# Patient Record
Sex: MALE | Race: WHITE | NOT HISPANIC OR LATINO
[De-identification: names, ages, dates, MRNs, and addresses within clinical notes are randomized per-mention and may not be internally consistent; named-entity substitution may affect disease eponyms.]

---

## 2017-03-02 ENCOUNTER — APPOINTMENT (OUTPATIENT)
Dept: UROLOGY | Facility: CLINIC | Age: 82
End: 2017-03-02

## 2017-03-02 LAB
PSA FREE FLD-MCNC: 20.8 %
PSA FREE SERPL-MCNC: 1.66 NG/ML
PSA SERPL-MCNC: 7.97 NG/ML

## 2017-03-06 ENCOUNTER — APPOINTMENT (OUTPATIENT)
Dept: PULMONOLOGY | Facility: CLINIC | Age: 82
End: 2017-03-06

## 2017-03-06 VITALS
BODY MASS INDEX: 27.3 KG/M2 | HEART RATE: 63 BPM | OXYGEN SATURATION: 94 % | WEIGHT: 206 LBS | SYSTOLIC BLOOD PRESSURE: 122 MMHG | RESPIRATION RATE: 18 BRPM | TEMPERATURE: 97.7 F | DIASTOLIC BLOOD PRESSURE: 75 MMHG | HEIGHT: 73 IN

## 2017-03-06 DIAGNOSIS — M25.519 PAIN IN UNSPECIFIED SHOULDER: ICD-10-CM

## 2017-03-06 DIAGNOSIS — M25.529 PAIN IN UNSPECIFIED ELBOW: ICD-10-CM

## 2017-03-21 ENCOUNTER — APPOINTMENT (OUTPATIENT)
Dept: PULMONOLOGY | Facility: CLINIC | Age: 82
End: 2017-03-21

## 2017-06-05 ENCOUNTER — APPOINTMENT (OUTPATIENT)
Dept: PULMONOLOGY | Facility: CLINIC | Age: 82
End: 2017-06-05

## 2017-06-05 VITALS
HEART RATE: 69 BPM | BODY MASS INDEX: 27.04 KG/M2 | TEMPERATURE: 97.5 F | DIASTOLIC BLOOD PRESSURE: 82 MMHG | HEIGHT: 73 IN | OXYGEN SATURATION: 96 % | SYSTOLIC BLOOD PRESSURE: 144 MMHG | RESPIRATION RATE: 16 BRPM | WEIGHT: 204 LBS

## 2017-06-05 DIAGNOSIS — M43.6 TORTICOLLIS: ICD-10-CM

## 2017-06-05 LAB
BASE EXCESS BLDA CALC-SCNC: -2.7
BLOOD GAS COMMENTS ARTERIAL: NORMAL
GAS PNL BLDA: NORMAL
HCO3 BLDA-SCNC: 21.3
HOROWITZ INDEX BLDA+IHG-RTO: NORMAL
PCO2 BLDA: 35.1
PH BLDA: 7.4
PO2 BLDA: 60
SAO2 % BLDA: NORMAL

## 2017-09-05 ENCOUNTER — APPOINTMENT (OUTPATIENT)
Dept: PULMONOLOGY | Facility: CLINIC | Age: 82
End: 2017-09-05

## 2017-09-07 ENCOUNTER — APPOINTMENT (OUTPATIENT)
Dept: UROLOGY | Facility: CLINIC | Age: 82
End: 2017-09-07
Payer: MEDICARE

## 2017-09-07 LAB
APPEARANCE: CLEAR
BACTERIA: NEGATIVE
BILIRUBIN URINE: NEGATIVE
BLOOD URINE: NEGATIVE
COLOR: ABNORMAL
GLUCOSE QUALITATIVE U: NORMAL MG/DL
HYALINE CASTS: 6 /LPF
KETONES URINE: NEGATIVE
LEUKOCYTE ESTERASE URINE: ABNORMAL
MICROSCOPIC-UA: NORMAL
NITRITE URINE: NEGATIVE
PH URINE: 5.5
PROTEIN URINE: ABNORMAL MG/DL
RED BLOOD CELLS URINE: 6 /HPF
SPECIFIC GRAVITY URINE: 1.02
SQUAMOUS EPITHELIAL CELLS: 0 /HPF
UROBILINOGEN URINE: NORMAL MG/DL
WHITE BLOOD CELLS URINE: 120 /HPF

## 2017-09-07 PROCEDURE — 99214 OFFICE O/P EST MOD 30 MIN: CPT

## 2017-09-08 LAB
CORE LAB FLUID CYTOLOGY: NORMAL
PSA FREE FLD-MCNC: 19.4
PSA FREE SERPL-MCNC: 1.68 NG/ML
PSA SERPL-MCNC: 8.66 NG/ML

## 2017-09-12 ENCOUNTER — APPOINTMENT (OUTPATIENT)
Dept: PULMONOLOGY | Facility: CLINIC | Age: 82
End: 2017-09-12
Payer: MEDICARE

## 2017-09-12 VITALS
TEMPERATURE: 97.6 F | WEIGHT: 204 LBS | BODY MASS INDEX: 27.04 KG/M2 | DIASTOLIC BLOOD PRESSURE: 86 MMHG | HEIGHT: 73 IN | RESPIRATION RATE: 16 BRPM | OXYGEN SATURATION: 96 % | SYSTOLIC BLOOD PRESSURE: 144 MMHG | HEART RATE: 69 BPM

## 2017-09-12 DIAGNOSIS — M17.10 UNILATERAL PRIMARY OSTEOARTHRITIS, UNSPECIFIED KNEE: ICD-10-CM

## 2017-09-12 DIAGNOSIS — M47.9 SPONDYLOSIS, UNSPECIFIED: ICD-10-CM

## 2017-09-12 DIAGNOSIS — R07.81 PLEURODYNIA: ICD-10-CM

## 2017-09-12 LAB
BASE EXCESS BLDA CALC-SCNC: -3.9
BLOOD GAS COMMENTS ARTERIAL: NORMAL
GAS PNL BLDA: NORMAL
HCO3 BLDA-SCNC: 19.7
HOROWITZ INDEX BLDA+IHG-RTO: NORMAL
PCO2 BLDA: 30.2
PH BLDA: 7.43
PO2 BLDA: 74
SAO2 % BLDA: NORMAL

## 2017-09-12 PROCEDURE — 82803 BLOOD GASES ANY COMBINATION: CPT

## 2017-09-12 PROCEDURE — 99214 OFFICE O/P EST MOD 30 MIN: CPT | Mod: 25

## 2017-09-12 PROCEDURE — 36600 WITHDRAWAL OF ARTERIAL BLOOD: CPT

## 2017-12-05 ENCOUNTER — APPOINTMENT (OUTPATIENT)
Dept: PULMONOLOGY | Facility: CLINIC | Age: 82
End: 2017-12-05
Payer: MEDICARE

## 2017-12-05 VITALS
TEMPERATURE: 98.4 F | BODY MASS INDEX: 26.64 KG/M2 | DIASTOLIC BLOOD PRESSURE: 87 MMHG | RESPIRATION RATE: 17 BRPM | HEART RATE: 68 BPM | HEIGHT: 73 IN | WEIGHT: 201 LBS | OXYGEN SATURATION: 92 % | SYSTOLIC BLOOD PRESSURE: 152 MMHG

## 2017-12-05 DIAGNOSIS — M79.605 PAIN IN LEFT LEG: ICD-10-CM

## 2017-12-05 DIAGNOSIS — M54.2 CERVICALGIA: ICD-10-CM

## 2017-12-05 PROCEDURE — 99215 OFFICE O/P EST HI 40 MIN: CPT | Mod: 25

## 2017-12-05 PROCEDURE — 36600 WITHDRAWAL OF ARTERIAL BLOOD: CPT

## 2017-12-05 PROCEDURE — 82803 BLOOD GASES ANY COMBINATION: CPT

## 2017-12-22 ENCOUNTER — APPOINTMENT (OUTPATIENT)
Dept: PULMONOLOGY | Facility: CLINIC | Age: 82
End: 2017-12-22
Payer: MEDICARE

## 2017-12-22 VITALS
OXYGEN SATURATION: 93 % | HEART RATE: 70 BPM | BODY MASS INDEX: 26.77 KG/M2 | HEIGHT: 73 IN | WEIGHT: 202 LBS | TEMPERATURE: 98.3 F | SYSTOLIC BLOOD PRESSURE: 158 MMHG | DIASTOLIC BLOOD PRESSURE: 75 MMHG

## 2017-12-22 DIAGNOSIS — R09.02 HYPOXEMIA: ICD-10-CM

## 2017-12-22 LAB
BASE EXCESS BLDA CALC-SCNC: -2.5
BASE EXCESS BLDA CALC-SCNC: -4.1
BLOOD GAS COMMENTS ARTERIAL: NORMAL
BLOOD GAS COMMENTS ARTERIAL: NORMAL
GAS PNL BLDA: NORMAL
GAS PNL BLDA: NORMAL
HCO3 BLDA-SCNC: 19
HCO3 BLDA-SCNC: NORMAL
HOROWITZ INDEX BLDA+IHG-RTO: NORMAL
HOROWITZ INDEX BLDA+IHG-RTO: NORMAL
PCO2 BLDA: 26.6
PCO2 BLDA: 31.4
PH BLDA: 7.44
PH BLDA: 7.47
PO2 BLDA: 54
PO2 BLDA: 58
SAO2 % BLDA: NORMAL
SAO2 % BLDA: NORMAL

## 2017-12-22 PROCEDURE — 94664 DEMO&/EVAL PT USE INHALER: CPT | Mod: 59

## 2017-12-22 PROCEDURE — 99214 OFFICE O/P EST MOD 30 MIN: CPT | Mod: 25

## 2017-12-22 PROCEDURE — 36600 WITHDRAWAL OF ARTERIAL BLOOD: CPT | Mod: 59

## 2017-12-22 PROCEDURE — 82803 BLOOD GASES ANY COMBINATION: CPT

## 2018-01-03 ENCOUNTER — RX RENEWAL (OUTPATIENT)
Age: 83
End: 2018-01-03

## 2018-03-08 ENCOUNTER — APPOINTMENT (OUTPATIENT)
Dept: UROLOGY | Facility: CLINIC | Age: 83
End: 2018-03-08
Payer: MEDICARE

## 2018-03-08 LAB
APPEARANCE: CLEAR
BACTERIA: NEGATIVE
BILIRUBIN URINE: NEGATIVE
BLOOD URINE: NEGATIVE
COLOR: ABNORMAL
GLUCOSE QUALITATIVE U: NEGATIVE MG/DL
HYALINE CASTS: 1 /LPF
KETONES URINE: NEGATIVE
LEUKOCYTE ESTERASE URINE: ABNORMAL
MICROSCOPIC-UA: NORMAL
NITRITE URINE: NEGATIVE
PH URINE: 5.5
PROTEIN URINE: NEGATIVE MG/DL
PSA FREE FLD-MCNC: 18.3
PSA FREE SERPL-MCNC: 1.14 NG/ML
PSA SERPL-MCNC: 6.23 NG/ML
RED BLOOD CELLS URINE: 2 /HPF
SPECIFIC GRAVITY URINE: 1.01
SQUAMOUS EPITHELIAL CELLS: 0 /HPF
UROBILINOGEN URINE: NEGATIVE MG/DL
WHITE BLOOD CELLS URINE: 143 /HPF

## 2018-03-08 PROCEDURE — 99214 OFFICE O/P EST MOD 30 MIN: CPT

## 2018-03-11 LAB — CORE LAB FLUID CYTOLOGY: NORMAL

## 2018-09-12 ENCOUNTER — APPOINTMENT (OUTPATIENT)
Dept: UROLOGY | Facility: CLINIC | Age: 83
End: 2018-09-12

## 2018-09-17 ENCOUNTER — OUTPATIENT (OUTPATIENT)
Dept: OUTPATIENT SERVICES | Facility: HOSPITAL | Age: 83
LOS: 1 days | End: 2018-09-17
Payer: MEDICARE

## 2018-09-17 VITALS
RESPIRATION RATE: 16 BRPM | WEIGHT: 199.96 LBS | SYSTOLIC BLOOD PRESSURE: 140 MMHG | OXYGEN SATURATION: 95 % | HEIGHT: 73 IN | TEMPERATURE: 99 F | DIASTOLIC BLOOD PRESSURE: 76 MMHG | HEART RATE: 69 BPM

## 2018-09-17 DIAGNOSIS — Z98.890 OTHER SPECIFIED POSTPROCEDURAL STATES: Chronic | ICD-10-CM

## 2018-09-17 DIAGNOSIS — I10 ESSENTIAL (PRIMARY) HYPERTENSION: ICD-10-CM

## 2018-09-17 DIAGNOSIS — I48.1 PERSISTENT ATRIAL FIBRILLATION: ICD-10-CM

## 2018-09-17 DIAGNOSIS — Z29.9 ENCOUNTER FOR PROPHYLACTIC MEASURES, UNSPECIFIED: ICD-10-CM

## 2018-09-17 DIAGNOSIS — Z89.9 ACQUIRED ABSENCE OF LIMB, UNSPECIFIED: Chronic | ICD-10-CM

## 2018-09-17 DIAGNOSIS — G47.34 IDIOPATHIC SLEEP RELATED NONOBSTRUCTIVE ALVEOLAR HYPOVENTILATION: ICD-10-CM

## 2018-09-17 DIAGNOSIS — M16.11 UNILATERAL PRIMARY OSTEOARTHRITIS, RIGHT HIP: ICD-10-CM

## 2018-09-17 DIAGNOSIS — Z95.0 PRESENCE OF CARDIAC PACEMAKER: Chronic | ICD-10-CM

## 2018-09-17 DIAGNOSIS — Z01.818 ENCOUNTER FOR OTHER PREPROCEDURAL EXAMINATION: ICD-10-CM

## 2018-09-17 DIAGNOSIS — J44.9 CHRONIC OBSTRUCTIVE PULMONARY DISEASE, UNSPECIFIED: ICD-10-CM

## 2018-09-17 DIAGNOSIS — Z95.0 PRESENCE OF CARDIAC PACEMAKER: ICD-10-CM

## 2018-09-17 LAB
ANION GAP SERPL CALC-SCNC: 11 MMOL/L — SIGNIFICANT CHANGE UP (ref 5–17)
BLD GP AB SCN SERPL QL: NEGATIVE — SIGNIFICANT CHANGE UP
BUN SERPL-MCNC: 24 MG/DL — HIGH (ref 7–23)
CALCIUM SERPL-MCNC: 9.5 MG/DL — SIGNIFICANT CHANGE UP (ref 8.4–10.5)
CHLORIDE SERPL-SCNC: 105 MMOL/L — SIGNIFICANT CHANGE UP (ref 96–108)
CO2 SERPL-SCNC: 23 MMOL/L — SIGNIFICANT CHANGE UP (ref 22–31)
CREAT SERPL-MCNC: 1.42 MG/DL — HIGH (ref 0.5–1.3)
GLUCOSE SERPL-MCNC: 131 MG/DL — HIGH (ref 70–99)
HBA1C BLD-MCNC: 5.9 % — HIGH (ref 4–5.6)
HCT VFR BLD CALC: 36.8 % — LOW (ref 39–50)
HGB BLD-MCNC: 11.9 G/DL — LOW (ref 13–17)
MCHC RBC-ENTMCNC: 32.1 PG — SIGNIFICANT CHANGE UP (ref 27–34)
MCHC RBC-ENTMCNC: 32.3 GM/DL — SIGNIFICANT CHANGE UP (ref 32–36)
MCV RBC AUTO: 99.2 FL — SIGNIFICANT CHANGE UP (ref 80–100)
MRSA PCR RESULT.: SIGNIFICANT CHANGE UP
PLATELET # BLD AUTO: 150 K/UL — SIGNIFICANT CHANGE UP (ref 150–400)
POTASSIUM SERPL-MCNC: 4.3 MMOL/L — SIGNIFICANT CHANGE UP (ref 3.5–5.3)
POTASSIUM SERPL-SCNC: 4.3 MMOL/L — SIGNIFICANT CHANGE UP (ref 3.5–5.3)
RBC # BLD: 3.71 M/UL — LOW (ref 4.2–5.8)
RBC # FLD: 13.8 % — SIGNIFICANT CHANGE UP (ref 10.3–14.5)
RH IG SCN BLD-IMP: NEGATIVE — SIGNIFICANT CHANGE UP
S AUREUS DNA NOSE QL NAA+PROBE: DETECTED
SODIUM SERPL-SCNC: 139 MMOL/L — SIGNIFICANT CHANGE UP (ref 135–145)
WBC # BLD: 6.72 K/UL — SIGNIFICANT CHANGE UP (ref 3.8–10.5)
WBC # FLD AUTO: 6.72 K/UL — SIGNIFICANT CHANGE UP (ref 3.8–10.5)

## 2018-09-17 PROCEDURE — 83036 HEMOGLOBIN GLYCOSYLATED A1C: CPT

## 2018-09-17 PROCEDURE — G0463: CPT

## 2018-09-17 PROCEDURE — 80048 BASIC METABOLIC PNL TOTAL CA: CPT

## 2018-09-17 PROCEDURE — 87640 STAPH A DNA AMP PROBE: CPT

## 2018-09-17 PROCEDURE — 85027 COMPLETE CBC AUTOMATED: CPT

## 2018-09-17 PROCEDURE — 86901 BLOOD TYPING SEROLOGIC RH(D): CPT

## 2018-09-17 PROCEDURE — 86850 RBC ANTIBODY SCREEN: CPT

## 2018-09-17 PROCEDURE — 87641 MR-STAPH DNA AMP PROBE: CPT

## 2018-09-17 PROCEDURE — 86900 BLOOD TYPING SEROLOGIC ABO: CPT

## 2018-09-17 RX ORDER — TRAMADOL HYDROCHLORIDE 50 MG/1
50 TABLET ORAL ONCE
Qty: 0 | Refills: 0 | Status: DISCONTINUED | OUTPATIENT
Start: 2018-10-01 | End: 2018-10-01

## 2018-09-17 RX ORDER — SODIUM CHLORIDE 9 MG/ML
3 INJECTION INTRAMUSCULAR; INTRAVENOUS; SUBCUTANEOUS EVERY 8 HOURS
Qty: 0 | Refills: 0 | Status: DISCONTINUED | OUTPATIENT
Start: 2018-10-01 | End: 2018-10-03

## 2018-09-17 RX ORDER — LIDOCAINE HCL 20 MG/ML
0.2 VIAL (ML) INJECTION ONCE
Qty: 0 | Refills: 0 | Status: DISCONTINUED | OUTPATIENT
Start: 2018-10-01 | End: 2018-10-03

## 2018-09-17 RX ORDER — CEFAZOLIN SODIUM 1 G
2000 VIAL (EA) INJECTION ONCE
Qty: 0 | Refills: 0 | Status: DISCONTINUED | OUTPATIENT
Start: 2018-10-01 | End: 2018-10-01

## 2018-09-17 NOTE — H&P PST ADULT - PROBLEM SELECTOR PLAN 5
Call cardiology office for last interrogation, if not recent will need to have one done prior to surgery

## 2018-09-17 NOTE — H&P PST ADULT - PMH
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

## 2018-09-17 NOTE — H&P PST ADULT - HISTORY OF PRESENT ILLNESS
Mr. Hutchison is an 83 year old man with PMH 2PPD smoker x50yrs, COPD, Idiopathic Pulmonary Fibrosis, HTN, HLD, Afib on coumadin s/p PPM 2005, BPH and osteoarthritis who has worsening right hip pain now scheduled for right THR.

## 2018-09-17 NOTE — H&P PST ADULT - PROBLEM SELECTOR PLAN 1
Scheduled for right total hip replacement.  MRSA nasal swab pending results  Labs pending.  Preop instructions given including skin prep  Instructed that patient may continue Norco for pain relief, may take DOS with small sips of water  Cardio evaluation done 2 weeks ago, pending results.  Pulm. evaluation scheduled for 9/22/18.

## 2018-09-17 NOTE — H&P PST ADULT - PROBLEM SELECTOR PLAN 6
Instructed to continue inhalers and to use the DOS prior to coming to hospital and to bring Pro-Air with him.  Pulm evaluation pending 9/22/18

## 2018-09-17 NOTE — H&P PST ADULT - ASSESSMENT
CAPRINI SCORE [CLOT]    AGE RELATED RISK FACTORS                                                       MOBILITY RELATED FACTORS  [ ] Age 41-60 years                                            (1 Point)                  [ ] Bed rest                                                        (1 Point)  [ ] Age: 61-74 years                                           (2 Points)                 [ ] Plaster cast                                                   (2 Points)  [x ] Age= 75 years                                              (3 Points)                 [ ] Bed bound for more than 72 hours                 (2 Points)    DISEASE RELATED RISK FACTORS                                               GENDER SPECIFIC FACTORS  [ ] Edema in the lower extremities                       (1 Point)                  [ ] Pregnancy                                                     (1 Point)  [ ] Varicose veins                                               (1 Point)                  [ ] Post-partum < 6 weeks                                   (1 Point)             [x ] BMI > 25 Kg/m2                                            (1 Point)                  [ ] Hormonal therapy  or oral contraception          (1 Point)                 [ ] Sepsis (in the previous month)                        (1 Point)                  [ ] History of pregnancy complications                 (1 point)  [ ] Pneumonia or serious lung disease                                               [ ] Unexplained or recurrent                     (1 Point)           (in the previous month)                               (1 Point)  [ ] Abnormal pulmonary function test                     (1 Point)                 SURGERY RELATED RISK FACTORS  [ ] Acute myocardial infarction                              (1 Point)                 [ ]  Section                                             (1 Point)  [ ] Congestive heart failure (in the previous month)  (1 Point)               [ ] Minor surgery                                                  (1 Point)   [ ] Inflammatory bowel disease                             (1 Point)                 [ ] Arthroscopic surgery                                        (2 Points)  [ ] Central venous access                                      (2 Points)                [ ] General surgery lasting more than 45 minutes   (2 Points)       [ ] Stroke (in the previous month)                          (5 Points)               [ x] Elective arthroplasty                                         (5 Points)                                                                                                                                               HEMATOLOGY RELATED FACTORS                                                 TRAUMA RELATED RISK FACTORS  [ ] Prior episodes of VTE                                     (3 Points)                 [ ] Fracture of the hip, pelvis, or leg                       (5 Points)  [ ] Positive family history for VTE                         (3 Points)                 [ ] Acute spinal cord injury (in the previous month)  (5 Points)  [ ] Prothrombin 02126 A                                     (3 Points)                 [ ] Paralysis  (less than 1 month)                             (5 Points)  [ ] Factor V Leiden                                             (3 Points)                  [ ] Multiple Trauma within 1 month                        (5 Points)  [ ] Lupus anticoagulants                                     (3 Points)                                                           [ ] Anticardiolipin antibodies                               (3 Points)                                                       [ ] High homocysteine in the blood                      (3 Points)                                             [ ] Other congenital or acquired thrombophilia      (3 Points)                                                [ ] Heparin induced thrombocytopenia                  (3 Points)                                          Total Score [     9     ]

## 2018-09-17 NOTE — H&P PST ADULT - PSH
H/O amputation  age 19, R great toe, due to growth "tumor"  H/O arthroscopic knee surgery  Both knees, 2013  H/O cardiac pacemaker  2005  H/O repair of rotator cuff  2012

## 2018-09-17 NOTE — H&P PST ADULT - OTHER CARE PROVIDERS
ambulated out of ed Dr Blancas, cardio at [facility], [phone]

## 2018-09-17 NOTE — H&P PST ADULT - NSANTHOSAYNRD_GEN_A_CORE
No. CISCO screening performed.  STOP BANG Legend: 0-2 = LOW Risk; 3-4 = INTERMEDIATE Risk; 5-8 = HIGH Risk

## 2018-09-18 RX ORDER — MUPIROCIN 20 MG/G
1 OINTMENT TOPICAL
Qty: 22 | Refills: 0 | OUTPATIENT
Start: 2018-09-18 | End: 2018-09-22

## 2018-09-30 ENCOUNTER — TRANSCRIPTION ENCOUNTER (OUTPATIENT)
Age: 83
End: 2018-09-30

## 2018-10-01 ENCOUNTER — RESULT REVIEW (OUTPATIENT)
Age: 83
End: 2018-10-01

## 2018-10-01 ENCOUNTER — INPATIENT (INPATIENT)
Facility: HOSPITAL | Age: 83
LOS: 1 days | Discharge: HOME | DRG: 470 | End: 2018-10-03
Attending: ORTHOPAEDIC SURGERY | Admitting: ORTHOPAEDIC SURGERY
Payer: MEDICARE

## 2018-10-01 VITALS
WEIGHT: 199.96 LBS | OXYGEN SATURATION: 97 % | SYSTOLIC BLOOD PRESSURE: 154 MMHG | TEMPERATURE: 98 F | RESPIRATION RATE: 16 BRPM | DIASTOLIC BLOOD PRESSURE: 86 MMHG | HEART RATE: 70 BPM | HEIGHT: 73 IN

## 2018-10-01 DIAGNOSIS — Z98.890 OTHER SPECIFIED POSTPROCEDURAL STATES: Chronic | ICD-10-CM

## 2018-10-01 DIAGNOSIS — Z89.9 ACQUIRED ABSENCE OF LIMB, UNSPECIFIED: Chronic | ICD-10-CM

## 2018-10-01 DIAGNOSIS — M16.11 UNILATERAL PRIMARY OSTEOARTHRITIS, RIGHT HIP: ICD-10-CM

## 2018-10-01 DIAGNOSIS — Z95.0 PRESENCE OF CARDIAC PACEMAKER: Chronic | ICD-10-CM

## 2018-10-01 LAB
ANION GAP SERPL CALC-SCNC: 8 MMOL/L — SIGNIFICANT CHANGE UP (ref 5–17)
BUN SERPL-MCNC: 20 MG/DL — SIGNIFICANT CHANGE UP (ref 7–23)
CALCIUM SERPL-MCNC: 8.4 MG/DL — SIGNIFICANT CHANGE UP (ref 8.4–10.5)
CHLORIDE SERPL-SCNC: 108 MMOL/L — SIGNIFICANT CHANGE UP (ref 96–108)
CO2 SERPL-SCNC: 26 MMOL/L — SIGNIFICANT CHANGE UP (ref 22–31)
CREAT SERPL-MCNC: 1.06 MG/DL — SIGNIFICANT CHANGE UP (ref 0.5–1.3)
GLUCOSE BLDC GLUCOMTR-MCNC: 107 MG/DL — HIGH (ref 70–99)
GLUCOSE SERPL-MCNC: 119 MG/DL — HIGH (ref 70–99)
HCT VFR BLD CALC: 29.7 % — LOW (ref 39–50)
HGB BLD-MCNC: 9.9 G/DL — LOW (ref 13–17)
INR BLD: 1.22 RATIO — HIGH (ref 0.88–1.16)
MCHC RBC-ENTMCNC: 32.6 PG — SIGNIFICANT CHANGE UP (ref 27–34)
MCHC RBC-ENTMCNC: 33.2 GM/DL — SIGNIFICANT CHANGE UP (ref 32–36)
MCV RBC AUTO: 98.1 FL — SIGNIFICANT CHANGE UP (ref 80–100)
PLATELET # BLD AUTO: 140 K/UL — LOW (ref 150–400)
POTASSIUM SERPL-MCNC: 3.9 MMOL/L — SIGNIFICANT CHANGE UP (ref 3.5–5.3)
POTASSIUM SERPL-SCNC: 3.9 MMOL/L — SIGNIFICANT CHANGE UP (ref 3.5–5.3)
PROTHROM AB SERPL-ACNC: 13.3 SEC — HIGH (ref 9.8–12.7)
RBC # BLD: 3.03 M/UL — LOW (ref 4.2–5.8)
RBC # FLD: 12.4 % — SIGNIFICANT CHANGE UP (ref 10.3–14.5)
RH IG SCN BLD-IMP: NEGATIVE — SIGNIFICANT CHANGE UP
SODIUM SERPL-SCNC: 142 MMOL/L — SIGNIFICANT CHANGE UP (ref 135–145)
WBC # BLD: 6.4 K/UL — SIGNIFICANT CHANGE UP (ref 3.8–10.5)
WBC # FLD AUTO: 6.4 K/UL — SIGNIFICANT CHANGE UP (ref 3.8–10.5)

## 2018-10-01 PROCEDURE — 88311 DECALCIFY TISSUE: CPT | Mod: 26

## 2018-10-01 PROCEDURE — 88305 TISSUE EXAM BY PATHOLOGIST: CPT | Mod: 26

## 2018-10-01 PROCEDURE — 72170 X-RAY EXAM OF PELVIS: CPT | Mod: 26

## 2018-10-01 RX ORDER — SODIUM CHLORIDE 9 MG/ML
1000 INJECTION, SOLUTION INTRAVENOUS
Qty: 0 | Refills: 0 | Status: DISCONTINUED | OUTPATIENT
Start: 2018-10-01 | End: 2018-10-03

## 2018-10-01 RX ORDER — ACETAMINOPHEN 500 MG
1000 TABLET ORAL ONCE
Qty: 0 | Refills: 0 | Status: COMPLETED | OUTPATIENT
Start: 2018-10-01 | End: 2018-10-01

## 2018-10-01 RX ORDER — TRAMADOL HYDROCHLORIDE 50 MG/1
50 TABLET ORAL EVERY 6 HOURS
Qty: 0 | Refills: 0 | Status: DISCONTINUED | OUTPATIENT
Start: 2018-10-01 | End: 2018-10-03

## 2018-10-01 RX ORDER — CELECOXIB 200 MG/1
200 CAPSULE ORAL EVERY 12 HOURS
Qty: 0 | Refills: 0 | Status: DISCONTINUED | OUTPATIENT
Start: 2018-10-03 | End: 2018-10-03

## 2018-10-01 RX ORDER — OXYCODONE HYDROCHLORIDE 5 MG/1
5 TABLET ORAL EVERY 4 HOURS
Qty: 0 | Refills: 0 | Status: DISCONTINUED | OUTPATIENT
Start: 2018-10-01 | End: 2018-10-03

## 2018-10-01 RX ORDER — HYDROMORPHONE HYDROCHLORIDE 2 MG/ML
0.25 INJECTION INTRAMUSCULAR; INTRAVENOUS; SUBCUTANEOUS
Qty: 0 | Refills: 0 | Status: DISCONTINUED | OUTPATIENT
Start: 2018-10-01 | End: 2018-10-01

## 2018-10-01 RX ORDER — TAMSULOSIN HYDROCHLORIDE 0.4 MG/1
0.4 CAPSULE ORAL AT BEDTIME
Qty: 0 | Refills: 0 | Status: DISCONTINUED | OUTPATIENT
Start: 2018-10-01 | End: 2018-10-03

## 2018-10-01 RX ORDER — KETOROLAC TROMETHAMINE 30 MG/ML
15 SYRINGE (ML) INJECTION EVERY 8 HOURS
Qty: 0 | Refills: 0 | Status: DISCONTINUED | OUTPATIENT
Start: 2018-10-01 | End: 2018-10-03

## 2018-10-01 RX ORDER — ACETAMINOPHEN 500 MG
975 TABLET ORAL ONCE
Qty: 0 | Refills: 0 | Status: COMPLETED | OUTPATIENT
Start: 2018-10-01 | End: 2018-10-01

## 2018-10-01 RX ORDER — SODIUM CHLORIDE 9 MG/ML
500 INJECTION INTRAMUSCULAR; INTRAVENOUS; SUBCUTANEOUS ONCE
Qty: 0 | Refills: 0 | Status: COMPLETED | OUTPATIENT
Start: 2018-10-01 | End: 2018-10-01

## 2018-10-01 RX ORDER — SIMVASTATIN 20 MG/1
20 TABLET, FILM COATED ORAL AT BEDTIME
Qty: 0 | Refills: 0 | Status: DISCONTINUED | OUTPATIENT
Start: 2018-10-01 | End: 2018-10-03

## 2018-10-01 RX ORDER — DOCUSATE SODIUM 100 MG
100 CAPSULE ORAL THREE TIMES A DAY
Qty: 0 | Refills: 0 | Status: DISCONTINUED | OUTPATIENT
Start: 2018-10-01 | End: 2018-10-03

## 2018-10-01 RX ORDER — ONDANSETRON 8 MG/1
4 TABLET, FILM COATED ORAL EVERY 6 HOURS
Qty: 0 | Refills: 0 | Status: DISCONTINUED | OUTPATIENT
Start: 2018-10-01 | End: 2018-10-03

## 2018-10-01 RX ORDER — BUDESONIDE AND FORMOTEROL FUMARATE DIHYDRATE 160; 4.5 UG/1; UG/1
2 AEROSOL RESPIRATORY (INHALATION)
Qty: 0 | Refills: 0 | Status: DISCONTINUED | OUTPATIENT
Start: 2018-10-01 | End: 2018-10-03

## 2018-10-01 RX ORDER — GABAPENTIN 400 MG/1
100 CAPSULE ORAL ONCE
Qty: 0 | Refills: 0 | Status: COMPLETED | OUTPATIENT
Start: 2018-10-01 | End: 2018-10-01

## 2018-10-01 RX ORDER — ACETAMINOPHEN 500 MG
975 TABLET ORAL EVERY 8 HOURS
Qty: 0 | Refills: 0 | Status: DISCONTINUED | OUTPATIENT
Start: 2018-10-02 | End: 2018-10-03

## 2018-10-01 RX ORDER — GABAPENTIN 400 MG/1
100 CAPSULE ORAL DAILY
Qty: 0 | Refills: 0 | Status: DISCONTINUED | OUTPATIENT
Start: 2018-10-01 | End: 2018-10-03

## 2018-10-01 RX ORDER — ACETAMINOPHEN 500 MG
1000 TABLET ORAL ONCE
Qty: 0 | Refills: 0 | Status: COMPLETED | OUTPATIENT
Start: 2018-10-02 | End: 2018-10-02

## 2018-10-01 RX ORDER — IPRATROPIUM/ALBUTEROL SULFATE 18-103MCG
3 AEROSOL WITH ADAPTER (GRAM) INHALATION EVERY 6 HOURS
Qty: 0 | Refills: 0 | Status: DISCONTINUED | OUTPATIENT
Start: 2018-10-01 | End: 2018-10-03

## 2018-10-01 RX ORDER — CEFAZOLIN SODIUM 1 G
2000 VIAL (EA) INJECTION EVERY 8 HOURS
Qty: 0 | Refills: 0 | Status: COMPLETED | OUTPATIENT
Start: 2018-10-01 | End: 2018-10-02

## 2018-10-01 RX ORDER — TIOTROPIUM BROMIDE 18 UG/1
1 CAPSULE ORAL; RESPIRATORY (INHALATION) DAILY
Qty: 0 | Refills: 0 | Status: DISCONTINUED | OUTPATIENT
Start: 2018-10-01 | End: 2018-10-03

## 2018-10-01 RX ORDER — WARFARIN SODIUM 2.5 MG/1
5 TABLET ORAL ONCE
Qty: 0 | Refills: 0 | Status: COMPLETED | OUTPATIENT
Start: 2018-10-01 | End: 2018-10-01

## 2018-10-01 RX ORDER — PANTOPRAZOLE SODIUM 20 MG/1
40 TABLET, DELAYED RELEASE ORAL DAILY
Qty: 0 | Refills: 0 | Status: DISCONTINUED | OUTPATIENT
Start: 2018-10-01 | End: 2018-10-03

## 2018-10-01 RX ORDER — OXYCODONE HYDROCHLORIDE 5 MG/1
10 TABLET ORAL EVERY 4 HOURS
Qty: 0 | Refills: 0 | Status: DISCONTINUED | OUTPATIENT
Start: 2018-10-01 | End: 2018-10-03

## 2018-10-01 RX ORDER — ALBUTEROL 90 UG/1
2 AEROSOL, METERED ORAL EVERY 6 HOURS
Qty: 0 | Refills: 0 | Status: DISCONTINUED | OUTPATIENT
Start: 2018-10-01 | End: 2018-10-03

## 2018-10-01 RX ORDER — SODIUM CHLORIDE 9 MG/ML
500 INJECTION INTRAMUSCULAR; INTRAVENOUS; SUBCUTANEOUS ONCE
Qty: 0 | Refills: 0 | Status: COMPLETED | OUTPATIENT
Start: 2018-10-02 | End: 2018-10-02

## 2018-10-01 RX ORDER — INFLUENZA VIRUS VACCINE 15; 15; 15; 15 UG/.5ML; UG/.5ML; UG/.5ML; UG/.5ML
0.5 SUSPENSION INTRAMUSCULAR ONCE
Qty: 0 | Refills: 0 | Status: COMPLETED | OUTPATIENT
Start: 2018-10-01 | End: 2018-10-03

## 2018-10-01 RX ORDER — SENNA PLUS 8.6 MG/1
2 TABLET ORAL AT BEDTIME
Qty: 0 | Refills: 0 | Status: DISCONTINUED | OUTPATIENT
Start: 2018-10-01 | End: 2018-10-03

## 2018-10-01 RX ORDER — AMLODIPINE BESYLATE 2.5 MG/1
5 TABLET ORAL DAILY
Qty: 0 | Refills: 0 | Status: DISCONTINUED | OUTPATIENT
Start: 2018-10-01 | End: 2018-10-03

## 2018-10-01 RX ORDER — LOSARTAN POTASSIUM 100 MG/1
50 TABLET, FILM COATED ORAL DAILY
Qty: 0 | Refills: 0 | Status: DISCONTINUED | OUTPATIENT
Start: 2018-10-01 | End: 2018-10-03

## 2018-10-01 RX ORDER — PANTOPRAZOLE SODIUM 20 MG/1
40 TABLET, DELAYED RELEASE ORAL ONCE
Qty: 0 | Refills: 0 | Status: COMPLETED | OUTPATIENT
Start: 2018-10-01 | End: 2018-10-01

## 2018-10-01 RX ORDER — ONDANSETRON 8 MG/1
4 TABLET, FILM COATED ORAL ONCE
Qty: 0 | Refills: 0 | Status: DISCONTINUED | OUTPATIENT
Start: 2018-10-01 | End: 2018-10-01

## 2018-10-01 RX ADMIN — Medication 1000 MILLIGRAM(S): at 18:55

## 2018-10-01 RX ADMIN — Medication 15 MILLIGRAM(S): at 22:06

## 2018-10-01 RX ADMIN — TAMSULOSIN HYDROCHLORIDE 0.4 MILLIGRAM(S): 0.4 CAPSULE ORAL at 22:07

## 2018-10-01 RX ADMIN — WARFARIN SODIUM 5 MILLIGRAM(S): 2.5 TABLET ORAL at 22:10

## 2018-10-01 RX ADMIN — SODIUM CHLORIDE 3 MILLILITER(S): 9 INJECTION INTRAMUSCULAR; INTRAVENOUS; SUBCUTANEOUS at 22:11

## 2018-10-01 RX ADMIN — TRAMADOL HYDROCHLORIDE 50 MILLIGRAM(S): 50 TABLET ORAL at 16:32

## 2018-10-01 RX ADMIN — HYDROMORPHONE HYDROCHLORIDE 0.25 MILLIGRAM(S): 2 INJECTION INTRAMUSCULAR; INTRAVENOUS; SUBCUTANEOUS at 13:15

## 2018-10-01 RX ADMIN — Medication 100 MILLIGRAM(S): at 19:00

## 2018-10-01 RX ADMIN — Medication 400 MILLIGRAM(S): at 13:21

## 2018-10-01 RX ADMIN — Medication 1000 MILLIGRAM(S): at 13:48

## 2018-10-01 RX ADMIN — Medication 15 MILLIGRAM(S): at 22:36

## 2018-10-01 RX ADMIN — Medication 400 MILLIGRAM(S): at 18:40

## 2018-10-01 RX ADMIN — SIMVASTATIN 20 MILLIGRAM(S): 20 TABLET, FILM COATED ORAL at 22:07

## 2018-10-01 RX ADMIN — SODIUM CHLORIDE 1000 MILLILITER(S): 9 INJECTION INTRAMUSCULAR; INTRAVENOUS; SUBCUTANEOUS at 16:28

## 2018-10-01 RX ADMIN — Medication 15 MILLIGRAM(S): at 13:21

## 2018-10-01 RX ADMIN — HYDROMORPHONE HYDROCHLORIDE 0.25 MILLIGRAM(S): 2 INJECTION INTRAMUSCULAR; INTRAVENOUS; SUBCUTANEOUS at 13:03

## 2018-10-01 RX ADMIN — GABAPENTIN 100 MILLIGRAM(S): 400 CAPSULE ORAL at 06:06

## 2018-10-01 RX ADMIN — TIOTROPIUM BROMIDE 1 CAPSULE(S): 18 CAPSULE ORAL; RESPIRATORY (INHALATION) at 18:50

## 2018-10-01 RX ADMIN — BUDESONIDE AND FORMOTEROL FUMARATE DIHYDRATE 2 PUFF(S): 160; 4.5 AEROSOL RESPIRATORY (INHALATION) at 17:50

## 2018-10-01 RX ADMIN — Medication 3 MILLILITER(S): at 17:50

## 2018-10-01 RX ADMIN — SODIUM CHLORIDE 3 MILLILITER(S): 9 INJECTION INTRAMUSCULAR; INTRAVENOUS; SUBCUTANEOUS at 06:07

## 2018-10-01 RX ADMIN — Medication 15 MILLIGRAM(S): at 13:48

## 2018-10-01 RX ADMIN — SODIUM CHLORIDE 3 MILLILITER(S): 9 INJECTION INTRAMUSCULAR; INTRAVENOUS; SUBCUTANEOUS at 13:12

## 2018-10-01 RX ADMIN — TRAMADOL HYDROCHLORIDE 50 MILLIGRAM(S): 50 TABLET ORAL at 07:21

## 2018-10-01 RX ADMIN — PANTOPRAZOLE SODIUM 40 MILLIGRAM(S): 20 TABLET, DELAYED RELEASE ORAL at 06:06

## 2018-10-01 RX ADMIN — Medication 975 MILLIGRAM(S): at 06:07

## 2018-10-01 RX ADMIN — TRAMADOL HYDROCHLORIDE 50 MILLIGRAM(S): 50 TABLET ORAL at 17:02

## 2018-10-01 RX ADMIN — Medication 975 MILLIGRAM(S): at 06:06

## 2018-10-01 RX ADMIN — Medication 100 MILLIGRAM(S): at 22:07

## 2018-10-01 RX ADMIN — SODIUM CHLORIDE 75 MILLILITER(S): 9 INJECTION, SOLUTION INTRAVENOUS at 13:26

## 2018-10-01 NOTE — PATIENT PROFILE ADULT. - PMH
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

## 2018-10-01 NOTE — PROCEDURE NOTE - ADDITIONAL PROCEDURE DETAILS
call to [facility] for patient preop hip surgery  no need for magnet use as surgery is greater than 15 cm away from device   no need for reintegration  post op   [id number]

## 2018-10-01 NOTE — PHYSICAL THERAPY INITIAL EVALUATION ADULT - PERTINENT HX OF CURRENT PROBLEM, REHAB EVAL
Pt is an 83 year old male w/p R JERARDO on 10/1/18 with PMH 2PPD smoker x50yrs, COPD, Idiopathic Pulmonary Fibrosis, HTN, HLD, Afib on coumadin s/p PPM 2005, BPH

## 2018-10-01 NOTE — BRIEF OPERATIVE NOTE - PROCEDURE
<<-----Click on this checkbox to enter Procedure Total hip arthroplasty  10/01/2018    Active  ARMEN

## 2018-10-01 NOTE — PHYSICAL THERAPY INITIAL EVALUATION ADULT - PLANNED THERAPY INTERVENTIONS, PT EVAL
gait training/GOAL: Pt will perform 2 stairs with or without U HR as needed within 3-4weeks./bed mobility training/transfer training

## 2018-10-01 NOTE — PRE-OP CHECKLIST - MUPIRONCIN END DATE
Gen: No acute distress, non toxic  HEENT: Mucous membranes moist, right eye blind, slightly sunken in. light ecchyoesis/bruising above right eye on forehead.   CV: RRR, nl s1/s2.  Resp: CTAB, normal rate and effort  GI: Abdomen soft, NT, ND. No rebound, no guarding  : No CVAT  Neuro: A&O x 1.5, moving all 4 extremities  MSK: No spine or joint tenderness to palpation  Skin: No rashes. intact and perfused. 01-Oct-2018

## 2018-10-01 NOTE — PHYSICAL THERAPY INITIAL EVALUATION ADULT - ADDITIONAL COMMENTS
As per pt, pt lives alone in a private house w/ 2 steps to enter no handrail. PTA pt was independent w/ all ADLs and required a cane for ambulation.

## 2018-10-01 NOTE — CHART NOTE - NSCHARTNOTEFT_GEN_A_CORE
Resting without complaints.   Seen in PACU   No Chest Pain, SOB, N/V.    T(C): 36.6 (10-01-18 @ 14:00), Max: 36.7 (10-01-18 @ 06:11)  HR: 69 (10-01-18 @ 14:00) (69 - 70)  BP: 116/65 (10-01-18 @ 13:45) (95/56 - 154/86)  RR: 17 (10-01-18 @ 14:00) (16 - 18)  SpO2: 98% (10-01-18 @ 14:00) (94% - 100%)      Exam:  Alert and [place], No Acute Distress  Card: +S1/S2, RRR  Pulm: CTAB  Abdomen soft / benign  [place]  [n ]   EXT   RLE       Dressing (s) C/D  [x ]           Calves soft       (+) DF  PF;  EHL / FHL 5/5        No Sensory Deficits noted        2+ pulses    Xray:----                          9.9<L>  6.4   )-----------( 140<L>    ( 01 Oct 2018 13:22 )             29.7<L>     10-01    142  |  108  |  20  ----------------------------<  119<H>  3.9   |  26  |  1.06        A/P: S/p R Total hip arthroplasty    -PT/OT-WBAT-posterior precautions  -Chk AM Labs  -DVT PPx: coumadin  -Pain Control PO/IV Pain Rx  -Continue Current Tx  -Dispo planning: anticipate home      ***See Above  [facility]  Orthopedics  B: [phone]  S: [phone] Resting without complaints.   Seen in PACU   No Chest Pain, SOB, N/V.    T(C): 36.6 (10-01-18 @ 14:00), Max: 36.7 (10-01-18 @ 06:11)  HR: 69 (10-01-18 @ 14:00) (69 - 70)  BP: 116/65 (10-01-18 @ 13:45) (95/56 - 154/86)  RR: 17 (10-01-18 @ 14:00) (16 - 18)  SpO2: 98% (10-01-18 @ 14:00) (94% - 100%)      Exam:  Alert and [place], No Acute Distress  Card: +S1/S2, RRR  Pulm: CTAB  Abdomen soft / benign  [place]  [n ]   EXT   RLE       Dressing (s) C/D  [x ]           Calves soft       (+) DF  PF;  EHL / FHL 5/5        No Sensory Deficits noted        2+ pulses    Xray:---- prosthesis in good alignment                          9.9<L>  6.4   )-----------( 140<L>    ( 01 Oct 2018 13:22 )             29.7<L>     10-01    142  |  108  |  20  ----------------------------<  119<H>  3.9   |  26  |  1.06        A/P: S/p R Total hip arthroplasty    -PT/OT-WBAT-posterior precautions  -Chk AM Labs  -DVT PPx: coumadin  -Pain Control PO/IV Pain Rx  -Continue Current Tx  -Dispo planning: anticipate home      ***See Above  [facility]  Orthopedics  B: [phone]  S: [phone]

## 2018-10-02 ENCOUNTER — TRANSCRIPTION ENCOUNTER (OUTPATIENT)
Age: 83
End: 2018-10-02

## 2018-10-02 LAB
ANION GAP SERPL CALC-SCNC: 11 MMOL/L — SIGNIFICANT CHANGE UP (ref 5–17)
BUN SERPL-MCNC: 19 MG/DL — SIGNIFICANT CHANGE UP (ref 7–23)
CALCIUM SERPL-MCNC: 8.5 MG/DL — SIGNIFICANT CHANGE UP (ref 8.4–10.5)
CHLORIDE SERPL-SCNC: 107 MMOL/L — SIGNIFICANT CHANGE UP (ref 96–108)
CO2 SERPL-SCNC: 24 MMOL/L — SIGNIFICANT CHANGE UP (ref 22–31)
CREAT SERPL-MCNC: 1.07 MG/DL — SIGNIFICANT CHANGE UP (ref 0.5–1.3)
GLUCOSE SERPL-MCNC: 125 MG/DL — HIGH (ref 70–99)
HCT VFR BLD CALC: 28.5 % — LOW (ref 39–50)
HGB BLD-MCNC: 9.4 G/DL — LOW (ref 13–17)
INR BLD: 1.24 RATIO — HIGH (ref 0.88–1.16)
MCHC RBC-ENTMCNC: 32.3 PG — SIGNIFICANT CHANGE UP (ref 27–34)
MCHC RBC-ENTMCNC: 33 GM/DL — SIGNIFICANT CHANGE UP (ref 32–36)
MCV RBC AUTO: 97.9 FL — SIGNIFICANT CHANGE UP (ref 80–100)
PLATELET # BLD AUTO: 123 K/UL — LOW (ref 150–400)
POTASSIUM SERPL-MCNC: 4.6 MMOL/L — SIGNIFICANT CHANGE UP (ref 3.5–5.3)
POTASSIUM SERPL-SCNC: 4.6 MMOL/L — SIGNIFICANT CHANGE UP (ref 3.5–5.3)
PROTHROM AB SERPL-ACNC: 14.1 SEC — HIGH (ref 10–13.1)
RBC # BLD: 2.91 M/UL — LOW (ref 4.2–5.8)
RBC # FLD: 13.7 % — SIGNIFICANT CHANGE UP (ref 10.3–14.5)
SODIUM SERPL-SCNC: 142 MMOL/L — SIGNIFICANT CHANGE UP (ref 135–145)
WBC # BLD: 6.08 K/UL — SIGNIFICANT CHANGE UP (ref 3.8–10.5)
WBC # FLD AUTO: 6.08 K/UL — SIGNIFICANT CHANGE UP (ref 3.8–10.5)

## 2018-10-02 RX ORDER — LOSARTAN POTASSIUM 100 MG/1
2 TABLET, FILM COATED ORAL
Refills: 0 | DISCHARGE
Start: 2018-10-02

## 2018-10-02 RX ORDER — SIMVASTATIN 20 MG/1
1 TABLET, FILM COATED ORAL
Qty: 0 | Refills: 0 | COMMUNITY

## 2018-10-02 RX ORDER — OXYCODONE HYDROCHLORIDE 5 MG/1
1 TABLET ORAL
Qty: 0 | Refills: 0 | COMMUNITY
Start: 2018-10-02

## 2018-10-02 RX ORDER — SIMVASTATIN 20 MG/1
1 TABLET, FILM COATED ORAL
Qty: 0 | Refills: 0 | DISCHARGE
Start: 2018-10-02

## 2018-10-02 RX ORDER — PANTOPRAZOLE SODIUM 20 MG/1
1 TABLET, DELAYED RELEASE ORAL
Qty: 0 | Refills: 0 | COMMUNITY
Start: 2018-10-02

## 2018-10-02 RX ORDER — ACETAMINOPHEN 500 MG
3 TABLET ORAL
Qty: 0 | Refills: 0 | DISCHARGE
Start: 2018-10-02

## 2018-10-02 RX ORDER — GABAPENTIN 400 MG/1
1 CAPSULE ORAL
Qty: 0 | Refills: 0 | DISCHARGE
Start: 2018-10-02

## 2018-10-02 RX ORDER — DOCUSATE SODIUM 100 MG
1 CAPSULE ORAL
Qty: 0 | Refills: 0 | DISCHARGE
Start: 2018-10-02

## 2018-10-02 RX ORDER — WARFARIN SODIUM 2.5 MG/1
3 TABLET ORAL ONCE
Qty: 0 | Refills: 0 | Status: COMPLETED | OUTPATIENT
Start: 2018-10-02 | End: 2018-10-02

## 2018-10-02 RX ORDER — TRAMADOL HYDROCHLORIDE 50 MG/1
1 TABLET ORAL
Qty: 0 | Refills: 0 | COMMUNITY
Start: 2018-10-02

## 2018-10-02 RX ADMIN — Medication 15 MILLIGRAM(S): at 22:35

## 2018-10-02 RX ADMIN — Medication 15 MILLIGRAM(S): at 05:55

## 2018-10-02 RX ADMIN — TAMSULOSIN HYDROCHLORIDE 0.4 MILLIGRAM(S): 0.4 CAPSULE ORAL at 22:35

## 2018-10-02 RX ADMIN — PANTOPRAZOLE SODIUM 40 MILLIGRAM(S): 20 TABLET, DELAYED RELEASE ORAL at 11:19

## 2018-10-02 RX ADMIN — Medication 3 MILLILITER(S): at 00:11

## 2018-10-02 RX ADMIN — Medication 15 MILLIGRAM(S): at 13:08

## 2018-10-02 RX ADMIN — OXYCODONE HYDROCHLORIDE 10 MILLIGRAM(S): 5 TABLET ORAL at 11:51

## 2018-10-02 RX ADMIN — SODIUM CHLORIDE 3 MILLILITER(S): 9 INJECTION INTRAMUSCULAR; INTRAVENOUS; SUBCUTANEOUS at 22:37

## 2018-10-02 RX ADMIN — Medication 100 MILLIGRAM(S): at 03:26

## 2018-10-02 RX ADMIN — OXYCODONE HYDROCHLORIDE 10 MILLIGRAM(S): 5 TABLET ORAL at 23:06

## 2018-10-02 RX ADMIN — Medication 15 MILLIGRAM(S): at 23:05

## 2018-10-02 RX ADMIN — OXYCODONE HYDROCHLORIDE 10 MILLIGRAM(S): 5 TABLET ORAL at 22:36

## 2018-10-02 RX ADMIN — WARFARIN SODIUM 3 MILLIGRAM(S): 2.5 TABLET ORAL at 22:35

## 2018-10-02 RX ADMIN — GABAPENTIN 100 MILLIGRAM(S): 400 CAPSULE ORAL at 11:19

## 2018-10-02 RX ADMIN — Medication 100 MILLIGRAM(S): at 13:08

## 2018-10-02 RX ADMIN — TIOTROPIUM BROMIDE 1 CAPSULE(S): 18 CAPSULE ORAL; RESPIRATORY (INHALATION) at 13:09

## 2018-10-02 RX ADMIN — BUDESONIDE AND FORMOTEROL FUMARATE DIHYDRATE 2 PUFF(S): 160; 4.5 AEROSOL RESPIRATORY (INHALATION) at 05:38

## 2018-10-02 RX ADMIN — Medication 975 MILLIGRAM(S): at 11:19

## 2018-10-02 RX ADMIN — Medication 3 MILLILITER(S): at 05:38

## 2018-10-02 RX ADMIN — SODIUM CHLORIDE 1000 MILLILITER(S): 9 INJECTION INTRAMUSCULAR; INTRAVENOUS; SUBCUTANEOUS at 07:42

## 2018-10-02 RX ADMIN — Medication 975 MILLIGRAM(S): at 17:14

## 2018-10-02 RX ADMIN — Medication 100 MILLIGRAM(S): at 05:39

## 2018-10-02 RX ADMIN — Medication 100 MILLIGRAM(S): at 22:35

## 2018-10-02 RX ADMIN — LOSARTAN POTASSIUM 50 MILLIGRAM(S): 100 TABLET, FILM COATED ORAL at 05:39

## 2018-10-02 RX ADMIN — SODIUM CHLORIDE 3 MILLILITER(S): 9 INJECTION INTRAMUSCULAR; INTRAVENOUS; SUBCUTANEOUS at 05:45

## 2018-10-02 RX ADMIN — AMLODIPINE BESYLATE 5 MILLIGRAM(S): 2.5 TABLET ORAL at 05:39

## 2018-10-02 RX ADMIN — OXYCODONE HYDROCHLORIDE 10 MILLIGRAM(S): 5 TABLET ORAL at 00:12

## 2018-10-02 RX ADMIN — SODIUM CHLORIDE 3 MILLILITER(S): 9 INJECTION INTRAMUSCULAR; INTRAVENOUS; SUBCUTANEOUS at 13:03

## 2018-10-02 RX ADMIN — Medication 400 MILLIGRAM(S): at 03:00

## 2018-10-02 RX ADMIN — OXYCODONE HYDROCHLORIDE 10 MILLIGRAM(S): 5 TABLET ORAL at 07:35

## 2018-10-02 RX ADMIN — Medication 3 MILLILITER(S): at 13:09

## 2018-10-02 RX ADMIN — OXYCODONE HYDROCHLORIDE 10 MILLIGRAM(S): 5 TABLET ORAL at 00:42

## 2018-10-02 RX ADMIN — OXYCODONE HYDROCHLORIDE 10 MILLIGRAM(S): 5 TABLET ORAL at 15:52

## 2018-10-02 RX ADMIN — SODIUM CHLORIDE 75 MILLILITER(S): 9 INJECTION, SOLUTION INTRAVENOUS at 03:00

## 2018-10-02 RX ADMIN — SIMVASTATIN 20 MILLIGRAM(S): 20 TABLET, FILM COATED ORAL at 22:35

## 2018-10-02 RX ADMIN — Medication 3 MILLILITER(S): at 17:14

## 2018-10-02 RX ADMIN — Medication 1000 MILLIGRAM(S): at 03:15

## 2018-10-02 RX ADMIN — Medication 15 MILLIGRAM(S): at 05:39

## 2018-10-02 NOTE — DISCHARGE NOTE ADULT - HOME CARE AGENCY
[facility] [phone] FOR VN AND PT. VISITING NURSE WILL CALL TO SCHEDULE VISIT DAY AFTER DISCHARGE.

## 2018-10-02 NOTE — DISCHARGE NOTE ADULT - PLAN OF CARE
Pain relief, improvement with activities of daily living Please call Dr. Dave' s office within next few days to schedule a follow up appointment about 14 days after surgery. Dressing will be changed during office visit. Out of bed, ambulate, weight bearing as tolerated- Physical therapy to assist with exercise and help increase endurance

## 2018-10-02 NOTE — OCCUPATIONAL THERAPY INITIAL EVALUATION ADULT - ANTICIPATED DISCHARGE DISPOSITION, OT EVAL
home w/ OT/for ADLs and safety assessment in home environment. Assist with ADLs as needed from family

## 2018-10-02 NOTE — OCCUPATIONAL THERAPY INITIAL EVALUATION ADULT - PERTINENT HX OF CURRENT PROBLEM, REHAB EVAL
Mr. Hutchison is an 83 year old man with PMH 2PPD smoker x50yrs, COPD, Idiopathic Pulmonary Fibrosis, HTN, HLD, Afib on coumadin s/p PPM 2005, BPH and osteoarthritis who has worsening right hip pain now scheduled for right THR. See below

## 2018-10-02 NOTE — DISCHARGE NOTE ADULT - MEDICATION SUMMARY - MEDICATIONS TO TAKE
I will START or STAY ON the medications listed below when I get home from the hospital:    acetaminophen 325 mg oral tablet  -- 3 tab(s) by mouth every 8 hours, As Needed  -- Indication: For Pain    Naprosyn 500 mg oral tablet  -- 1 tab(s) by mouth 2 times a day  -- Indication: For Antiinflammatory    oxyCODONE 5 mg oral tablet  -- 1 or 2 tab(s) by mouth every 4 hours, As needed, for Pain MDD:8  -- Indication: For Pain    traMADol 50 mg oral tablet  -- 1 tab(s) by mouth every 6 hours, As needed, Mild Pain (1 - 3) MDD:4  -- Indication: For Pain    losartan 50 mg oral tablet  -- 1 tab(s) by mouth once a day  -- Indication: For Antihypertensive agent    Flomax 0.4 mg oral capsule  -- 1 cap(s) by mouth once a day  -- Indication: For BPH    Coumadin 5 mg oral tablet  -- 1 tab(s) by mouth once a day, follow up with Dr. Blancas for dosing and lab cks.  -- Indication: For Anticoagulation    gabapentin 100 mg oral capsule  -- 1 cap(s) by mouth once a day  -- Indication: For Anticonvulsant    simvastatin 20 mg oral tablet  -- 1 tab(s) by mouth once a day (at bedtime)  -- Indication: For Antihyperlipidemia    Advair  mcg-21 mcg/inh inhalation aerosol  -- 2 puff(s) inhaled 2 times a day  -- Indication: For Bronchodilator    Incruse Ellipta 62.5 mcg/inh inhalation powder  -- Indication: For Bronchodilator    albuterol 90 mcg/inh inhalation aerosol  -- 2 puff(s) inhaled 4 times a day, As Needed  -- Indication: For Bronchodilator    Norvasc 5 mg oral tablet  -- 1 tab(s) by mouth once a day  -- Indication: For Antihypertensive agent    docusate sodium 100 mg oral capsule  -- 1 cap(s) by mouth 3 times a day  -- Indication: For Stool softener    pantoprazole 40 mg oral delayed release tablet  -- 1 tab(s) by mouth once a day  -- Indication: For Antidyspepsia agent

## 2018-10-02 NOTE — OCCUPATIONAL THERAPY INITIAL EVALUATION ADULT - LEVEL OF INDEPENDENCE: STAND/SIT, REHAB EVAL
Adequate NUTRIENT INTAKE -      Nutrient/Hydration intake appropriate for improving, restoring or maintaining nutritional needs Progressing     Breast feeding baby will demonstrate adequate intake Progressing        DISCHARGE PLANNING     Discharge to home or other facility with appropriate resources Progressing        INFECTION -      No evidence of infection Progressing        Knowledge Deficit     Patient/family/caregiver demonstrates understanding of disease process, treatment plan, medications, and discharge instructions Progressing     Infant caregiver verbalizes understanding of benefits of skin-to-skin with healthy  Progressing     Infant caregiver verbalizes understanding of benefits and management of breastfeeding their healthy  [de-identified]     Infant caregiver verbalizes understanding of benefits to rooming-in with their healthy  [de-identified]     Infant caregiver verbalizes understanding of support and resources for follow up after discharge Progressing        NORMAL      Experiences normal transition Progressing     Total weight loss less than 10% of birth weight Progressing        PAIN -      Displays adequate comfort level or baseline comfort level Progressing        SAFETY -      Patient will remain free from falls Progressing        THERMOREGULATION - /PEDIATRICS     Maintains normal body temperature Progressing independent

## 2018-10-02 NOTE — PROGRESS NOTE ADULT - SUBJECTIVE AND OBJECTIVE BOX
Pt S/E at bedside, no acute events overnight, pain controlled  Abduction pillow in place    Vital Signs Last 24 Hrs  T(C): 36.6 (02 Oct 2018 00:29), Max: 36.7 (01 Oct 2018 20:59)  T(F): 97.8 (02 Oct 2018 00:29), Max: 98.1 (01 Oct 2018 20:59)  HR: 70 (02 Oct 2018 05:30) (69 - 71)  BP: 114/71 (02 Oct 2018 05:30) (95/56 - 132/71)  BP(mean): 95 (01 Oct 2018 15:00) (69 - 95)  RR: 18 (02 Oct 2018 00:29) (15 - 18)  SpO2: 100% (02 Oct 2018 00:29) (94% - 100%)    AVSS  Gen: NAD, AAOx3    RLE:  Dressing clean dry intact  +EHL/FHL/TA/GS  SILT L3-S1  +DP/PT Pulses  Compartments soft  No calf TTP B/L

## 2018-10-02 NOTE — PROGRESS NOTE ADULT - ATTENDING COMMENTS
Patient seen. Agree with above.  NV intact,.  Dressing clean and dry.  Stable.  PT.  DVT prophylaxis.  OOB, D/C planning.

## 2018-10-02 NOTE — DISCHARGE NOTE ADULT - PATIENT PORTAL LINK FT
You can access the [facility] Portal, offered by [facility], by registering with the following website: [email or web address]

## 2018-10-02 NOTE — OCCUPATIONAL THERAPY INITIAL EVALUATION ADULT - LIVES WITH, PROFILE
alone/Pt lives alone in private home with 1 step to enter, flight to bedroom, tub in bathroom with grab bars. Pt I in ADLs and ambulation prior to admission

## 2018-10-02 NOTE — DISCHARGE NOTE ADULT - HOSPITAL COURSE
History of Present Illness		  Mr. Hutchison is an 83 year old man with PMH 2PPD smoker x50yrs, COPD, Idiopathic Pulmonary Fibrosis, HTN, HLD, Afib on coumadin s/p PPM 2005, BPH and osteoarthritis who has worsening right hip pain now scheduled for right THR.    Allergies/Medications:   Allergies:        Allergies:  	No Known Allergies:     Home Medications:   * Patient Currently Takes Medications as of 17-Sep-2018 10:33 documented in Structured Notes  · 	Jantoven 5 mg oral tablet: Last Dose Taken:  , 1 tab(s) orally once a day  · 	losartan 50 mg oral tablet: Last Dose Taken:  , 1 tab(s) orally once a day  · 	simvastatin 20 mg oral tablet: Last Dose Taken:  , 1 tab(s) orally once a day (at bedtime)  · 	Flomax 0.4 mg oral capsule: Last Dose Taken:  , 1 cap(s) orally once a day  · 	Norvasc 5 mg oral tablet: Last Dose Taken:  , 1 tab(s) orally once a day  · 	Norco 10 mg-325 mg oral tablet: Last Dose Taken:  , 1 tab(s) orally every 6 hours, As Needed  · 	Advair  mcg-21 mcg/inh inhalation aerosol: Last Dose Taken:  , 2 puff(s) inhaled 2 times a day  · 	Incruse Ellipta 62.5 mcg/inh inhalation powder: Last Dose Taken:    · 	albuterol 90 mcg/inh inhalation aerosol: Last Dose Taken:  , 2 puff(s) inhaled 4 times a day, As Needed    PMH/PSH/FH/SH:    Past Medical History:  Benign prostatic hyperplasia with nocturia    Chronic obstructive pulmonary disease, unspecified COPD type    Dyspnea on exertion    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     Past Surgical History:  H/O amputation  age 19, R great toe, due to growth "tumor"  H/O arthroscopic knee surgery  Both knees, 2013  H/O cardiac pacemaker  2005  H/O repair of rotator cuff  2012.  10/1/18- Patient presents to the hospital for elective procedure, underwent a right total hip replacement- tolerated procedure without complications.  Patient was evaluated by Physical and Occupational therapy whom recommended home for discharge.  Patient stable for discharge when cleared by PT.

## 2018-10-02 NOTE — DISCHARGE NOTE ADULT - CARE PLAN
Principal Discharge DX:	Unilateral primary osteoarthritis, right hip  Goal:	Pain relief, improvement with activities of daily living  Assessment and plan of treatment:	Please call Dr. Dave' s office within next few days to schedule a follow up appointment about 14 days after surgery. Dressing will be changed during office visit. Out of bed, ambulate, weight bearing as tolerated- Physical therapy to assist with exercise and help increase endurance

## 2018-10-02 NOTE — DISCHARGE NOTE ADULT - CARE PROVIDER_API CALL
Dioni Dave), Orthopaedic Surgery  [address] [place] [address]  Phone: [phone]  Fax: [phone]

## 2018-10-03 VITALS — OXYGEN SATURATION: 95 % | HEART RATE: 70 BPM | SYSTOLIC BLOOD PRESSURE: 129 MMHG | DIASTOLIC BLOOD PRESSURE: 71 MMHG

## 2018-10-03 LAB
APTT BLD: 29.7 SEC — SIGNIFICANT CHANGE UP (ref 27.5–37.4)
INR BLD: 1.43 RATIO — HIGH (ref 0.88–1.16)
PROTHROM AB SERPL-ACNC: 16.3 SEC — HIGH (ref 10–13.1)

## 2018-10-03 PROCEDURE — C1713: CPT

## 2018-10-03 PROCEDURE — 97161 PT EVAL LOW COMPLEX 20 MIN: CPT

## 2018-10-03 PROCEDURE — 85610 PROTHROMBIN TIME: CPT

## 2018-10-03 PROCEDURE — 72170 X-RAY EXAM OF PELVIS: CPT

## 2018-10-03 PROCEDURE — 97535 SELF CARE MNGMENT TRAINING: CPT

## 2018-10-03 PROCEDURE — 86901 BLOOD TYPING SEROLOGIC RH(D): CPT

## 2018-10-03 PROCEDURE — 88311 DECALCIFY TISSUE: CPT

## 2018-10-03 PROCEDURE — 82962 GLUCOSE BLOOD TEST: CPT

## 2018-10-03 PROCEDURE — 97530 THERAPEUTIC ACTIVITIES: CPT

## 2018-10-03 PROCEDURE — 97116 GAIT TRAINING THERAPY: CPT

## 2018-10-03 PROCEDURE — 80048 BASIC METABOLIC PNL TOTAL CA: CPT

## 2018-10-03 PROCEDURE — 97165 OT EVAL LOW COMPLEX 30 MIN: CPT

## 2018-10-03 PROCEDURE — 85730 THROMBOPLASTIN TIME PARTIAL: CPT

## 2018-10-03 PROCEDURE — 94640 AIRWAY INHALATION TREATMENT: CPT

## 2018-10-03 PROCEDURE — 85027 COMPLETE CBC AUTOMATED: CPT

## 2018-10-03 PROCEDURE — 86900 BLOOD TYPING SEROLOGIC ABO: CPT

## 2018-10-03 PROCEDURE — C1776: CPT

## 2018-10-03 PROCEDURE — 88305 TISSUE EXAM BY PATHOLOGIST: CPT

## 2018-10-03 PROCEDURE — 90686 IIV4 VACC NO PRSV 0.5 ML IM: CPT

## 2018-10-03 RX ORDER — SODIUM CHLORIDE 9 MG/ML
500 INJECTION INTRAMUSCULAR; INTRAVENOUS; SUBCUTANEOUS ONCE
Qty: 0 | Refills: 0 | Status: COMPLETED | OUTPATIENT
Start: 2018-10-03 | End: 2018-10-03

## 2018-10-03 RX ORDER — OXYCODONE HYDROCHLORIDE 5 MG/1
1 TABLET ORAL
Qty: 50 | Refills: 0
Start: 2018-10-03

## 2018-10-03 RX ORDER — GABAPENTIN 400 MG/1
1 CAPSULE ORAL
Qty: 14 | Refills: 0
Start: 2018-10-03 | End: 2018-10-16

## 2018-10-03 RX ORDER — TRAMADOL HYDROCHLORIDE 50 MG/1
1 TABLET ORAL
Qty: 28 | Refills: 0
Start: 2018-10-03 | End: 2018-10-09

## 2018-10-03 RX ORDER — PANTOPRAZOLE SODIUM 20 MG/1
1 TABLET, DELAYED RELEASE ORAL
Qty: 14 | Refills: 0
Start: 2018-10-03 | End: 2018-10-16

## 2018-10-03 RX ADMIN — OXYCODONE HYDROCHLORIDE 10 MILLIGRAM(S): 5 TABLET ORAL at 06:32

## 2018-10-03 RX ADMIN — Medication 975 MILLIGRAM(S): at 02:14

## 2018-10-03 RX ADMIN — Medication 975 MILLIGRAM(S): at 10:32

## 2018-10-03 RX ADMIN — INFLUENZA VIRUS VACCINE 0.5 MILLILITER(S): 15; 15; 15; 15 SUSPENSION INTRAMUSCULAR at 10:13

## 2018-10-03 RX ADMIN — Medication 975 MILLIGRAM(S): at 11:48

## 2018-10-03 RX ADMIN — SODIUM CHLORIDE 3 MILLILITER(S): 9 INJECTION INTRAMUSCULAR; INTRAVENOUS; SUBCUTANEOUS at 06:05

## 2018-10-03 RX ADMIN — Medication 975 MILLIGRAM(S): at 02:45

## 2018-10-03 RX ADMIN — LOSARTAN POTASSIUM 50 MILLIGRAM(S): 100 TABLET, FILM COATED ORAL at 05:49

## 2018-10-03 RX ADMIN — Medication 100 MILLIGRAM(S): at 05:49

## 2018-10-03 RX ADMIN — Medication 15 MILLIGRAM(S): at 06:05

## 2018-10-03 RX ADMIN — OXYCODONE HYDROCHLORIDE 10 MILLIGRAM(S): 5 TABLET ORAL at 07:00

## 2018-10-03 RX ADMIN — BUDESONIDE AND FORMOTEROL FUMARATE DIHYDRATE 2 PUFF(S): 160; 4.5 AEROSOL RESPIRATORY (INHALATION) at 05:50

## 2018-10-03 RX ADMIN — AMLODIPINE BESYLATE 5 MILLIGRAM(S): 2.5 TABLET ORAL at 05:49

## 2018-10-03 RX ADMIN — Medication 15 MILLIGRAM(S): at 05:49

## 2018-10-03 RX ADMIN — Medication 3 MILLILITER(S): at 05:49

## 2018-10-03 RX ADMIN — SODIUM CHLORIDE 1000 MILLILITER(S): 9 INJECTION INTRAMUSCULAR; INTRAVENOUS; SUBCUTANEOUS at 01:39

## 2018-10-03 NOTE — PROGRESS NOTE ADULT - SUBJECTIVE AND OBJECTIVE BOX
ORTHO PROGRESS NOTE     Pt seen and examined at bedside, denies SOB, CP, Dizziness. N/V/D /HA.  No significant overnight events. Pain well controlled.    Vital Signs Last 24 Hrs  T(C): 37 (03 Oct 2018 05:12), Max: 37.2 (02 Oct 2018 16:26)  T(F): 98.6 (03 Oct 2018 05:12), Max: 98.9 (02 Oct 2018 16:26)  HR: 70 (03 Oct 2018 05:12) (70 - 73)  BP: 117/69 (03 Oct 2018 05:12) (96/60 - 131/72)  BP(mean): --  RR: 18 (03 Oct 2018 05:12) (18 - 18)  SpO2: 95% (03 Oct 2018 05:12) (93% - 100%)    Gen: No apparent distress, alert  LE:  Dressing C/D/I          +DF/PF. moving toes          SILT, wwp at foot          compartments soft    Labs:  CBC Full  -  ( 02 Oct 2018 07:55 )  WBC Count : 6.08 K/uL  Hemoglobin : 9.4 g/dL  Hematocrit : 28.5 %  Platelet Count - Automated : 123 K/uL  Mean Cell Volume : 97.9 fl  Mean Cell Hemoglobin : 32.3 pg  Mean Cell Hemoglobin Concentration : 33.0 gm/dL  Auto Neutrophil # : x  Auto Lymphocyte # : x  Auto Monocyte # : x  Auto Eosinophil # : x  Auto Basophil # : x  Auto Neutrophil % : x  Auto Lymphocyte % : x  Auto Monocyte % : x  Auto Eosinophil % : x  Auto Basophil % : x      10-02    142  |  107  |  19  ----------------------------<  125<H>  4.6   |  24  |  1.07    Ca    8.5      02 Oct 2018 07:05        A/P  Pt is a 83yyo Male s/p total hip Arthroplasty, POD #2    - Pain control/ Analgesia  - DVT ppx  -PT/OT - wbat/oob    -WBAT  -home today

## 2018-12-05 PROBLEM — F10.10 ALCOHOL ABUSE, UNCOMPLICATED: Chronic | Status: ACTIVE | Noted: 2018-09-17

## 2018-12-05 PROBLEM — Z95.0 PRESENCE OF CARDIAC PACEMAKER: Chronic | Status: ACTIVE | Noted: 2018-09-17

## 2018-12-05 PROBLEM — K63.5 POLYP OF COLON: Chronic | Status: ACTIVE | Noted: 2018-09-17

## 2018-12-05 PROBLEM — J44.9 CHRONIC OBSTRUCTIVE PULMONARY DISEASE, UNSPECIFIED: Chronic | Status: ACTIVE | Noted: 2018-09-17

## 2018-12-05 PROBLEM — R06.09 OTHER FORMS OF DYSPNEA: Chronic | Status: ACTIVE | Noted: 2018-09-17

## 2018-12-05 PROBLEM — J84.112 IDIOPATHIC PULMONARY FIBROSIS: Chronic | Status: ACTIVE | Noted: 2018-09-17

## 2018-12-05 PROBLEM — M15.9 POLYOSTEOARTHRITIS, UNSPECIFIED: Chronic | Status: ACTIVE | Noted: 2018-09-17

## 2018-12-05 PROBLEM — I48.1 PERSISTENT ATRIAL FIBRILLATION: Chronic | Status: ACTIVE | Noted: 2018-09-17

## 2018-12-05 PROBLEM — N40.1 BENIGN PROSTATIC HYPERPLASIA WITH LOWER URINARY TRACT SYMPTOMS: Chronic | Status: ACTIVE | Noted: 2018-09-17

## 2018-12-05 PROBLEM — G47.34 IDIOPATHIC SLEEP RELATED NONOBSTRUCTIVE ALVEOLAR HYPOVENTILATION: Chronic | Status: ACTIVE | Noted: 2018-09-17

## 2018-12-05 PROBLEM — Z87.891 PERSONAL HISTORY OF NICOTINE DEPENDENCE: Chronic | Status: ACTIVE | Noted: 2018-09-17

## 2019-04-18 ENCOUNTER — APPOINTMENT (OUTPATIENT)
Dept: UROLOGY | Facility: CLINIC | Age: 84
End: 2019-04-18
Payer: MEDICARE

## 2019-04-18 DIAGNOSIS — Z00.00 ENCOUNTER FOR GENERAL ADULT MEDICAL EXAMINATION W/OUT ABNORMAL FINDINGS: ICD-10-CM

## 2019-04-18 PROCEDURE — 99214 OFFICE O/P EST MOD 30 MIN: CPT

## 2019-04-18 NOTE — PHYSICAL EXAM
[General Appearance - Well Nourished] : well nourished [General Appearance - Well Developed] : well developed [Normal Appearance] : normal appearance [General Appearance - In No Acute Distress] : no acute distress [Well Groomed] : well groomed [Abdomen Soft] : soft [Costovertebral Angle Tenderness] : no ~M costovertebral angle tenderness [Abdomen Tenderness] : non-tender [Urethral Meatus] : meatus normal [Urinary Bladder Findings] : the bladder was normal on palpation [Scrotum] : the scrotum was normal [Testes Mass (___cm)] : there were no testicular masses [No Prostate Nodules] : no prostate nodules [Edema] : no peripheral edema [] : no respiratory distress [Respiration, Rhythm And Depth] : normal respiratory rhythm and effort [Exaggerated Use Of Accessory Muscles For Inspiration] : no accessory muscle use [Affect] : the affect was normal [Oriented To Time, Place, And Person] : oriented to person, place, and time [Mood] : the mood was normal [Not Anxious] : not anxious [Normal Station and Gait] : the gait and station were normal for the patient's age [No Focal Deficits] : no focal deficits [No Palpable Adenopathy] : no palpable adenopathy

## 2019-04-19 LAB
APPEARANCE: ABNORMAL
BACTERIA: NEGATIVE
BILIRUBIN URINE: NEGATIVE
BLOOD URINE: ABNORMAL
COLOR: YELLOW
GLUCOSE QUALITATIVE U: NEGATIVE
HYALINE CASTS: 0 /LPF
KETONES URINE: NEGATIVE
LEUKOCYTE ESTERASE URINE: ABNORMAL
MICROSCOPIC-UA: NORMAL
NITRITE URINE: NEGATIVE
PH URINE: 6
PROTEIN URINE: NORMAL
PSA FREE FLD-MCNC: 19 %
PSA FREE SERPL-MCNC: 1.63 NG/ML
PSA SERPL-MCNC: 8.48 NG/ML
RED BLOOD CELLS URINE: 7 /HPF
SPECIFIC GRAVITY URINE: 1.02
SQUAMOUS EPITHELIAL CELLS: 1 /HPF
UROBILINOGEN URINE: NORMAL
WHITE BLOOD CELLS URINE: 251 /HPF

## 2019-12-04 ENCOUNTER — INPATIENT (INPATIENT)
Facility: HOSPITAL | Age: 84
LOS: 2 days | Discharge: HOME | End: 2019-12-07
Attending: SURGERY | Admitting: SURGERY
Payer: MEDICARE

## 2019-12-04 VITALS
HEART RATE: 70 BPM | DIASTOLIC BLOOD PRESSURE: 88 MMHG | RESPIRATION RATE: 18 BRPM | OXYGEN SATURATION: 95 % | SYSTOLIC BLOOD PRESSURE: 150 MMHG | TEMPERATURE: 98 F

## 2019-12-04 DIAGNOSIS — Z89.9 ACQUIRED ABSENCE OF LIMB, UNSPECIFIED: Chronic | ICD-10-CM

## 2019-12-04 DIAGNOSIS — Z98.890 OTHER SPECIFIED POSTPROCEDURAL STATES: Chronic | ICD-10-CM

## 2019-12-04 DIAGNOSIS — Z95.0 PRESENCE OF CARDIAC PACEMAKER: Chronic | ICD-10-CM

## 2019-12-04 LAB
ALBUMIN SERPL ELPH-MCNC: 4 G/DL — SIGNIFICANT CHANGE UP (ref 3.3–5)
ALP SERPL-CCNC: 52 U/L — SIGNIFICANT CHANGE UP (ref 40–120)
ALT FLD-CCNC: 30 U/L — SIGNIFICANT CHANGE UP (ref 4–41)
ANION GAP SERPL CALC-SCNC: 17 MMO/L — HIGH (ref 7–14)
APTT BLD: 51.8 SEC — HIGH (ref 27.5–36.3)
AST SERPL-CCNC: 19 U/L — SIGNIFICANT CHANGE UP (ref 4–40)
BILIRUB SERPL-MCNC: 0.7 MG/DL — SIGNIFICANT CHANGE UP (ref 0.2–1.2)
BUN SERPL-MCNC: 38 MG/DL — HIGH (ref 7–23)
CALCIUM SERPL-MCNC: 9.3 MG/DL — SIGNIFICANT CHANGE UP (ref 8.4–10.5)
CHLORIDE SERPL-SCNC: 104 MMOL/L — SIGNIFICANT CHANGE UP (ref 98–107)
CO2 SERPL-SCNC: 20 MMOL/L — LOW (ref 22–31)
CREAT SERPL-MCNC: 1.31 MG/DL — HIGH (ref 0.5–1.3)
GLUCOSE SERPL-MCNC: 144 MG/DL — HIGH (ref 70–99)
HCT VFR BLD CALC: 43.4 % — SIGNIFICANT CHANGE UP (ref 39–50)
HGB BLD-MCNC: 14.2 G/DL — SIGNIFICANT CHANGE UP (ref 13–17)
INR BLD: 8.1 — CRITICAL HIGH (ref 0.88–1.17)
MCHC RBC-ENTMCNC: 32.7 % — SIGNIFICANT CHANGE UP (ref 32–36)
MCHC RBC-ENTMCNC: 33.5 PG — SIGNIFICANT CHANGE UP (ref 27–34)
MCV RBC AUTO: 102.4 FL — HIGH (ref 80–100)
NRBC # FLD: 0 K/UL — SIGNIFICANT CHANGE UP (ref 0–0)
PLATELET # BLD AUTO: 169 K/UL — SIGNIFICANT CHANGE UP (ref 150–400)
PMV BLD: 10.1 FL — SIGNIFICANT CHANGE UP (ref 7–13)
POTASSIUM SERPL-MCNC: 4.3 MMOL/L — SIGNIFICANT CHANGE UP (ref 3.5–5.3)
POTASSIUM SERPL-SCNC: 4.3 MMOL/L — SIGNIFICANT CHANGE UP (ref 3.5–5.3)
PROT SERPL-MCNC: 6.9 G/DL — SIGNIFICANT CHANGE UP (ref 6–8.3)
PROTHROM AB SERPL-ACNC: 98.5 SEC — HIGH (ref 9.8–13.1)
RBC # BLD: 4.24 M/UL — SIGNIFICANT CHANGE UP (ref 4.2–5.8)
RBC # FLD: 14.2 % — SIGNIFICANT CHANGE UP (ref 10.3–14.5)
SODIUM SERPL-SCNC: 141 MMOL/L — SIGNIFICANT CHANGE UP (ref 135–145)
WBC # BLD: 18.26 K/UL — HIGH (ref 3.8–10.5)
WBC # FLD AUTO: 18.26 K/UL — HIGH (ref 3.8–10.5)

## 2019-12-04 RX ORDER — PHYTONADIONE (VIT K1) 5 MG
5 TABLET ORAL ONCE
Refills: 0 | Status: COMPLETED | OUTPATIENT
Start: 2019-12-04 | End: 2019-12-04

## 2019-12-04 RX ORDER — ALBUTEROL 90 UG/1
1 AEROSOL, METERED ORAL ONCE
Refills: 0 | Status: COMPLETED | OUTPATIENT
Start: 2019-12-04 | End: 2019-12-04

## 2019-12-04 RX ADMIN — ALBUTEROL 1 PUFF(S): 90 AEROSOL, METERED ORAL at 23:50

## 2019-12-04 NOTE — ED PROVIDER NOTE - OBJECTIVE STATEMENT
83yo man with hx afib (on coumadin), HLD, HTN, COPD presenting with rectal bleeding x1 day. Hx hemorrhoids, bright red blood, no dark blood, no BM today. Last normal BM yesterday. No diarrhea/constipation. No CP/SOB. No dizziness/lightheadedness. No N/V. Follows with GI, Dr. Glover, for annual colonoscopy. Last Feb 2019.

## 2019-12-04 NOTE — ED PROVIDER NOTE - ATTENDING CONTRIBUTION TO CARE
agree with resident note    "85yo man with hx afib (on coumadin), HLD, HTN, COPD presenting with rectal bleeding x1 day. Hx hemorrhoids, bright red blood, no dark blood, no BM today. Last normal BM yesterday. No diarrhea/constipation. No CP/SOB. No dizziness/lightheadedness. No N/V. Follows with GI, Dr. Glover, for annual colonoscopy. Last Feb 2019." Noted 4-5 BMs (only 1 here) with blood today.  States clots that have now resolved.  Denies weakness, chest pain, shortness of breath    PE: well appearing; VSS: CTAB/L; s1 s2 no m/r/g abd soft/NT/ND ext: no edema Rectal: performed by resident; external hemorrhoids not bleeding; BRBPR    Imp: INR supratherapeutic; will give PO vitamin K; pt bleeding has stopped/drastically declined; will hold AC; no abd pain to warrant CTA; has been scoped by Dr. Glover so will have surgery come see; trend H/H; at this point FFP to be held until surgery sees;  admit

## 2019-12-04 NOTE — ED ADULT NURSE NOTE - PMH
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

## 2019-12-04 NOTE — ED ADULT NURSE NOTE - NSIMPLEMENTINTERV_GEN_ALL_ED
Implemented All Fall Risk Interventions:  [place] to call system. Call bell, personal items and telephone within reach. Instruct patient to call for assistance. Room bathroom lighting operational. Non-slip footwear when patient is off stretcher. Physically safe environment: no spills, clutter or unnecessary equipment. Stretcher in lowest position, wheels locked, appropriate side rails in place. Provide visual cue, wrist band, yellow gown, etc. Monitor gait and stability. Monitor for mental status changes and reorient to person, place, and time. Review medications for side effects contributing to fall risk. Reinforce activity limits and safety measures with patient and family.

## 2019-12-04 NOTE — ED ADULT TRIAGE NOTE - RESPIRATORY RATE (BREATHS/MIN)
Here for a wellness check works for [employer] non-smoker modest drinker non-smoker. He has hypertension and takes an hour before that purpose. Positive family history of lung cancer in father who was a smoker. He exercises infrequently.   When he does its with
18

## 2019-12-04 NOTE — ED PROVIDER NOTE - PROGRESS NOTE DETAILS
INR 8.10, 5mg Vit K.   Pt clinically stable, no signs of acute abdomen, VS stable. Will repeat CBC in 4 hours. Consult surgery as pt follows with Dr. Glover for colonoscopy.   d/w Dr. Gill Surgery at bedside. No acute intervention. Pt clinically stable. Given pt age and hx, will admit for inpatient GI workup. d/w Dr. Wu.

## 2019-12-04 NOTE — ED ADULT TRIAGE NOTE - CHIEF COMPLAINT QUOTE
Pt. c/o rectal bleeding that began around 10 am, with "a few clots." Endorses use of blood thinners. Also c/o "burning" sensation on rectum. Denies dizziness, weakness. PMH: pacemaker, a fib, COPD

## 2019-12-04 NOTE — ED PROVIDER NOTE - PMH
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

## 2019-12-04 NOTE — ED ADULT NURSE NOTE - OBJECTIVE STATEMENT
Pt is a 84 year old male reporting to the ED for rectal bleed. Pt reports pmh of afib on coumadin, membroids and polyps in colon. Pt reports yesterday bright red blood in BM. PT reports rectal bleeding and burning since yesterday. Pt is AOX4. Pt denies chest pain or SOB. PT respirations even an unlabored. Pt appears to be comfortable, in NAD. Pt denies fever, chills, n/v/d. Pt denies abdominal pain, dysuria, hematuria. 18 g iv placed in right wrist, labs drawn, pending review, will continue to monitor.

## 2019-12-04 NOTE — ED PROVIDER NOTE - CLINICAL SUMMARY MEDICAL DECISION MAKING FREE TEXT BOX
[facility] PGY1: 85yo man with hx afib (on coumadin), HLD, HTN, COPD presenting with rectal bleeding x1 day. Hx hemorrhoids, bright red blood. VS stable. H/h stable. INR 8.10, given Vit K 5mg to reverse. Rpt labs pending. Surgery consulted, no need for acute intervention. Pt for further workup inpt with GI. Accepted by hospitalist for admission. d/w Dr. Wu.

## 2019-12-05 DIAGNOSIS — Z02.9 ENCOUNTER FOR ADMINISTRATIVE EXAMINATIONS, UNSPECIFIED: ICD-10-CM

## 2019-12-05 DIAGNOSIS — I48.19 OTHER PERSISTENT ATRIAL FIBRILLATION: ICD-10-CM

## 2019-12-05 DIAGNOSIS — K92.2 GASTROINTESTINAL HEMORRHAGE, UNSPECIFIED: ICD-10-CM

## 2019-12-05 DIAGNOSIS — E78.5 HYPERLIPIDEMIA, UNSPECIFIED: ICD-10-CM

## 2019-12-05 DIAGNOSIS — J44.9 CHRONIC OBSTRUCTIVE PULMONARY DISEASE, UNSPECIFIED: ICD-10-CM

## 2019-12-05 DIAGNOSIS — D75.89 OTHER SPECIFIED DISEASES OF BLOOD AND BLOOD-FORMING ORGANS: ICD-10-CM

## 2019-12-05 DIAGNOSIS — Z29.9 ENCOUNTER FOR PROPHYLACTIC MEASURES, UNSPECIFIED: ICD-10-CM

## 2019-12-05 DIAGNOSIS — I10 ESSENTIAL (PRIMARY) HYPERTENSION: ICD-10-CM

## 2019-12-05 DIAGNOSIS — N40.1 BENIGN PROSTATIC HYPERPLASIA WITH LOWER URINARY TRACT SYMPTOMS: ICD-10-CM

## 2019-12-05 LAB
ALBUMIN SERPL ELPH-MCNC: 3.6 G/DL — SIGNIFICANT CHANGE UP (ref 3.3–5)
ALP SERPL-CCNC: 44 U/L — SIGNIFICANT CHANGE UP (ref 40–120)
ALT FLD-CCNC: 23 U/L — SIGNIFICANT CHANGE UP (ref 4–41)
ANION GAP SERPL CALC-SCNC: 15 MMO/L — HIGH (ref 7–14)
APTT BLD: 25.3 SEC — LOW (ref 27.5–36.3)
APTT BLD: 47.4 SEC — HIGH (ref 27.5–36.3)
APTT BLD: 49.2 SEC — HIGH (ref 27.5–36.3)
AST SERPL-CCNC: 15 U/L — SIGNIFICANT CHANGE UP (ref 4–40)
BASE EXCESS BLDV CALC-SCNC: 1.7 MMOL/L — SIGNIFICANT CHANGE UP
BILIRUB SERPL-MCNC: 1 MG/DL — SIGNIFICANT CHANGE UP (ref 0.2–1.2)
BLD GP AB SCN SERPL QL: NEGATIVE — SIGNIFICANT CHANGE UP
BLD GP AB SCN SERPL QL: NEGATIVE — SIGNIFICANT CHANGE UP
BLOOD GAS VENOUS - CREATININE: 1.14 MG/DL — SIGNIFICANT CHANGE UP (ref 0.5–1.3)
BUN SERPL-MCNC: 37 MG/DL — HIGH (ref 7–23)
CALCIUM SERPL-MCNC: 8.8 MG/DL — SIGNIFICANT CHANGE UP (ref 8.4–10.5)
CHLORIDE BLDV-SCNC: 108 MMOL/L — SIGNIFICANT CHANGE UP (ref 96–108)
CHLORIDE SERPL-SCNC: 106 MMOL/L — SIGNIFICANT CHANGE UP (ref 98–107)
CO2 SERPL-SCNC: 22 MMOL/L — SIGNIFICANT CHANGE UP (ref 22–31)
CREAT SERPL-MCNC: 1.17 MG/DL — SIGNIFICANT CHANGE UP (ref 0.5–1.3)
FOLATE SERPL-MCNC: > 20 NG/ML — HIGH (ref 4.7–20)
GAS PNL BLDV: SIGNIFICANT CHANGE UP MMOL/L (ref 136–146)
GLUCOSE BLDV-MCNC: 136 MG/DL — HIGH (ref 70–99)
GLUCOSE SERPL-MCNC: 148 MG/DL — HIGH (ref 70–99)
HCO3 BLDV-SCNC: 25 MMOL/L — SIGNIFICANT CHANGE UP (ref 20–27)
HCT VFR BLD CALC: 33.6 % — LOW (ref 39–50)
HCT VFR BLD CALC: 33.6 % — LOW (ref 39–50)
HCT VFR BLD CALC: 37.3 % — LOW (ref 39–50)
HCT VFR BLD CALC: 39 % — SIGNIFICANT CHANGE UP (ref 39–50)
HCT VFR BLDV CALC: 37.2 % — LOW (ref 39–51)
HGB BLD-MCNC: 10.9 G/DL — LOW (ref 13–17)
HGB BLD-MCNC: 10.9 G/DL — LOW (ref 13–17)
HGB BLD-MCNC: 12.5 G/DL — LOW (ref 13–17)
HGB BLD-MCNC: 13.1 G/DL — SIGNIFICANT CHANGE UP (ref 13–17)
HGB BLDV-MCNC: 12.1 G/DL — LOW (ref 13–17)
INR BLD: 1.4 — HIGH (ref 0.88–1.17)
INR BLD: 6.59 — CRITICAL HIGH (ref 0.88–1.17)
INR BLD: 7.83 — CRITICAL HIGH (ref 0.88–1.17)
LACTATE BLDV-MCNC: 2.5 MMOL/L — HIGH (ref 0.5–2)
MAGNESIUM SERPL-MCNC: 2.1 MG/DL — SIGNIFICANT CHANGE UP (ref 1.6–2.6)
MCHC RBC-ENTMCNC: 32.4 % — SIGNIFICANT CHANGE UP (ref 32–36)
MCHC RBC-ENTMCNC: 32.4 % — SIGNIFICANT CHANGE UP (ref 32–36)
MCHC RBC-ENTMCNC: 32.7 PG — SIGNIFICANT CHANGE UP (ref 27–34)
MCHC RBC-ENTMCNC: 33.2 PG — SIGNIFICANT CHANGE UP (ref 27–34)
MCHC RBC-ENTMCNC: 33.5 % — SIGNIFICANT CHANGE UP (ref 32–36)
MCHC RBC-ENTMCNC: 33.6 % — SIGNIFICANT CHANGE UP (ref 32–36)
MCHC RBC-ENTMCNC: 33.9 PG — SIGNIFICANT CHANGE UP (ref 27–34)
MCHC RBC-ENTMCNC: 34.3 PG — HIGH (ref 27–34)
MCV RBC AUTO: 100.9 FL — HIGH (ref 80–100)
MCV RBC AUTO: 101 FL — HIGH (ref 80–100)
MCV RBC AUTO: 102.4 FL — HIGH (ref 80–100)
MCV RBC AUTO: 102.5 FL — HIGH (ref 80–100)
NRBC # FLD: 0 K/UL — SIGNIFICANT CHANGE UP (ref 0–0)
PCO2 BLDV: 43 MMHG — SIGNIFICANT CHANGE UP (ref 41–51)
PH BLDV: 7.4 PH — SIGNIFICANT CHANGE UP (ref 7.32–7.43)
PHOSPHATE SERPL-MCNC: 3 MG/DL — SIGNIFICANT CHANGE UP (ref 2.5–4.5)
PLATELET # BLD AUTO: 113 K/UL — LOW (ref 150–400)
PLATELET # BLD AUTO: 120 K/UL — LOW (ref 150–400)
PLATELET # BLD AUTO: 133 K/UL — LOW (ref 150–400)
PLATELET # BLD AUTO: 145 K/UL — LOW (ref 150–400)
PMV BLD: 10.2 FL — SIGNIFICANT CHANGE UP (ref 7–13)
PMV BLD: 10.2 FL — SIGNIFICANT CHANGE UP (ref 7–13)
PMV BLD: 10.3 FL — SIGNIFICANT CHANGE UP (ref 7–13)
PMV BLD: 9.9 FL — SIGNIFICANT CHANGE UP (ref 7–13)
PO2 BLDV: 24 MMHG — LOW (ref 35–40)
POTASSIUM BLDV-SCNC: SIGNIFICANT CHANGE UP MMOL/L (ref 3.4–4.5)
POTASSIUM SERPL-MCNC: 4.8 MMOL/L — SIGNIFICANT CHANGE UP (ref 3.5–5.3)
POTASSIUM SERPL-SCNC: 4.8 MMOL/L — SIGNIFICANT CHANGE UP (ref 3.5–5.3)
PROT SERPL-MCNC: 5.8 G/DL — LOW (ref 6–8.3)
PROTHROM AB SERPL-ACNC: 16.1 SEC — HIGH (ref 9.8–13.1)
PROTHROM AB SERPL-ACNC: 77.6 SEC — HIGH (ref 9.8–13.1)
PROTHROM AB SERPL-ACNC: 92.5 SEC — HIGH (ref 9.8–13.1)
RBC # BLD: 3.28 M/UL — LOW (ref 4.2–5.8)
RBC # BLD: 3.33 M/UL — LOW (ref 4.2–5.8)
RBC # BLD: 3.64 M/UL — LOW (ref 4.2–5.8)
RBC # BLD: 3.86 M/UL — LOW (ref 4.2–5.8)
RBC # FLD: 13.9 % — SIGNIFICANT CHANGE UP (ref 10.3–14.5)
RBC # FLD: 14 % — SIGNIFICANT CHANGE UP (ref 10.3–14.5)
RBC # FLD: 14.1 % — SIGNIFICANT CHANGE UP (ref 10.3–14.5)
RBC # FLD: 14.1 % — SIGNIFICANT CHANGE UP (ref 10.3–14.5)
RH IG SCN BLD-IMP: NEGATIVE — SIGNIFICANT CHANGE UP
RH IG SCN BLD-IMP: NEGATIVE — SIGNIFICANT CHANGE UP
SAO2 % BLDV: 37.1 % — LOW (ref 60–85)
SODIUM SERPL-SCNC: 143 MMOL/L — SIGNIFICANT CHANGE UP (ref 135–145)
VIT B12 SERPL-MCNC: 919 PG/ML — HIGH (ref 200–900)
WBC # BLD: 10.11 K/UL — SIGNIFICANT CHANGE UP (ref 3.8–10.5)
WBC # BLD: 11.78 K/UL — HIGH (ref 3.8–10.5)
WBC # BLD: 12.67 K/UL — HIGH (ref 3.8–10.5)
WBC # BLD: 13.52 K/UL — HIGH (ref 3.8–10.5)
WBC # FLD AUTO: 10.11 K/UL — SIGNIFICANT CHANGE UP (ref 3.8–10.5)
WBC # FLD AUTO: 11.78 K/UL — HIGH (ref 3.8–10.5)
WBC # FLD AUTO: 12.67 K/UL — HIGH (ref 3.8–10.5)
WBC # FLD AUTO: 13.52 K/UL — HIGH (ref 3.8–10.5)

## 2019-12-05 PROCEDURE — 99223 1ST HOSP IP/OBS HIGH 75: CPT | Mod: GC

## 2019-12-05 PROCEDURE — 99222 1ST HOSP IP/OBS MODERATE 55: CPT

## 2019-12-05 PROCEDURE — 78278 ACUTE GI BLOOD LOSS IMAGING: CPT | Mod: 26

## 2019-12-05 RX ORDER — SODIUM CHLORIDE 9 MG/ML
1000 INJECTION, SOLUTION INTRAVENOUS
Refills: 0 | Status: DISCONTINUED | OUTPATIENT
Start: 2019-12-05 | End: 2019-12-06

## 2019-12-05 RX ORDER — PANTOPRAZOLE SODIUM 20 MG/1
80 TABLET, DELAYED RELEASE ORAL ONCE
Refills: 0 | Status: COMPLETED | OUTPATIENT
Start: 2019-12-05 | End: 2019-12-05

## 2019-12-05 RX ORDER — ALBUTEROL 90 UG/1
2.5 AEROSOL, METERED ORAL EVERY 6 HOURS
Refills: 0 | Status: DISCONTINUED | OUTPATIENT
Start: 2019-12-05 | End: 2019-12-07

## 2019-12-05 RX ORDER — BUDESONIDE AND FORMOTEROL FUMARATE DIHYDRATE 160; 4.5 UG/1; UG/1
2 AEROSOL RESPIRATORY (INHALATION)
Refills: 0 | Status: DISCONTINUED | OUTPATIENT
Start: 2019-12-05 | End: 2019-12-07

## 2019-12-05 RX ORDER — PHYTONADIONE (VIT K1) 5 MG
10 TABLET ORAL ONCE
Refills: 0 | Status: COMPLETED | OUTPATIENT
Start: 2019-12-05 | End: 2019-12-05

## 2019-12-05 RX ORDER — PANTOPRAZOLE SODIUM 20 MG/1
8 TABLET, DELAYED RELEASE ORAL
Qty: 80 | Refills: 0 | Status: DISCONTINUED | OUTPATIENT
Start: 2019-12-05 | End: 2019-12-06

## 2019-12-05 RX ORDER — TIOTROPIUM BROMIDE 18 UG/1
1 CAPSULE ORAL; RESPIRATORY (INHALATION) DAILY
Refills: 0 | Status: DISCONTINUED | OUTPATIENT
Start: 2019-12-05 | End: 2019-12-07

## 2019-12-05 RX ORDER — SODIUM CHLORIDE 9 MG/ML
1000 INJECTION, SOLUTION INTRAVENOUS
Refills: 0 | Status: DISCONTINUED | OUTPATIENT
Start: 2019-12-05 | End: 2019-12-05

## 2019-12-05 RX ADMIN — SODIUM CHLORIDE 75 MILLILITER(S): 9 INJECTION, SOLUTION INTRAVENOUS at 12:03

## 2019-12-05 RX ADMIN — SODIUM CHLORIDE 75 MILLILITER(S): 9 INJECTION, SOLUTION INTRAVENOUS at 07:20

## 2019-12-05 RX ADMIN — Medication 5 MILLIGRAM(S): at 00:07

## 2019-12-05 RX ADMIN — Medication 102 MILLIGRAM(S): at 06:21

## 2019-12-05 RX ADMIN — PANTOPRAZOLE SODIUM 80 MILLIGRAM(S): 20 TABLET, DELAYED RELEASE ORAL at 06:55

## 2019-12-05 RX ADMIN — PANTOPRAZOLE SODIUM 10 MG/HR: 20 TABLET, DELAYED RELEASE ORAL at 07:20

## 2019-12-05 RX ADMIN — BUDESONIDE AND FORMOTEROL FUMARATE DIHYDRATE 2 PUFF(S): 160; 4.5 AEROSOL RESPIRATORY (INHALATION) at 12:01

## 2019-12-05 RX ADMIN — TIOTROPIUM BROMIDE 1 CAPSULE(S): 18 CAPSULE ORAL; RESPIRATORY (INHALATION) at 12:02

## 2019-12-05 NOTE — ED ADULT NURSE REASSESSMENT NOTE - NS ED NURSE REASSESS COMMENT FT1
Pt is aOX4. Pt reports 1 episode of bright red blood in stool. Pt denies cp or SOB. Pt denies dizziness, fatigue. Report given to ESSU1 RN.

## 2019-12-05 NOTE — H&P ADULT - PROBLEM SELECTOR PLAN 3
- Continue home inhaler equivalents and nasal cannula 3 L overnight. - MCV = 102.4  - f/u Vitamin B12, folate levels  - if B12 borderline/unequivocal (since low-normal level could still have deficiency), would get methylmalonic acid level

## 2019-12-05 NOTE — H&P ADULT - NSHPLABSRESULTS_GEN_ALL_CORE
13.1   13.52 )-----------( 133      ( 05 Dec 2019 02:30 )             39.0       12-04    141  |  104  |  38<H>  ----------------------------<  144<H>  4.3   |  20<L>  |  1.31<H>    Ca    9.3      04 Dec 2019 22:40    TPro  6.9  /  Alb  4.0  /  TBili  0.7  /  DBili  x   /  AST  19  /  ALT  30  /  AlkPhos  52  12-04    PT/INR - ( 05 Dec 2019 02:30 )   PT: 92.5 SEC;   INR: 7.82     PTT - ( 05 Dec 2019 02:30 )  PTT:49.2 SEC

## 2019-12-05 NOTE — H&P ADULT - PROBLEM SELECTOR PLAN 4
- Hold home antihypertensives in the setting of acute GI bleed.  - give Iv medications if hypertension is an issue. - Hold home antihypertensives in the setting of acute GI bleed.  - give IV medications if hypertension is an issue. - Continue home inhaler equivalents and nasal cannula 3 L overnight.

## 2019-12-05 NOTE — H&P ADULT - PROBLEM SELECTOR PLAN 8
Transitions of Care Status:  1.  Name of PCP:  2.  PCP Contacted on Admission: [ ] Y    [ x] N    3.  PCP contacted at Discharge: [ ] Y    [ ] N    [ ] N/A  4.  Post-Discharge Appointment Date and Location:  5.  Summary of Handoff given to PCP: - on AC - coumadin (for A-fib) and w/ supratherapeutic INR presently

## 2019-12-05 NOTE — H&P ADULT - PROBLEM SELECTOR PLAN 2
- patient takes coumadin for AC and metoprolol for rate control. Will hold both in the setting of acute GI bleed. - [id number] Score = 3  - patient takes coumadin for AC and metoprolol for rate control. Will hold both in the setting of acute GI bleed.

## 2019-12-05 NOTE — H&P ADULT - PROBLEM SELECTOR PLAN 9
Transitions of Care Status:  1.  Name of PCP:  2.  PCP Contacted on Admission: [ ] Y    [ x] N    3.  PCP contacted at Discharge: [ ] Y    [ ] N    [ ] N/A  4.  Post-Discharge Appointment Date and Location:  5.  Summary of Handoff given to PCP:

## 2019-12-05 NOTE — H&P ADULT - PROBLEM SELECTOR PLAN 7
- effectively anticoagulated - on AC - coumadin (for A-fib) and w/ supratherapeutic INR presently - No issues presently  - Continue Flomax once GI status stabilizes

## 2019-12-05 NOTE — H&P ADULT - NSHPPHYSICALEXAM_GEN_ALL_CORE
PHYSICAL EXAM:  T(C): 36.6 (12-05-19 @ 05:45), Max: 36.6 (12-05-19 @ 01:09)  HR: 70 (12-05-19 @ 05:45) (70 - 79)  BP: 135/80 (12-05-19 @ 05:45) (135/80 - 176/97)  RR: 17 (12-05-19 @ 05:45) (16 - 19)  SpO2: 98% (12-05-19 @ 05:45) (95% - 98%)  GENERAL: NAD, well-developed  HEAD:  Atraumatic, Normocephalic  EYES: EOMI, PERRLA, conjunctiva and sclera clear  NECK: Supple, No JVD  CHEST/LUNG: Clear to auscultation bilaterally; No wheeze  HEART: Regular rate and rhythm; No murmurs, rubs, or gallops  ABDOMEN: Soft, Nontender, Nondistended; Bowel sounds present  EXTREMITIES: b/l LE trace edema 2+ Peripheral Pulses, No clubbing, cyanosis  PSYCH: AAOx3  NEUROLOGY: non-focal  SKIN: No rashes or lesions PHYSICAL EXAM:  T(C): 36.6 (12-05-19 @ 05:45), Max: 36.6 (12-05-19 @ 01:09)  HR: 70 (12-05-19 @ 05:45) (70 - 79)  BP: 135/80 (12-05-19 @ 05:45) (135/80 - 176/97)  RR: 17 (12-05-19 @ 05:45) (16 - 19)  SpO2: 98% (12-05-19 @ 05:45) (95% - 98%)  GENERAL: NAD, well-developed  HEAD:  Atraumatic, Normocephalic  EYES: EOMI, PERRLA, conjunctiva and sclera clear  NECK: Supple, No JVD  CHEST/LUNG: Clear to auscultation bilaterally; No wheeze  HEART: Regular rate and rhythm; No murmurs, rubs, or gallops  ABDOMEN: Soft, Nontender, Nondistended; Bowel sounds present,  RECTAL: blood surrounding the anus, some skin tags and small extrenal hemorrhoids, no palpable internal hemorrhoids.   EXTREMITIES: b/l LE trace edema 2+ Peripheral Pulses, No clubbing, cyanosis  PSYCH: AAOx3  NEUROLOGY: non-focal  SKIN: No rashes or lesions PHYSICAL EXAM:  T(C): 36.6 (12-05-19 @ 05:45), Max: 36.6 (12-05-19 @ 01:09)  HR: 70 (12-05-19 @ 05:45) (70 - 79)  BP: 135/80 (12-05-19 @ 05:45) (135/80 - 176/97)  RR: 17 (12-05-19 @ 05:45) (16 - 19)  SpO2: 98% (12-05-19 @ 05:45) (95% - 98%)  GENERAL: NAD, well-developed  HEAD:  Atraumatic, Normocephalic  EYES: EOMI, PERRLA, conjunctiva and sclera clear  NECK: Supple, No JVD  CHEST/LUNG: Clear to auscultation bilaterally; No wheeze  HEART: Regular rate and rhythm; No murmurs, rubs, or gallops  ABDOMEN: Soft, Nontender, Nondistended; Bowel sounds present,  RECTAL: blood surrounding the anus, some skin tags and small external hemorrhoids, no palpable internal hemorrhoids.   EXTREMITIES: b/l LE trace edema 2+ Peripheral Pulses, No clubbing, cyanosis  PSYCH: AAOx3  NEUROLOGY: non-focal  SKIN: No rashes or lesions PHYSICAL EXAM:  T(C): 36.6 (19 @ 05:45), Max: 36.6 (19 @ 01:09)  HR: 70 (19 @ 05:45) (70 - 79)  BP: 135/80 (19 @ 05:45) (135/80 - 176/97)  RR: 17 (19 @ 05:45) (16 - 19)  SpO2: 98% (19 @ 05:45) (95% - 98%)  GENERAL: NAD, well-developed, appears worried  HEAD:  Atraumatic, Normocephalic  EYES: EOMI, PERRL, conjunctiva and sclera clear  NECK: Supple, No JVD  CHEST/LUNG: Clear to auscultation bilaterally; No wheeze  HEART: Regular rate and rhythm; No murmurs, rubs, or gallops  ABDOMEN: Soft, Nontender, Nondistended; Bowel sounds present,  RECTAL: blood surrounding the anus, some skin tags and small external hemorrhoids, no palpable internal hemorrhoids.   EXTREMITIES: b/l LE trace edema 2+ Peripheral Pulses, No clubbing, cyanosis  PSYCH: AAOx3; anxious about being admitted/current illness and also recollection that his wife  here ~ 6 years ago on Thanksgiving Day  NEUROLOGY: non-focal  SKIN: No rashes or lesions

## 2019-12-05 NOTE — CONSULT NOTE ADULT - ASSESSMENT
84 year old male with coumadin toxicity and hematochezia .  SEEN IN ED WITH FAMILY.       Rectal bleeding   Seem to be improving with correction of INR   I had an extensive discussion with family about colonoscopy We decided to hold off for now as I explained that he recently had a colonoscopy and likely this bleeding is all form his coumadin toxcity.  Bleeding scan ordered by primary team   Ok for clears after exam   CBC daily   keep hemoglobin > 8     Coumadin toxicity   Repeat INR in am   Hold dosing for now   Vit K given already   FFP is hanging       I was physically present for the key portions of the evaluation and management (E/M) service provided.  The patient was personally seen and examined at bedside.    Thank you for your consultation and allowing  me to participate in the care of your patients. If you have further questions please contact me at [phone].     Damian Desouza M.D.       _________________________________________________________________________________________________  [facility]  [address] [place] [address]  Office: [phone]    Bubba Zelaya PA-C  ___________________________________________________________________________________________________

## 2019-12-05 NOTE — H&P ADULT - NSICDXPASTSURGICALHX_GEN_ALL_CORE_FT
PAST SURGICAL HISTORY:  H/O amputation age 19, R great toe, due to growth "tumor"    H/O arthroscopic knee surgery Both knees, 2013    H/O cardiac pacemaker 2005    H/O repair of rotator cuff 2012

## 2019-12-05 NOTE — PATIENT PROFILE ADULT - FUNCTIONAL SCREEN CURRENT LEVEL: SWALLOWING (IF SCORE 2 OR MORE FOR ANY ITEM, CONSULT REHAB SERVICES), MLM)
Arthritis: Care Instructions  Your Care Instructions  Arthritis, also called osteoarthritis, is a breakdown of the cartilage that cushions your joints. When the cartilage wears down, your bones rub against each other. This causes pain and stiffness. Many people have some arthritis as they age. Arthritis most often affects the joints of the spine, hands, hips, knees, or feet. You can take simple measures to protect your joints, ease your pain, and help you stay active. Follow-up care is a key part of your treatment and safety. Be sure to make and go to all appointments, and call your doctor if you are having problems. It's also a good idea to know your test results and keep a list of the medicines you take. How can you care for yourself at home? · Stay at a healthy weight. Being overweight puts extra strain on your joints. · Talk to your doctor or physical therapist about exercises that will help ease joint pain. ? Stretch. You may enjoy gentle forms of yoga to help keep your joints and muscles flexible. ? Walk instead of jog. Other types of exercise that are less stressful on the joints include riding a bicycle, swimming, yadi chi, or water exercise. ? Lift weights. Strong muscles help reduce stress on your joints. Stronger thigh muscles, for example, take some of the stress off of the knees and hips. Learn the right way to lift weights so you do not make joint pain worse. · Take your medicines exactly as prescribed. Call your doctor if you think you are having a problem with your medicine. · Take pain medicines exactly as directed. ? If the doctor gave you a prescription medicine for pain, take it as prescribed. ? If you are not taking a prescription pain medicine, ask your doctor if you can take an over-the-counter medicine. · Use a cane, crutch, walker, or another device if you need help to get around. These can help rest your joints.  You also can use other things to make life easier, such as a higher toilet seat and padded handles on kitchen utensils. · Do not sit in low chairs, which can make it hard to get up. · Put heat or cold on your sore joints as needed. Use whichever helps you most. You also can take turns with hot and cold packs. ? Apply heat 2 or 3 times a day for 20 to 30 minutesusing a heating pad, hot shower, or hot packto relieve pain and stiffness. ? Put ice or a cold pack on your sore joint for 10 to 20 minutes at a time. Put a thin cloth between the ice and your skin. When should you call for help? Call your doctor now or seek immediate medical care if:    · You have sudden swelling, warmth, or pain in any joint.     · You have joint pain and a fever or rash.     · You have such bad pain that you cannot use a joint.    Watch closely for changes in your health, and be sure to contact your doctor if:    · You have mild joint symptoms that continue even with more than 6 weeks of care at home.     · You have stomach pain or other problems with your medicine. Where can you learn more? Go to http://mikael-oskar.info/. Enter Q742 in the search box to learn more about \"Arthritis: Care Instructions. \"  Current as of: June 11, 2018  Content Version: 11.8  © 0193-6414 [employer]. Care instructions adapted under license by [employer] (which disclaims liability or warranty for this information). If you have questions about a medical condition or this instruction, always ask your healthcare professional. [place] [address] any warranty or liability for your use of this information. 0 = swallows foods/liquids without difficulty

## 2019-12-05 NOTE — CONSULT NOTE ADULT - SUBJECTIVE AND OBJECTIVE BOX
Patient is a 84y old  Male who presents with a chief complaint of Lower GI bleed in the setting of supratherapeutic INR (05 Dec 2019 05:59)      The patient is an 84 year old man with afib on coumadin, HTN, HLD, COPD nasal cannula at night, s/p PPM (2005), BPH, s/p right hip replacement, presents with multiple bloody bowel movements. On the day of presentation the patient reports having about 6 bowel movements initially he had solid stool surrounded by blood filling the bowl and eventually became just blood with clots mixed in. The persistence of the bleeding brought him to the hospital. He has had occasional hemorrhoidal bleeding but it has never been this bad. The patient usually follows with his cardiologist Dr. Alfonso Blancas for coumadin management but he has not been for a while. He does not know his exact INR but he has not have to change his dose in a long time. He has not recently changed his diet. His HHA cooks for him      84 year old male with family at bedside He has a significant history of A-fib on coumadin, HLD, COPD, presents to our ED wit multiple episodes of bright red blood per rectum.  He states on my interview that the epsides have improved and his last BM had only spots of blood. His last colonoscopy was this past February.       REVIEW OF SYSTEMS  Constitutional:   No fever, no fatigue, no pallor, no night sweats, no weight loss.  HEENT:   No eye pain, no vision changes, no icterus, no mouth ulcers.  Respiratory:   No shortness of breath, no cough, no respiratory distress.   Cardiovascular:   No chest pain, no palpitations.   Gastrointestinal: No abdominal pain, no nausea, no vomiting , no diarrhea no constipation, + hematochezia, no melena.  Skin:   No rashes, no jaundice, no eczema.   Musculoskeletal:   No joint pain, no swelling, no myalgia.   Neurologic:   No headache, no seizure, no weakness.   Genitourinary:   No dysuria, no decreased urine output.  Psychiatric:  No depression, no anxiety,   Endocrine:   No thyroid disease, no diabetes.  Heme/Lymphatic:   No anemia, no blood transfusions, no lymph node enlargement, no bleeding, no bruising.  ___________________________________________________________________________________________  Allergies    No Known Allergies    Intolerances      MEDICATIONS  (STANDING):  budesonide 160 MICROgram(s)/formoterol 4.5 MICROgram(s) Inhaler 2 Puff(s) Inhalation two times a day  lactated ringers. 1000 milliLiter(s) (75 mL/Hr) IV Continuous <Continuous>  pantoprazole Infusion 8 mG/Hr (10 mL/Hr) IV Continuous <Continuous>  tiotropium 18 MICROgram(s) Capsule 1 Capsule(s) Inhalation daily    MEDICATIONS  (PRN):  ALBUTerol    0.083% 2.5 milliGRAM(s) Nebulizer every 6 hours PRN Shortness of Breath and/or Wheezing      PAST MEDICAL & SURGICAL HISTORY:  Essential hypertension  Oxygen desaturation during sleep: Use oxygen at night, nasal cannula  Dyspnea on exertion  Benign prostatic hyperplasia with nocturia  Polyp of colon, unspecified part of colon, unspecified type  Idiopathic pulmonary fibrosis  Pacemaker: [employer] Model [id number]  Serial [id number]  Persistent atrial fibrillation  ETOH abuse: in recovery, 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  Chronic obstructive pulmonary disease, unspecified COPD type  Former smoker, stopped smoking many years ago: 2PPD X 50yrs  Osteoarthritis of multiple joints, unspecified osteoarthritis type  H/O repair of rotator cuff: 2012  H/O cardiac pacemaker: 2005  H/O arthroscopic knee surgery: Both knees, 2013  H/O amputation: age 19, R great toe, due to growth &quot;tumor&quot;    FAMILY HISTORY:  FH: ovarian cancer: father  FH: colon cancer: mother    Social History: No history of : Tobacco use, IVDA, EToH  ______________________________________________________________________________________    PHYSICAL EXAM    Daily Height in cm: 185.42 (05 Dec 2019 10:41)    Daily   BMI: 27.2 (12-05 @ 10:41)  Change in Weight:  Vital Signs Last 24 Hrs  T(C): 36.5 (05 Dec 2019 13:25), Max: 37.1 (05 Dec 2019 10:41)  T(F): 97.7 (05 Dec 2019 13:25), Max: 98.7 (05 Dec 2019 10:41)  HR: 69 (05 Dec 2019 13:25) (69 - 79)  BP: 146/84 (05 Dec 2019 13:25) (135/80 - 176/97)  BP(mean): --  RR: 16 (05 Dec 2019 13:25) (16 - 19)  SpO2: 99% (05 Dec 2019 13:25) (95% - 99%)    General:  Well developed, well nourished, alert and active, no pallor, NAD.  HEENT:    Normal appearance of conjunctiva, ears, nose, lips, oropharynx, and oral mucosa, anicteric.  Neck:  No masses, no asymmetry.  Lymph Nodes:  No lymphadenopathy.   Cardiovascular:  RRR normal S1/S2, no murmur.  Respiratory:  CTA B/L, normal respiratory effort.   Abdominal:   soft, no masses or tenderness, normoactive BS, NT/ND, no HSM.  Extremities:   No clubbing or cyanosis, normal capillary refill, no edema.   Skin:   No rash, jaundice, lesions, eczema.   Musculoskeletal:  No joint swelling, erythema or tenderness.   Neuro: No focal deficits.   Other:   _______________________________________________________________________________________________  Lab Results:                          12.5   12.67 )-----------( 145      ( 05 Dec 2019 06:46 )             37.3     12-05    143  |  106  |  37<H>  ----------------------------<  148<H>  4.8   |  22  |  1.17    Ca    8.8      05 Dec 2019 06:46  Phos  3.0     12-05  Mg     2.1     12-05    TPro  5.8<L>  /  Alb  3.6  /  TBili  1.0  /  DBili  x   /  AST  15  /  ALT  23  /  AlkPhos  44  12-05    LIVER FUNCTIONS - ( 05 Dec 2019 06:46 )  Alb: 3.6 g/dL / Pro: 5.8 g/dL / ALK PHOS: 44 u/L / ALT: 23 u/L / AST: 15 u/L / GGT: x           PT/INR - ( 05 Dec 2019 06:46 )   PT: 77.6 SEC;   INR: 6.59          PTT - ( 05 Dec 2019 06:46 )  PTT:47.4 SEC        Stool Results:          RADIOLOGY RESULTS:    SURGICAL PATHOLOGY:

## 2019-12-05 NOTE — H&P ADULT - ATTENDING COMMENTS
84 year old male, with past history significant for Colonic polyps, Hemorrhoids, Idiopathic pulmonary fibrosis, A-fib, OA, Smoking, A-fib, alcohol abuse (in remission) and BPH, presented w/ report of continuous rectal bleeding of BRB for the past one day.  Appears very worried when seen at bedside.  Reports continued dripping of blood during defecation; no prior episodes.  Was not straining.  Follows up w/ colorectal surgeon for yearly colonoscopy (due to polyps); last procedure in 02/2018.  INR elevated to 8.10 --> 7.82.  BUN/Cr elevated to 38/1.31 (previously 19/1.07 in 10/2018).  S/P Phytonadione 5 mg in the ED.  For repeat CBC stat.  Would start Protonix drip due to suspicion for UGIB; has mild tenderness to moderate palpation at the mid abdomen during writer's examination.  May need Kcentra.  GI to be notified now.  Surgical consult (Dr Torsten Glover) as well.  Vital signs within acceptable ranges (no tachycardia also).  Antihypertensive meds on hold.  NPO and IVF hydration while NPO.    Other management as prescribed.

## 2019-12-05 NOTE — H&P ADULT - ASSESSMENT
The patient is an 84 year old man with afib on coumadin, HTN, HLD, COPD nasal cannula at night, s/p PPM (2005), BPH, s/p right hip replacement, presents with multiple bloody bowel movements admitted for brisk GI bleed possibly brisk upper GI bleed in the setting of elevated BUN vs. lower GI.

## 2019-12-05 NOTE — H&P ADULT - PROBLEM SELECTOR PLAN 1
- patient presenting with multiple episodes of BRBPR in the setting of supratherapeutic INR.   - Patient received 5 mg of PO vitamin k in the Ed.   - additional 10 mg vit k IV ordered.   - will recheck coags and CBC stat and assess need for k-centra  - cbc and coags q4h.   - contact GI regarding endoscopy and colonoscopy.   - contacted Dr. Glover for colonoscopy  - Dr. Galloway contacted as GI doctor for endoscopy  -

## 2019-12-05 NOTE — H&P ADULT - HISTORY OF PRESENT ILLNESS
The patient is an 84 year old man with afib on coumadin, HTN, HLD, COPD, s/p PPM (2005), BPH, s/p right hip replacement, presents with multiple bloody bowel movements. On the day of presentation the patient reports having about 6 bowel movements initially he had solid stool surrounded by blood filling the bowl and eventually became just blood with clots mixed in. The persistence of the bleeding brought him to the hospital. He has had occasional hemorrhoidal bleeding but it has never been this bad. The patient usually follows with his cardiologist Dr. Alfonso Blancas for coumadin management but he has not been for a while. He does not know his exact INR but he has not have to change his dose in a long time. He has not recently changed his diet. His HHA cooks for him.     The patient had his last colonoscopy with Dr. Glover was 10 months ago.     In the ED the patient has been hemodynamically stable and has had several BRBPR. He was given 5 mg of PO vitamin K in the ED. The patient is an 84 year old man with afib on coumadin, HTN, HLD, COPD nasal cannula at night, s/p PPM (2005), BPH, s/p right hip replacement, presents with multiple bloody bowel movements. On the day of presentation the patient reports having about 6 bowel movements initially he had solid stool surrounded by blood filling the bowl and eventually became just blood with clots mixed in. The persistence of the bleeding brought him to the hospital. He has had occasional hemorrhoidal bleeding but it has never been this bad. The patient usually follows with his cardiologist Dr. Alfonso Blancas for coumadin management but he has not been for a while. He does not know his exact INR but he has not have to change his dose in a long time. He has not recently changed his diet. His HHA cooks for him.     The patient had his last colonoscopy with Dr. Glover was 10 months ago.     In the ED the patient has been hemodynamically stable and has had several BRBPR. He was given 5 mg of PO vitamin K in the ED.

## 2019-12-05 NOTE — H&P ADULT - NSHPSOCIALHISTORY_GEN_ALL_CORE
The patient is a retired  and . He has a 70 pack year smoking history, quit 35 years ago. He drinks socially and does not use recreational drugs. The patient is a retired  and .   He has a 70 pack year smoking history, quit 35 years ago.   He drinks socially and   Does not use recreational drugs.

## 2019-12-05 NOTE — PATIENT PROFILE ADULT - LIVES WITH
alone/Pt. states that he has an aide that comes to his house and helps him 5 hours a day x 5 days a week

## 2019-12-05 NOTE — H&P ADULT - NSICDXPASTMEDICALHX_GEN_ALL_CORE_FT
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    Former smoker, stopped smoking many years ago 2PPD X 50yrs    Idiopathic pulmonary fibrosis     Osteoarthritis of multiple joints, unspecified osteoarthritis type     Oxygen desaturation during sleep Use oxygen at night, nasal cannula    Pacemaker [employer] Model [id number]  Serial [id number]    Persistent atrial fibrillation     Polyp of colon, unspecified part of colon, unspecified type PAST MEDICAL HISTORY:  Benign prostatic hyperplasia with nocturia     Chronic obstructive pulmonary disease, unspecified COPD type     Dyspnea on exertion     Essential hypertension     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

## 2019-12-05 NOTE — CONSULT NOTE ADULT - ASSESSMENT
84M Hx BPH, COPD, Pulm fibrosis, PPM, A Fib on coumadin who presents with a GI bleed.    - Correct INR  - Hold anticoagulation  - Consult GI for colonoscopy (vs endoscopy)  - Should the patient become unstable, consider IR consultation for angioembolization  - Trend H/H and transfuse prn  - To be discussed with bleed    A Team  [id number] 84M Hx BPH, COPD, Pulm fibrosis, PPM, A Fib on coumadin who presents with a GI bleed.    - Correct INR  - Hold anticoagulation  - Consult GI for colonoscopy (vs endoscopy)  - Should the patient become unstable, consider IR consultation for angioembolization  - Trend H/H and transfuse prn  - To be discussed with attending    A Team  [id number]

## 2019-12-05 NOTE — H&P ADULT - NSHPREVIEWOFSYSTEMS_GEN_ALL_CORE
REVIEW OF SYSTEMS:    CONSTITUTIONAL: No weakness, fevers or chills  EYES/ENT: No visual changes;  No vertigo or throat pain   NECK: No pain or stiffness  RESPIRATORY: No cough, wheezing, hemoptysis; No shortness of breath  CARDIOVASCULAR: No chest pain or palpitations  GASTROINTESTINAL: as in HPI  GENITOURINARY: No dysuria, frequency or hematuria  NEUROLOGICAL: No numbness or weakness  EXTREMITIES: right hip pain associated with hip replacement  SKIN: No itching, rashes

## 2019-12-05 NOTE — H&P ADULT - PROBLEM SELECTOR PLAN 5
- hold simvastatin until GI status stabilized. - Hold home antihypertensives in the setting of acute GI bleed.  - give IV medications if hypertension is an issue.

## 2019-12-05 NOTE — CONSULT NOTE ADULT - SUBJECTIVE AND OBJECTIVE BOX
Surgery Consult  Consulting surgical team: A Team  Consulting attending: Dr. Glover    HPI: Patient is a 84M Hx BPH, COPD, Pulm fibrosis, PPM, A Fib on coumain who presents with a GI bleed. He had a normal breakfast yesterday and then went to the bathroom around 10 am. At that time, he had a large amount of blood per rectum. It was a mix of dark and bright blood. He had 6 more episodes like this and came to the ED. He denies dizziness and CP but endorses a chronic history of SOB due to his COPD. He denies n/v/fevers.     His last colonoscopy was 10 months ago with Dr. Glover and is due for one in another couple months due to findings of polyps.       PAST MEDICAL HISTORY:  Oxygen desaturation during sleep  Dyspnea on exertion  Benign prostatic hyperplasia with nocturia  Polyp of colon, unspecified part of colon, unspecified type  Idiopathic pulmonary fibrosis  Pacemaker  Persistent atrial fibrillation  ETOH abuse  Chronic obstructive pulmonary disease, unspecified COPD type  Former smoker, stopped smoking many years ago  Osteoarthritis of multiple joints, unspecified osteoarthritis type      PAST SURGICAL HISTORY:  H/O repair of rotator cuff  H/O cardiac pacemaker  H/O arthroscopic knee surgery  H/O amputation      MEDICATIONS:      ALLERGIES:  No Known Allergies      VITALS & I/Os:  Vital Signs Last 24 Hrs  T(C): 36.6 (05 Dec 2019 01:09), Max: 36.6 (05 Dec 2019 01:09)  T(F): 97.9 (05 Dec 2019 01:09), Max: 97.9 (05 Dec 2019 01:09)  HR: 71 (05 Dec 2019 03:56) (70 - 79)  BP: 137/87 (05 Dec 2019 03:56) (137/87 - 176/97)  BP(mean): --  RR: 18 (05 Dec 2019 03:56) (16 - 19)  SpO2: 95% (05 Dec 2019 03:56) (95% - 97%)    I&O's Summary      PHYSICAL EXAM:  General: No acute distress  Respiratory: Nonlabored  Cardiovascular: RRR  Abdominal: Soft, nondistended, nontender. No rebound or guarding.  Extremities: Warm    LABS:                        13.1   13.52 )-----------( 133      ( 05 Dec 2019 02:30 )             39.0     12-04    141  |  104  |  38<H>  ----------------------------<  144<H>  4.3   |  20<L>  |  1.31<H>    Ca    9.3      04 Dec 2019 22:40    TPro  6.9  /  Alb  4.0  /  TBili  0.7  /  DBili  x   /  AST  19  /  ALT  30  /  AlkPhos  52  12-04    Lactate:    PT/INR - ( 05 Dec 2019 02:30 )   PT: 92.5 SEC;   INR: 7.82          PTT - ( 05 Dec 2019 02:30 )  PTT:49.2 SEC              IMAGING:

## 2019-12-06 LAB
24R-OH-CALCIDIOL SERPL-MCNC: 11.3 NG/ML — LOW (ref 30–80)
ANION GAP SERPL CALC-SCNC: 12 MMO/L — SIGNIFICANT CHANGE UP (ref 7–14)
APTT BLD: 25.4 SEC — LOW (ref 27.5–36.3)
BUN SERPL-MCNC: 22 MG/DL — SIGNIFICANT CHANGE UP (ref 7–23)
CALCIUM SERPL-MCNC: 8.5 MG/DL — SIGNIFICANT CHANGE UP (ref 8.4–10.5)
CHLORIDE SERPL-SCNC: 106 MMOL/L — SIGNIFICANT CHANGE UP (ref 98–107)
CO2 SERPL-SCNC: 23 MMOL/L — SIGNIFICANT CHANGE UP (ref 22–31)
CREAT SERPL-MCNC: 1.24 MG/DL — SIGNIFICANT CHANGE UP (ref 0.5–1.3)
GLUCOSE SERPL-MCNC: 83 MG/DL — SIGNIFICANT CHANGE UP (ref 70–99)
HCT VFR BLD CALC: 33.5 % — LOW (ref 39–50)
HGB BLD-MCNC: 10.5 G/DL — LOW (ref 13–17)
INR BLD: 1.18 — HIGH (ref 0.88–1.17)
MAGNESIUM SERPL-MCNC: 2 MG/DL — SIGNIFICANT CHANGE UP (ref 1.6–2.6)
MCHC RBC-ENTMCNC: 31.3 % — LOW (ref 32–36)
MCHC RBC-ENTMCNC: 32.1 PG — SIGNIFICANT CHANGE UP (ref 27–34)
MCV RBC AUTO: 102.4 FL — HIGH (ref 80–100)
NRBC # FLD: 0 K/UL — SIGNIFICANT CHANGE UP (ref 0–0)
PHOSPHATE SERPL-MCNC: 2.6 MG/DL — SIGNIFICANT CHANGE UP (ref 2.5–4.5)
PLATELET # BLD AUTO: 108 K/UL — LOW (ref 150–400)
PMV BLD: 10.3 FL — SIGNIFICANT CHANGE UP (ref 7–13)
POTASSIUM SERPL-MCNC: 3.8 MMOL/L — SIGNIFICANT CHANGE UP (ref 3.5–5.3)
POTASSIUM SERPL-SCNC: 3.8 MMOL/L — SIGNIFICANT CHANGE UP (ref 3.5–5.3)
PROTHROM AB SERPL-ACNC: 13.5 SEC — HIGH (ref 9.8–13.1)
RBC # BLD: 3.27 M/UL — LOW (ref 4.2–5.8)
RBC # FLD: 14.1 % — SIGNIFICANT CHANGE UP (ref 10.3–14.5)
SODIUM SERPL-SCNC: 141 MMOL/L — SIGNIFICANT CHANGE UP (ref 135–145)
WBC # BLD: 8.9 K/UL — SIGNIFICANT CHANGE UP (ref 3.8–10.5)
WBC # FLD AUTO: 8.9 K/UL — SIGNIFICANT CHANGE UP (ref 3.8–10.5)

## 2019-12-06 RX ORDER — TAMSULOSIN HYDROCHLORIDE 0.4 MG/1
0.4 CAPSULE ORAL AT BEDTIME
Refills: 0 | Status: DISCONTINUED | OUTPATIENT
Start: 2019-12-06 | End: 2019-12-07

## 2019-12-06 RX ORDER — AMLODIPINE BESYLATE 2.5 MG/1
5 TABLET ORAL DAILY
Refills: 0 | Status: DISCONTINUED | OUTPATIENT
Start: 2019-12-06 | End: 2019-12-07

## 2019-12-06 RX ORDER — SODIUM,POTASSIUM PHOSPHATES 278-250MG
1 POWDER IN PACKET (EA) ORAL ONCE
Refills: 0 | Status: COMPLETED | OUTPATIENT
Start: 2019-12-06 | End: 2019-12-06

## 2019-12-06 RX ORDER — SIMVASTATIN 20 MG/1
20 TABLET, FILM COATED ORAL AT BEDTIME
Refills: 0 | Status: DISCONTINUED | OUTPATIENT
Start: 2019-12-06 | End: 2019-12-07

## 2019-12-06 RX ORDER — PANTOPRAZOLE SODIUM 20 MG/1
40 TABLET, DELAYED RELEASE ORAL
Refills: 0 | Status: DISCONTINUED | OUTPATIENT
Start: 2019-12-06 | End: 2019-12-07

## 2019-12-06 RX ORDER — LOSARTAN POTASSIUM 100 MG/1
50 TABLET, FILM COATED ORAL DAILY
Refills: 0 | Status: DISCONTINUED | OUTPATIENT
Start: 2019-12-06 | End: 2019-12-07

## 2019-12-06 RX ORDER — GABAPENTIN 400 MG/1
100 CAPSULE ORAL DAILY
Refills: 0 | Status: DISCONTINUED | OUTPATIENT
Start: 2019-12-06 | End: 2019-12-07

## 2019-12-06 RX ORDER — SODIUM CHLORIDE 9 MG/ML
3 INJECTION INTRAMUSCULAR; INTRAVENOUS; SUBCUTANEOUS EVERY 8 HOURS
Refills: 0 | Status: DISCONTINUED | OUTPATIENT
Start: 2019-12-06 | End: 2019-12-07

## 2019-12-06 RX ADMIN — SODIUM CHLORIDE 3 MILLILITER(S): 9 INJECTION INTRAMUSCULAR; INTRAVENOUS; SUBCUTANEOUS at 14:23

## 2019-12-06 RX ADMIN — AMLODIPINE BESYLATE 5 MILLIGRAM(S): 2.5 TABLET ORAL at 07:06

## 2019-12-06 RX ADMIN — GABAPENTIN 100 MILLIGRAM(S): 400 CAPSULE ORAL at 14:24

## 2019-12-06 RX ADMIN — SODIUM CHLORIDE 3 MILLILITER(S): 9 INJECTION INTRAMUSCULAR; INTRAVENOUS; SUBCUTANEOUS at 22:41

## 2019-12-06 RX ADMIN — PANTOPRAZOLE SODIUM 40 MILLIGRAM(S): 20 TABLET, DELAYED RELEASE ORAL at 07:06

## 2019-12-06 RX ADMIN — Medication 1 TABLET(S): at 09:34

## 2019-12-06 RX ADMIN — LOSARTAN POTASSIUM 50 MILLIGRAM(S): 100 TABLET, FILM COATED ORAL at 07:06

## 2019-12-06 RX ADMIN — BUDESONIDE AND FORMOTEROL FUMARATE DIHYDRATE 2 PUFF(S): 160; 4.5 AEROSOL RESPIRATORY (INHALATION) at 09:36

## 2019-12-06 RX ADMIN — TIOTROPIUM BROMIDE 1 CAPSULE(S): 18 CAPSULE ORAL; RESPIRATORY (INHALATION) at 09:34

## 2019-12-06 RX ADMIN — TAMSULOSIN HYDROCHLORIDE 0.4 MILLIGRAM(S): 0.4 CAPSULE ORAL at 22:22

## 2019-12-06 RX ADMIN — SIMVASTATIN 20 MILLIGRAM(S): 20 TABLET, FILM COATED ORAL at 22:22

## 2019-12-06 RX ADMIN — BUDESONIDE AND FORMOTEROL FUMARATE DIHYDRATE 2 PUFF(S): 160; 4.5 AEROSOL RESPIRATORY (INHALATION) at 22:21

## 2019-12-06 NOTE — PROGRESS NOTE ADULT - ASSESSMENT
84M Hx BPH, COPD, Pulm fibrosis, PPM, A Fib on coumadin who presents with a GI bleed.    - F/u INR  - Appreciate GI recs  - Home meds restarted  - Tagged RBC scan yesterday negative for acute bleeding  - Hold anticoagulation  - CBC stable  - Diet: CLD    A Team  [id number]

## 2019-12-06 NOTE — PROGRESS NOTE ADULT - SUBJECTIVE AND OBJECTIVE BOX
SUBJECTIVE: Patient seen and examined this morning. No acute events overnight. Pain well controlled. Tolerating diet. Denies N/V/D/F/C. CBC stable overnight.    MEDICATIONS  (STANDING):  amLODIPine   Tablet 5 milliGRAM(s) Oral daily  budesonide 160 MICROgram(s)/formoterol 4.5 MICROgram(s) Inhaler 2 Puff(s) Inhalation two times a day  gabapentin 100 milliGRAM(s) Oral daily  lactated ringers. 1000 milliLiter(s) (50 mL/Hr) IV Continuous <Continuous>  losartan 50 milliGRAM(s) Oral daily  pantoprazole    Tablet 40 milliGRAM(s) Oral before breakfast  simvastatin 20 milliGRAM(s) Oral at bedtime  tamsulosin 0.4 milliGRAM(s) Oral at bedtime  tiotropium 18 MICROgram(s) Capsule 1 Capsule(s) Inhalation daily    MEDICATIONS  (PRN):  ALBUTerol    0.083% 2.5 milliGRAM(s) Nebulizer every 6 hours PRN Shortness of Breath and/or Wheezing      OBJECTIVE:    Vital Signs Last 24 Hrs  T(C): 36.7 (06 Dec 2019 05:53), Max: 37.1 (05 Dec 2019 10:41)  T(F): 98 (06 Dec 2019 05:53), Max: 98.7 (05 Dec 2019 10:41)  HR: 70 (06 Dec 2019 05:53) (68 - 74)  BP: 120/82 (06 Dec 2019 05:53) (120/75 - 150/78)  BP(mean): --  RR: 18 (06 Dec 2019 05:53) (16 - 19)  SpO2: 98% (06 Dec 2019 05:53) (95% - 99%)    General Appearance: Uncomfortable due to nausea  Neck: Supple  Chest: non-labored breathing, no respiratory distress  CV: Pulse regular presently  Abdomen: Soft, non-tender, non-distended  Extremities: warm and well perfused    I&O's Summary    05 Dec 2019 07:01  -  06 Dec 2019 07:00  --------------------------------------------------------  IN: 990 mL / OUT: 1250 mL / NET: -260 mL      I&O's Detail    05 Dec 2019 07:01  -  06 Dec 2019 07:00  --------------------------------------------------------  IN:    lactated ringers.: 900 mL    pantoprazole Infusion: 90 mL  Total IN: 990 mL    OUT:    Voided: 1250 mL  Total OUT: 1250 mL    Total NET: -260 mL            LABS:                        10.9   10.11 )-----------( 113      ( 05 Dec 2019 22:45 )             33.6     12-05    143  |  106  |  37<H>  ----------------------------<  148<H>  4.8   |  22  |  1.17    Ca    8.8      05 Dec 2019 06:46  Phos  3.0     12-05  Mg     2.1     12-05    TPro  5.8<L>  /  Alb  3.6  /  TBili  1.0  /  DBili  x   /  AST  15  /  ALT  23  /  AlkPhos  44  12-05    PT/INR - ( 05 Dec 2019 17:04 )   PT: 16.1 SEC;   INR: 1.40          PTT - ( 05 Dec 2019 17:04 )  PTT:25.3 SEC

## 2019-12-06 NOTE — CONSULT NOTE ADULT - SUBJECTIVE AND OBJECTIVE BOX
CHIEF COMPLAINT:Patient is a 84y old  Male who presents with a chief complaint of Lower GI bleed in the setting of supratherapeutic INR (06 Dec 2019 09:15)      HISTORY OF PRESENT ILLNESS:    84 male with history as below , copd, chronic afib on coumadin s/p PPM   admitted with GI BLEED   feels well  No chest pain, dyspnea, palpitation, or dizziness.     PAST MEDICAL & SURGICAL HISTORY:  Essential hypertension  Oxygen desaturation during sleep: Use oxygen at night, nasal cannula  Dyspnea on exertion  Benign prostatic hyperplasia with nocturia  Polyp of colon, unspecified part of colon, unspecified type  Idiopathic pulmonary fibrosis  Pacemaker: [employer] Model [id number]  Serial [id number]  Persistent atrial fibrillation  ETOH abuse: in recovery, 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  Chronic obstructive pulmonary disease, unspecified COPD type  Former smoker, stopped smoking many years ago: 2PPD X 50yrs  Osteoarthritis of multiple joints, unspecified osteoarthritis type  H/O repair of rotator cuff: 2012  H/O cardiac pacemaker: 2005  H/O arthroscopic knee surgery: Both knees, 2013  H/O amputation: age 19, R great toe, due to growth &quot;tumor&quot;          MEDICATIONS:  amLODIPine   Tablet 5 milliGRAM(s) Oral daily  losartan 50 milliGRAM(s) Oral daily  tamsulosin 0.4 milliGRAM(s) Oral at bedtime      ALBUTerol    0.083% 2.5 milliGRAM(s) Nebulizer every 6 hours PRN  budesonide 160 MICROgram(s)/formoterol 4.5 MICROgram(s) Inhaler 2 Puff(s) Inhalation two times a day  tiotropium 18 MICROgram(s) Capsule 1 Capsule(s) Inhalation daily    gabapentin 100 milliGRAM(s) Oral daily    pantoprazole    Tablet 40 milliGRAM(s) Oral before breakfast    simvastatin 20 milliGRAM(s) Oral at bedtime    sodium chloride 0.9% lock flush 3 milliLiter(s) IV Push every 8 hours      FAMILY HISTORY:  FH: ovarian cancer: father  FH: colon cancer: mother      Non-contributory    SOCIAL HISTORY:    No tobacco, drugs or etoh    Allergies    No Known Allergies    Intolerances    	    REVIEW OF SYSTEMS:  as above  The rest of the 14 points ROS reviewed and except above they are unremarkable.        PHYSICAL EXAM:  T(C): 36.8 (12-06-19 @ 14:01), Max: 37 (12-06-19 @ 09:39)  HR: 72 (12-06-19 @ 14:01) (68 - 74)  BP: 125/78 (12-06-19 @ 14:01) (117/85 - 148/83)  RR: 18 (12-06-19 @ 14:01) (16 - 19)  SpO2: 98% (12-06-19 @ 14:01) (95% - 98%)  Wt(kg): --  I&O's Summary    05 Dec 2019 07:01  -  06 Dec 2019 07:00  --------------------------------------------------------  IN: 990 mL / OUT: 1450 mL / NET: -460 mL    06 Dec 2019 07:01  -  06 Dec 2019 15:05  --------------------------------------------------------  IN: 472 mL / OUT: 200 mL / NET: 272 mL        JVP: Normal  Neck: supple  Lung: clear   CV: S1 S2 , Murmur:  Abd: soft  Ext: No edema  neuro: Awake / alert  Psych: flat affect  Skin: normal     LABS/DATA:    TELEMETRY: 	    ECG:  	   	  CARDIAC MARKERS:      < from: 12 Lead ECG (12.04.19 @ 21:11) >  Diagnosis Line Ventricular-paced rhythm  Abnormal ECG    < end of copied text >                                  10.5   8.90  )-----------( 108      ( 06 Dec 2019 06:15 )             33.5     12-06    141  |  106  |  22  ----------------------------<  83  3.8   |  23  |  1.24    Ca    8.5      06 Dec 2019 06:15  Phos  2.6     12-06  Mg     2.0     12-06    TPro  5.8<L>  /  Alb  3.6  /  TBili  1.0  /  DBili  x   /  AST  15  /  ALT  23  /  AlkPhos  44  12-05    proBNP:   Lipid Profile:   HgA1c:   TSH:

## 2019-12-06 NOTE — CONSULT NOTE ADULT - ATTENDING COMMENTS
Advanced care planning was discussed with patient and family.  Risks, benefits and alternatives of the cardiac treatments and medical therapy including procedures were discussed in detail and all questions were answered. Importance of compliance with medical therapy and lifestyle modification to improve cardiovascular health were addressed. Appropriate forms and patient educational materials were reviewed. 30 minutes face to face spent.
Mr. Hutchison presented to [facility] with complaints of blood per rectum that started yesterday. He had a bloody BM in the ED. He denies any nausea, vomiting, fever, chills, abdominal pain, chest pain, shortness of breath, or dizziness.     He was found to have a supratherapeutic INR  He has received Vitamin K 5mg, and 10mg  He is ordered for 2 units of FFP    On exam: he is awake, alert and oriented  He is receiving supplemental O2 via NC (history of COPD)  There is no scleral icterus  His abdomen is soft, not tender and not distended    Labs reviewed    84 year old man with GI bleed, and supratherapeutic INR  1. 2 units of FFP  2. Follow up INR  3. Bleeding scan pending  4. Appreciate GI consultation  5. Trend H/H

## 2019-12-06 NOTE — PROGRESS NOTE ADULT - ASSESSMENT
84M with h/o COPD, pulmonary fibrosis, AFib on Coumadin presents with LGIB 2/2 supratherapeutic INR. No further bloody BM, no active bleeding on NM bleeding scan, H/H stable    - ADAT  - f/u INR in AM  - monitor H/H  - resume AC once appropriate  - discharge once stable on AC 84M with h/o COPD, pulmonary fibrosis, AFib on Coumadin presents with LGIB 2/2 supratherapeutic INR. No further bloody BM, no active bleeding on NM bleeding scan, H/H stable, INR improved    Pt seen and examined with Dr. Briones  - Reg diet  - monitor H/H  - resume AC once appropriate  - speak with outpt Cardiologist Dr. Isiah Blancas  - dispreymundo once stable on AC

## 2019-12-06 NOTE — CONSULT NOTE ADULT - REASON FOR ADMISSION
Lower GI bleed in the setting of supratherapeutic INR
Lower GI bleed in the setting of supratherapeutic INR

## 2019-12-06 NOTE — CONSULT NOTE ADULT - ASSESSMENT
persistent afib  paced   HR stable  a/c is recommended once deemed safe from GI / surgery standpoint  as per current acc/aha guidelines , noac is preferred over warfarin   recommend eliquis 5 bid once he is able to go on a/c  echo is recommended to rule out mitral stenosis     HTN  stable   cont current meds    GIB  Monitor hemoglobin, transfuse as needed.

## 2019-12-06 NOTE — PROGRESS NOTE ADULT - SUBJECTIVE AND OBJECTIVE BOX
INTERVAL HPI/OVERNIGHT EVENTS:    Pt seen and examined. No further rectal bleeding  no bm overnight  pt denies abdominal pain, n/v     MEDICATIONS  (STANDING):  amLODIPine   Tablet 5 milliGRAM(s) Oral daily  budesonide 160 MICROgram(s)/formoterol 4.5 MICROgram(s) Inhaler 2 Puff(s) Inhalation two times a day  gabapentin 100 milliGRAM(s) Oral daily  lactated ringers. 1000 milliLiter(s) (50 mL/Hr) IV Continuous <Continuous>  losartan 50 milliGRAM(s) Oral daily  pantoprazole    Tablet 40 milliGRAM(s) Oral before breakfast  potassium acid phosphate/sodium acid phosphate tablet (K-PHOS No. 2) 1 Tablet(s) Oral once  simvastatin 20 milliGRAM(s) Oral at bedtime  tamsulosin 0.4 milliGRAM(s) Oral at bedtime  tiotropium 18 MICROgram(s) Capsule 1 Capsule(s) Inhalation daily    MEDICATIONS  (PRN):  ALBUTerol    0.083% 2.5 milliGRAM(s) Nebulizer every 6 hours PRN Shortness of Breath and/or Wheezing      Allergies    No Known Allergies    Intolerances        Review of Systems:    General:  No wt loss, fevers, chills, night sweats,fatigue,   Eyes:  Good vision, no reported pain  ENT:  No sore throat, pain, runny nose, dysphagia  CV:  No pain, palpitations, hypo/hypertension  Resp:  No dyspnea, cough, tachypnea, wheezing  GI:  No pain, No nausea, No vomiting, No diarrhea, No constipation, No weight loss, No fever, No pruritis, No rectal bleeding, No melena, No dysphagia  :  No pain, bleeding, incontinence, nocturia  Muscle:  No pain, weakness  Neuro:  No weakness, tingling, memory problems  Psych:  No fatigue, insomnia, mood problems, depression  Endocrine:  No polyuria, polydypsia, cold/heat intolerance  Heme:  No petechiae, ecchymosis, easy bruisability  Skin:  No rash, tattoos, scars, edema      Vital Signs Last 24 Hrs  T(C): 36.6 (06 Dec 2019 07:07), Max: 37.1 (05 Dec 2019 10:41)  T(F): 97.9 (06 Dec 2019 07:07), Max: 98.7 (05 Dec 2019 10:41)  HR: 72 (06 Dec 2019 07:07) (68 - 74)  BP: 117/85 (06 Dec 2019 07:07) (117/85 - 150/78)  BP(mean): --  RR: 17 (06 Dec 2019 07:07) (16 - 19)  SpO2: 98% (06 Dec 2019 07:07) (95% - 99%)    PHYSICAL EXAM:    Constitutional: NAD, well-developed  HEENT: EOMI, throat clear  Neck: No LAD, supple  Respiratory: CTA and P  Cardiovascular: S1 and S2, RRR, no M  Gastrointestinal: BS+, soft, NT/ND, neg HSM,  Extremities: No peripheral edema, neg clubing, cyanosis  Vascular: 2+ peripheral pulses  Neurological: A/O x 3, no focal deficits  Psychiatric: Normal mood, normal affect  Skin: No rashes      LABS:                        10.5   8.90  )-----------( 108      ( 06 Dec 2019 06:15 )             33.5     12-06    141  |  106  |  22  ----------------------------<  83  3.8   |  23  |  1.24    Ca    8.5      06 Dec 2019 06:15  Phos  2.6     12-06  Mg     2.0     12-06    TPro  5.8<L>  /  Alb  3.6  /  TBili  1.0  /  DBili  x   /  AST  15  /  ALT  23  /  AlkPhos  44  12-05    PT/INR - ( 06 Dec 2019 06:15 )   PT: 13.5 SEC;   INR: 1.18          PTT - ( 06 Dec 2019 06:15 )  PTT:25.4 SEC      RADIOLOGY & ADDITIONAL TESTS:

## 2019-12-06 NOTE — PROGRESS NOTE ADULT - ASSESSMENT
84 year old male with coumadin toxicity and hematochezia       Rectal bleeding   improving with correction of INR   Dr. Ibrahim had an extensive discussion with family about colonoscopy. They decided as it was explained that he recently had a colonoscopy and likely this bleeding is all from his coumadin toxicity.  Bleeding scan reviewed, negative  diet to be advanced as tolerated   CBC daily   keep hemoglobin > 8     Coumadin toxicity   Repeat INR in am   Hold dosing for now   s/p Vit K  s/p FFP     [facility]  [address] [place] [address]  Office: [phone]    Bubba Zelaya PA-C

## 2019-12-07 ENCOUNTER — TRANSCRIPTION ENCOUNTER (OUTPATIENT)
Age: 84
End: 2019-12-07

## 2019-12-07 VITALS
RESPIRATION RATE: 17 BRPM | OXYGEN SATURATION: 95 % | HEART RATE: 79 BPM | DIASTOLIC BLOOD PRESSURE: 54 MMHG | TEMPERATURE: 97 F | SYSTOLIC BLOOD PRESSURE: 124 MMHG

## 2019-12-07 LAB
ANION GAP SERPL CALC-SCNC: 12 MMO/L — SIGNIFICANT CHANGE UP (ref 7–14)
APTT BLD: 24.4 SEC — LOW (ref 27.5–36.3)
BUN SERPL-MCNC: 20 MG/DL — SIGNIFICANT CHANGE UP (ref 7–23)
CALCIUM SERPL-MCNC: 8.2 MG/DL — LOW (ref 8.4–10.5)
CHLORIDE SERPL-SCNC: 104 MMOL/L — SIGNIFICANT CHANGE UP (ref 98–107)
CO2 SERPL-SCNC: 24 MMOL/L — SIGNIFICANT CHANGE UP (ref 22–31)
CREAT SERPL-MCNC: 1.26 MG/DL — SIGNIFICANT CHANGE UP (ref 0.5–1.3)
GLUCOSE SERPL-MCNC: 110 MG/DL — HIGH (ref 70–99)
HCT VFR BLD CALC: 31.7 % — LOW (ref 39–50)
HCT VFR BLD CALC: 35.4 % — LOW (ref 39–50)
HGB BLD-MCNC: 10.4 G/DL — LOW (ref 13–17)
HGB BLD-MCNC: 11.4 G/DL — LOW (ref 13–17)
INR BLD: 1.21 — HIGH (ref 0.88–1.17)
MAGNESIUM SERPL-MCNC: 2.1 MG/DL — SIGNIFICANT CHANGE UP (ref 1.6–2.6)
MCHC RBC-ENTMCNC: 32.2 % — SIGNIFICANT CHANGE UP (ref 32–36)
MCHC RBC-ENTMCNC: 32.8 % — SIGNIFICANT CHANGE UP (ref 32–36)
MCHC RBC-ENTMCNC: 33.1 PG — SIGNIFICANT CHANGE UP (ref 27–34)
MCHC RBC-ENTMCNC: 33.4 PG — SIGNIFICANT CHANGE UP (ref 27–34)
MCV RBC AUTO: 101.9 FL — HIGH (ref 80–100)
MCV RBC AUTO: 102.9 FL — HIGH (ref 80–100)
NRBC # FLD: 0 K/UL — SIGNIFICANT CHANGE UP (ref 0–0)
NRBC # FLD: 0 K/UL — SIGNIFICANT CHANGE UP (ref 0–0)
PHOSPHATE SERPL-MCNC: 2.9 MG/DL — SIGNIFICANT CHANGE UP (ref 2.5–4.5)
PLATELET # BLD AUTO: 102 K/UL — LOW (ref 150–400)
PLATELET # BLD AUTO: 142 K/UL — LOW (ref 150–400)
PMV BLD: 10.1 FL — SIGNIFICANT CHANGE UP (ref 7–13)
PMV BLD: 10.1 FL — SIGNIFICANT CHANGE UP (ref 7–13)
POTASSIUM SERPL-MCNC: 3.7 MMOL/L — SIGNIFICANT CHANGE UP (ref 3.5–5.3)
POTASSIUM SERPL-SCNC: 3.7 MMOL/L — SIGNIFICANT CHANGE UP (ref 3.5–5.3)
PROTHROM AB SERPL-ACNC: 13.5 SEC — HIGH (ref 9.8–13.1)
RBC # BLD: 3.11 M/UL — LOW (ref 4.2–5.8)
RBC # BLD: 3.44 M/UL — LOW (ref 4.2–5.8)
RBC # FLD: 13.7 % — SIGNIFICANT CHANGE UP (ref 10.3–14.5)
RBC # FLD: 13.8 % — SIGNIFICANT CHANGE UP (ref 10.3–14.5)
SODIUM SERPL-SCNC: 140 MMOL/L — SIGNIFICANT CHANGE UP (ref 135–145)
WBC # BLD: 10.79 K/UL — HIGH (ref 3.8–10.5)
WBC # BLD: 7.27 K/UL — SIGNIFICANT CHANGE UP (ref 3.8–10.5)
WBC # FLD AUTO: 10.79 K/UL — HIGH (ref 3.8–10.5)
WBC # FLD AUTO: 7.27 K/UL — SIGNIFICANT CHANGE UP (ref 3.8–10.5)

## 2019-12-07 RX ORDER — APIXABAN 2.5 MG/1
5 TABLET, FILM COATED ORAL
Refills: 0 | Status: DISCONTINUED | OUTPATIENT
Start: 2019-12-07 | End: 2019-12-07

## 2019-12-07 RX ORDER — APIXABAN 2.5 MG/1
1 TABLET, FILM COATED ORAL
Qty: 60 | Refills: 0
Start: 2019-12-07 | End: 2020-01-05

## 2019-12-07 RX ORDER — POTASSIUM CHLORIDE 20 MEQ
40 PACKET (EA) ORAL ONCE
Refills: 0 | Status: COMPLETED | OUTPATIENT
Start: 2019-12-07 | End: 2019-12-07

## 2019-12-07 RX ADMIN — GABAPENTIN 100 MILLIGRAM(S): 400 CAPSULE ORAL at 11:03

## 2019-12-07 RX ADMIN — SODIUM CHLORIDE 3 MILLILITER(S): 9 INJECTION INTRAMUSCULAR; INTRAVENOUS; SUBCUTANEOUS at 06:56

## 2019-12-07 RX ADMIN — SODIUM CHLORIDE 3 MILLILITER(S): 9 INJECTION INTRAMUSCULAR; INTRAVENOUS; SUBCUTANEOUS at 13:42

## 2019-12-07 RX ADMIN — AMLODIPINE BESYLATE 5 MILLIGRAM(S): 2.5 TABLET ORAL at 06:58

## 2019-12-07 RX ADMIN — LOSARTAN POTASSIUM 50 MILLIGRAM(S): 100 TABLET, FILM COATED ORAL at 06:58

## 2019-12-07 RX ADMIN — Medication 40 MILLIEQUIVALENT(S): at 13:42

## 2019-12-07 RX ADMIN — PANTOPRAZOLE SODIUM 40 MILLIGRAM(S): 20 TABLET, DELAYED RELEASE ORAL at 06:58

## 2019-12-07 RX ADMIN — BUDESONIDE AND FORMOTEROL FUMARATE DIHYDRATE 2 PUFF(S): 160; 4.5 AEROSOL RESPIRATORY (INHALATION) at 11:04

## 2019-12-07 NOTE — PROGRESS NOTE ADULT - REASON FOR ADMISSION
Lower GI bleed in the setting of supratherapeutic INR

## 2019-12-07 NOTE — DISCHARGE NOTE NURSING/CASE MANAGEMENT/SOCIAL WORK - NSDCPNINST_GEN_ALL_CORE
Report of any redness, drainage, swelling, fever or pain, chills not relieved by pain medication. Report increased abdominal distension, nausea, vomiting, diarrhea to the provider. Report blood in stools.

## 2019-12-07 NOTE — PROGRESS NOTE ADULT - SUBJECTIVE AND OBJECTIVE BOX
Subjective: Patient seen and examined. No new events except as noted.     SUBJECTIVE/ROS:  feels ok       MEDICATIONS:  MEDICATIONS  (STANDING):  amLODIPine   Tablet 5 milliGRAM(s) Oral daily  budesonide 160 MICROgram(s)/formoterol 4.5 MICROgram(s) Inhaler 2 Puff(s) Inhalation two times a day  gabapentin 100 milliGRAM(s) Oral daily  losartan 50 milliGRAM(s) Oral daily  pantoprazole    Tablet 40 milliGRAM(s) Oral before breakfast  simvastatin 20 milliGRAM(s) Oral at bedtime  sodium chloride 0.9% lock flush 3 milliLiter(s) IV Push every 8 hours  tamsulosin 0.4 milliGRAM(s) Oral at bedtime  tiotropium 18 MICROgram(s) Capsule 1 Capsule(s) Inhalation daily      PHYSICAL EXAM:  T(C): 36.7 (12-07-19 @ 07:00), Max: 37.1 (12-06-19 @ 18:10)  HR: 69 (12-07-19 @ 02:35) (67 - 72)  BP: 118/65 (12-07-19 @ 07:00) (103/60 - 138/77)  RR: 16 (12-07-19 @ 07:00) (16 - 18)  SpO2: 96% (12-07-19 @ 07:00) (95% - 98%)  Wt(kg): --  I&O's Summary    06 Dec 2019 07:01  -  07 Dec 2019 07:00  --------------------------------------------------------  IN: 792 mL / OUT: 200 mL / NET: 592 mL            JVP: Normal  Neck: supple  Lung: clear   CV: S1 S2 , Murmur:  Abd: soft  Ext: No edema  neuro: Awake / alert  Psych: flat affect  Skin: normal``    LABS/DATA:    CARDIAC MARKERS:                                10.5   8.90  )-----------( 108      ( 06 Dec 2019 06:15 )             33.5     12-06    141  |  106  |  22  ----------------------------<  83  3.8   |  23  |  1.24    Ca    8.5      06 Dec 2019 06:15  Phos  2.6     12-06  Mg     2.0     12-06      proBNP:   Lipid Profile:   HgA1c:   TSH:     TELE:  EKG:

## 2019-12-07 NOTE — PROGRESS NOTE ADULT - SUBJECTIVE AND OBJECTIVE BOX
INTERVAL HPI/OVERNIGHT EVENTS:    no further bleeding    MEDICATIONS  (STANDING):  amLODIPine   Tablet 5 milliGRAM(s) Oral daily  apixaban 5 milliGRAM(s) Oral two times a day  budesonide 160 MICROgram(s)/formoterol 4.5 MICROgram(s) Inhaler 2 Puff(s) Inhalation two times a day  gabapentin 100 milliGRAM(s) Oral daily  losartan 50 milliGRAM(s) Oral daily  pantoprazole    Tablet 40 milliGRAM(s) Oral before breakfast  simvastatin 20 milliGRAM(s) Oral at bedtime  sodium chloride 0.9% lock flush 3 milliLiter(s) IV Push every 8 hours  tamsulosin 0.4 milliGRAM(s) Oral at bedtime  tiotropium 18 MICROgram(s) Capsule 1 Capsule(s) Inhalation daily    MEDICATIONS  (PRN):  ALBUTerol    0.083% 2.5 milliGRAM(s) Nebulizer every 6 hours PRN Shortness of Breath and/or Wheezing      Allergies    No Known Allergies    Intolerances        Review of Systems:    General:  No wt loss, fevers, chills, night sweats,fatigue,   Eyes:  Good vision, no reported pain  ENT:  No sore throat, pain, runny nose, dysphagia  CV:  No pain, palpitatioins, hypo/hypertension  Resp:  No dyspnea, cough, tachypnea, wheezing  GI:  No pain, No nausea, No vomiting, No diarrhea, No constipatiion, No weight loss, No fever, No pruritis, No rectal bleeding, No tarry stools, No dysphagia,  :  No pain, bleeding, incontinence, nocturia  Muscle:  No pain, weakness  Neuro:  No weakness, tingling, memory problems  Psych:  No fatigue, insomnia, mood problems, depression  Endocrine:  No polyuria, polydypsia, cold/heat intolerance  Heme:  No petechiae, ecchymosis, easy bruisability  Skin:  No rash, tattoos, scars, edema      Vital Signs Last 24 Hrs  T(C): 36.3 (07 Dec 2019 14:05), Max: 37.1 (06 Dec 2019 18:10)  T(F): 97.3 (07 Dec 2019 14:05), Max: 98.7 (06 Dec 2019 18:10)  HR: 79 (07 Dec 2019 14:05) (67 - 79)  BP: 124/54 (07 Dec 2019 14:05) (103/60 - 124/54)  BP(mean): --  RR: 17 (07 Dec 2019 14:05) (16 - 18)  SpO2: 95% (07 Dec 2019 14:05) (95% - 97%)    PHYSICAL EXAM:    Constitutional: NAD, well-developed  HEENT: EOMI, throat clear  Neck: No LAD, supple  Respiratory: CTA and P  Cardiovascular: S1 and S2, RRR, no M  Gastrointestinal: BS+, soft, NT/ND, neg HSM,  Extremities: No peripheral edema, neg clubing, cyanosis  Vascular: 2+ peripheral pulses  Neurological: A/O x 3, no focal deficits  Psychiatric: Normal mood, normal affect  Skin: No rashes      LABS:                        10.4   7.27  )-----------( 102      ( 07 Dec 2019 06:45 )             31.7     12-07    140  |  104  |  20  ----------------------------<  110<H>  3.7   |  24  |  1.26    Ca    8.2<L>      07 Dec 2019 06:45  Phos  2.9     12-07  Mg     2.1     12-07      PT/INR - ( 07 Dec 2019 06:45 )   PT: 13.5 SEC;   INR: 1.21          PTT - ( 07 Dec 2019 06:45 )  PTT:24.4 SEC      RADIOLOGY & ADDITIONAL TESTS:

## 2019-12-07 NOTE — PROGRESS NOTE ADULT - ASSESSMENT
84 year old male with coumadin toxicity and hematochezia       Rectal bleeding   improving with correction of INR   Dr. Ibrahim had an extensive discussion with family about colonoscopy. They decided as it was explained that he recently had a colonoscopy and likely this bleeding is all from his coumadin toxicity.  Bleeding scan reviewed, negative  diet to be advanced as tolerated   CBC daily   keep hemoglobin > 8     Coumadin toxicity   Repeat INR in am   Hold dosing for now   s/p Vit K  s/p FFP     [facility]  [address] [place] [address]  Office: [phone]    Bubba Zelaya PA-C

## 2019-12-07 NOTE — PROGRESS NOTE ADULT - ASSESSMENT
83y/o admitted with LGIB due to supra-therapeutic INR, currently stable.    - start Eliquis as per cardiol  - repeat HH later today, if stable with d/c home to f/u with cardiol as outpt.

## 2019-12-07 NOTE — PROGRESS NOTE ADULT - ASSESSMENT
persistent afib  paced   HR stable  a/c is recommended once deemed safe from GI / surgery standpoint  as per current acc/aha guidelines , noac is preferred over warfarin   recommend eliquis 5 bid once he is able to go on a/c  echo is recommended to rule out mitral stenosis , it can be done as outpt     HTN  stable   cont current meds    GIB  Monitor hemoglobin, transfuse as needed.

## 2019-12-07 NOTE — DISCHARGE NOTE NURSING/CASE MANAGEMENT/SOCIAL WORK - PATIENT PORTAL LINK FT
You can access the FollowMyHealth Patient Portal offered by [facility] by registering at the following website: [email or web address]. By joining [facility]’s FollowMyHealth portal, you will also be able to view your health information using other applications (apps) compatible with our system.

## 2019-12-07 NOTE — DISCHARGE NOTE PROVIDER - CARE PROVIDER_API CALL
Torsten Glover)  ColonRectal Surgery; Surgery  [address]  [place] [address]  Phone: [phone]  Fax: [phone]  Follow Up Time:     Andrew Huber)  Cardiovascular Disease; Internal Medicine  [address], [address]  [place] [address]  Phone: [phone]  Fax: [phone]  Follow Up Time:

## 2019-12-07 NOTE — DISCHARGE NOTE PROVIDER - NSDCCPCAREPLAN_GEN_ALL_CORE_FT
PRINCIPAL DISCHARGE DIAGNOSIS  Diagnosis: Lower GI bleeding  Assessment and Plan of Treatment: Patient managed conservatively. GI bleed resolved spontaneously.

## 2019-12-07 NOTE — PROGRESS NOTE ADULT - SUBJECTIVE AND OBJECTIVE BOX
INTERVAL HPI/OVERNIGHT EVENTS: pt feels well. No further rectal bleeding    MEDICATIONS  (STANDING):  amLODIPine   Tablet 5 milliGRAM(s) Oral daily  apixaban 5 milliGRAM(s) Oral two times a day  budesonide 160 MICROgram(s)/formoterol 4.5 MICROgram(s) Inhaler 2 Puff(s) Inhalation two times a day  gabapentin 100 milliGRAM(s) Oral daily  losartan 50 milliGRAM(s) Oral daily  pantoprazole    Tablet 40 milliGRAM(s) Oral before breakfast  potassium chloride    Tablet ER 40 milliEquivalent(s) Oral once  simvastatin 20 milliGRAM(s) Oral at bedtime  sodium chloride 0.9% lock flush 3 milliLiter(s) IV Push every 8 hours  tamsulosin 0.4 milliGRAM(s) Oral at bedtime  tiotropium 18 MICROgram(s) Capsule 1 Capsule(s) Inhalation daily    MEDICATIONS  (PRN):  ALBUTerol    0.083% 2.5 milliGRAM(s) Nebulizer every 6 hours PRN Shortness of Breath and/or Wheezing      Vital Signs Last 24 Hrs  T(C): 36.6 (07 Dec 2019 09:29), Max: 37.1 (06 Dec 2019 18:10)  T(F): 97.9 (07 Dec 2019 09:29), Max: 98.7 (06 Dec 2019 18:10)  HR: 77 (07 Dec 2019 09:29) (67 - 77)  BP: 103/63 (07 Dec 2019 09:29) (103/60 - 125/78)  BP(mean): --  RR: 18 (07 Dec 2019 09:29) (16 - 18)  SpO2: 95% (07 Dec 2019 09:29) (95% - 98%)  I&O's Detail    06 Dec 2019 07:01  -  07 Dec 2019 07:00  --------------------------------------------------------  IN:    Oral Fluid: 792 mL  Total IN: 792 mL    OUT:    Voided: 200 mL  Total OUT: 200 mL    Total NET: 592 mL      07 Dec 2019 07:01  -  07 Dec 2019 11:17  --------------------------------------------------------  IN:    Oral Fluid: 150 mL  Total IN: 150 mL    OUT:  Total OUT: 0 mL    Total NET: 150 mL      Abdominal: Soft, NT, ND +BS, Extremities: No edema, + peripheral pulses        LABS:                        10.4   7.27  )-----------( 102      ( 07 Dec 2019 06:45 )             31.7     12-07    140  |  104  |  20  ----------------------------<  110<H>  3.7   |  24  |  1.26    Ca    8.2<L>      07 Dec 2019 06:45  Phos  2.9     12-07  Mg     2.1     12-07      PT/INR - ( 07 Dec 2019 06:45 )   PT: 13.5 SEC;   INR: 1.21          PTT - ( 07 Dec 2019 06:45 )  PTT:24.4 SEC      RADIOLOGY & ADDITIONAL STUDIES:

## 2019-12-07 NOTE — DISCHARGE NOTE NURSING/CASE MANAGEMENT/SOCIAL WORK - NSDCPEELIQUIS_GEN_ALL_CORE
Apixaban/Eliquis - Dietary Advice/Apixaban/Eliquis - Compliance/Apixaban/Eliquis - Potential for adverse drug reactions and interactions/Apixaban/Eliquis - Follow up monitoring

## 2019-12-07 NOTE — PROGRESS NOTE ADULT - ATTENDING COMMENTS
Advanced care planning was discussed with patient and family.  Risks, benefits and alternatives of the cardiac treatments and medical therapy including procedures were discussed in detail and all questions were answered. Importance of compliance with medical therapy and lifestyle modification to improve cardiovascular health were addressed. Appropriate forms and patient educational materials were reviewed. 30 minutes face to face spent.
Pt seen/examined, agree with above.  - d/c planning pending cardiol recom.

## 2019-12-07 NOTE — DISCHARGE NOTE PROVIDER - HOSPITAL COURSE
84 year old man with afib on coumadin, HTN, HLD, COPD nasal cannula at night, s/p PPM (2005), BPH, s/p right hip replacement, who presented with multiple bloody bowel movements. On the day of presentation the patient reported having about 6 bowel movements initially he had solid stool surrounded by blood filling the bowl and eventually became just blood with clots mixed in. Admission laboratory results revealed supratherapeutic INR level. Coumadin was held and GI bleeding resolved shortly after. Cardiology was consulted who recommended Eliquis 5 twice a day and outpatient echocardiogram.         At the time of discharge, the patient was hemodynamically stable, tolerating PO diet, voiding urine, passing stool, ambulating and was comfortable with adequate pain control. The patient was instructed to follow up with his cardiologist after discharge. The patient/family felt comfortable with discharge. The patient was discharged to home/rehab. The patient had no other issues.

## 2019-12-07 NOTE — DISCHARGE NOTE PROVIDER - NSDCFUADDINST_GEN_ALL_CORE_FT
1. Please take Eliquis medication 2 times a day.  2. As per Cardiologists recommendations, please have an echocardiogram done and follow with Dr. Huber in his offices with the results.    WOUND CARE: Keep incision clean and dry.  Cover with dry, sterile dressing.  BATHING: Please do not submerge wound underwater. You may shower and/or sponge bathe.  ACTIVITY: No heavy lifting or straining. Otherwise, you may return to your usual level of physical activity. If you are taking narcotic pain medication (such as Percocet), do NOT drive a car, operate machinery or make important decisions.  DIET: ___  NOTIFY YOUR SURGEON IF: You have any bleeding that does not stop, any pus draining from your wound, any fever (over 100.4 F) or chills, persistent nausea/vomiting, persistent diarrhea, or if your pain is not controlled on your discharge pain medications.  FOLLOW-UP:  1. Follow-up with  within 1-2 weeks of discharge.  Please call office for appointment.  2. Please follow up with your primary care physician in one week regarding your hospitalization. 1. Please take Eliquis medication 2 times a day.  2. As per Cardiologists recommendations, please have an echocardiogram done and follow with Dr. Huber in his offices with the results.    NOTIFY YOUR SURGEON IF: You have any bleeding that does not stop, any fever (over 100.4 F) or chills, persistent diarrhea.    FOLLOW-UP:  1. Follow-up with you cardiologist within 1-2 weeks of discharge.  Please call office for appointment.  2. Please follow up with your primary care physician in one week regarding your hospitalization. 1. Please take Eliquis medication 2 times a day.  2. As per Cardiologists recommendations, please have an echocardiogram done and follow with Dr. Huber in his offices with the results. You may also follow with your cardiologist Dr. Alfonso Blancas.    NOTIFY YOUR SURGEON IF: You have any bleeding that does not stop, any fever (over 100.4 F) or chills, persistent diarrhea.    FOLLOW-UP:  1. Follow-up with you cardiologist within 1-2 weeks of discharge.  Please call office for appointment.  2. Please follow up with your primary care physician in one week regarding your hospitalization.

## 2020-04-22 ENCOUNTER — APPOINTMENT (OUTPATIENT)
Dept: UROLOGY | Facility: CLINIC | Age: 85
End: 2020-04-22

## 2020-06-19 ENCOUNTER — RX RENEWAL (OUTPATIENT)
Age: 85
End: 2020-06-19

## 2021-04-04 NOTE — DISCHARGE NOTE ADULT - NS AS ACTIVITY OBS
COVID pneumonia SOB  Afib Patient may shower, limit direct water to dressing, if wet pat dry/Do not make important decisions/No Heavy lifting/straining/Walking-Indoors allowed/Walking-Outdoors allowed/Stairs allowed/Do not drive or operate machinery

## 2021-12-10 NOTE — DISCHARGE NOTE PROVIDER - REASON FOR ADMISSION
Lower GI bleed in the setting of supratherapeutic INR DISPLAY PLAN FREE TEXT DISPLAY PLAN FREE TEXT DISPLAY PLAN FREE TEXT DISPLAY PLAN FREE TEXT DISPLAY PLAN FREE TEXT DISPLAY PLAN FREE TEXT DISPLAY PLAN FREE TEXT DISPLAY PLAN FREE TEXT

## 2022-02-09 PROBLEM — I10 ESSENTIAL (PRIMARY) HYPERTENSION: Chronic | Status: ACTIVE | Noted: 2019-12-05

## 2022-02-17 ENCOUNTER — APPOINTMENT (OUTPATIENT)
Dept: UROLOGY | Facility: CLINIC | Age: 87
End: 2022-02-17
Payer: MEDICARE

## 2022-02-17 DIAGNOSIS — R97.20 ELEVATED PROSTATE, SPECIFIC ANTIGEN [PSA]: ICD-10-CM

## 2022-02-17 PROCEDURE — 99214 OFFICE O/P EST MOD 30 MIN: CPT

## 2022-02-18 LAB
ANION GAP SERPL CALC-SCNC: 15 MMOL/L
APPEARANCE: ABNORMAL
BACTERIA: ABNORMAL
BILIRUBIN URINE: NEGATIVE
BLOOD URINE: ABNORMAL
BUN SERPL-MCNC: 22 MG/DL
CALCIUM SERPL-MCNC: 9.3 MG/DL
CHLORIDE SERPL-SCNC: 102 MMOL/L
CO2 SERPL-SCNC: 24 MMOL/L
COLOR: YELLOW
CREAT SERPL-MCNC: 1.1 MG/DL
GLUCOSE QUALITATIVE U: NEGATIVE
GLUCOSE SERPL-MCNC: 90 MG/DL
HYALINE CASTS: 1 /LPF
KETONES URINE: NEGATIVE
LEUKOCYTE ESTERASE URINE: ABNORMAL
MICROSCOPIC-UA: NORMAL
NITRITE URINE: POSITIVE
PH URINE: 6.5
POTASSIUM SERPL-SCNC: 4.1 MMOL/L
PROTEIN URINE: ABNORMAL
PSA FREE FLD-MCNC: 19 %
PSA FREE SERPL-MCNC: 1.61 NG/ML
PSA SERPL-MCNC: 8.36 NG/ML
RED BLOOD CELLS URINE: 1 /HPF
SODIUM SERPL-SCNC: 141 MMOL/L
SPECIFIC GRAVITY URINE: 1.02
SQUAMOUS EPITHELIAL CELLS: 0 /HPF
UROBILINOGEN URINE: NORMAL
WHITE BLOOD CELLS URINE: >720 /HPF

## 2022-08-02 ENCOUNTER — RX RENEWAL (OUTPATIENT)
Age: 87
End: 2022-08-02

## 2022-08-02 RX ORDER — TAMSULOSIN HYDROCHLORIDE 0.4 MG/1
0.4 CAPSULE ORAL
Qty: 60 | Refills: 0 | Status: ACTIVE | COMMUNITY
Start: 2020-07-15 | End: 1900-01-01

## 2022-08-23 ENCOUNTER — APPOINTMENT (OUTPATIENT)
Dept: ORTHOPEDIC SURGERY | Facility: CLINIC | Age: 87
End: 2022-08-23

## 2023-01-17 ENCOUNTER — NON-APPOINTMENT (OUTPATIENT)
Age: 88
End: 2023-01-17

## 2023-01-17 ENCOUNTER — APPOINTMENT (OUTPATIENT)
Dept: GERIATRICS | Facility: CLINIC | Age: 88
End: 2023-01-17
Payer: MEDICARE

## 2023-01-17 VITALS
DIASTOLIC BLOOD PRESSURE: 66 MMHG | TEMPERATURE: 97.7 F | RESPIRATION RATE: 17 BRPM | HEIGHT: 72 IN | OXYGEN SATURATION: 95 % | BODY MASS INDEX: 26.84 KG/M2 | WEIGHT: 198.13 LBS | SYSTOLIC BLOOD PRESSURE: 144 MMHG | HEART RATE: 66 BPM

## 2023-01-17 DIAGNOSIS — G62.9 POLYNEUROPATHY, UNSPECIFIED: ICD-10-CM

## 2023-01-17 DIAGNOSIS — H91.90 UNSPECIFIED HEARING LOSS, UNSPECIFIED EAR: ICD-10-CM

## 2023-01-17 PROCEDURE — 99204 OFFICE O/P NEW MOD 45 MIN: CPT | Mod: GC

## 2023-01-17 RX ORDER — TAMSULOSIN HYDROCHLORIDE 0.4 MG/1
0.4 CAPSULE ORAL
Qty: 60 | Refills: 0 | Status: DISCONTINUED | COMMUNITY
Start: 2019-04-18 | End: 2023-01-17

## 2023-01-17 RX ORDER — RIVAROXABAN 20 MG/1
20 TABLET, FILM COATED ORAL DAILY
Refills: 0 | Status: ACTIVE | COMMUNITY
Start: 2023-01-17

## 2023-01-17 RX ORDER — HYDROCODONE BITARTRATE AND ACETAMINOPHEN 7.5; 325 MG/1; MG/1
7.5-325 TABLET ORAL 3 TIMES DAILY
Qty: 100 | Refills: 0 | Status: DISCONTINUED | COMMUNITY
Start: 2017-12-05 | End: 2023-01-17

## 2023-01-17 RX ORDER — METHYLPREDNISOLONE 4 MG/1
4 TABLET ORAL DAILY
Refills: 0 | Status: ACTIVE | COMMUNITY
Start: 2023-01-17

## 2023-01-17 RX ORDER — LOSARTAN POTASSIUM 100 MG/1
100 TABLET, FILM COATED ORAL DAILY
Refills: 0 | Status: ACTIVE | COMMUNITY
Start: 2023-01-17

## 2023-01-17 RX ORDER — AMLODIPINE BESYLATE 5 MG/1
5 TABLET ORAL
Qty: 90 | Refills: 1 | Status: ACTIVE | COMMUNITY
Start: 2023-01-17

## 2023-01-17 RX ORDER — DEXAMETHASONE 2 MG/1
2 TABLET ORAL
Qty: 60 | Refills: 2 | Status: DISCONTINUED | COMMUNITY
Start: 2017-12-05 | End: 2023-01-17

## 2023-01-17 RX ORDER — SIMVASTATIN 20 MG/1
20 TABLET, FILM COATED ORAL DAILY
Refills: 0 | Status: ACTIVE | COMMUNITY
Start: 2023-01-17

## 2023-01-17 RX ORDER — HYDROCODONE BITARTRATE AND ACETAMINOPHEN 7.5; 3 MG/1; MG/1
7.5-3 TABLET ORAL
Qty: 120 | Refills: 0 | Status: DISCONTINUED | COMMUNITY
Start: 2017-03-06 | End: 2023-01-17

## 2023-01-17 RX ORDER — ACLIDINIUM BROMIDE 400 UG/1
400 POWDER, METERED RESPIRATORY (INHALATION)
Qty: 60 | Refills: 3 | Status: DISCONTINUED | COMMUNITY
Start: 2017-12-22 | End: 2023-01-17

## 2023-01-17 RX ORDER — GABAPENTIN 100 MG/1
100 CAPSULE ORAL 3 TIMES DAILY
Refills: 0 | Status: ACTIVE | COMMUNITY
Start: 2023-01-17

## 2023-01-17 RX ORDER — METOPROLOL SUCCINATE 50 MG/1
50 TABLET, EXTENDED RELEASE ORAL
Refills: 0 | Status: ACTIVE | COMMUNITY
Start: 2023-01-17

## 2023-01-17 RX ORDER — UMECLIDINIUM 62.5 UG/1
62.5 AEROSOL, POWDER ORAL DAILY
Qty: 1 | Refills: 3 | Status: DISCONTINUED | COMMUNITY
Start: 2018-01-03 | End: 2023-01-17

## 2023-01-17 NOTE — REASON FOR VISIT
[Initial Evaluation] : an initial evaluation [Family Member] : family member [FreeTextEntry1] : Establish care; Fall [FreeTextEntry2] : Daughter- Inez

## 2023-01-17 NOTE — HISTORY OF PRESENT ILLNESS
[Patient is independent with] : bathing [Independent] : managing finances [] : Assistance needed with traveling/transport [Full assistance needed] : Assistance needed managing medications [Cane] : cane [0] : 2) Feeling down, depressed, or hopeless: Not at all (0) [PHQ-2 Negative - No further assessment needed] : PHQ-2 Negative - No further assessment needed [With Patient/Caregiver] : , with patient/caregiver [Reviewed no changes] : Reviewed, no changes [Designated Healthcare Proxy] : Designated healthcare proxy [Relationship: ___] : Relationship: [unfilled] [I will adhere to the patient's wishes.] : I will adhere to the patient's wishes. 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 He is accompanied by his daughter, Inez, and son, Bartolome.\par \par Uro- Steckel\par Pulm- Milvia\par Cardio- Isiah Handler\par GI - Hernan Glover\par \par Patient reports that he had a fall last night. He was walking outside without his cane, lost balance and landed on his right knee in the grass. Denies LOC, headstrike. Since the fall, he has been able to ambulate and bear weight. Denies headache, dizziness, numbness, tingling, chest pain, dyspnea, nausea, vomit, constipation, diarrhea, hematochezia, hematuria. \par \par Last hospitalized at [facility] in 2019 for GI bleeding deemed secondary to Coumadin toxicity - transitioned to Eliquis. Currently on Xarelto due to cost of medication. \par \par Chronic Venous Insufficiency- He does not use compression stockings consistently. Following with vascular surgery. \par \par HTN- Currently on amlodipine, Losartan, Toprol XL. \par \par HLD- Currently on Simvastatin. No prior lipid panel in our records.\par \par BPH- Follows with urology. Currently on Flomax.\par \par COPD/IPF- Patient reports that he uses nocturnal oxygen (3.5L at baseline). He reports being on chronic steroids (methylprednisolone) by his pulmonologist. He uses Advair/Spiriva for maintenance. He uses albuterol PRN. \par \par s/p PPM (replaced 2-3 months ago)\par [employer] MRI\par [id number]\par [id number]\par \par SHx: Former smoker (quit 35 years ago). 2-3 beers/week. No illicit drug use. . He has 3 children (2 sons, 1 daughter). Retired [employer]. He lives alone, hired [employer] 6 hrs/day 6 days/week.\par \par [facility] - Ritika Toro ([phone]) [BoneDensityDate] : 2022 [ColonoscopyDate] : 2022 [Mammogramdate] : 2016 [Grab Bars] : no grab bars [Shower Chair] : no shower chair [Driving Concerns] : not driving or driving without noted concerns [FreeTextEntry3] : Ambulates with cane  [FreeTextEntry8] : Wears diapers [de-identified] : No longer drives [AdvancecareDate] : 01/23 [FreeTextEntry4] : Advised to bring paperwork at next visit

## 2023-01-17 NOTE — ASSESSMENT
[FreeTextEntry1] : XRs/Blood work ordered today.\par Referral provided to orthopedics and audiology\par \par RTO in 1 month for follow-up- advised to bring medication bottles for medication reconciliation. Consider cognitive assessment.

## 2023-01-17 NOTE — PHYSICAL EXAM
[Alert] : alert [No Acute Distress] : in no acute distress [Sclera] : the sclera and conjunctiva were normal [EOMI] : extraocular movements were intact [Normal Oral Mucosa] : normal oral mucosa [Normal Outer Ear/Nose] : the ears and nose were normal in appearance [Both Tympanic Membranes Were Examined] : both tympanic membranes were normal [Normal Lips/Gums] : the lips and gums were normal [Oropharynx] : the oropharynx was normal [Supple] : the neck was supple [No Respiratory Distress] : no respiratory distress [No Acc Muscle Use] : no accessory muscle use [Respiration, Rhythm And Depth] : normal respiratory rhythm and effort [Auscultation Breath Sounds / Voice Sounds] : lungs were clear to auscultation bilaterally [Normal S1, S2] : normal S1 and S2 [Heart Rate And Rhythm] : heart rate was normal and rhythm regular [Normal Appearance] : normal in appearance [Bowel Sounds] : normal bowel sounds [Abdomen Tenderness] : non-tender [Abdomen Soft] : soft [Cervical Lymph Nodes Enlarged Posterior Bilaterally] : posterior cervical [Supraclavicular Lymph Nodes Enlarged Bilaterally] : supraclavicular [Cervical Lymph Nodes Enlarged Anterior Bilaterally] : anterior cervical, supraclavicular [No Spinal Tenderness] : no spinal tenderness [Normal Gait] : normal gait [Involuntary Movements] : no involuntary movements were seen [Motor Tone] : muscle strength and tone were normal [No Focal Deficits] : no focal deficits [Oriented To Time, Place, And Person] : oriented to person, place, and time [Normal Affect] : the affect was normal [Normal Insight/Judgment] : insight and judgment were intact [Normal Mood] : the mood was normal [de-identified] : Bilateral lower extremity edema +1-2. Normal capillary refill [de-identified] : Normal varus/valgus test on right knee [de-identified] : Skin tags and seborrheic keratosis on back. Chronic hyperpigmentation changes in BL LE

## 2023-01-17 NOTE — END OF VISIT
[] : Fellow [Time Spent: ___ minutes] : I have spent [unfilled] minutes of time on the encounter. [FreeTextEntry3] : 87 year old man w/ PMH as above presents for initial visit.\par \par Patient has numerous specialists ("more than 10 doctors") but has not had consistent primary care. We do not have recent outside records for review but asked family to obtain. Brought med list, but unable to verify complete accuracy. Is accompanied by son and daughter for collateral hx.\par \par Majority of visit today consumed by patient's mechanical fall last night. He did not lose consciousness, but was by himself. Has bruising on anterior R knee (had prior surgery in that location). He has no evidence of large effusion/hemarthrosis. No change in RLE mobility and has been weight bearing since the fall. There was no reported head or chest trauma. We have ordered R hip/pelvis and R knee/tibia/fibula xrays. His PPM is reportedly not MRI compatible.\par \par Also completed labs today. 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 He has very mild leukocytosis w/ neutrophil predominance. There was no clinical evidence of infection on hx and exam. He has been using methylprednisolone as per his pulmonologist and this could be secondary to steroid use. Will need to compare to prior labs and repeat in 1-2 months (assuming no clinical changes).\par \par Patient confirmed his daughter Inez is his HCP. Asked for copies. \par \par Also provided ortho and audiology referrals today.\par \par Short term f/u in 1 month w/ Dr. Gomez. he will need cognitive assessment at some point in the near future as well.

## 2023-01-17 NOTE — REVIEW OF SYSTEMS
[Loss Of Hearing] : hearing loss [Lower Ext Edema] : lower extremity edema [Joint Swelling] : joint swelling [Limb Swelling] : limb swelling [Negative] : Endocrine [Limb Pain] : no limb pain

## 2023-01-18 ENCOUNTER — APPOINTMENT (OUTPATIENT)
Dept: RADIOLOGY | Facility: IMAGING CENTER | Age: 88
End: 2023-01-18
Payer: MEDICARE

## 2023-01-18 ENCOUNTER — OUTPATIENT (OUTPATIENT)
Dept: OUTPATIENT SERVICES | Facility: HOSPITAL | Age: 88
LOS: 1 days | End: 2023-01-18
Payer: MEDICARE

## 2023-01-18 DIAGNOSIS — Z00.8 ENCOUNTER FOR OTHER GENERAL EXAMINATION: ICD-10-CM

## 2023-01-18 DIAGNOSIS — Z95.0 PRESENCE OF CARDIAC PACEMAKER: Chronic | ICD-10-CM

## 2023-01-18 DIAGNOSIS — Z98.890 OTHER SPECIFIED POSTPROCEDURAL STATES: Chronic | ICD-10-CM

## 2023-01-18 DIAGNOSIS — Z89.9 ACQUIRED ABSENCE OF LIMB, UNSPECIFIED: Chronic | ICD-10-CM

## 2023-01-18 LAB
ALBUMIN SERPL ELPH-MCNC: 4.2 G/DL
ALP BLD-CCNC: 52 U/L
ALT SERPL-CCNC: 20 U/L
ANION GAP SERPL CALC-SCNC: 18 MMOL/L
AST SERPL-CCNC: 19 U/L
BASOPHILS # BLD AUTO: 0.02 K/UL
BASOPHILS NFR BLD AUTO: 0.2 %
BILIRUB SERPL-MCNC: 1 MG/DL
BUN SERPL-MCNC: 31 MG/DL
CALCIUM SERPL-MCNC: 9.4 MG/DL
CHLORIDE SERPL-SCNC: 107 MMOL/L
CHOLEST SERPL-MCNC: 144 MG/DL
CO2 SERPL-SCNC: 20 MMOL/L
CREAT SERPL-MCNC: 1.41 MG/DL
EGFR: 49 ML/MIN/1.73M2
EOSINOPHIL # BLD AUTO: 0.01 K/UL
EOSINOPHIL NFR BLD AUTO: 0.1 %
GLUCOSE SERPL-MCNC: 102 MG/DL
HCT VFR BLD CALC: 38 %
HDLC SERPL-MCNC: 71 MG/DL
HGB BLD-MCNC: 12.6 G/DL
IMM GRANULOCYTES NFR BLD AUTO: 0.9 %
LDLC SERPL CALC-MCNC: 51 MG/DL
LYMPHOCYTES # BLD AUTO: 0.87 K/UL
LYMPHOCYTES NFR BLD AUTO: 7.5 %
MAN DIFF?: NORMAL
MCHC RBC-ENTMCNC: 33.2 GM/DL
MCHC RBC-ENTMCNC: 34 PG
MCV RBC AUTO: 102.4 FL
MONOCYTES # BLD AUTO: 0.86 K/UL
MONOCYTES NFR BLD AUTO: 7.4 %
NEUTROPHILS # BLD AUTO: 9.71 K/UL
NEUTROPHILS NFR BLD AUTO: 83.9 %
NONHDLC SERPL-MCNC: 73 MG/DL
PLATELET # BLD AUTO: 179 K/UL
POTASSIUM SERPL-SCNC: 4.2 MMOL/L
PROT SERPL-MCNC: 5.8 G/DL
RBC # BLD: 3.71 M/UL
RBC # FLD: 13.3 %
SODIUM SERPL-SCNC: 145 MMOL/L
TRIGL SERPL-MCNC: 109 MG/DL
TSH SERPL-ACNC: 1.18 UIU/ML
WBC # FLD AUTO: 11.57 K/UL

## 2023-01-18 PROCEDURE — 73564 X-RAY EXAM KNEE 4 OR MORE: CPT | Mod: 26,RT

## 2023-01-18 PROCEDURE — 73564 X-RAY EXAM KNEE 4 OR MORE: CPT

## 2023-01-19 ENCOUNTER — NON-APPOINTMENT (OUTPATIENT)
Age: 88
End: 2023-01-19

## 2023-01-30 ENCOUNTER — APPOINTMENT (OUTPATIENT)
Dept: ORTHOPEDIC SURGERY | Facility: CLINIC | Age: 88
End: 2023-01-30
Payer: MEDICARE

## 2023-01-30 VITALS — OXYGEN SATURATION: 92 % | HEART RATE: 70 BPM | SYSTOLIC BLOOD PRESSURE: 151 MMHG | DIASTOLIC BLOOD PRESSURE: 50 MMHG

## 2023-01-30 DIAGNOSIS — T84.84XA PAIN DUE TO INTERNAL ORTHOPEDIC PROSTHETIC DEVICES, IMPLANTS AND GRAFTS, INITIAL ENCOUNTER: ICD-10-CM

## 2023-01-30 DIAGNOSIS — M79.676 PAIN IN UNSPECIFIED TOE(S): ICD-10-CM
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
## 2023-01-30 DIAGNOSIS — Z96.649 PAIN DUE TO INTERNAL ORTHOPEDIC PROSTHETIC DEVICES, IMPLANTS AND GRAFTS, INITIAL ENCOUNTER: ICD-10-CM

## 2023-01-30 PROCEDURE — 99203 OFFICE O/P NEW LOW 30 MIN: CPT

## 2023-01-30 PROCEDURE — 73564 X-RAY EXAM KNEE 4 OR MORE: CPT | Mod: RT

## 2023-01-30 PROCEDURE — 73552 X-RAY EXAM OF FEMUR 2/>: CPT | Mod: RT

## 2023-01-30 PROCEDURE — 73502 X-RAY EXAM HIP UNI 2-3 VIEWS: CPT | Mod: RT
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
## 2023-02-04 NOTE — REVIEW OF SYSTEMS
[Joint Pain] : joint pain [Negative] : Endocrine [Joint Stiffness] : no joint stiffness [Joint Swelling] : no joint swelling [FreeTextEntry5] : History of A-Fib, on PPM (Xarelto) [FreeTextEntry6] : History of COPD [FreeTextEntry8] : History of BPH [de-identified] : History of tingling in the foot

## 2023-02-04 NOTE — HISTORY OF PRESENT ILLNESS
[de-identified] : Mr. Hutchison is a 87-year-old male presented to the office, with his daughter, for evaluation of his right leg following a fall.  Patient experienced a fall on 1/16/2023.  He was in his yard and tripped in the grass and states that his leg went sideways.  Patient fell onto his right knee, but did not hit his head.  He states that he is not having significant knee pain at this time and that it only "hurts a little".  He does report some right hip/thigh pain.  Pain starts over the right lateral hip/thigh and radiates down the lateral thigh.  He states that this pain has been ongoing since his right JERARDO.  Patient underwent right JERARDO in 2018 by Dr. Dave.  Patient also has a history of lumbar back pain has been treated with epidural.  He reports some tingling in his toes.  Patient has been able to ambulate with a cane.  No fevers or chills.  Patient also reports a history of chronic toe pain and decreased toe range of motion.

## 2023-02-04 NOTE — PHYSICAL EXAM
[de-identified] : Constitutional: 87 year old male, alert and oriented, cooperative, in no acute distress.\par \par HEENT \par NC/AT.  Appearance: symmetric\par \par Neck/Back\par Straight without deformity or instability.  Good ROM.\par \par Chest/Respiratory \par Respiratory effort: no intercostal retractions or use of accessory muscles. Nonlabored Breathing\par \par Skin \par On inspection, warm and dry without rashes or lesions.\par \par Mental Status: \par Judgment, insight: intact\par Orientation: oriented to time, place, and person\par \par Neurological:\par Sensory and Motor are grossly intact throughout\par \par Right Knee\par \par Inspection:\par     Skin intact, no rashes or lesions\par     No Effusion\par     Non-tender to palpation over tibial tubercle, patella, medial and lateral joint line, and pes insertion.\par \par Range of Motion:\par 	Extension - 0 degrees\par 	Flexion - 120 degrees\par 	Extensor lag: None\par \par Stability:\par      Demonstrates no Varus or Valgus instability\par      Negative Anterior or Posterior drawer.\par      Negative Lachman's\par      Negative McMurrays\par \par Patella: stable, tracks well. \par \par Neurologic Exam\par     Motor intact including 5/5 Extensor Hallucis Longus, 5/5 Flexor Hallucis Longus, 5/5 Tibialis Anterior and 5/5 Gastrocnemius\par     Sensation Intact to Light Touch including Saphenous, Sural, Superficial Peroneal, Deep Peroneal, Tibial nerve distributions\par \par Vascular Exam\par     Foot is warm and well perfused with 2+ Dorsalis Pedis Pulse \par \par No pain with range of motion of the bilateral hips or left knee. No lumbar paraspinal muscle tenderness. No tenderness over the bilateral upper extremities. No pain with range of motion of bilateral upper extremities.\par \par Right Hip Exam:\par \par Tenderness: \par 	Sacroiliac: Negative \par 	Greater trochanter: Positive \par                 BELEN Test: Negative\par                 FADIR Test: Negative\par \par Range of Motion:\par                 Extension - 0 \par 	Flexion - 100 \par 	IR - 20 \par 	ER - 30 \par 	Abd - 45 \par 	Add - 30 \par \par Spine Exam:\par No midline Tenderness to Palpation over the Cervical, Thoracic, Lumbar or Sacral spine\par No paraspinal muscle Tenderness to Palpation\par \par Motor: \par                L2               L3               L4               L5               S1\par R            5/5              5/5              5/5               5/5              5/5\par L            5/5              5/5              5/5               5/5              5/5\par \par Sensory:\par \par                L2          L3          L4           L5          S1        (0=absent, 1=impaired, 2=normal, NT=not testable)\par R              2            2             2            2            2\par L              2            2             2            2            2 \par  [de-identified] : XRay:  XRays of the Pelvis (1 View), Right Hip (2 Views) and Right Femur (2 Views) taken in the office today and discussed with the patient. XRays demonstrate no obvious fracture or dislocation. 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 (my personal interpretation). \par \par XRay: XRays of the Right Knee (4 Views) taken in the office today and reviewed with the patient. XRays demonstrate tricompartmental joint space narrowing, consistent with mild osteoarthritis, KL ndGndrndanddndend:nd nd2nd. There are no obvious fractures or dislocations. (my personal interpretation) \par \par

## 2023-02-04 NOTE — DISCUSSION/SUMMARY
[de-identified] : Mr. Hutchison is a 87-year-old male who presented to the office, with his daughter, for evaluation of his right leg pain.  Patient had a fall on 1/16/2023, falling in his yard.  Patient has been able to ambulate and states that his knee pain has improved.  He does report some chronic hip pain over the right lateral thigh.  X-rays showed no obvious fractures or dislocations.  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  Examination showed the patient was neurologically intact, with good hip and knee range of motion.  There was some tenderness over the right greater trochanter.  Discussed with patient the examination and imaging findings.  Discussed with the patient that there were no obvious fractures or dislocations seen on x-ray.  Discussed that patient continues to have right hip pain.  Patient would like further evaluation of his chronic right hip pain.  Patient is unable to undergo MRI due to history of a pacemaker.  A CT scan of the right lower extremity was ordered for further evaluation of his right total hip arthroplasty and to rule out any fractures.  Patient would also like further evaluation of his chronic toe pain.  Patient was given referral to Dr. Martinez.  Patient will follow-up in 2 to 3 weeks for reevaluation and management, pending results of CT scan.  Patient understanding and in agreement with the plan.  All questions answered.\par \par Plan:\par - CT Scan Right Lower Extremity\par - Follow up with Dr. Martinez for evaluation of toe pain\par -Follow-up in 2 to 3 weeks for reevaluation and management, pending results of the CT scan

## 2023-02-09 ENCOUNTER — OUTPATIENT (OUTPATIENT)
Dept: OUTPATIENT SERVICES | Facility: HOSPITAL | Age: 88
LOS: 1 days | End: 2023-02-09
Payer: MEDICARE

## 2023-02-09 ENCOUNTER — RESULT REVIEW (OUTPATIENT)
Age: 88
End: 2023-02-09

## 2023-02-09 ENCOUNTER — APPOINTMENT (OUTPATIENT)
Dept: CT IMAGING | Facility: IMAGING CENTER | Age: 88
End: 2023-02-09
Payer: MEDICARE

## 2023-02-09 DIAGNOSIS — T84.84XA PAIN DUE TO INTERNAL ORTHOPEDIC PROSTHETIC DEVICES, IMPLANTS AND GRAFTS, INITIAL ENCOUNTER: ICD-10-CM

## 2023-02-09 DIAGNOSIS — Z95.0 PRESENCE OF CARDIAC PACEMAKER: Chronic | ICD-10-CM

## 2023-02-09 DIAGNOSIS — Z89.9 ACQUIRED ABSENCE OF LIMB, UNSPECIFIED: Chronic | ICD-10-CM

## 2023-02-09 DIAGNOSIS — Z98.890 OTHER SPECIFIED POSTPROCEDURAL STATES: Chronic | ICD-10-CM
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
## 2023-02-09 PROCEDURE — 73700 CT LOWER EXTREMITY W/O DYE: CPT

## 2023-02-09 PROCEDURE — 76377 3D RENDER W/INTRP POSTPROCES: CPT | Mod: 26

## 2023-02-09 PROCEDURE — 73700 CT LOWER EXTREMITY W/O DYE: CPT | Mod: 26,RT,MH

## 2023-02-09 PROCEDURE — 76377 3D RENDER W/INTRP POSTPROCES: CPT

## 2023-02-14 ENCOUNTER — APPOINTMENT (OUTPATIENT)
Dept: GERIATRICS | Facility: CLINIC | Age: 88
End: 2023-02-14
Payer: MEDICARE

## 2023-02-14 ENCOUNTER — NON-APPOINTMENT (OUTPATIENT)
Age: 88
End: 2023-02-14

## 2023-02-14 VITALS
TEMPERATURE: 97.3 F | DIASTOLIC BLOOD PRESSURE: 81 MMHG | HEIGHT: 72 IN | WEIGHT: 201.5 LBS | SYSTOLIC BLOOD PRESSURE: 153 MMHG | OXYGEN SATURATION: 95 % | RESPIRATION RATE: 18 BRPM | BODY MASS INDEX: 27.29 KG/M2 | HEART RATE: 70 BPM
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
## 2023-02-14 DIAGNOSIS — E78.5 HYPERLIPIDEMIA, UNSPECIFIED: ICD-10-CM

## 2023-02-14 DIAGNOSIS — J84.112 IDIOPATHIC PULMONARY FIBROSIS: ICD-10-CM

## 2023-02-14 DIAGNOSIS — N17.9 ACUTE KIDNEY FAILURE, UNSPECIFIED: ICD-10-CM

## 2023-02-14 PROCEDURE — 99214 OFFICE O/P EST MOD 30 MIN: CPT | Mod: GC

## 2023-02-14 NOTE — REASON FOR VISIT
[Family Member] : family member [Follow-Up] : a follow-up visit [FreeTextEntry1] : Fall [FreeTextEntry2] : Rubin- Bartolome; DENZEL- Ana

## 2023-02-14 NOTE — REVIEW OF SYSTEMS
[Lower Ext Edema] : lower extremity edema [Limb Swelling] : limb swelling [SOB on Exertion] : shortness of breath during exertion [Negative] : ENT [Joint Swelling] : no joint swelling [Limb Pain] : no limb pain

## 2023-02-14 NOTE — PHYSICAL EXAM
[Alert] : alert [Sclera] : the sclera and conjunctiva were normal [EOMI] : extraocular movements were intact [Normal Oral Mucosa] : normal oral mucosa [Normal Outer Ear/Nose] : the ears and nose were normal in appearance [Both Tympanic Membranes Were Examined] : both tympanic membranes were normal [Normal Lips/Gums] : the lips and gums were normal [Oropharynx] : the oropharynx was normal [Supple] : the neck was supple [No Respiratory Distress] : no respiratory distress [No Acc Muscle Use] : no accessory muscle use [Respiration, Rhythm And Depth] : normal respiratory rhythm and effort [Auscultation Breath Sounds / Voice Sounds] : lungs were clear to auscultation bilaterally [Normal S1, S2] : normal S1 and S2 [Heart Rate And Rhythm] : heart rate was normal and rhythm regular [Normal Appearance] : normal in appearance [Bowel Sounds] : normal bowel sounds [Abdomen Tenderness] : non-tender [Abdomen Soft] : soft [Cervical Lymph Nodes Enlarged Posterior Bilaterally] : posterior cervical [Supraclavicular Lymph Nodes Enlarged Bilaterally] : supraclavicular [Cervical Lymph Nodes Enlarged Anterior Bilaterally] : anterior cervical, supraclavicular [No Spinal Tenderness] : no spinal tenderness [Normal Gait] : normal gait [Involuntary Movements] : no involuntary movements were seen [Motor Tone] : muscle strength and tone were normal [No Focal Deficits] : no focal deficits [Oriented To Time, Place, And Person] : oriented to person, place, and time [Normal Affect] : the affect was normal [Normal Insight/Judgment] : insight and judgment were intact [Normal Mood] : the mood was normal [de-identified] : Bilateral lower extremity edema +1-2. Normal capillary refill [de-identified] : Skin tags and seborrheic keratosis on back. Chronic hyperpigmentation changes in BL LE

## 2023-02-14 NOTE — HISTORY OF PRESENT ILLNESS
[Any fall with injury in past year] : Patient reported fall with injury in the past year [Patient is independent with] : bathing [Independent] : managing finances [] : Assistance needed with traveling/transport [Full assistance needed] : Assistance needed managing medications [Cane] : cane [0] : 2) Feeling down, depressed, or hopeless: Not at all (0) [PHQ-2 Negative - No further assessment needed] : PHQ-2 Negative - No further assessment needed [With Patient/Caregiver] : , with patient/caregiver [Reviewed no changes] : Reviewed, no changes [Designated Healthcare Proxy] : Designated healthcare proxy [Name: ___] : Health Care Proxy's Name: [unfilled]  [Relationship: ___] : Relationship: [unfilled] [I will adhere to the patient's wishes.] : I will adhere to the patient's wishes. 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 He is accompanied by his son, Bartolome, and HHAna VANN.\par \par Uro- Steckel\par Pulm- Milvia\par Cardio- Isiah Handler\par GI - Hernan Glover\par \par Patient had a fall 01/16/2023. He was walking outside without his cane, lost balance and landed on his right knee in the grass. XR of the right knee showed no acute fracture. He was evaluated by orthopedics. CT of RLE showed no periprosthetic fracture or osteolysis. \par \par Since then, no new falls. No ED/urgent care visits/hospitalizations. \par \par Chronic Venous Insufficiency- He was seen by vascular surgery a week ago - patient reports that echocardiogram and ultrasound of lower extremities were obtained with no acute findings. He does not use compression stockings consistently. \par \par Atrial Fibrillation- Hospitalized at [facility] in 2019 for GI bleeding deemed secondary to Coumadin toxicity - transitioned to Eliquis. Currently on Xarelto due to cost of medication. \par \par HTN- Currently on amlodipine, Losartan, Toprol XL. \par \par HLD- Currently on Simvastatin. Lipid Panel (01/2023): Cholesterol 144, HDL 71, , LDL 51. \par \par BPH- Follows with urology. Currently on Flomax.\par \par COPD/IPF- Patient reports that he uses nocturnal oxygen (3.5L at baseline). He reports being on chronic steroids (methylprednisolone) as needed by his pulmonologist. He uses Advair/Spiriva for maintenance. He uses albuterol PRN. \par \par s/p PPM (replaced 2-3 months ago)\par Assurity MRI\par [id number]\par [id number]\par \par Planning for defibrillator placement this Friday. \par SHx: Former smoker (quit 35 years ago). 2-3 beers/week. No illicit drug use. . He has 3 children (2 sons, 1 daughter). Retired [employer]. He lives alone, hired [employer] 6 hrs/day 6 days/week.\par \par Immunizations:\par Flu- UTD\par Covid- UTD\par Pneumococcal- Patient reports that he has had pneumococcal vaccination but does not recall date \par Shingles: Due \par \par [facility]- HCP - Ritika Toro ([phone]) [BoneDensityDate] : 2022 [ColonoscopyDate] : 2022 [Mammogramdate] : 2016 [Grab Bars] : no grab bars [Shower Chair] : no shower chair [Driving Concerns] : not driving or driving without noted concerns [FreeTextEntry3] : Ambulates with cane  [FreeTextEntry8] : Wears diapers [de-identified] : No longer drives [AdvancecareDate] : 01/23 [FreeTextEntry4] : Advised to bring paperwork at next visit

## 2023-02-14 NOTE — ASSESSMENT
[FreeTextEntry1] : Reviewed medication bottles in office today - medication list reconciled.\par \par Blood work ordered today - will discuss with daughter, Inez, at patient's request \par Confirm vaccination history with daughter \par Patient will consider physical therapy and let us know if he would like referral. \par Referral provided to audiology at prior visit\par \par Advised to bring paperwork for HCP.\par Consider cognitive assessment in future visits\par \par RTO in 2-3 month for follow-up, sooner if needed\par

## 2023-02-15 DIAGNOSIS — Z23 ENCOUNTER FOR IMMUNIZATION: ICD-10-CM

## 2023-02-15 LAB
ANION GAP SERPL CALC-SCNC: 12 MMOL/L
BASOPHILS # BLD AUTO: 0.03 K/UL
BASOPHILS NFR BLD AUTO: 0.3 %
BUN SERPL-MCNC: 13 MG/DL
CALCIUM SERPL-MCNC: 9.4 MG/DL
CHLORIDE SERPL-SCNC: 106 MMOL/L
CO2 SERPL-SCNC: 24 MMOL/L
CREAT SERPL-MCNC: 1.07 MG/DL
EGFR: 68 ML/MIN/1.73M2
EOSINOPHIL # BLD AUTO: 0.06 K/UL
EOSINOPHIL NFR BLD AUTO: 0.7 %
GLUCOSE SERPL-MCNC: 95 MG/DL
HCT VFR BLD CALC: 37.4 %
HGB BLD-MCNC: 12.1 G/DL
IMM GRANULOCYTES NFR BLD AUTO: 0.3 %
LYMPHOCYTES # BLD AUTO: 0.82 K/UL
LYMPHOCYTES NFR BLD AUTO: 8.9 %
MAN DIFF?: NORMAL
MCHC RBC-ENTMCNC: 32.4 GM/DL
MCHC RBC-ENTMCNC: 34.7 PG
MCV RBC AUTO: 107.2 FL
MONOCYTES # BLD AUTO: 0.78 K/UL
MONOCYTES NFR BLD AUTO: 8.5 %
NEUTROPHILS # BLD AUTO: 7.48 K/UL
NEUTROPHILS NFR BLD AUTO: 81.3 %
PLATELET # BLD AUTO: 164 K/UL
POTASSIUM SERPL-SCNC: 4.7 MMOL/L
RBC # BLD: 3.49 M/UL
RBC # FLD: 14.2 %
SODIUM SERPL-SCNC: 141 MMOL/L
WBC # FLD AUTO: 9.2 K/UL

## 2023-02-22 ENCOUNTER — APPOINTMENT (OUTPATIENT)
Dept: UROLOGY | Facility: CLINIC | Age: 88
End: 2023-02-22
Payer: MEDICARE

## 2023-02-22 PROCEDURE — 88112 CYTOPATH CELL ENHANCE TECH: CPT | Mod: 26

## 2023-02-22 PROCEDURE — 99214 OFFICE O/P EST MOD 30 MIN: CPT

## 2023-02-23 LAB
PSA FREE FLD-MCNC: 16 %
PSA FREE SERPL-MCNC: 2.26 NG/ML
PSA SERPL-MCNC: 14.2 NG/ML
URINE CYTOLOGY: NORMAL

## 2023-02-24 LAB
APPEARANCE: CLEAR
BACTERIA: ABNORMAL
BILIRUBIN URINE: NEGATIVE
BLOOD URINE: ABNORMAL
COLOR: ABNORMAL
GLUCOSE QUALITATIVE U: NEGATIVE
HYALINE CASTS: 0 /LPF
KETONES URINE: NEGATIVE
LEUKOCYTE ESTERASE URINE: ABNORMAL
MICROSCOPIC-UA: NORMAL
NITRITE URINE: NEGATIVE
PH URINE: 6
PROTEIN URINE: ABNORMAL
RED BLOOD CELLS URINE: 10 /HPF
SPECIFIC GRAVITY URINE: 1.02
SQUAMOUS EPITHELIAL CELLS: 0 /HPF
URINE COMMENTS: NORMAL
UROBILINOGEN URINE: NORMAL
WHITE BLOOD CELLS URINE: 425 /HPF

## 2023-03-28 DIAGNOSIS — R26.81 UNSTEADINESS ON FEET: ICD-10-CM

## 2023-05-17 ENCOUNTER — APPOINTMENT (OUTPATIENT)
Dept: GERIATRICS | Facility: CLINIC | Age: 88
End: 2023-05-17
Payer: MEDICARE

## 2023-05-17 VITALS
WEIGHT: 196 LBS | OXYGEN SATURATION: 89 % | TEMPERATURE: 98.4 F | BODY MASS INDEX: 26.58 KG/M2 | RESPIRATION RATE: 20 BRPM | HEART RATE: 6 BPM | SYSTOLIC BLOOD PRESSURE: 153 MMHG | DIASTOLIC BLOOD PRESSURE: 65 MMHG

## 2023-05-17 VITALS — SYSTOLIC BLOOD PRESSURE: 138 MMHG | DIASTOLIC BLOOD PRESSURE: 78 MMHG

## 2023-05-17 DIAGNOSIS — E55.9 VITAMIN D DEFICIENCY, UNSPECIFIED: ICD-10-CM

## 2023-05-17 DIAGNOSIS — Z71.89 OTHER SPECIFIED COUNSELING: ICD-10-CM

## 2023-05-17 DIAGNOSIS — I87.2 VENOUS INSUFFICIENCY (CHRONIC) (PERIPHERAL): ICD-10-CM

## 2023-05-17 PROCEDURE — 90677 PCV20 VACCINE IM: CPT

## 2023-05-17 PROCEDURE — G0009: CPT

## 2023-05-17 PROCEDURE — 99497 ADVNCD CARE PLAN 30 MIN: CPT

## 2023-05-17 PROCEDURE — 99215 OFFICE O/P EST HI 40 MIN: CPT | Mod: 25

## 2023-05-17 NOTE — HISTORY OF PRESENT ILLNESS
[Any fall with injury in past year] : Patient reported fall with injury in the past year [Patient is independent with] : bathing [Independent] : managing finances [] : Assistance needed with traveling/transport [Full assistance needed] : Assistance needed managing medications [Cane] : cane [0] : 2) Feeling down, depressed, or hopeless: Not at all (0) [PHQ-2 Negative - No further assessment needed] : PHQ-2 Negative - No further assessment needed [With Patient/Caregiver] : , with patient/caregiver [Reviewed no changes] : Reviewed, no changes [Designated Healthcare Proxy] : Designated healthcare proxy [Name: ___] : Health Care Proxy's Name: [unfilled]  [Relationship: ___] : Relationship: [unfilled] [I will adhere to the patient's wishes.] : I will adhere to the patient's wishes. 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 He is accompanied by his daughter, Inez, and HHA, Ana.\par \par Uro- Elian\par Pulm- Milvia\par Cardio- Isiah Handler\par GI - Hernan Glover\par Ortho- Dr. Conner\par \par Patient states that he is feeling okay today. He is now on continuous supplemental oxygen. Typically using 3L via NC. Denies SOB or TOLENTINO when using O2. Patient has not had any falls, urgent care visits or hospitalizations since last visit. \par \par SInce last visit, patient had CT hip with showed no periprosthetic fractures or osteolysis. No need for sx. Doing PT. \par \par Was also seen by urology, Dr. Plummer with PSA of 14.2. Advised to follow up in September for repeat PSA.\par ____________________________________________________________________________________________________________\par \par Chronic Venous Insufficiency- He was seen by vascular surgery in February - patient reports that echocardiogram and ultrasound of lower extremities were obtained with no acute findings. He does not use compression stockings consistently. \par \par Atrial Fibrillation- Hospitalized at [facility] in 2019 for GI bleeding deemed secondary to Coumadin toxicity - transitioned to Eliquis. Currently on Xarelto due to cost of medication. \par \par HTN- Currently on amlodipine, Losartan, Toprol XL. \par \par HLD- Currently on Simvastatin. Lipid Panel (01/2023): Cholesterol 144, HDL 71, , LDL 51. \par \par BPH- Follows with urology. Currently on Flomax.\par \par COPD/IPF- Patient reports that he uses nocturnal oxygen (3.5L at baseline). He reports being on chronic steroids (methylprednisolone) as needed by his pulmonologist. He uses Advair/Spiriva for maintenance. He uses albuterol PRN. \par \par s/p PPM (replaced 2-3 months ago)\par Assurity MRI\par [id number]\par [id number]\par \par Planning for defibrillator placement this Friday. \par SHx: Former smoker (quit 35 years ago). 2-3 beers/week. No illicit drug use. . He has 3 children (2 sons, 1 daughter). Retired [employer]. He lives alone, hired [employer] 6 hrs/day 6 days/week.\par \par Immunizations:\par Flu- UTD\par Covid- UTD\par Pneumococcal- unclear, thinks he may have has one pna vaccine- does not know when\par Shingles: Due \par \par [facility]- [facility] - Inez Toro ([phone]) [BoneDensityDate] : 2022 [ColonoscopyDate] : 2022 [Mammogramdate] : 2016 [Grab Bars] : no grab bars [Shower Chair] : no shower chair [Driving Concerns] : not driving or driving without noted concerns [FreeTextEntry3] : Ambulates with cane  [FreeTextEntry8] : Wears diapers [de-identified] : No longer drives [[id number]] : 0 [AdvancecareDate] : 05/23 [FreeTextEntry4] : did not bring HCP form to visit; will fax to office\selvin TORO completed with patient today: DNR/DNI/trial of feeding tube/trial of IVF/limited medical interventions/send to the hospital

## 2023-05-17 NOTE — REVIEW OF SYSTEMS
[Lower Ext Edema] : lower extremity edema [Limb Swelling] : limb swelling [Feeling Poorly] : not feeling poorly [Feeling Tired] : not feeling tired [Discharge From Eyes] : no purulent discharge from the eyes [Dry Eyes] : no dryness of the eyes [Nasal Discharge] : no nasal discharge [Sore Throat] : no sore throat [Shortness Of Breath] : no shortness of breath [Wheezing] : no wheezing [Cough] : no cough [SOB on Exertion] : no shortness of breath during exertion [Abdominal Pain] : no abdominal pain [Vomiting] : no vomiting [Constipation] : no constipation [Diarrhea] : no diarrhea [Dysuria] : no dysuria [Joint Swelling] : no joint swelling [Limb Pain] : no limb pain [Skin Wound] : no skin wound [Dizziness] : no dizziness [Anxiety] : no anxiety [Depression] : no depression [Muscle Weakness] : no muscle weakness

## 2023-05-17 NOTE — ASSESSMENT
[FreeTextEntry1] : Prevnar 20 today\par Advised Shingles vaccine\par \par Blood work ordered \par \par Follow up in 3 months

## 2023-05-17 NOTE — PHYSICAL EXAM
[Alert] : alert [Sclera] : the sclera and conjunctiva were normal [EOMI] : extraocular movements were intact [Normal Oral Mucosa] : normal oral mucosa [Normal Outer Ear/Nose] : the ears and nose were normal in appearance [Both Tympanic Membranes Were Examined] : both tympanic membranes were normal [Normal Lips/Gums] : the lips and gums were normal [Oropharynx] : the oropharynx was normal [Supple] : the neck was supple [No Respiratory Distress] : no respiratory distress [No Acc Muscle Use] : no accessory muscle use [Respiration, Rhythm And Depth] : normal respiratory rhythm and effort [Auscultation Breath Sounds / Voice Sounds] : lungs were clear to auscultation bilaterally [Normal S1, S2] : normal S1 and S2 [Heart Rate And Rhythm] : heart rate was normal and rhythm regular [Normal Appearance] : normal in appearance [Bowel Sounds] : normal bowel sounds [Abdomen Tenderness] : non-tender [Abdomen Soft] : soft [Cervical Lymph Nodes Enlarged Posterior Bilaterally] : posterior cervical [Supraclavicular Lymph Nodes Enlarged Bilaterally] : supraclavicular [Cervical Lymph Nodes Enlarged Anterior Bilaterally] : anterior cervical, supraclavicular [No Spinal Tenderness] : no spinal tenderness [Normal Gait] : normal gait [Involuntary Movements] : no involuntary movements were seen [Motor Tone] : muscle strength and tone were normal [No Focal Deficits] : no focal deficits [Oriented To Time, Place, And Person] : oriented to person, place, and time [Normal Affect] : the affect was normal [Normal Insight/Judgment] : insight and judgment were intact [Normal Mood] : the mood was normal [Pedal Pulses Normal] : the pedal pulses are present [de-identified] : Bilateral lower extremity edema +1 [de-identified] : Skin tags and seborrheic keratosis on back. Chronic hyperpigmentation changes in BL LE

## 2023-05-17 NOTE — REASON FOR VISIT
[Follow-Up] : a follow-up visit [Family Member] : family member [FreeTextEntry1] : Fall [FreeTextEntry2] : Rubin- Bartolome; DENZEL- Ana

## 2023-05-18 ENCOUNTER — LABORATORY RESULT (OUTPATIENT)
Age: 88
End: 2023-05-18

## 2023-05-19 RX ORDER — MULTIVIT-MIN/FOLIC/VIT K/LYCOP 400-300MCG
50 MCG TABLET ORAL
Qty: 30 | Refills: 3 | Status: ACTIVE | COMMUNITY
Start: 2023-05-19 | End: 1900-01-01

## 2023-05-19 RX ORDER — CYANOCOBALAMIN (VITAMIN B-12) 1000 MCG
1000 TABLET ORAL
Qty: 90 | Refills: 0 | Status: ACTIVE | COMMUNITY
Start: 2023-05-19 | End: 1900-01-01

## 2023-06-22 NOTE — ED PROVIDER NOTE - CONSTITUTIONAL NEGATIVE STATEMENT, MLM
Patient presents with:  Urgent Care  Pharyngitis: Sore throat since this morning. Feeling tired.       (R07.0) Throat pain  (primary encounter diagnosis)  Comment:   Plan: Streptococcus A Rapid Screen w/Reflex to PCR -         Clinic Collect, Group A Streptococcus PCR         Throat Swab          Tylenol or ibuprofen as needed for pain.     May try benadryl at night as needed (12.5mg) as needed for possible post nasal drip    Culture will be back tomorrow.        If not improving or if condition worsens, follow up with your Primary Care Provider            SUBJECTIVE:   Ellie Zhu is a 9 year old female who presents today with sore throat since this morning, also feeling fatigued.  Denies any runny nose congestion cough nausea vomiting or diarrhea.  She was able to eat both breakfast and lunch today without issue.  She has not had any Tylenol or ibuprofen, and denies any fevers.  She is here today with her mom.      No past medical history on file.      Current Outpatient Medications   Medication Sig Dispense Refill     Multiple Vitamins-Iron (DAILY-SHEN/IRON/BETA-CAROTENE) TABS TAKE 1 TABLET BY MOUTH DAILY. (Patient not taking: Reported on 10/19/2020) 30 tablet 7     Social History     Tobacco Use     Smoking status: Never Smoker     Smokeless tobacco: Never Used   Substance Use Topics     Alcohol use: Not on file     Family History   Problem Relation Age of Onset     Diabetes Mother      Diabetes Father          ROS:    10 point ROS of systems including Constitutional, Eyes, Respiratory, Cardiovascular, Gastroenterology, Genitourinary, Integumentary, Muscularskeletal, Psychiatric ,neurological were all negative except for pertinent positives noted in my HPI       OBJECTIVE:  Pulse 97   Temp 98.1  F (36.7  C) (Temporal)   Resp 20   Wt 24.9 kg (55 lb)   SpO2 98%   Physical Exam:  GENERAL APPEARANCE: healthy, alert and no distress  EYES: EOMI,  PERRL, conjunctiva clear  HENT: ear canals and TM's normal.   Nose and mouth without ulcers, erythema or lesions  HENT: Mildly erythematous posterior pharynx.  NECK: supple, nontender, no lymphadenopathy  RESP: lungs clear to auscultation - no rales, rhonchi or wheezes  CV: regular rates and rhythm, normal S1 S2, no murmur noted  ABDOMEN:  soft, nontender, no HSM or masses and bowel sounds normal  SKIN: no suspicious lesions or rashes    Results for orders placed or performed in visit on 06/22/23   Streptococcus A Rapid Screen w/Reflex to PCR - Clinic Collect     Status: Normal    Specimen: Throat; Swab   Result Value Ref Range    Group A Strep antigen Negative Negative     While   no fever and no chills.

## 2023-07-27 NOTE — DISCHARGE NOTE ADULT - NSFTFATTENDCERTRD_GEN_ALL_CORE
My signature below certifies that the above stated patient is homebound and upon completion of the Face-To-Face encounter, has the need for intermittent skilled nursing, physical therapy and/or speech or occupational therapy services in their home for their current diagnosis as outlined in their initial plan of care. These services will continue to be monitored by myself or another physician.
None

## 2023-08-10 ENCOUNTER — APPOINTMENT (OUTPATIENT)
Dept: DERMATOLOGY | Facility: CLINIC | Age: 88
End: 2023-08-10
Payer: MEDICARE

## 2023-08-10 PROCEDURE — 99213 OFFICE O/P EST LOW 20 MIN: CPT | Mod: 25

## 2023-08-10 PROCEDURE — 11103 TANGNTL BX SKIN EA SEP/ADDL: CPT

## 2023-08-10 PROCEDURE — 11102 TANGNTL BX SKIN SINGLE LES: CPT

## 2023-08-15 NOTE — ASSESSMENT
[FreeTextEntry1] : 1. Neoplasm of uncertain behavior of skin - L nare BCC vs other  - Recommend tangential shave biopsy for definitive diagnosis.  - After informed written consent was obtained, using Hibiclens for cleansing and 1% Lidocaine with epinephrine for anesthetic, with sterile technique a shave biopsy was used to obtain a biopsy specimen of the lesion. Hemostasis was obtained with aluminum chloride. Vaseline was applied, and wound care instructions provided. The specimen was labeled and sent to pathology for evaluation. The procedure was well tolerated without complication.  - The patient will be contacted with biopsy results  2. Neoplasm of uncertain behavior of skin - R flank, irritated neurofibroma vs FEP vs IDN vs other  - Performed tangential shave biopsy for definitive diagnosis.  - After informed written consent was obtained, using Hibiclens for cleansing and 1% Lidocaine with epinephrine for anesthetic, with sterile technique a shave biopsy was used to obtain a biopsy specimen of the lesion. Hemostasis was obtained with aluminum chloride. Vaseline was applied, and wound care instructions provided. The specimen was labeled and sent to pathology for evaluation. The procedure was well tolerated without complication.  - The patient will be contacted with biopsy results  3. cyst of R infraorbital cheek  - Discussed benign clinical features but that a biopsy/excision can be performed to confirm the diagnosis - Reviewed risks (as well as mitigation strategies for adverse drug events as applicable), benefits, and alternatives of therapy - Referred to Dr. Zaria Eagle for excision

## 2023-08-15 NOTE — PHYSICAL EXAM
[Alert] : alert [Oriented x 3] : ~L oriented x 3 [Well Nourished] : well nourished [Conjunctiva Non-injected] : conjunctiva non-injected [No Visual Lymphadenopathy] : no visual  lymphadenopathy [No Clubbing] : no clubbing [No Edema] : no edema [No Bromhidrosis] : no bromhidrosis [No Chromhidrosis] : no chromhidrosis [FreeTextEntry3] : pearly papule with telangiectasia of the L nares   nodule on the R infraorbital cheek   R flank with pedunculated skin colored papule

## 2023-08-15 NOTE — HISTORY OF PRESENT ILLNESS
[FreeTextEntry1] : np - bumps [de-identified] : 87 yo M no hx of skin cancer presenting for:   1. new bump on the L nares x months - getting bigger  2. painful bump R flank x years - requests removal 3. bump of the R infraorbital cheek x years - bothersome because rubs against oxygen tubing

## 2023-08-23 ENCOUNTER — NON-APPOINTMENT (OUTPATIENT)
Age: 88
End: 2023-08-23

## 2023-08-25 LAB — DERMATOLOGY BIOPSY: NORMAL

## 2023-09-07 ENCOUNTER — APPOINTMENT (OUTPATIENT)
Dept: UROLOGY | Facility: CLINIC | Age: 88
End: 2023-09-07
Payer: MEDICARE

## 2023-09-07 DIAGNOSIS — R35.0 FREQUENCY OF MICTURITION: ICD-10-CM

## 2023-09-07 DIAGNOSIS — N40.1 BENIGN PROSTATIC HYPERPLASIA WITH LOWER URINARY TRACT SYMPMS: ICD-10-CM

## 2023-09-07 DIAGNOSIS — R97.20 ELEVATED PROSTATE, SPECIFIC ANTIGEN [PSA]: ICD-10-CM

## 2023-09-07 DIAGNOSIS — N13.8 BENIGN PROSTATIC HYPERPLASIA WITH LOWER URINARY TRACT SYMPMS: ICD-10-CM

## 2023-09-07 PROCEDURE — 99214 OFFICE O/P EST MOD 30 MIN: CPT

## 2023-09-08 LAB
APPEARANCE: CLEAR
BACTERIA: ABNORMAL /HPF
BILIRUBIN URINE: NEGATIVE
BLOOD URINE: ABNORMAL
CAST: 1 /LPF
COLOR: NORMAL
EPITHELIAL CELLS: 1 /HPF
GLUCOSE QUALITATIVE U: NEGATIVE MG/DL
KETONES URINE: NEGATIVE MG/DL
LEUKOCYTE ESTERASE URINE: ABNORMAL
MICROSCOPIC-UA: NORMAL
NITRITE URINE: NEGATIVE
PH URINE: 6
PROTEIN URINE: 30 MG/DL
PSA FREE FLD-MCNC: 17 %
PSA FREE SERPL-MCNC: 2.17 NG/ML
PSA SERPL-MCNC: 12.6 NG/ML
RED BLOOD CELLS URINE: 4 /HPF
SPECIFIC GRAVITY URINE: 1.02
UROBILINOGEN URINE: 1 MG/DL
WHITE BLOOD CELLS URINE: 232 /HPF

## 2023-09-11 LAB — BACTERIA UR CULT: ABNORMAL

## 2023-09-11 RX ORDER — NITROFURANTOIN (MONOHYDRATE/MACROCRYSTALS) 25; 75 MG/1; MG/1
100 CAPSULE ORAL TWICE DAILY
Qty: 14 | Refills: 0 | Status: ACTIVE | COMMUNITY
Start: 2023-09-11 | End: 1900-01-01

## 2023-09-13 ENCOUNTER — APPOINTMENT (OUTPATIENT)
Dept: GERIATRICS | Facility: CLINIC | Age: 88
End: 2023-09-13
Payer: MEDICARE

## 2023-09-13 VITALS
BODY MASS INDEX: 25.77 KG/M2 | SYSTOLIC BLOOD PRESSURE: 137 MMHG | RESPIRATION RATE: 20 BRPM | WEIGHT: 190 LBS | TEMPERATURE: 98.5 F | OXYGEN SATURATION: 94 % | HEART RATE: 69 BPM | DIASTOLIC BLOOD PRESSURE: 75 MMHG

## 2023-09-13 PROCEDURE — 99215 OFFICE O/P EST HI 40 MIN: CPT

## 2023-09-27 ENCOUNTER — APPOINTMENT (OUTPATIENT)
Dept: DERMATOLOGY | Facility: CLINIC | Age: 88
End: 2023-09-27
Payer: MEDICARE

## 2023-09-27 PROCEDURE — 99214 OFFICE O/P EST MOD 30 MIN: CPT

## 2023-10-24 ENCOUNTER — NON-APPOINTMENT (OUTPATIENT)
Age: 88
End: 2023-10-24

## 2023-10-24 ENCOUNTER — APPOINTMENT (OUTPATIENT)
Dept: DERMATOLOGY | Facility: CLINIC | Age: 88
End: 2023-10-24
Payer: MEDICARE

## 2023-10-24 DIAGNOSIS — C44.311 BASAL CELL CARCINOMA OF SKIN OF NOSE: ICD-10-CM

## 2023-10-24 PROCEDURE — 17311 MOHS 1 STAGE H/N/HF/G: CPT

## 2023-11-30 ENCOUNTER — APPOINTMENT (OUTPATIENT)
Dept: ORTHOPEDIC SURGERY | Facility: CLINIC | Age: 88
End: 2023-11-30
Payer: MEDICARE

## 2023-11-30 DIAGNOSIS — M16.12 UNILATERAL PRIMARY OSTEOARTHRITIS, LEFT HIP: ICD-10-CM

## 2023-11-30 PROCEDURE — 72100 X-RAY EXAM L-S SPINE 2/3 VWS: CPT

## 2023-11-30 PROCEDURE — 99213 OFFICE O/P EST LOW 20 MIN: CPT

## 2023-11-30 PROCEDURE — 73502 X-RAY EXAM HIP UNI 2-3 VIEWS: CPT

## 2023-12-02 PROBLEM — M16.12 PRIMARY OSTEOARTHRITIS OF LEFT HIP: Status: ACTIVE | Noted: 2023-12-02

## 2024-01-01 ENCOUNTER — TRANSCRIPTION ENCOUNTER (OUTPATIENT)
Age: 89
End: 2024-01-01

## 2024-01-01 ENCOUNTER — INPATIENT (INPATIENT)
Facility: HOSPITAL | Age: 89
LOS: 8 days | Discharge: HOSPICE | DRG: 293 | End: 2024-12-31
Attending: HOSPITALIST | Admitting: STUDENT IN AN ORGANIZED HEALTH CARE EDUCATION/TRAINING PROGRAM
Payer: MEDICARE

## 2024-01-01 VITALS
RESPIRATION RATE: 18 BRPM | HEART RATE: 69 BPM | OXYGEN SATURATION: 93 % | TEMPERATURE: 99 F | DIASTOLIC BLOOD PRESSURE: 63 MMHG | SYSTOLIC BLOOD PRESSURE: 104 MMHG

## 2024-01-01 VITALS
RESPIRATION RATE: 20 BRPM | WEIGHT: 199.96 LBS | DIASTOLIC BLOOD PRESSURE: 73 MMHG | SYSTOLIC BLOOD PRESSURE: 131 MMHG | HEART RATE: 80 BPM | TEMPERATURE: 98 F | OXYGEN SATURATION: 99 % | HEIGHT: 72 IN

## 2024-01-01 DIAGNOSIS — F43.20 ADJUSTMENT DISORDER, UNSPECIFIED: ICD-10-CM

## 2024-01-01 DIAGNOSIS — I48.20 CHRONIC ATRIAL FIBRILLATION, UNSPECIFIED: ICD-10-CM

## 2024-01-01 DIAGNOSIS — Z98.890 OTHER SPECIFIED POSTPROCEDURAL STATES: Chronic | ICD-10-CM

## 2024-01-01 DIAGNOSIS — Z51.5 ENCOUNTER FOR PALLIATIVE CARE: ICD-10-CM

## 2024-01-01 DIAGNOSIS — R78.81 BACTEREMIA: ICD-10-CM

## 2024-01-01 DIAGNOSIS — R06.00 DYSPNEA, UNSPECIFIED: ICD-10-CM

## 2024-01-01 DIAGNOSIS — R19.7 DIARRHEA, UNSPECIFIED: ICD-10-CM

## 2024-01-01 DIAGNOSIS — J96.01 ACUTE RESPIRATORY FAILURE WITH HYPOXIA: ICD-10-CM

## 2024-01-01 DIAGNOSIS — I10 ESSENTIAL (PRIMARY) HYPERTENSION: ICD-10-CM

## 2024-01-01 DIAGNOSIS — J44.9 CHRONIC OBSTRUCTIVE PULMONARY DISEASE, UNSPECIFIED: ICD-10-CM

## 2024-01-01 DIAGNOSIS — Z71.89 OTHER SPECIFIED COUNSELING: ICD-10-CM

## 2024-01-01 DIAGNOSIS — Z95.0 PRESENCE OF CARDIAC PACEMAKER: Chronic | ICD-10-CM

## 2024-01-01 DIAGNOSIS — J44.1 CHRONIC OBSTRUCTIVE PULMONARY DISEASE WITH (ACUTE) EXACERBATION: ICD-10-CM

## 2024-01-01 DIAGNOSIS — E78.5 HYPERLIPIDEMIA, UNSPECIFIED: ICD-10-CM

## 2024-01-01 DIAGNOSIS — Z89.9 ACQUIRED ABSENCE OF LIMB, UNSPECIFIED: Chronic | ICD-10-CM

## 2024-01-01 DIAGNOSIS — M25.561 PAIN IN RIGHT KNEE: ICD-10-CM

## 2024-01-01 DIAGNOSIS — M10.9 GOUT, UNSPECIFIED: ICD-10-CM

## 2024-01-01 DIAGNOSIS — Z29.9 ENCOUNTER FOR PROPHYLACTIC MEASURES, UNSPECIFIED: ICD-10-CM

## 2024-01-01 DIAGNOSIS — I50.9 HEART FAILURE, UNSPECIFIED: ICD-10-CM

## 2024-01-01 DIAGNOSIS — I50.20 UNSPECIFIED SYSTOLIC (CONGESTIVE) HEART FAILURE: ICD-10-CM

## 2024-01-01 LAB
ADD ON TEST-SPECIMEN IN LAB: SIGNIFICANT CHANGE UP
ALBUMIN SERPL ELPH-MCNC: 4 G/DL — SIGNIFICANT CHANGE UP (ref 3.3–5)
ALP SERPL-CCNC: 64 U/L — SIGNIFICANT CHANGE UP (ref 40–120)
ALT FLD-CCNC: 13 U/L — SIGNIFICANT CHANGE UP (ref 10–45)
ANION GAP SERPL CALC-SCNC: 12 MMOL/L — SIGNIFICANT CHANGE UP (ref 5–17)
ANION GAP SERPL CALC-SCNC: 13 MMOL/L — SIGNIFICANT CHANGE UP (ref 5–17)
ANION GAP SERPL CALC-SCNC: 13 MMOL/L — SIGNIFICANT CHANGE UP (ref 5–17)
ANION GAP SERPL CALC-SCNC: 14 MMOL/L — SIGNIFICANT CHANGE UP (ref 5–17)
ANION GAP SERPL CALC-SCNC: 15 MMOL/L — SIGNIFICANT CHANGE UP (ref 5–17)
ANION GAP SERPL CALC-SCNC: 15 MMOL/L — SIGNIFICANT CHANGE UP (ref 5–17)
ANION GAP SERPL CALC-SCNC: 18 MMOL/L — HIGH (ref 5–17)
ANION GAP SERPL CALC-SCNC: 18 MMOL/L — HIGH (ref 5–17)
ANION GAP SERPL CALC-SCNC: 9 MMOL/L — SIGNIFICANT CHANGE UP (ref 5–17)
ANISOCYTOSIS BLD QL: SLIGHT — SIGNIFICANT CHANGE UP
AST SERPL-CCNC: 16 U/L — SIGNIFICANT CHANGE UP (ref 10–40)
BASOPHILS # BLD AUTO: 0 K/UL — SIGNIFICANT CHANGE UP (ref 0–0.2)
BASOPHILS # BLD AUTO: 0 K/UL — SIGNIFICANT CHANGE UP (ref 0–0.2)
BASOPHILS # BLD AUTO: 0.01 K/UL — SIGNIFICANT CHANGE UP (ref 0–0.2)
BASOPHILS # BLD AUTO: 0.02 K/UL — SIGNIFICANT CHANGE UP (ref 0–0.2)
BASOPHILS NFR BLD AUTO: 0 % — SIGNIFICANT CHANGE UP (ref 0–2)
BASOPHILS NFR BLD AUTO: 0 % — SIGNIFICANT CHANGE UP (ref 0–2)
BASOPHILS NFR BLD AUTO: 0.1 % — SIGNIFICANT CHANGE UP (ref 0–2)
BASOPHILS NFR BLD AUTO: 0.6 % — SIGNIFICANT CHANGE UP (ref 0–2)
BASOPHILS NFR BLD AUTO: 0.7 % — SIGNIFICANT CHANGE UP (ref 0–2)
BASOPHILS NFR BLD AUTO: 0.9 % — SIGNIFICANT CHANGE UP (ref 0–2)
BILIRUB SERPL-MCNC: 0.6 MG/DL — SIGNIFICANT CHANGE UP (ref 0.2–1.2)
BUN SERPL-MCNC: 17 MG/DL — SIGNIFICANT CHANGE UP (ref 7–23)
BUN SERPL-MCNC: 20 MG/DL — SIGNIFICANT CHANGE UP (ref 7–23)
BUN SERPL-MCNC: 21 MG/DL — SIGNIFICANT CHANGE UP (ref 7–23)
BUN SERPL-MCNC: 22 MG/DL — SIGNIFICANT CHANGE UP (ref 7–23)
BUN SERPL-MCNC: 24 MG/DL — HIGH (ref 7–23)
BUN SERPL-MCNC: 24 MG/DL — HIGH (ref 7–23)
BUN SERPL-MCNC: 27 MG/DL — HIGH (ref 7–23)
BUN SERPL-MCNC: 29 MG/DL — HIGH (ref 7–23)
BUN SERPL-MCNC: 32 MG/DL — HIGH (ref 7–23)
BURR CELLS BLD QL SMEAR: PRESENT — SIGNIFICANT CHANGE UP
CALCIUM SERPL-MCNC: 8.2 MG/DL — LOW (ref 8.4–10.5)
CALCIUM SERPL-MCNC: 8.2 MG/DL — LOW (ref 8.4–10.5)
CALCIUM SERPL-MCNC: 8.3 MG/DL — LOW (ref 8.4–10.5)
CALCIUM SERPL-MCNC: 8.4 MG/DL — SIGNIFICANT CHANGE UP (ref 8.4–10.5)
CALCIUM SERPL-MCNC: 9 MG/DL — SIGNIFICANT CHANGE UP (ref 8.4–10.5)
CALCIUM SERPL-MCNC: 9.3 MG/DL — SIGNIFICANT CHANGE UP (ref 8.4–10.5)
CALCIUM SERPL-MCNC: 9.6 MG/DL — SIGNIFICANT CHANGE UP (ref 8.4–10.5)
CHLORIDE SERPL-SCNC: 100 MMOL/L — SIGNIFICANT CHANGE UP (ref 96–108)
CHLORIDE SERPL-SCNC: 103 MMOL/L — SIGNIFICANT CHANGE UP (ref 96–108)
CHLORIDE SERPL-SCNC: 107 MMOL/L — SIGNIFICANT CHANGE UP (ref 96–108)
CHLORIDE SERPL-SCNC: 96 MMOL/L — SIGNIFICANT CHANGE UP (ref 96–108)
CHLORIDE SERPL-SCNC: 98 MMOL/L — SIGNIFICANT CHANGE UP (ref 96–108)
CHLORIDE SERPL-SCNC: 98 MMOL/L — SIGNIFICANT CHANGE UP (ref 96–108)
CHLORIDE SERPL-SCNC: 99 MMOL/L — SIGNIFICANT CHANGE UP (ref 96–108)
CK MB CFR SERPL CALC: 3.1 NG/ML — SIGNIFICANT CHANGE UP (ref 0–6.7)
CK SERPL-CCNC: 64 U/L — SIGNIFICANT CHANGE UP (ref 30–200)
CO2 SERPL-SCNC: 21 MMOL/L — LOW (ref 22–31)
CO2 SERPL-SCNC: 21 MMOL/L — LOW (ref 22–31)
CO2 SERPL-SCNC: 24 MMOL/L — SIGNIFICANT CHANGE UP (ref 22–31)
CO2 SERPL-SCNC: 25 MMOL/L — SIGNIFICANT CHANGE UP (ref 22–31)
CO2 SERPL-SCNC: 26 MMOL/L — SIGNIFICANT CHANGE UP (ref 22–31)
CO2 SERPL-SCNC: 26 MMOL/L — SIGNIFICANT CHANGE UP (ref 22–31)
CO2 SERPL-SCNC: 28 MMOL/L — SIGNIFICANT CHANGE UP (ref 22–31)
CO2 SERPL-SCNC: 28 MMOL/L — SIGNIFICANT CHANGE UP (ref 22–31)
CO2 SERPL-SCNC: 29 MMOL/L — SIGNIFICANT CHANGE UP (ref 22–31)
CREAT SERPL-MCNC: 0.86 MG/DL — SIGNIFICANT CHANGE UP (ref 0.5–1.3)
CREAT SERPL-MCNC: 0.91 MG/DL — SIGNIFICANT CHANGE UP (ref 0.5–1.3)
CREAT SERPL-MCNC: 0.92 MG/DL — SIGNIFICANT CHANGE UP (ref 0.5–1.3)
CREAT SERPL-MCNC: 0.93 MG/DL — SIGNIFICANT CHANGE UP (ref 0.5–1.3)
CREAT SERPL-MCNC: 0.94 MG/DL — SIGNIFICANT CHANGE UP (ref 0.5–1.3)
CREAT SERPL-MCNC: 0.99 MG/DL — SIGNIFICANT CHANGE UP (ref 0.5–1.3)
CREAT SERPL-MCNC: 1.06 MG/DL — SIGNIFICANT CHANGE UP (ref 0.5–1.3)
CREAT SERPL-MCNC: 1.09 MG/DL — SIGNIFICANT CHANGE UP (ref 0.5–1.3)
CREAT SERPL-MCNC: 1.13 MG/DL — SIGNIFICANT CHANGE UP (ref 0.5–1.3)
CULTURE RESULTS: SIGNIFICANT CHANGE UP
CULTURE RESULTS: SIGNIFICANT CHANGE UP
DACRYOCYTES BLD QL SMEAR: SLIGHT — SIGNIFICANT CHANGE UP
EGFR: 63 ML/MIN/1.73M2 — SIGNIFICANT CHANGE UP
EGFR: 65 ML/MIN/1.73M2 — SIGNIFICANT CHANGE UP
EGFR: 68 ML/MIN/1.73M2 — SIGNIFICANT CHANGE UP
EGFR: 73 ML/MIN/1.73M2 — SIGNIFICANT CHANGE UP
EGFR: 78 ML/MIN/1.73M2 — SIGNIFICANT CHANGE UP
EGFR: 79 ML/MIN/1.73M2 — SIGNIFICANT CHANGE UP
EGFR: 80 ML/MIN/1.73M2 — SIGNIFICANT CHANGE UP
EGFR: 81 ML/MIN/1.73M2 — SIGNIFICANT CHANGE UP
EGFR: 83 ML/MIN/1.73M2 — SIGNIFICANT CHANGE UP
EOSINOPHIL # BLD AUTO: 0 K/UL — SIGNIFICANT CHANGE UP (ref 0–0.5)
EOSINOPHIL NFR BLD AUTO: 0 % — SIGNIFICANT CHANGE UP (ref 0–6)
FLUAV AG NPH QL: SIGNIFICANT CHANGE UP
FLUBV AG NPH QL: SIGNIFICANT CHANGE UP
FOLATE SERPL-MCNC: 7.8 NG/ML — SIGNIFICANT CHANGE UP
GAS PNL BLDA: SIGNIFICANT CHANGE UP
GAS PNL BLDV: SIGNIFICANT CHANGE UP
GAS PNL BLDV: SIGNIFICANT CHANGE UP
GIANT PLATELETS BLD QL SMEAR: PRESENT — SIGNIFICANT CHANGE UP
GLUCOSE SERPL-MCNC: 101 MG/DL — HIGH (ref 70–99)
GLUCOSE SERPL-MCNC: 102 MG/DL — HIGH (ref 70–99)
GLUCOSE SERPL-MCNC: 103 MG/DL — HIGH (ref 70–99)
GLUCOSE SERPL-MCNC: 105 MG/DL — HIGH (ref 70–99)
GLUCOSE SERPL-MCNC: 168 MG/DL — HIGH (ref 70–99)
GLUCOSE SERPL-MCNC: 168 MG/DL — HIGH (ref 70–99)
GLUCOSE SERPL-MCNC: 67 MG/DL — LOW (ref 70–99)
GLUCOSE SERPL-MCNC: 87 MG/DL — SIGNIFICANT CHANGE UP (ref 70–99)
GLUCOSE SERPL-MCNC: 89 MG/DL — SIGNIFICANT CHANGE UP (ref 70–99)
HCT VFR BLD CALC: 31.9 % — LOW (ref 39–50)
HCT VFR BLD CALC: 32.4 % — LOW (ref 39–50)
HCT VFR BLD CALC: 32.6 % — LOW (ref 39–50)
HCT VFR BLD CALC: 33.2 % — LOW (ref 39–50)
HCT VFR BLD CALC: 34 % — LOW (ref 39–50)
HCT VFR BLD CALC: 34.4 % — LOW (ref 39–50)
HCT VFR BLD CALC: 35.5 % — LOW (ref 39–50)
HCT VFR BLD CALC: 35.8 % — LOW (ref 39–50)
HCT VFR BLD CALC: 36.4 % — LOW (ref 39–50)
HGB BLD-MCNC: 10.1 G/DL — LOW (ref 13–17)
HGB BLD-MCNC: 10.1 G/DL — LOW (ref 13–17)
HGB BLD-MCNC: 10.5 G/DL — LOW (ref 13–17)
HGB BLD-MCNC: 10.6 G/DL — LOW (ref 13–17)
HGB BLD-MCNC: 10.7 G/DL — LOW (ref 13–17)
HGB BLD-MCNC: 10.9 G/DL — LOW (ref 13–17)
HGB BLD-MCNC: 11.1 G/DL — LOW (ref 13–17)
HGB BLD-MCNC: 11.2 G/DL — LOW (ref 13–17)
HGB BLD-MCNC: 11.5 G/DL — LOW (ref 13–17)
IMM GRANULOCYTES NFR BLD AUTO: 0.3 % — SIGNIFICANT CHANGE UP (ref 0–0.9)
IMM GRANULOCYTES NFR BLD AUTO: 0.5 % — SIGNIFICANT CHANGE UP (ref 0–0.9)
IMM GRANULOCYTES NFR BLD AUTO: 0.5 % — SIGNIFICANT CHANGE UP (ref 0–0.9)
IMM GRANULOCYTES NFR BLD AUTO: 0.6 % — SIGNIFICANT CHANGE UP (ref 0–0.9)
LG PLATELETS BLD QL AUTO: SLIGHT — SIGNIFICANT CHANGE UP
LYMPHOCYTES # BLD AUTO: 0.3 K/UL — LOW (ref 1–3.3)
LYMPHOCYTES # BLD AUTO: 0.37 K/UL — LOW (ref 1–3.3)
LYMPHOCYTES # BLD AUTO: 0.42 K/UL — LOW (ref 1–3.3)
LYMPHOCYTES # BLD AUTO: 0.43 K/UL — LOW (ref 1–3.3)
LYMPHOCYTES # BLD AUTO: 0.48 K/UL — LOW (ref 1–3.3)
LYMPHOCYTES # BLD AUTO: 0.48 K/UL — LOW (ref 1–3.3)
LYMPHOCYTES # BLD AUTO: 13.2 % — SIGNIFICANT CHANGE UP (ref 13–44)
LYMPHOCYTES # BLD AUTO: 16 % — SIGNIFICANT CHANGE UP (ref 13–44)
LYMPHOCYTES # BLD AUTO: 16.7 % — SIGNIFICANT CHANGE UP (ref 13–44)
LYMPHOCYTES # BLD AUTO: 23.5 % — SIGNIFICANT CHANGE UP (ref 13–44)
LYMPHOCYTES # BLD AUTO: 5.7 % — LOW (ref 13–44)
LYMPHOCYTES # BLD AUTO: 9.6 % — LOW (ref 13–44)
MACROCYTES BLD QL: SLIGHT — SIGNIFICANT CHANGE UP
MAGNESIUM SERPL-MCNC: 1.8 MG/DL — SIGNIFICANT CHANGE UP (ref 1.6–2.6)
MAGNESIUM SERPL-MCNC: 1.8 MG/DL — SIGNIFICANT CHANGE UP (ref 1.6–2.6)
MAGNESIUM SERPL-MCNC: 2 MG/DL — SIGNIFICANT CHANGE UP (ref 1.6–2.6)
MAGNESIUM SERPL-MCNC: 2.1 MG/DL — SIGNIFICANT CHANGE UP (ref 1.6–2.6)
MANUAL SMEAR VERIFICATION: SIGNIFICANT CHANGE UP
MCHC RBC-ENTMCNC: 31.2 G/DL — LOW (ref 32–36)
MCHC RBC-ENTMCNC: 31.3 G/DL — LOW (ref 32–36)
MCHC RBC-ENTMCNC: 31.3 G/DL — LOW (ref 32–36)
MCHC RBC-ENTMCNC: 31.5 G/DL — LOW (ref 32–36)
MCHC RBC-ENTMCNC: 31.6 G/DL — LOW (ref 32–36)
MCHC RBC-ENTMCNC: 31.7 G/DL — LOW (ref 32–36)
MCHC RBC-ENTMCNC: 31.7 G/DL — LOW (ref 32–36)
MCHC RBC-ENTMCNC: 31.9 G/DL — LOW (ref 32–36)
MCHC RBC-ENTMCNC: 32.2 G/DL — SIGNIFICANT CHANGE UP (ref 32–36)
MCHC RBC-ENTMCNC: 32.5 PG — SIGNIFICANT CHANGE UP (ref 27–34)
MCHC RBC-ENTMCNC: 32.6 PG — SIGNIFICANT CHANGE UP (ref 27–34)
MCHC RBC-ENTMCNC: 32.7 PG — SIGNIFICANT CHANGE UP (ref 27–34)
MCHC RBC-ENTMCNC: 32.8 PG — SIGNIFICANT CHANGE UP (ref 27–34)
MCHC RBC-ENTMCNC: 32.9 PG — SIGNIFICANT CHANGE UP (ref 27–34)
MCHC RBC-ENTMCNC: 32.9 PG — SIGNIFICANT CHANGE UP (ref 27–34)
MCHC RBC-ENTMCNC: 33.5 PG — SIGNIFICANT CHANGE UP (ref 27–34)
MCV RBC AUTO: 101.2 FL — HIGH (ref 80–100)
MCV RBC AUTO: 103.1 FL — HIGH (ref 80–100)
MCV RBC AUTO: 103.2 FL — HIGH (ref 80–100)
MCV RBC AUTO: 103.6 FL — HIGH (ref 80–100)
MCV RBC AUTO: 104 FL — HIGH (ref 80–100)
MCV RBC AUTO: 104 FL — HIGH (ref 80–100)
MCV RBC AUTO: 104.2 FL — HIGH (ref 80–100)
MCV RBC AUTO: 104.7 FL — HIGH (ref 80–100)
MCV RBC AUTO: 107.2 FL — HIGH (ref 80–100)
METAMYELOCYTES # FLD: 0.9 % — HIGH (ref 0–0)
MONOCYTES # BLD AUTO: 0.18 K/UL — SIGNIFICANT CHANGE UP (ref 0–0.9)
MONOCYTES # BLD AUTO: 0.27 K/UL — SIGNIFICANT CHANGE UP (ref 0–0.9)
MONOCYTES # BLD AUTO: 0.35 K/UL — SIGNIFICANT CHANGE UP (ref 0–0.9)
MONOCYTES # BLD AUTO: 0.39 K/UL — SIGNIFICANT CHANGE UP (ref 0–0.9)
MONOCYTES # BLD AUTO: 0.43 K/UL — SIGNIFICANT CHANGE UP (ref 0–0.9)
MONOCYTES # BLD AUTO: 0.79 K/UL — SIGNIFICANT CHANGE UP (ref 0–0.9)
MONOCYTES NFR BLD AUTO: 10.4 % — SIGNIFICANT CHANGE UP (ref 2–14)
MONOCYTES NFR BLD AUTO: 13 % — SIGNIFICANT CHANGE UP (ref 2–14)
MONOCYTES NFR BLD AUTO: 13.2 % — SIGNIFICANT CHANGE UP (ref 2–14)
MONOCYTES NFR BLD AUTO: 15.8 % — HIGH (ref 2–14)
MONOCYTES NFR BLD AUTO: 8.7 % — SIGNIFICANT CHANGE UP (ref 2–14)
MONOCYTES NFR BLD AUTO: 9.4 % — SIGNIFICANT CHANGE UP (ref 2–14)
NEUTROPHILS # BLD AUTO: 1.15 K/UL — LOW (ref 1.8–7.4)
NEUTROPHILS # BLD AUTO: 1.46 K/UL — LOW (ref 1.8–7.4)
NEUTROPHILS # BLD AUTO: 2.1 K/UL — SIGNIFICANT CHANGE UP (ref 1.8–7.4)
NEUTROPHILS # BLD AUTO: 2.36 K/UL — SIGNIFICANT CHANGE UP (ref 1.8–7.4)
NEUTROPHILS # BLD AUTO: 2.53 K/UL — SIGNIFICANT CHANGE UP (ref 1.8–7.4)
NEUTROPHILS # BLD AUTO: 7.09 K/UL — SIGNIFICANT CHANGE UP (ref 1.8–7.4)
NEUTROPHILS NFR BLD AUTO: 65.2 % — SIGNIFICANT CHANGE UP (ref 43–77)
NEUTROPHILS NFR BLD AUTO: 66.1 % — SIGNIFICANT CHANGE UP (ref 43–77)
NEUTROPHILS NFR BLD AUTO: 70 % — SIGNIFICANT CHANGE UP (ref 43–77)
NEUTROPHILS NFR BLD AUTO: 72.4 % — SIGNIFICANT CHANGE UP (ref 43–77)
NEUTROPHILS NFR BLD AUTO: 79.1 % — HIGH (ref 43–77)
NEUTROPHILS NFR BLD AUTO: 84.3 % — HIGH (ref 43–77)
NEUTS BAND # BLD: 2.6 % — SIGNIFICANT CHANGE UP (ref 0–8)
NRBC # BLD: 0 /100 WBCS — SIGNIFICANT CHANGE UP (ref 0–0)
NT-PROBNP SERPL-SCNC: 7261 PG/ML — HIGH (ref 0–300)
OVALOCYTES BLD QL SMEAR: SLIGHT — SIGNIFICANT CHANGE UP
PHOSPHATE SERPL-MCNC: 2 MG/DL — LOW (ref 2.5–4.5)
PHOSPHATE SERPL-MCNC: 2.1 MG/DL — LOW (ref 2.5–4.5)
PHOSPHATE SERPL-MCNC: 2.2 MG/DL — LOW (ref 2.5–4.5)
PHOSPHATE SERPL-MCNC: 2.2 MG/DL — LOW (ref 2.5–4.5)
PHOSPHATE SERPL-MCNC: 2.4 MG/DL — LOW (ref 2.5–4.5)
PHOSPHATE SERPL-MCNC: 2.4 MG/DL — LOW (ref 2.5–4.5)
PHOSPHATE SERPL-MCNC: 2.5 MG/DL — SIGNIFICANT CHANGE UP (ref 2.5–4.5)
PHOSPHATE SERPL-MCNC: 2.5 MG/DL — SIGNIFICANT CHANGE UP (ref 2.5–4.5)
PLAT MORPH BLD: ABNORMAL
PLAT MORPH BLD: ABNORMAL
PLAT MORPH BLD: NORMAL — SIGNIFICANT CHANGE UP
PLATELET # BLD AUTO: 128 K/UL — LOW (ref 150–400)
PLATELET # BLD AUTO: 128 K/UL — LOW (ref 150–400)
PLATELET # BLD AUTO: 148 K/UL — LOW (ref 150–400)
PLATELET # BLD AUTO: 149 K/UL — LOW (ref 150–400)
PLATELET # BLD AUTO: 157 K/UL — SIGNIFICANT CHANGE UP (ref 150–400)
PLATELET # BLD AUTO: 167 K/UL — SIGNIFICANT CHANGE UP (ref 150–400)
PLATELET # BLD AUTO: 174 K/UL — SIGNIFICANT CHANGE UP (ref 150–400)
PLATELET # BLD AUTO: 175 K/UL — SIGNIFICANT CHANGE UP (ref 150–400)
PLATELET # BLD AUTO: 200 K/UL — SIGNIFICANT CHANGE UP (ref 150–400)
PLATELET CLUMP BLD QL SMEAR: ABNORMAL
POIKILOCYTOSIS BLD QL AUTO: SLIGHT — SIGNIFICANT CHANGE UP
POLYCHROMASIA BLD QL SMEAR: SLIGHT — SIGNIFICANT CHANGE UP
POTASSIUM SERPL-MCNC: 3.4 MMOL/L — LOW (ref 3.5–5.3)
POTASSIUM SERPL-MCNC: 3.4 MMOL/L — LOW (ref 3.5–5.3)
POTASSIUM SERPL-MCNC: 3.5 MMOL/L — SIGNIFICANT CHANGE UP (ref 3.5–5.3)
POTASSIUM SERPL-MCNC: 3.6 MMOL/L — SIGNIFICANT CHANGE UP (ref 3.5–5.3)
POTASSIUM SERPL-MCNC: 3.7 MMOL/L — SIGNIFICANT CHANGE UP (ref 3.5–5.3)
POTASSIUM SERPL-MCNC: 3.9 MMOL/L — SIGNIFICANT CHANGE UP (ref 3.5–5.3)
POTASSIUM SERPL-SCNC: 3.4 MMOL/L — LOW (ref 3.5–5.3)
POTASSIUM SERPL-SCNC: 3.4 MMOL/L — LOW (ref 3.5–5.3)
POTASSIUM SERPL-SCNC: 3.5 MMOL/L — SIGNIFICANT CHANGE UP (ref 3.5–5.3)
POTASSIUM SERPL-SCNC: 3.6 MMOL/L — SIGNIFICANT CHANGE UP (ref 3.5–5.3)
POTASSIUM SERPL-SCNC: 3.7 MMOL/L — SIGNIFICANT CHANGE UP (ref 3.5–5.3)
POTASSIUM SERPL-SCNC: 3.9 MMOL/L — SIGNIFICANT CHANGE UP (ref 3.5–5.3)
PROCALCITONIN SERPL-MCNC: 0.23 NG/ML — HIGH (ref 0.02–0.1)
PROT SERPL-MCNC: 6.5 G/DL — SIGNIFICANT CHANGE UP (ref 6–8.3)
RBC # BLD: 3.09 M/UL — LOW (ref 4.2–5.8)
RBC # BLD: 3.11 M/UL — LOW (ref 4.2–5.8)
RBC # BLD: 3.22 M/UL — LOW (ref 4.2–5.8)
RBC # BLD: 3.22 M/UL — LOW (ref 4.2–5.8)
RBC # BLD: 3.27 M/UL — LOW (ref 4.2–5.8)
RBC # BLD: 3.32 M/UL — LOW (ref 4.2–5.8)
RBC # BLD: 3.34 M/UL — LOW (ref 4.2–5.8)
RBC # BLD: 3.39 M/UL — LOW (ref 4.2–5.8)
RBC # BLD: 3.5 M/UL — LOW (ref 4.2–5.8)
RBC # FLD: 13.3 % — SIGNIFICANT CHANGE UP (ref 10.3–14.5)
RBC # FLD: 13.3 % — SIGNIFICANT CHANGE UP (ref 10.3–14.5)
RBC # FLD: 13.4 % — SIGNIFICANT CHANGE UP (ref 10.3–14.5)
RBC # FLD: 13.5 % — SIGNIFICANT CHANGE UP (ref 10.3–14.5)
RBC # FLD: 14 % — SIGNIFICANT CHANGE UP (ref 10.3–14.5)
RBC # FLD: 14.1 % — SIGNIFICANT CHANGE UP (ref 10.3–14.5)
RBC # FLD: 14.2 % — SIGNIFICANT CHANGE UP (ref 10.3–14.5)
RBC # FLD: 14.3 % — SIGNIFICANT CHANGE UP (ref 10.3–14.5)
RBC # FLD: 14.3 % — SIGNIFICANT CHANGE UP (ref 10.3–14.5)
RBC BLD AUTO: SIGNIFICANT CHANGE UP
RSV RNA NPH QL NAA+NON-PROBE: SIGNIFICANT CHANGE UP
SARS-COV-2 RNA SPEC QL NAA+PROBE: SIGNIFICANT CHANGE UP
SODIUM SERPL-SCNC: 133 MMOL/L — LOW (ref 135–145)
SODIUM SERPL-SCNC: 137 MMOL/L — SIGNIFICANT CHANGE UP (ref 135–145)
SODIUM SERPL-SCNC: 138 MMOL/L — SIGNIFICANT CHANGE UP (ref 135–145)
SODIUM SERPL-SCNC: 139 MMOL/L — SIGNIFICANT CHANGE UP (ref 135–145)
SODIUM SERPL-SCNC: 140 MMOL/L — SIGNIFICANT CHANGE UP (ref 135–145)
SODIUM SERPL-SCNC: 145 MMOL/L — SIGNIFICANT CHANGE UP (ref 135–145)
SODIUM SERPL-SCNC: 146 MMOL/L — HIGH (ref 135–145)
SPECIMEN SOURCE: SIGNIFICANT CHANGE UP
SPECIMEN SOURCE: SIGNIFICANT CHANGE UP
TROPONIN T, HIGH SENSITIVITY RESULT: 57 NG/L — HIGH (ref 0–51)
TROPONIN T, HIGH SENSITIVITY RESULT: 69 NG/L — HIGH (ref 0–51)
TROPONIN T, HIGH SENSITIVITY RESULT: 70 NG/L — HIGH (ref 0–51)
URATE SERPL-MCNC: 9 MG/DL — HIGH (ref 3.4–8.8)
VIT B12 SERPL-MCNC: 1098 PG/ML — SIGNIFICANT CHANGE UP (ref 232–1245)
WBC # BLD: 1.77 K/UL — LOW (ref 3.8–10.5)
WBC # BLD: 2.21 K/UL — LOW (ref 3.8–10.5)
WBC # BLD: 3 K/UL — LOW (ref 3.8–10.5)
WBC # BLD: 3.1 K/UL — LOW (ref 3.8–10.5)
WBC # BLD: 3.26 K/UL — LOW (ref 3.8–10.5)
WBC # BLD: 4.07 K/UL — SIGNIFICANT CHANGE UP (ref 3.8–10.5)
WBC # BLD: 6.33 K/UL — SIGNIFICANT CHANGE UP (ref 3.8–10.5)
WBC # BLD: 7.76 K/UL — SIGNIFICANT CHANGE UP (ref 3.8–10.5)
WBC # BLD: 8.41 K/UL — SIGNIFICANT CHANGE UP (ref 3.8–10.5)
WBC # FLD AUTO: 1.77 K/UL — LOW (ref 3.8–10.5)
WBC # FLD AUTO: 2.21 K/UL — LOW (ref 3.8–10.5)
WBC # FLD AUTO: 3 K/UL — LOW (ref 3.8–10.5)
WBC # FLD AUTO: 3.1 K/UL — LOW (ref 3.8–10.5)
WBC # FLD AUTO: 3.26 K/UL — LOW (ref 3.8–10.5)
WBC # FLD AUTO: 4.07 K/UL — SIGNIFICANT CHANGE UP (ref 3.8–10.5)
WBC # FLD AUTO: 6.33 K/UL — SIGNIFICANT CHANGE UP (ref 3.8–10.5)
WBC # FLD AUTO: 7.76 K/UL — SIGNIFICANT CHANGE UP (ref 3.8–10.5)
WBC # FLD AUTO: 8.41 K/UL — SIGNIFICANT CHANGE UP (ref 3.8–10.5)

## 2024-01-01 PROCEDURE — 96374 THER/PROPH/DIAG INJ IV PUSH: CPT

## 2024-01-01 PROCEDURE — 83880 ASSAY OF NATRIURETIC PEPTIDE: CPT

## 2024-01-01 PROCEDURE — 84100 ASSAY OF PHOSPHORUS: CPT

## 2024-01-01 PROCEDURE — 84145 PROCALCITONIN (PCT): CPT

## 2024-01-01 PROCEDURE — 99285 EMERGENCY DEPT VISIT HI MDM: CPT | Mod: 25

## 2024-01-01 PROCEDURE — 99498 ADVNCD CARE PLAN ADDL 30 MIN: CPT

## 2024-01-01 PROCEDURE — 85025 COMPLETE CBC W/AUTO DIFF WBC: CPT

## 2024-01-01 PROCEDURE — 99233 SBSQ HOSP IP/OBS HIGH 50: CPT

## 2024-01-01 PROCEDURE — 99239 HOSP IP/OBS DSCHRG MGMT >30: CPT

## 2024-01-01 PROCEDURE — 83605 ASSAY OF LACTIC ACID: CPT

## 2024-01-01 PROCEDURE — 99497 ADVNCD CARE PLAN 30 MIN: CPT

## 2024-01-01 PROCEDURE — 82550 ASSAY OF CK (CPK): CPT

## 2024-01-01 PROCEDURE — 36600 WITHDRAWAL OF ARTERIAL BLOOD: CPT

## 2024-01-01 PROCEDURE — 99232 SBSQ HOSP IP/OBS MODERATE 35: CPT | Mod: GC

## 2024-01-01 PROCEDURE — 84484 ASSAY OF TROPONIN QUANT: CPT

## 2024-01-01 PROCEDURE — 93308 TTE F-UP OR LMTD: CPT

## 2024-01-01 PROCEDURE — 93308 TTE F-UP OR LMTD: CPT | Mod: 26

## 2024-01-01 PROCEDURE — 99232 SBSQ HOSP IP/OBS MODERATE 35: CPT

## 2024-01-01 PROCEDURE — 71045 X-RAY EXAM CHEST 1 VIEW: CPT | Mod: 26

## 2024-01-01 PROCEDURE — 94660 CPAP INITIATION&MGMT: CPT

## 2024-01-01 PROCEDURE — 97161 PT EVAL LOW COMPLEX 20 MIN: CPT

## 2024-01-01 PROCEDURE — 99497 ADVNCD CARE PLAN 30 MIN: CPT | Mod: GC

## 2024-01-01 PROCEDURE — 0241U: CPT

## 2024-01-01 PROCEDURE — 94640 AIRWAY INHALATION TREATMENT: CPT

## 2024-01-01 PROCEDURE — 85018 HEMOGLOBIN: CPT

## 2024-01-01 PROCEDURE — 84550 ASSAY OF BLOOD/URIC ACID: CPT

## 2024-01-01 PROCEDURE — 82947 ASSAY GLUCOSE BLOOD QUANT: CPT

## 2024-01-01 PROCEDURE — 82435 ASSAY OF BLOOD CHLORIDE: CPT

## 2024-01-01 PROCEDURE — 99223 1ST HOSP IP/OBS HIGH 75: CPT | Mod: GC

## 2024-01-01 PROCEDURE — 96375 TX/PRO/DX INJ NEW DRUG ADDON: CPT

## 2024-01-01 PROCEDURE — 82607 VITAMIN B-12: CPT

## 2024-01-01 PROCEDURE — 85027 COMPLETE CBC AUTOMATED: CPT

## 2024-01-01 PROCEDURE — 36415 COLL VENOUS BLD VENIPUNCTURE: CPT

## 2024-01-01 PROCEDURE — 84295 ASSAY OF SERUM SODIUM: CPT

## 2024-01-01 PROCEDURE — 87040 BLOOD CULTURE FOR BACTERIA: CPT

## 2024-01-01 PROCEDURE — 99291 CRITICAL CARE FIRST HOUR: CPT | Mod: GC

## 2024-01-01 PROCEDURE — 99233 SBSQ HOSP IP/OBS HIGH 50: CPT | Mod: GC

## 2024-01-01 PROCEDURE — 84132 ASSAY OF SERUM POTASSIUM: CPT

## 2024-01-01 PROCEDURE — 71045 X-RAY EXAM CHEST 1 VIEW: CPT

## 2024-01-01 PROCEDURE — 80053 COMPREHEN METABOLIC PANEL: CPT

## 2024-01-01 PROCEDURE — 82553 CREATINE MB FRACTION: CPT

## 2024-01-01 PROCEDURE — 82330 ASSAY OF CALCIUM: CPT

## 2024-01-01 PROCEDURE — 82746 ASSAY OF FOLIC ACID SERUM: CPT

## 2024-01-01 PROCEDURE — 87637 SARSCOV2&INF A&B&RSV AMP PRB: CPT

## 2024-01-01 PROCEDURE — 83735 ASSAY OF MAGNESIUM: CPT

## 2024-01-01 PROCEDURE — 85014 HEMATOCRIT: CPT

## 2024-01-01 PROCEDURE — 82803 BLOOD GASES ANY COMBINATION: CPT

## 2024-01-01 PROCEDURE — 80048 BASIC METABOLIC PNL TOTAL CA: CPT

## 2024-01-01 RX ORDER — ACETAMINOPHEN 80 MG/.8ML
1000 SOLUTION/ DROPS ORAL ONCE
Refills: 0 | Status: COMPLETED | OUTPATIENT
Start: 2024-01-01 | End: 2024-01-01

## 2024-01-01 RX ORDER — AMPICILLIN TRIHYDRATE 125 MG/5ML
12 SUSPENSION, RECONSTITUTED, ORAL (ML) ORAL ONCE
Refills: 0 | Status: DISCONTINUED | OUTPATIENT
Start: 2024-01-01 | End: 2024-01-01

## 2024-01-01 RX ORDER — METOPROLOL TARTRATE 50 MG
50 TABLET ORAL DAILY
Refills: 0 | Status: DISCONTINUED | OUTPATIENT
Start: 2024-01-01 | End: 2024-01-01

## 2024-01-01 RX ORDER — RIVAROXABAN 2.5 MG/1
20 TABLET, FILM COATED ORAL
Refills: 0 | Status: DISCONTINUED | OUTPATIENT
Start: 2024-01-01 | End: 2024-01-01

## 2024-01-01 RX ORDER — FLUTICASONE PROPIONATE AND SALMETEROL 50; 500 UG/1; UG/1
1 POWDER ORAL; RESPIRATORY (INHALATION)
Refills: 0 | Status: DISCONTINUED | OUTPATIENT
Start: 2024-01-01 | End: 2024-01-01

## 2024-01-01 RX ORDER — SOD PHOS DI, MONO/K PHOS MONO 250 MG
2 TABLET ORAL ONCE
Refills: 0 | Status: COMPLETED | OUTPATIENT
Start: 2024-01-01 | End: 2024-01-01

## 2024-01-01 RX ORDER — SODIUM PHOSPHATE, MONOBASIC, MONOHYDRATE AND SODIUM PHOSPHATE, DIBASIC ANHYDROUS 142; 276 MG/ML; MG/ML
15 INJECTION, SOLUTION INTRAVENOUS ONCE
Refills: 0 | Status: COMPLETED | OUTPATIENT
Start: 2024-01-01 | End: 2024-01-01

## 2024-01-01 RX ORDER — LOSARTAN POTASSIUM 100 MG/1
100 TABLET, FILM COATED ORAL DAILY
Refills: 0 | Status: DISCONTINUED | OUTPATIENT
Start: 2024-01-01 | End: 2024-01-01

## 2024-01-01 RX ORDER — FLUTICASONE FUROATE, UMECLIDINIUM BROMIDE AND VILANTEROL TRIFENATATE 100; 62.5; 25 UG/1; UG/1; UG/1
1 POWDER RESPIRATORY (INHALATION)
Refills: 0 | DISCHARGE

## 2024-01-01 RX ORDER — MORPHINE SULFATE 15 MG
2 TABLET, EXTENDED RELEASE ORAL
Qty: 0 | Refills: 0 | DISCHARGE
Start: 2024-01-01

## 2024-01-01 RX ORDER — KETOROLAC TROMETHAMINE 30 MG/ML
15 INJECTION INTRAMUSCULAR; INTRAVENOUS ONCE
Refills: 0 | Status: DISCONTINUED | OUTPATIENT
Start: 2024-01-01 | End: 2024-01-01

## 2024-01-01 RX ORDER — TIOTROPIUM BROMIDE MONOHYDRATE 18 UG/1
2 CAPSULE ORAL; RESPIRATORY (INHALATION) DAILY
Refills: 0 | Status: DISCONTINUED | OUTPATIENT
Start: 2024-01-01 | End: 2024-01-01

## 2024-01-01 RX ORDER — POTASSIUM PHOSPHATE, MONOBASIC AND POTASSIUM PHOSPHATE, DIBASIC 224; 236 MG/ML; MG/ML
30 INJECTION, SOLUTION INTRAVENOUS ONCE
Refills: 0 | Status: COMPLETED | OUTPATIENT
Start: 2024-01-01 | End: 2024-01-01

## 2024-01-01 RX ORDER — MORPHINE SULFATE 15 MG
2 TABLET, EXTENDED RELEASE ORAL
Refills: 0 | Status: DISCONTINUED | OUTPATIENT
Start: 2024-01-01 | End: 2024-01-01

## 2024-01-01 RX ORDER — LIDOCAINE 50 MG/G
1 OINTMENT TOPICAL ONCE
Refills: 0 | Status: COMPLETED | OUTPATIENT
Start: 2024-01-01 | End: 2024-01-01

## 2024-01-01 RX ORDER — SENNOSIDES 8.6 MG/1
2 TABLET, FILM COATED ORAL AT BEDTIME
Refills: 0 | Status: DISCONTINUED | OUTPATIENT
Start: 2024-01-01 | End: 2024-01-01

## 2024-01-01 RX ORDER — COLCHICINE 0.6 MG/1
1.2 CAPSULE ORAL ONCE
Refills: 0 | Status: COMPLETED | OUTPATIENT
Start: 2024-01-01 | End: 2024-01-01

## 2024-01-01 RX ORDER — CHLORHEXIDINE GLUCONATE 1.2 MG/ML
1 RINSE ORAL
Refills: 0 | Status: DISCONTINUED | OUTPATIENT
Start: 2024-01-01 | End: 2024-01-01

## 2024-01-01 RX ORDER — FUROSEMIDE 20 MG
40 TABLET ORAL ONCE
Refills: 0 | Status: COMPLETED | OUTPATIENT
Start: 2024-01-01 | End: 2024-01-01

## 2024-01-01 RX ORDER — AMPICILLIN TRIHYDRATE 125 MG/5ML
2 SUSPENSION, RECONSTITUTED, ORAL (ML) ORAL EVERY 4 HOURS
Refills: 0 | Status: DISCONTINUED | OUTPATIENT
Start: 2024-01-01 | End: 2024-01-01

## 2024-01-01 RX ORDER — IPRATROPIUM BROMIDE AND ALBUTEROL SULFATE .5; 2.5 MG/3ML; MG/3ML
3 SOLUTION RESPIRATORY (INHALATION) EVERY 6 HOURS
Refills: 0 | Status: DISCONTINUED | OUTPATIENT
Start: 2024-01-01 | End: 2024-01-01

## 2024-01-01 RX ORDER — FUROSEMIDE 20 MG
40 TABLET ORAL DAILY
Refills: 0 | Status: DISCONTINUED | OUTPATIENT
Start: 2024-01-01 | End: 2024-01-01

## 2024-01-01 RX ORDER — TAMSULOSIN HYDROCHLORIDE 0.4 MG/1
0.8 CAPSULE ORAL AT BEDTIME
Refills: 0 | Status: DISCONTINUED | OUTPATIENT
Start: 2024-01-01 | End: 2024-01-01

## 2024-01-01 RX ORDER — COLCHICINE 0.6 MG/1
0.6 CAPSULE ORAL DAILY
Refills: 0 | Status: DISCONTINUED | OUTPATIENT
Start: 2024-01-01 | End: 2024-01-01

## 2024-01-01 RX ORDER — POTASSIUM CHLORIDE 600 MG/1
40 TABLET, FILM COATED, EXTENDED RELEASE ORAL EVERY 4 HOURS
Refills: 0 | Status: COMPLETED | OUTPATIENT
Start: 2024-01-01 | End: 2024-01-01

## 2024-01-01 RX ORDER — PIPERACILLIN AND TAZOBACTAM 3; .375 G/15ML; G/15ML
3.38 INJECTION, POWDER, LYOPHILIZED, FOR SOLUTION INTRAVENOUS ONCE
Refills: 0 | Status: COMPLETED | OUTPATIENT
Start: 2024-01-01 | End: 2024-01-01

## 2024-01-01 RX ORDER — IPRATROPIUM BROMIDE AND ALBUTEROL SULFATE .5; 2.5 MG/3ML; MG/3ML
3 SOLUTION RESPIRATORY (INHALATION)
Qty: 0 | Refills: 0 | DISCHARGE
Start: 2024-01-01

## 2024-01-01 RX ORDER — SODIUM PHOSPHATE, MONOBASIC, MONOHYDRATE AND SODIUM PHOSPHATE, DIBASIC ANHYDROUS 142; 276 MG/ML; MG/ML
30 INJECTION, SOLUTION INTRAVENOUS ONCE
Refills: 0 | Status: COMPLETED | OUTPATIENT
Start: 2024-01-01 | End: 2024-01-01

## 2024-01-01 RX ORDER — ACETAMINOPHEN 80 MG/.8ML
650 SOLUTION/ DROPS ORAL ONCE
Refills: 0 | Status: COMPLETED | OUTPATIENT
Start: 2024-01-01 | End: 2024-01-01

## 2024-01-01 RX ORDER — MAGNESIUM SULFATE 500 MG/ML
2 INJECTION, SOLUTION INTRAMUSCULAR; INTRAVENOUS ONCE
Refills: 0 | Status: COMPLETED | OUTPATIENT
Start: 2024-01-01 | End: 2024-01-01

## 2024-01-01 RX ORDER — FLUTICASONE PROPIONATE AND SALMETEROL 50; 500 UG/1; UG/1
1 POWDER ORAL; RESPIRATORY (INHALATION)
Qty: 0 | Refills: 0 | DISCHARGE
Start: 2024-01-01

## 2024-01-01 RX ORDER — CEFTRIAXONE SODIUM 1 G/1
2000 INJECTION, POWDER, FOR SOLUTION INTRAMUSCULAR; INTRAVENOUS EVERY 12 HOURS
Refills: 0 | Status: DISCONTINUED | OUTPATIENT
Start: 2024-01-01 | End: 2024-01-01

## 2024-01-01 RX ORDER — SOD PHOS DI, MONO/K PHOS MONO 250 MG
1 TABLET ORAL ONCE
Refills: 0 | Status: COMPLETED | OUTPATIENT
Start: 2024-01-01 | End: 2024-01-01

## 2024-01-01 RX ORDER — METOPROLOL TARTRATE 50 MG
1 TABLET ORAL
Qty: 0 | Refills: 0 | DISCHARGE
Start: 2024-01-01

## 2024-01-01 RX ORDER — METOPROLOL TARTRATE 50 MG
25 TABLET ORAL AT BEDTIME
Refills: 0 | Status: DISCONTINUED | OUTPATIENT
Start: 2024-01-01 | End: 2024-01-01

## 2024-01-01 RX ORDER — TIOTROPIUM BROMIDE MONOHYDRATE 18 UG/1
2 CAPSULE ORAL; RESPIRATORY (INHALATION)
Qty: 0 | Refills: 0 | DISCHARGE
Start: 2024-01-01

## 2024-01-01 RX ORDER — FERROUS SULFATE 325(65) MG
325 TABLET ORAL DAILY
Refills: 0 | Status: DISCONTINUED | OUTPATIENT
Start: 2024-01-01 | End: 2024-01-01

## 2024-01-01 RX ORDER — IPRATROPIUM BROMIDE AND ALBUTEROL SULFATE .5; 2.5 MG/3ML; MG/3ML
3 SOLUTION RESPIRATORY (INHALATION) ONCE
Refills: 0 | Status: COMPLETED | OUTPATIENT
Start: 2024-01-01 | End: 2024-01-01

## 2024-01-01 RX ADMIN — RIVAROXABAN 20 MILLIGRAM(S): 2.5 TABLET, FILM COATED ORAL at 17:37

## 2024-01-01 RX ADMIN — CHLORHEXIDINE GLUCONATE 1 APPLICATION(S): 1.2 RINSE ORAL at 06:16

## 2024-01-01 RX ADMIN — IPRATROPIUM BROMIDE AND ALBUTEROL SULFATE 3 MILLILITER(S): .5; 2.5 SOLUTION RESPIRATORY (INHALATION) at 00:03

## 2024-01-01 RX ADMIN — CEFTRIAXONE SODIUM 100 MILLIGRAM(S): 1 INJECTION, POWDER, FOR SOLUTION INTRAMUSCULAR; INTRAVENOUS at 05:10

## 2024-01-01 RX ADMIN — FLUTICASONE PROPIONATE AND SALMETEROL 1 DOSE(S): 50; 500 POWDER ORAL; RESPIRATORY (INHALATION) at 17:04

## 2024-01-01 RX ADMIN — IPRATROPIUM BROMIDE AND ALBUTEROL SULFATE 3 MILLILITER(S): .5; 2.5 SOLUTION RESPIRATORY (INHALATION) at 06:16

## 2024-01-01 RX ADMIN — Medication 40 MILLIGRAM(S): at 05:01

## 2024-01-01 RX ADMIN — Medication 200 GRAM(S): at 08:03

## 2024-01-01 RX ADMIN — CHLORHEXIDINE GLUCONATE 1 APPLICATION(S): 1.2 RINSE ORAL at 07:32

## 2024-01-01 RX ADMIN — IPRATROPIUM BROMIDE AND ALBUTEROL SULFATE 3 MILLILITER(S): .5; 2.5 SOLUTION RESPIRATORY (INHALATION) at 17:03

## 2024-01-01 RX ADMIN — Medication 25 MILLIGRAM(S): at 21:49

## 2024-01-01 RX ADMIN — IPRATROPIUM BROMIDE AND ALBUTEROL SULFATE 3 MILLILITER(S): .5; 2.5 SOLUTION RESPIRATORY (INHALATION) at 06:30

## 2024-01-01 RX ADMIN — SODIUM PHOSPHATE, MONOBASIC, MONOHYDRATE AND SODIUM PHOSPHATE, DIBASIC ANHYDROUS 63.75 MILLIMOLE(S): 142; 276 INJECTION, SOLUTION INTRAVENOUS at 08:34

## 2024-01-01 RX ADMIN — IPRATROPIUM BROMIDE AND ALBUTEROL SULFATE 3 MILLILITER(S): .5; 2.5 SOLUTION RESPIRATORY (INHALATION) at 11:54

## 2024-01-01 RX ADMIN — Medication 5 MILLIGRAM(S): at 05:01

## 2024-01-01 RX ADMIN — CHLORHEXIDINE GLUCONATE 1 APPLICATION(S): 1.2 RINSE ORAL at 07:29

## 2024-01-01 RX ADMIN — IPRATROPIUM BROMIDE AND ALBUTEROL SULFATE 3 MILLILITER(S): .5; 2.5 SOLUTION RESPIRATORY (INHALATION) at 10:55

## 2024-01-01 RX ADMIN — Medication 40 MILLIGRAM(S): at 05:05

## 2024-01-01 RX ADMIN — CEFTRIAXONE SODIUM 100 MILLIGRAM(S): 1 INJECTION, POWDER, FOR SOLUTION INTRAMUSCULAR; INTRAVENOUS at 06:28

## 2024-01-01 RX ADMIN — Medication 25 MILLIGRAM(S): at 21:12

## 2024-01-01 RX ADMIN — FLUTICASONE PROPIONATE AND SALMETEROL 1 DOSE(S): 50; 500 POWDER ORAL; RESPIRATORY (INHALATION) at 05:11

## 2024-01-01 RX ADMIN — IPRATROPIUM BROMIDE AND ALBUTEROL SULFATE 3 MILLILITER(S): .5; 2.5 SOLUTION RESPIRATORY (INHALATION) at 05:01

## 2024-01-01 RX ADMIN — CEFTRIAXONE SODIUM 100 MILLIGRAM(S): 1 INJECTION, POWDER, FOR SOLUTION INTRAMUSCULAR; INTRAVENOUS at 17:54

## 2024-01-01 RX ADMIN — COLCHICINE 0.6 MILLIGRAM(S): 0.6 CAPSULE ORAL at 11:50

## 2024-01-01 RX ADMIN — IPRATROPIUM BROMIDE AND ALBUTEROL SULFATE 3 MILLILITER(S): .5; 2.5 SOLUTION RESPIRATORY (INHALATION) at 23:01

## 2024-01-01 RX ADMIN — TIOTROPIUM BROMIDE MONOHYDRATE 2 PUFF(S): 18 CAPSULE ORAL; RESPIRATORY (INHALATION) at 12:05

## 2024-01-01 RX ADMIN — Medication 50 MILLIGRAM(S): at 05:01

## 2024-01-01 RX ADMIN — IPRATROPIUM BROMIDE AND ALBUTEROL SULFATE 3 MILLILITER(S): .5; 2.5 SOLUTION RESPIRATORY (INHALATION) at 05:04

## 2024-01-01 RX ADMIN — TAMSULOSIN HYDROCHLORIDE 0.8 MILLIGRAM(S): 0.4 CAPSULE ORAL at 21:09

## 2024-01-01 RX ADMIN — TAMSULOSIN HYDROCHLORIDE 0.8 MILLIGRAM(S): 0.4 CAPSULE ORAL at 21:34

## 2024-01-01 RX ADMIN — TAMSULOSIN HYDROCHLORIDE 0.8 MILLIGRAM(S): 0.4 CAPSULE ORAL at 21:04

## 2024-01-01 RX ADMIN — Medication 25 MILLIGRAM(S): at 22:29

## 2024-01-01 RX ADMIN — Medication 2 MILLIGRAM(S): at 06:21

## 2024-01-01 RX ADMIN — RIVAROXABAN 20 MILLIGRAM(S): 2.5 TABLET, FILM COATED ORAL at 17:46

## 2024-01-01 RX ADMIN — RIVAROXABAN 20 MILLIGRAM(S): 2.5 TABLET, FILM COATED ORAL at 17:03

## 2024-01-01 RX ADMIN — Medication 2 MILLIGRAM(S): at 14:06

## 2024-01-01 RX ADMIN — Medication 25 MILLIGRAM(S): at 21:09

## 2024-01-01 RX ADMIN — FLUTICASONE PROPIONATE AND SALMETEROL 1 DOSE(S): 50; 500 POWDER ORAL; RESPIRATORY (INHALATION) at 18:03

## 2024-01-01 RX ADMIN — TAMSULOSIN HYDROCHLORIDE 0.8 MILLIGRAM(S): 0.4 CAPSULE ORAL at 22:29

## 2024-01-01 RX ADMIN — Medication 40 MILLIGRAM(S): at 06:29

## 2024-01-01 RX ADMIN — Medication 200 GRAM(S): at 17:45

## 2024-01-01 RX ADMIN — Medication 1 PACKET(S): at 07:59

## 2024-01-01 RX ADMIN — Medication 2 MILLIGRAM(S): at 05:26

## 2024-01-01 RX ADMIN — Medication 40 MILLIGRAM(S): at 21:16

## 2024-01-01 RX ADMIN — FLUTICASONE PROPIONATE AND SALMETEROL 1 DOSE(S): 50; 500 POWDER ORAL; RESPIRATORY (INHALATION) at 06:29

## 2024-01-01 RX ADMIN — FLUTICASONE PROPIONATE AND SALMETEROL 1 DOSE(S): 50; 500 POWDER ORAL; RESPIRATORY (INHALATION) at 05:01

## 2024-01-01 RX ADMIN — Medication 5 MILLIGRAM(S): at 06:16

## 2024-01-01 RX ADMIN — KETOROLAC TROMETHAMINE 15 MILLIGRAM(S): 30 INJECTION INTRAMUSCULAR; INTRAVENOUS at 17:35

## 2024-01-01 RX ADMIN — CHLORHEXIDINE GLUCONATE 1 APPLICATION(S): 1.2 RINSE ORAL at 07:57

## 2024-01-01 RX ADMIN — Medication 200 GRAM(S): at 00:56

## 2024-01-01 RX ADMIN — Medication 50 MILLIGRAM(S): at 05:05

## 2024-01-01 RX ADMIN — Medication 200 GRAM(S): at 12:15

## 2024-01-01 RX ADMIN — Medication 5 MILLIGRAM(S): at 05:05

## 2024-01-01 RX ADMIN — FLUTICASONE PROPIONATE AND SALMETEROL 1 DOSE(S): 50; 500 POWDER ORAL; RESPIRATORY (INHALATION) at 17:33

## 2024-01-01 RX ADMIN — CHLORHEXIDINE GLUCONATE 1 APPLICATION(S): 1.2 RINSE ORAL at 06:28

## 2024-01-01 RX ADMIN — Medication 2 MILLIGRAM(S): at 22:56

## 2024-01-01 RX ADMIN — Medication 50 MILLIGRAM(S): at 06:28

## 2024-01-01 RX ADMIN — Medication 25 MILLIGRAM(S): at 21:03

## 2024-01-01 RX ADMIN — TIOTROPIUM BROMIDE MONOHYDRATE 2 PUFF(S): 18 CAPSULE ORAL; RESPIRATORY (INHALATION) at 11:12

## 2024-01-01 RX ADMIN — Medication 2 MILLIGRAM(S): at 05:00

## 2024-01-01 RX ADMIN — COLCHICINE 0.6 MILLIGRAM(S): 0.6 CAPSULE ORAL at 17:03

## 2024-01-01 RX ADMIN — IPRATROPIUM BROMIDE AND ALBUTEROL SULFATE 3 MILLILITER(S): .5; 2.5 SOLUTION RESPIRATORY (INHALATION) at 18:16

## 2024-01-01 RX ADMIN — IPRATROPIUM BROMIDE AND ALBUTEROL SULFATE 3 MILLILITER(S): .5; 2.5 SOLUTION RESPIRATORY (INHALATION) at 06:35

## 2024-01-01 RX ADMIN — IPRATROPIUM BROMIDE AND ALBUTEROL SULFATE 3 MILLILITER(S): .5; 2.5 SOLUTION RESPIRATORY (INHALATION) at 00:38

## 2024-01-01 RX ADMIN — IPRATROPIUM BROMIDE AND ALBUTEROL SULFATE 3 MILLILITER(S): .5; 2.5 SOLUTION RESPIRATORY (INHALATION) at 17:33

## 2024-01-01 RX ADMIN — KETOROLAC TROMETHAMINE 15 MILLIGRAM(S): 30 INJECTION INTRAMUSCULAR; INTRAVENOUS at 00:14

## 2024-01-01 RX ADMIN — MAGNESIUM SULFATE 25 GRAM(S): 500 INJECTION, SOLUTION INTRAMUSCULAR; INTRAVENOUS at 11:17

## 2024-01-01 RX ADMIN — KETOROLAC TROMETHAMINE 15 MILLIGRAM(S): 30 INJECTION INTRAMUSCULAR; INTRAVENOUS at 17:46

## 2024-01-01 RX ADMIN — CEFTRIAXONE SODIUM 100 MILLIGRAM(S): 1 INJECTION, POWDER, FOR SOLUTION INTRAMUSCULAR; INTRAVENOUS at 16:37

## 2024-01-01 RX ADMIN — CEFTRIAXONE SODIUM 100 MILLIGRAM(S): 1 INJECTION, POWDER, FOR SOLUTION INTRAMUSCULAR; INTRAVENOUS at 05:01

## 2024-01-01 RX ADMIN — Medication 2 MILLIGRAM(S): at 13:36

## 2024-01-01 RX ADMIN — FLUTICASONE PROPIONATE AND SALMETEROL 1 DOSE(S): 50; 500 POWDER ORAL; RESPIRATORY (INHALATION) at 05:05

## 2024-01-01 RX ADMIN — Medication 325 MILLIGRAM(S): at 11:17

## 2024-01-01 RX ADMIN — Medication 200 GRAM(S): at 05:00

## 2024-01-01 RX ADMIN — IPRATROPIUM BROMIDE AND ALBUTEROL SULFATE 3 MILLILITER(S): .5; 2.5 SOLUTION RESPIRATORY (INHALATION) at 11:33

## 2024-01-01 RX ADMIN — CEFTRIAXONE SODIUM 100 MILLIGRAM(S): 1 INJECTION, POWDER, FOR SOLUTION INTRAMUSCULAR; INTRAVENOUS at 18:18

## 2024-01-01 RX ADMIN — Medication 2 MILLIGRAM(S): at 18:11

## 2024-01-01 RX ADMIN — TAMSULOSIN HYDROCHLORIDE 0.8 MILLIGRAM(S): 0.4 CAPSULE ORAL at 21:13

## 2024-01-01 RX ADMIN — Medication 2 MILLIGRAM(S): at 14:33

## 2024-01-01 RX ADMIN — CEFTRIAXONE SODIUM 100 MILLIGRAM(S): 1 INJECTION, POWDER, FOR SOLUTION INTRAMUSCULAR; INTRAVENOUS at 16:54

## 2024-01-01 RX ADMIN — IPRATROPIUM BROMIDE AND ALBUTEROL SULFATE 3 MILLILITER(S): .5; 2.5 SOLUTION RESPIRATORY (INHALATION) at 00:26

## 2024-01-01 RX ADMIN — TIOTROPIUM BROMIDE MONOHYDRATE 2 PUFF(S): 18 CAPSULE ORAL; RESPIRATORY (INHALATION) at 11:22

## 2024-01-01 RX ADMIN — POTASSIUM PHOSPHATE, MONOBASIC AND POTASSIUM PHOSPHATE, DIBASIC 83.33 MILLIMOLE(S): 224; 236 INJECTION, SOLUTION INTRAVENOUS at 09:59

## 2024-01-01 RX ADMIN — ACETAMINOPHEN 650 MILLIGRAM(S): 80 SOLUTION/ DROPS ORAL at 12:34

## 2024-01-01 RX ADMIN — Medication 325 MILLIGRAM(S): at 11:29

## 2024-01-01 RX ADMIN — FLUTICASONE PROPIONATE AND SALMETEROL 1 DOSE(S): 50; 500 POWDER ORAL; RESPIRATORY (INHALATION) at 17:47

## 2024-01-01 RX ADMIN — TIOTROPIUM BROMIDE MONOHYDRATE 2 PUFF(S): 18 CAPSULE ORAL; RESPIRATORY (INHALATION) at 11:34

## 2024-01-01 RX ADMIN — IPRATROPIUM BROMIDE AND ALBUTEROL SULFATE 3 MILLILITER(S): .5; 2.5 SOLUTION RESPIRATORY (INHALATION) at 11:12

## 2024-01-01 RX ADMIN — TIOTROPIUM BROMIDE MONOHYDRATE 2 PUFF(S): 18 CAPSULE ORAL; RESPIRATORY (INHALATION) at 11:50

## 2024-01-01 RX ADMIN — TAMSULOSIN HYDROCHLORIDE 0.8 MILLIGRAM(S): 0.4 CAPSULE ORAL at 21:49

## 2024-01-01 RX ADMIN — FLUTICASONE PROPIONATE AND SALMETEROL 1 DOSE(S): 50; 500 POWDER ORAL; RESPIRATORY (INHALATION) at 17:53

## 2024-01-01 RX ADMIN — IPRATROPIUM BROMIDE AND ALBUTEROL SULFATE 3 MILLILITER(S): .5; 2.5 SOLUTION RESPIRATORY (INHALATION) at 16:46

## 2024-01-01 RX ADMIN — Medication 2 MILLIGRAM(S): at 16:14

## 2024-01-01 RX ADMIN — Medication 2 MILLIGRAM(S): at 21:09

## 2024-01-01 RX ADMIN — Medication 40 MILLIGRAM(S): at 05:11

## 2024-01-01 RX ADMIN — Medication 1 PACKET(S): at 08:20

## 2024-01-01 RX ADMIN — Medication 325 MILLIGRAM(S): at 11:53

## 2024-01-01 RX ADMIN — IPRATROPIUM BROMIDE AND ALBUTEROL SULFATE 3 MILLILITER(S): .5; 2.5 SOLUTION RESPIRATORY (INHALATION) at 05:11

## 2024-01-01 RX ADMIN — IPRATROPIUM BROMIDE AND ALBUTEROL SULFATE 3 MILLILITER(S): .5; 2.5 SOLUTION RESPIRATORY (INHALATION) at 05:02

## 2024-01-01 RX ADMIN — CEFTRIAXONE SODIUM 100 MILLIGRAM(S): 1 INJECTION, POWDER, FOR SOLUTION INTRAMUSCULAR; INTRAVENOUS at 05:04

## 2024-01-01 RX ADMIN — Medication 50 MILLIGRAM(S): at 06:16

## 2024-01-01 RX ADMIN — COLCHICINE 1.2 MILLIGRAM(S): 0.6 CAPSULE ORAL at 11:28

## 2024-01-01 RX ADMIN — CEFTRIAXONE SODIUM 100 MILLIGRAM(S): 1 INJECTION, POWDER, FOR SOLUTION INTRAMUSCULAR; INTRAVENOUS at 17:37

## 2024-01-01 RX ADMIN — SODIUM PHOSPHATE, MONOBASIC, MONOHYDRATE AND SODIUM PHOSPHATE, DIBASIC ANHYDROUS 85 MILLIMOLE(S): 142; 276 INJECTION, SOLUTION INTRAVENOUS at 09:29

## 2024-01-01 RX ADMIN — TAMSULOSIN HYDROCHLORIDE 0.8 MILLIGRAM(S): 0.4 CAPSULE ORAL at 21:21

## 2024-01-01 RX ADMIN — ACETAMINOPHEN 400 MILLIGRAM(S): 80 SOLUTION/ DROPS ORAL at 12:40

## 2024-01-01 RX ADMIN — FLUTICASONE PROPIONATE AND SALMETEROL 1 DOSE(S): 50; 500 POWDER ORAL; RESPIRATORY (INHALATION) at 06:34

## 2024-01-01 RX ADMIN — Medication 2 MILLIGRAM(S): at 22:10

## 2024-01-01 RX ADMIN — CEFTRIAXONE SODIUM 100 MILLIGRAM(S): 1 INJECTION, POWDER, FOR SOLUTION INTRAMUSCULAR; INTRAVENOUS at 17:45

## 2024-01-01 RX ADMIN — CEFTRIAXONE SODIUM 100 MILLIGRAM(S): 1 INJECTION, POWDER, FOR SOLUTION INTRAMUSCULAR; INTRAVENOUS at 06:34

## 2024-01-01 RX ADMIN — Medication 2 MILLIGRAM(S): at 13:37

## 2024-01-01 RX ADMIN — Medication 325 MILLIGRAM(S): at 11:16

## 2024-01-01 RX ADMIN — PIPERACILLIN AND TAZOBACTAM 200 GRAM(S): 3; .375 INJECTION, POWDER, LYOPHILIZED, FOR SOLUTION INTRAVENOUS at 17:43

## 2024-01-01 RX ADMIN — Medication 25 MILLIGRAM(S): at 21:21

## 2024-01-01 RX ADMIN — RIVAROXABAN 20 MILLIGRAM(S): 2.5 TABLET, FILM COATED ORAL at 17:54

## 2024-01-01 RX ADMIN — IPRATROPIUM BROMIDE AND ALBUTEROL SULFATE 3 MILLILITER(S): .5; 2.5 SOLUTION RESPIRATORY (INHALATION) at 11:29

## 2024-01-01 RX ADMIN — Medication 50 MILLIGRAM(S): at 05:11

## 2024-01-01 RX ADMIN — LIDOCAINE 1 APPLICATION(S): 50 OINTMENT TOPICAL at 17:52

## 2024-01-01 RX ADMIN — ACETAMINOPHEN 1000 MILLIGRAM(S): 80 SOLUTION/ DROPS ORAL at 11:55

## 2024-01-01 RX ADMIN — Medication 325 MILLIGRAM(S): at 11:34

## 2024-01-01 RX ADMIN — CHLORHEXIDINE GLUCONATE 1 APPLICATION(S): 1.2 RINSE ORAL at 05:04

## 2024-01-01 RX ADMIN — IPRATROPIUM BROMIDE AND ALBUTEROL SULFATE 3 MILLILITER(S): .5; 2.5 SOLUTION RESPIRATORY (INHALATION) at 00:57

## 2024-01-01 RX ADMIN — Medication 2 MILLIGRAM(S): at 22:50

## 2024-01-01 RX ADMIN — Medication 2 MILLIGRAM(S): at 19:22

## 2024-01-01 RX ADMIN — IPRATROPIUM BROMIDE AND ALBUTEROL SULFATE 3 MILLILITER(S): .5; 2.5 SOLUTION RESPIRATORY (INHALATION) at 05:00

## 2024-01-01 RX ADMIN — Medication 40 MILLIGRAM(S): at 05:02

## 2024-01-01 RX ADMIN — IPRATROPIUM BROMIDE AND ALBUTEROL SULFATE 3 MILLILITER(S): .5; 2.5 SOLUTION RESPIRATORY (INHALATION) at 17:46

## 2024-01-01 RX ADMIN — IPRATROPIUM BROMIDE AND ALBUTEROL SULFATE 3 MILLILITER(S): .5; 2.5 SOLUTION RESPIRATORY (INHALATION) at 11:16

## 2024-01-01 RX ADMIN — Medication 2 MILLIGRAM(S): at 12:20

## 2024-01-01 RX ADMIN — IPRATROPIUM BROMIDE AND ALBUTEROL SULFATE 3 MILLILITER(S): .5; 2.5 SOLUTION RESPIRATORY (INHALATION) at 00:22

## 2024-01-01 RX ADMIN — Medication 40 MILLIGRAM(S): at 05:00

## 2024-01-01 RX ADMIN — RIVAROXABAN 20 MILLIGRAM(S): 2.5 TABLET, FILM COATED ORAL at 18:01

## 2024-01-01 RX ADMIN — Medication 200 GRAM(S): at 20:53

## 2024-01-01 RX ADMIN — ACETAMINOPHEN 400 MILLIGRAM(S): 80 SOLUTION/ DROPS ORAL at 11:28

## 2024-01-01 RX ADMIN — Medication 5 MILLIGRAM(S): at 05:10

## 2024-01-01 RX ADMIN — IPRATROPIUM BROMIDE AND ALBUTEROL SULFATE 3 MILLILITER(S): .5; 2.5 SOLUTION RESPIRATORY (INHALATION) at 14:45

## 2024-01-01 RX ADMIN — IPRATROPIUM BROMIDE AND ALBUTEROL SULFATE 3 MILLILITER(S): .5; 2.5 SOLUTION RESPIRATORY (INHALATION) at 11:50

## 2024-01-01 RX ADMIN — IPRATROPIUM BROMIDE AND ALBUTEROL SULFATE 3 MILLILITER(S): .5; 2.5 SOLUTION RESPIRATORY (INHALATION) at 05:05

## 2024-01-01 RX ADMIN — Medication 2 MILLIGRAM(S): at 04:26

## 2024-01-01 RX ADMIN — CEFTRIAXONE SODIUM 100 MILLIGRAM(S): 1 INJECTION, POWDER, FOR SOLUTION INTRAMUSCULAR; INTRAVENOUS at 06:16

## 2024-01-01 RX ADMIN — CEFTRIAXONE SODIUM 100 MILLIGRAM(S): 1 INJECTION, POWDER, FOR SOLUTION INTRAMUSCULAR; INTRAVENOUS at 17:03

## 2024-01-01 RX ADMIN — Medication 325 MILLIGRAM(S): at 10:55

## 2024-01-01 RX ADMIN — KETOROLAC TROMETHAMINE 15 MILLIGRAM(S): 30 INJECTION INTRAMUSCULAR; INTRAVENOUS at 01:14

## 2024-01-01 RX ADMIN — Medication 25 MILLIGRAM(S): at 21:34

## 2024-01-01 RX ADMIN — TIOTROPIUM BROMIDE MONOHYDRATE 2 PUFF(S): 18 CAPSULE ORAL; RESPIRATORY (INHALATION) at 11:29

## 2024-01-01 RX ADMIN — IPRATROPIUM BROMIDE AND ALBUTEROL SULFATE 3 MILLILITER(S): .5; 2.5 SOLUTION RESPIRATORY (INHALATION) at 17:54

## 2024-01-01 RX ADMIN — Medication 2 PACKET(S): at 11:17

## 2024-01-01 RX ADMIN — TIOTROPIUM BROMIDE MONOHYDRATE 2 PUFF(S): 18 CAPSULE ORAL; RESPIRATORY (INHALATION) at 10:55

## 2024-01-01 RX ADMIN — Medication 5 MILLIGRAM(S): at 06:28

## 2024-01-01 RX ADMIN — Medication 2 MILLIGRAM(S): at 13:55

## 2024-01-01 RX ADMIN — IPRATROPIUM BROMIDE AND ALBUTEROL SULFATE 3 MILLILITER(S): .5; 2.5 SOLUTION RESPIRATORY (INHALATION) at 18:01

## 2024-01-01 RX ADMIN — FLUTICASONE PROPIONATE AND SALMETEROL 1 DOSE(S): 50; 500 POWDER ORAL; RESPIRATORY (INHALATION) at 06:16

## 2024-01-01 RX ADMIN — COLCHICINE 0.6 MILLIGRAM(S): 0.6 CAPSULE ORAL at 11:35

## 2024-01-01 RX ADMIN — ACETAMINOPHEN 650 MILLIGRAM(S): 80 SOLUTION/ DROPS ORAL at 12:06

## 2024-01-01 RX ADMIN — RIVAROXABAN 20 MILLIGRAM(S): 2.5 TABLET, FILM COATED ORAL at 18:17

## 2024-01-01 RX ADMIN — Medication 40 MILLIGRAM(S): at 06:16

## 2024-01-01 RX ADMIN — Medication 325 MILLIGRAM(S): at 11:50

## 2024-01-01 RX ADMIN — Medication 2 MILLIGRAM(S): at 21:49

## 2024-01-01 RX ADMIN — FLUTICASONE PROPIONATE AND SALMETEROL 1 DOSE(S): 50; 500 POWDER ORAL; RESPIRATORY (INHALATION) at 05:02

## 2024-01-01 RX ADMIN — RIVAROXABAN 20 MILLIGRAM(S): 2.5 TABLET, FILM COATED ORAL at 16:47

## 2024-01-01 RX ADMIN — Medication 40 MILLIGRAM(S): at 17:02

## 2024-01-01 RX ADMIN — IPRATROPIUM BROMIDE AND ALBUTEROL SULFATE 3 MILLILITER(S): .5; 2.5 SOLUTION RESPIRATORY (INHALATION) at 11:15

## 2024-01-01 RX ADMIN — IPRATROPIUM BROMIDE AND ALBUTEROL SULFATE 3 MILLILITER(S): .5; 2.5 SOLUTION RESPIRATORY (INHALATION) at 23:35

## 2024-01-01 RX ADMIN — LIDOCAINE 1 APPLICATION(S): 50 OINTMENT TOPICAL at 00:14

## 2024-01-01 RX ADMIN — Medication 2 MILLIGRAM(S): at 14:03

## 2024-01-01 RX ADMIN — FLUTICASONE PROPIONATE AND SALMETEROL 1 DOSE(S): 50; 500 POWDER ORAL; RESPIRATORY (INHALATION) at 18:16

## 2024-01-01 RX ADMIN — Medication 2 MILLIGRAM(S): at 13:30

## 2024-01-01 RX ADMIN — TIOTROPIUM BROMIDE MONOHYDRATE 2 PUFF(S): 18 CAPSULE ORAL; RESPIRATORY (INHALATION) at 11:16

## 2024-01-01 RX ADMIN — POTASSIUM CHLORIDE 40 MILLIEQUIVALENT(S): 600 TABLET, FILM COATED, EXTENDED RELEASE ORAL at 13:26

## 2024-01-01 RX ADMIN — CEFTRIAXONE SODIUM 100 MILLIGRAM(S): 1 INJECTION, POWDER, FOR SOLUTION INTRAMUSCULAR; INTRAVENOUS at 05:06

## 2024-01-01 RX ADMIN — CHLORHEXIDINE GLUCONATE 1 APPLICATION(S): 1.2 RINSE ORAL at 07:55

## 2024-01-01 RX ADMIN — Medication 325 MILLIGRAM(S): at 11:13

## 2024-01-01 RX ADMIN — FLUTICASONE PROPIONATE AND SALMETEROL 1 DOSE(S): 50; 500 POWDER ORAL; RESPIRATORY (INHALATION) at 16:50

## 2024-01-01 RX ADMIN — IPRATROPIUM BROMIDE AND ALBUTEROL SULFATE 3 MILLILITER(S): .5; 2.5 SOLUTION RESPIRATORY (INHALATION) at 00:15

## 2024-01-01 RX ADMIN — POTASSIUM CHLORIDE 40 MILLIEQUIVALENT(S): 600 TABLET, FILM COATED, EXTENDED RELEASE ORAL at 11:17

## 2024-03-07 ENCOUNTER — APPOINTMENT (OUTPATIENT)
Dept: GERIATRICS | Facility: CLINIC | Age: 89
End: 2024-03-07
Payer: MEDICARE

## 2024-03-07 VITALS
OXYGEN SATURATION: 97 % | RESPIRATION RATE: 16 BRPM | HEART RATE: 70 BPM | SYSTOLIC BLOOD PRESSURE: 127 MMHG | DIASTOLIC BLOOD PRESSURE: 73 MMHG | TEMPERATURE: 98.2 F | BODY MASS INDEX: 26.04 KG/M2 | WEIGHT: 192 LBS

## 2024-03-07 DIAGNOSIS — D64.9 ANEMIA, UNSPECIFIED: ICD-10-CM

## 2024-03-07 DIAGNOSIS — R97.20 ELEVATED PROSTATE, SPECIFIC ANTIGEN [PSA]: ICD-10-CM

## 2024-03-07 DIAGNOSIS — C44.311 BASAL CELL CARCINOMA OF SKIN OF NOSE: ICD-10-CM

## 2024-03-07 DIAGNOSIS — I48.91 UNSPECIFIED ATRIAL FIBRILLATION: ICD-10-CM

## 2024-03-07 DIAGNOSIS — J44.9 CHRONIC OBSTRUCTIVE PULMONARY DISEASE, UNSPECIFIED: ICD-10-CM

## 2024-03-07 DIAGNOSIS — I10 ESSENTIAL (PRIMARY) HYPERTENSION: ICD-10-CM

## 2024-03-07 PROCEDURE — G2211 COMPLEX E/M VISIT ADD ON: CPT

## 2024-03-07 PROCEDURE — 99215 OFFICE O/P EST HI 40 MIN: CPT

## 2024-03-07 RX ORDER — TIOTROPIUM BROMIDE INHALATION SPRAY 3.12 UG/1
2.5 SPRAY, METERED RESPIRATORY (INHALATION) DAILY
Refills: 0 | Status: DISCONTINUED | COMMUNITY
Start: 2023-01-17 | End: 2024-03-07

## 2024-03-07 RX ORDER — FLUTICASONE FUROATE, UMECLIDINIUM BROMIDE AND VILANTEROL TRIFENATATE 200; 62.5; 25 UG/1; UG/1; UG/1
200-62.5-25 POWDER RESPIRATORY (INHALATION)
Refills: 0 | Status: ACTIVE | COMMUNITY
Start: 2024-03-07

## 2024-03-07 NOTE — REASON FOR VISIT
[Family Member] : family member [Follow-Up] : a follow-up visit [FreeTextEntry3] : lauren and DENZEL

## 2024-03-07 NOTE — REVIEW OF SYSTEMS
[As Noted in HPI] : as noted in HPI [Feeling Tired] : not feeling tired [Feeling Poorly] : not feeling poorly [Dry Eyes] : no dryness of the eyes [Discharge From Eyes] : no purulent discharge from the eyes [Nasal Discharge] : no nasal discharge [Sore Throat] : no sore throat [Chest Pain] : no chest pain [Shortness Of Breath] : no shortness of breath [Palpitations] : no palpitations [Cough] : no cough [Wheezing] : no wheezing [SOB on Exertion] : no shortness of breath during exertion [Abdominal Pain] : no abdominal pain [Vomiting] : no vomiting [Constipation] : no constipation [Diarrhea] : no diarrhea [Skin Wound] : no skin wound [Dysuria] : no dysuria [Anxiety] : no anxiety [Dizziness] : no dizziness [Depression] : no depression [Easy Bruising] : no tendency for easy bruising

## 2024-03-07 NOTE — PHYSICAL EXAM
[Alert] : alert [Sclera] : the sclera and conjunctiva were normal [Normal Oral Mucosa] : normal oral mucosa [EOMI] : extraocular movements were intact [Normal Outer Ear/Nose] : the ears and nose were normal in appearance [Both Tympanic Membranes Were Examined] : both tympanic membranes were normal [Supple] : the neck was supple [No Respiratory Distress] : no respiratory distress [Respiration, Rhythm And Depth] : normal respiratory rhythm and effort [No Acc Muscle Use] : no accessory muscle use [Auscultation Breath Sounds / Voice Sounds] : lungs were clear to auscultation bilaterally [Normal S1, S2] : normal S1 and S2 [Heart Rate And Rhythm] : heart rate was normal and rhythm regular [Pedal Pulses Normal] : the pedal pulses are present [Abdomen Tenderness] : non-tender [Normal Appearance] : normal in appearance [Bowel Sounds] : normal bowel sounds [Abdomen Soft] : soft [Cervical Lymph Nodes Enlarged Posterior Bilaterally] : posterior cervical [Cervical Lymph Nodes Enlarged Anterior Bilaterally] : anterior cervical, supraclavicular [No Spinal Tenderness] : no spinal tenderness [Supraclavicular Lymph Nodes Enlarged Bilaterally] : supraclavicular [Involuntary Movements] : no involuntary movements were seen [Normal Gait] : normal gait [No Focal Deficits] : no focal deficits [Motor Tone] : muscle strength and tone were normal [Normal Affect] : the affect was normal [Oriented To Time, Place, And Person] : oriented to person, place, and time [Normal Mood] : the mood was normal [Normal Insight/Judgment] : insight and judgment were intact [de-identified] : Bilateral lower extremity edema +1 [de-identified] : Skin tags and seborrheic keratosis on back. Chronic hyperpigmentation changes in BL LE

## 2024-03-07 NOTE — HISTORY OF PRESENT ILLNESS
[Any fall with injury in past year] : Patient reported fall with injury in the past year [Patient is independent with] : bathing [Independent] : managing finances [Full assistance needed] : Assistance needed managing medications [] : Assistance needed doing laundry [Cane] : cane [PHQ-2 Negative - No further assessment needed] : PHQ-2 Negative - No further assessment needed [0] : 2) Feeling down, depressed, or hopeless: Not at all (0) [With Patient/Caregiver] : , with patient/caregiver [Designated Healthcare Proxy] : Designated healthcare proxy [Reviewed no changes] : Reviewed, no changes [Name: ___] : Health Care Proxy's Name: [unfilled]  [Relationship: ___] : Relationship: [unfilled] [DNR] : DNR [DNI] : DNI [I will adhere to the patient's wishes.] : I will adhere to the patient's wishes. 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 He is accompanied by his son and HHA, Ana.  Seth- [facility]- Milvia Cardio- Isiah Handler JACEK - Hernan Mccarty- Dr. Conner  Patient states that he is feeling okay today. He is on continuous supplemental oxygen. Typically using 3L via NC. Denies SOB or TOLENTINO when using O2. Patient has not had any falls, urgent care visits or hospitalizations since last visit.   States that he has generalized arthritic pain. Takes tylenol 1000mg qhs with some relief. Saw ortho (Dr. Conner) on 11/30/23 who recommended conservative measures for left hip arthritis.  Since last visit, saw derm for biopsy of lesion on his nose/ trunk. 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 Patient has Moh's procedure with derm on 10/24/2023. Has healed well.  Follows with urology, Dr. Plummer (last seen 9/2023) with PSA of 12.6 from 14.2.   Patient saw pulm about 2 weeks ago who switched him to Trelegy which he has been tolerating well.   Also saw ENT for removal of impacted cerumen.  ____________________________________________________________________________________________________________  Chronic Venous Insufficiency- He was seen by vascular surgery in February - patient reports that echocardiogram and ultrasound of lower extremities were obtained with no acute findings. He does not use compression stockings consistently.   Atrial Fibrillation- Hospitalized at [facility] in 2019 for GI bleeding deemed secondary to Coumadin toxicity - transitioned to Eliquis. Currently on Xarelto due to cost of medication.   HTN- Currently on amlodipine, Losartan, Toprol XL.   HLD- Currently on Simvastatin. Lipid Panel (01/2023): Cholesterol 144, HDL 71, , LDL 51.   BPH- Follows with urology. Currently on Flomax.  COPD/IPF- Patient reports that he uses continuous O2 (3L). He reports being on chronic steroids (methylprednisolone) as needed by his pulmonologist. On Trelegy. He uses albuterol PRN.   s/p PPM (replaced 2-3 months ago) [employer] MRI [id number]  SHx: Former smoker (quit 35 years ago). 2-3 beers/week. No illicit drug use. . He has 3 children (2 sons, 1 daughter). Retired [employer]. He lives alone, hired [employer] 6 hrs/day 6 days/week.  Immunizations: Flu- UTD Covid- UTD Pneumococcal- unclear, thinks he may have has one pna vaccine- does not know when Shingles: Due   [facility]- HCP - Inez Toro ([phone]) [ColonoscopyDate] : 2022 [BoneDensityDate] : 2022 [Mammogramdate] : 2016 [Grab Bars] : no grab bars [Shower Chair] : no shower chair [FreeTextEntry3] : Ambulates with cane  [Driving Concerns] : not driving or driving without noted concerns [FreeTextEntry8] : Wears diapers [de-identified] : No longer drives [[id number]] : 0 [AdvancecareDate] : 05/23 [FreeTextEntry4] : MOLST in place: DNR/DNI/trial of feeding tube/trial of IVF/limited medical interventions/send to the hospital

## 2024-03-08 ENCOUNTER — MED ADMIN CHARGE (OUTPATIENT)
Age: 89
End: 2024-03-08

## 2024-03-08 LAB
ALBUMIN SERPL ELPH-MCNC: 4.2 G/DL
ALP BLD-CCNC: 71 U/L
ALT SERPL-CCNC: 13 U/L
ANION GAP SERPL CALC-SCNC: 15 MMOL/L
AST SERPL-CCNC: 13 U/L
BASOPHILS # BLD AUTO: 0.04 K/UL
BASOPHILS NFR BLD AUTO: 0.5 %
BILIRUB SERPL-MCNC: 0.7 MG/DL
BUN SERPL-MCNC: 22 MG/DL
CALCIUM SERPL-MCNC: 9.7 MG/DL
CHLORIDE SERPL-SCNC: 106 MMOL/L
CHOLEST SERPL-MCNC: 115 MG/DL
CO2 SERPL-SCNC: 23 MMOL/L
CREAT SERPL-MCNC: 1.15 MG/DL
EGFR: 62 ML/MIN/1.73M2
EOSINOPHIL # BLD AUTO: 0.08 K/UL
EOSINOPHIL NFR BLD AUTO: 1 %
GLUCOSE SERPL-MCNC: 93 MG/DL
HCT VFR BLD CALC: 38.7 %
HDLC SERPL-MCNC: 63 MG/DL
HGB BLD-MCNC: 11.8 G/DL
IMM GRANULOCYTES NFR BLD AUTO: 0.3 %
LDLC SERPL CALC-MCNC: 39 MG/DL
LYMPHOCYTES # BLD AUTO: 1.17 K/UL
LYMPHOCYTES NFR BLD AUTO: 14.7 %
MAN DIFF?: NORMAL
MCHC RBC-ENTMCNC: 30.5 GM/DL
MCHC RBC-ENTMCNC: 32.7 PG
MCV RBC AUTO: 107.2 FL
MONOCYTES # BLD AUTO: 0.66 K/UL
MONOCYTES NFR BLD AUTO: 8.3 %
NEUTROPHILS # BLD AUTO: 5.98 K/UL
NEUTROPHILS NFR BLD AUTO: 75.2 %
NONHDLC SERPL-MCNC: 52 MG/DL
PLATELET # BLD AUTO: 160 K/UL
POTASSIUM SERPL-SCNC: 4.5 MMOL/L
PROT SERPL-MCNC: 6.7 G/DL
PSA SERPL-MCNC: 10.3 NG/ML
RBC # BLD: 3.61 M/UL
RBC # FLD: 13.7 %
SODIUM SERPL-SCNC: 144 MMOL/L
TRIGL SERPL-MCNC: 61 MG/DL
TSH SERPL-ACNC: 1.42 UIU/ML
WBC # FLD AUTO: 7.95 K/UL

## 2024-03-12 LAB — VIT B12 SERPL-MCNC: 540 PG/ML

## 2024-03-22 ENCOUNTER — INPATIENT (INPATIENT)
Facility: HOSPITAL | Age: 89
LOS: 5 days | Discharge: ANOTHER HOSPITAL | End: 2024-03-28
Attending: INTERNAL MEDICINE | Admitting: INTERNAL MEDICINE
Payer: MEDICARE

## 2024-03-22 VITALS
OXYGEN SATURATION: 91 % | WEIGHT: 190.92 LBS | HEART RATE: 72 BPM | TEMPERATURE: 100 F | HEIGHT: 73 IN | SYSTOLIC BLOOD PRESSURE: 153 MMHG | RESPIRATION RATE: 28 BRPM | DIASTOLIC BLOOD PRESSURE: 68 MMHG

## 2024-03-22 DIAGNOSIS — Z95.0 PRESENCE OF CARDIAC PACEMAKER: Chronic | ICD-10-CM

## 2024-03-22 DIAGNOSIS — Z89.9 ACQUIRED ABSENCE OF LIMB, UNSPECIFIED: Chronic | ICD-10-CM

## 2024-03-22 DIAGNOSIS — Z98.890 OTHER SPECIFIED POSTPROCEDURAL STATES: Chronic | ICD-10-CM

## 2024-03-22 LAB
ALBUMIN SERPL ELPH-MCNC: 4.3 G/DL — SIGNIFICANT CHANGE UP (ref 3.3–5)
ALP SERPL-CCNC: 75 U/L — SIGNIFICANT CHANGE UP (ref 40–120)
ALT FLD-CCNC: 15 U/L — SIGNIFICANT CHANGE UP (ref 4–41)
ANION GAP SERPL CALC-SCNC: 16 MMOL/L — HIGH (ref 7–14)
APTT BLD: 30.3 SEC — SIGNIFICANT CHANGE UP (ref 24.5–35.6)
AST SERPL-CCNC: 18 U/L — SIGNIFICANT CHANGE UP (ref 4–40)
BASE EXCESS BLDV CALC-SCNC: -2.9 MMOL/L — LOW (ref -2–3)
BASOPHILS # BLD AUTO: 0.01 K/UL — SIGNIFICANT CHANGE UP (ref 0–0.2)
BASOPHILS NFR BLD AUTO: 0.1 % — SIGNIFICANT CHANGE UP (ref 0–2)
BILIRUB SERPL-MCNC: 1 MG/DL — SIGNIFICANT CHANGE UP (ref 0.2–1.2)
BLOOD GAS VENOUS COMPREHENSIVE RESULT: SIGNIFICANT CHANGE UP
BUN SERPL-MCNC: 24 MG/DL — HIGH (ref 7–23)
CALCIUM SERPL-MCNC: 9.8 MG/DL — SIGNIFICANT CHANGE UP (ref 8.4–10.5)
CHLORIDE BLDV-SCNC: 106 MMOL/L — SIGNIFICANT CHANGE UP (ref 96–108)
CHLORIDE SERPL-SCNC: 107 MMOL/L — SIGNIFICANT CHANGE UP (ref 98–107)
CO2 BLDV-SCNC: 23.2 MMOL/L — SIGNIFICANT CHANGE UP (ref 22–26)
CO2 SERPL-SCNC: 20 MMOL/L — LOW (ref 22–31)
CREAT SERPL-MCNC: 1.15 MG/DL — SIGNIFICANT CHANGE UP (ref 0.5–1.3)
EGFR: 62 ML/MIN/1.73M2 — SIGNIFICANT CHANGE UP
EOSINOPHIL # BLD AUTO: 0 K/UL — SIGNIFICANT CHANGE UP (ref 0–0.5)
EOSINOPHIL NFR BLD AUTO: 0 % — SIGNIFICANT CHANGE UP (ref 0–6)
GAS PNL BLDV: 140 MMOL/L — SIGNIFICANT CHANGE UP (ref 136–145)
GLUCOSE BLDV-MCNC: 136 MG/DL — HIGH (ref 70–99)
GLUCOSE SERPL-MCNC: 134 MG/DL — HIGH (ref 70–99)
HCO3 BLDV-SCNC: 22 MMOL/L — SIGNIFICANT CHANGE UP (ref 22–29)
HCT VFR BLD CALC: 39.8 % — SIGNIFICANT CHANGE UP (ref 39–50)
HCT VFR BLDA CALC: 40 % — SIGNIFICANT CHANGE UP (ref 39–51)
HGB BLD CALC-MCNC: 13.2 G/DL — SIGNIFICANT CHANGE UP (ref 12.6–17.4)
HGB BLD-MCNC: 12.8 G/DL — LOW (ref 13–17)
IANC: 11.98 K/UL — HIGH (ref 1.8–7.4)
IMM GRANULOCYTES NFR BLD AUTO: 0.5 % — SIGNIFICANT CHANGE UP (ref 0–0.9)
INR BLD: 1.73 RATIO — HIGH (ref 0.85–1.18)
LACTATE BLDV-MCNC: 4.2 MMOL/L — CRITICAL HIGH (ref 0.5–2)
LYMPHOCYTES # BLD AUTO: 0.38 K/UL — LOW (ref 1–3.3)
LYMPHOCYTES # BLD AUTO: 3 % — LOW (ref 13–44)
MCHC RBC-ENTMCNC: 32.2 GM/DL — SIGNIFICANT CHANGE UP (ref 32–36)
MCHC RBC-ENTMCNC: 33.2 PG — SIGNIFICANT CHANGE UP (ref 27–34)
MCV RBC AUTO: 103.4 FL — HIGH (ref 80–100)
MONOCYTES # BLD AUTO: 0.35 K/UL — SIGNIFICANT CHANGE UP (ref 0–0.9)
MONOCYTES NFR BLD AUTO: 2.7 % — SIGNIFICANT CHANGE UP (ref 2–14)
NEUTROPHILS # BLD AUTO: 11.98 K/UL — HIGH (ref 1.8–7.4)
NEUTROPHILS NFR BLD AUTO: 93.7 % — HIGH (ref 43–77)
NRBC # BLD: 0 /100 WBCS — SIGNIFICANT CHANGE UP (ref 0–0)
NRBC # FLD: 0 K/UL — SIGNIFICANT CHANGE UP (ref 0–0)
NT-PROBNP SERPL-SCNC: 3358 PG/ML — HIGH
PCO2 BLDV: 38 MMHG — LOW (ref 42–55)
PH BLDV: 7.37 — SIGNIFICANT CHANGE UP (ref 7.32–7.43)
PLATELET # BLD AUTO: 156 K/UL — SIGNIFICANT CHANGE UP (ref 150–400)
PO2 BLDV: 36 MMHG — SIGNIFICANT CHANGE UP (ref 25–45)
POTASSIUM BLDV-SCNC: 4.5 MMOL/L — SIGNIFICANT CHANGE UP (ref 3.5–5.1)
POTASSIUM SERPL-MCNC: 4.6 MMOL/L — SIGNIFICANT CHANGE UP (ref 3.5–5.3)
POTASSIUM SERPL-SCNC: 4.6 MMOL/L — SIGNIFICANT CHANGE UP (ref 3.5–5.3)
PROT SERPL-MCNC: 7.6 G/DL — SIGNIFICANT CHANGE UP (ref 6–8.3)
PROTHROM AB SERPL-ACNC: 19.1 SEC — HIGH (ref 9.5–13)
RBC # BLD: 3.85 M/UL — LOW (ref 4.2–5.8)
RBC # FLD: 13.9 % — SIGNIFICANT CHANGE UP (ref 10.3–14.5)
SAO2 % BLDV: 57.7 % — LOW (ref 67–88)
SODIUM SERPL-SCNC: 143 MMOL/L — SIGNIFICANT CHANGE UP (ref 135–145)
WBC # BLD: 12.78 K/UL — HIGH (ref 3.8–10.5)
WBC # FLD AUTO: 12.78 K/UL — HIGH (ref 3.8–10.5)

## 2024-03-22 PROCEDURE — 99285 EMERGENCY DEPT VISIT HI MDM: CPT

## 2024-03-22 RX ORDER — AZITHROMYCIN 500 MG/1
500 TABLET, FILM COATED ORAL ONCE
Refills: 0 | Status: COMPLETED | OUTPATIENT
Start: 2024-03-22 | End: 2024-03-22

## 2024-03-22 RX ORDER — SODIUM CHLORIDE 9 MG/ML
500 INJECTION INTRAMUSCULAR; INTRAVENOUS; SUBCUTANEOUS ONCE
Refills: 0 | Status: COMPLETED | OUTPATIENT
Start: 2024-03-22 | End: 2024-03-22

## 2024-03-22 RX ORDER — SODIUM CHLORIDE 9 MG/ML
1000 INJECTION, SOLUTION INTRAVENOUS ONCE
Refills: 0 | Status: DISCONTINUED | OUTPATIENT
Start: 2024-03-22 | End: 2024-03-22

## 2024-03-22 RX ORDER — ACETAMINOPHEN 500 MG
1000 TABLET ORAL ONCE
Refills: 0 | Status: COMPLETED | OUTPATIENT
Start: 2024-03-22 | End: 2024-03-22

## 2024-03-22 RX ORDER — CEFEPIME 1 G/1
2000 INJECTION, POWDER, FOR SOLUTION INTRAMUSCULAR; INTRAVENOUS ONCE
Refills: 0 | Status: COMPLETED | OUTPATIENT
Start: 2024-03-22 | End: 2024-03-22

## 2024-03-22 RX ORDER — IPRATROPIUM/ALBUTEROL SULFATE 18-103MCG
3 AEROSOL WITH ADAPTER (GRAM) INHALATION
Refills: 0 | Status: COMPLETED | OUTPATIENT
Start: 2024-03-22 | End: 2024-03-22

## 2024-03-22 RX ADMIN — Medication 3 MILLILITER(S): at 23:16

## 2024-03-22 RX ADMIN — Medication 400 MILLIGRAM(S): at 23:12

## 2024-03-22 RX ADMIN — AZITHROMYCIN 255 MILLIGRAM(S): 500 TABLET, FILM COATED ORAL at 23:47

## 2024-03-22 RX ADMIN — Medication 3 MILLILITER(S): at 23:12

## 2024-03-22 RX ADMIN — CEFEPIME 100 MILLIGRAM(S): 1 INJECTION, POWDER, FOR SOLUTION INTRAMUSCULAR; INTRAVENOUS at 23:12

## 2024-03-22 RX ADMIN — Medication 3 MILLILITER(S): at 22:49

## 2024-03-22 RX ADMIN — Medication 125 MILLIGRAM(S): at 23:12

## 2024-03-22 NOTE — ED ADULT TRIAGE NOTE - CHIEF COMPLAINT QUOTE
Pt arrives to ED c/o SOB.  Pt on home O2 at 3 Liters for COPD, increased it to 4 Liters due to SOB.  Home Albuterol did not help.  Hx pacemaker, afib, HTN, HLD, COPD.  On Xarelto.  Pt using accessory muscles.

## 2024-03-22 NOTE — ED ADULT NURSE NOTE - NSFALLHARMRISKINTERV_ED_ALL_ED

## 2024-03-22 NOTE — ED PROVIDER NOTE - CLINICAL SUMMARY MEDICAL DECISION MAKING FREE TEXT BOX
87 y/o M w/ hx of Afib on Eliquis w/ PPM, COPD on 3-3.5L NC On prednisone daily presenting to ED with  shortness of breath that began prior to arrival.   It began after he was exerting himself walking upstairs. 89 y/o M w/ hx of Afib on Eliquis w/ PPM, COPD on 3-3.5L NC On prednisone daily presenting to ED with  shortness of breath that began prior to arrival.   It began after he was exerting himself walking upstairs.    echoughey: Patient comes to the ED with short of breath he has baseline at home COPD with 3 L of oxygen and he brought it up to 4 because he had increasing shortness of breath home albuterol did not help him.  Additional history pacemaker A-fib hypertension high lipids COPD on Xarelto.    Respiratory rate 28 blood pressure 153/68 heart rate 72 O2 sat 91% on 3 L of nasal cannula    Patient with incredibly dry mucous membranes tongues are for road    Lungs no obvious crackles or wheezes on exam for me    Abdomen soft no rebound no guarding.  Patient with temperature of 104.1.  Incredibly likely he has infection in addition to his COPD.  Awaiting labs and refill 87 y/o M w/ hx of Afib on Eliquis w/ PPM, COPD on 3-3.5L NC On prednisone daily presenting to ED with  shortness of breath that began prior to arrival.   It began after he was exerting himself walking upstairs. Denies fevers, chills, nausea, vomiting.  Has been using his albuterol without any relief.  No sick contacts or travel.  Of note patient's son passed well last week and according to son at bedside this has really affected him.  No falls or injuries.  Denies urinary or bowel changes.    Vitals:   On 4 L nasal cannula, tachypneic increased to 5 L nasal cannula.  Normotensive, tachycardic. febrile rectally 104    ros negative unless stated otherwise.    General: Alert and Orientated x 3.  tachypnea, dry appearing  Head: Normocephalic and atraumatic.  Eyes: PERRLA with EOMI.  Neck: Supple. Trachea midline.   Cardiac: Normal S1 and S2 w/  irregularly irregularNo murmurs appreciated. No JVD appreciated.  Pulmonary:   Bilateral wheezing  Abdominal: Soft, non-tender. (+) bowel sounds appreciated in all 4 quadrants. No hepatosplenomegaly.   Neurologic: No focal sensory or motor deficits.  Musculoskeletal: Strength appropriate in all 4 extremities for age with no limited ROM.  Skin: Color appropriate for race. Intact, warm, and well-perfused.  Psychiatric: Appropriate mood and affect. No apparent risk to self or others.     MDM: 87 y/o M w/ hx of Afib on Eliquis w/ PPM, COPD on 3-3.5L NC On prednisone daily presenting  with shortness of breath that began today.  Using his albuterol at home.  Patient also found to be febrile, tachycardic, normotensive.  Increased oxygen demands.  Will get sepsis labs, concern for pneumonia, viral URI, UTI.  Low concern for CNS infection or intra-abdominal infection.  Will treat COPD exacerbation with methylprednisolone, DuoNebs.  Will empirically antibiotics.  On POCUS patient with B-lines bilaterally, will hold off on 30 cc/kg fluid at this time.  Possible effusion versus consolidation on ultrasound.  To be admitted.    fiorella: Patient comes to the ED with short of breath he has baseline at home COPD with 3 L of oxygen and he brought it up to 4 because he had increasing shortness of breath home albuterol did not help him.  Additional history pacemaker A-fib hypertension high lipids COPD on Xarelto.    Respiratory rate 28 blood pressure 153/68 heart rate 72 O2 sat 91% on 3 L of nasal cannula    Patient with incredibly dry mucous membranes tongues are for road    Lungs no obvious crackles or wheezes on exam for me    Abdomen soft no rebound no guarding.  Patient with temperature of 104.1.  Incredibly likely he has infection in addition to his COPD.  Awaiting labs and refill

## 2024-03-22 NOTE — ED ADULT TRIAGE NOTE - PATIENT'S PREFERRED PRONOUN
Him/He [(1) _____] : [unfilled] [(5) _____] : [unfilled] [Passed] : [facility] passed [Mother] : mother [Breast milk] : breast milk [Expressed Breast milk] : expressed breast milk [Formula ___ oz/feed] : [unfilled] oz of formula per feed [Hours between feeds ___] : Child is fed every [unfilled] hours [___ stools per day] : [unfilled]  stools per day [Yellow] : stools are yellow color [___ voids per day] : [unfilled] voids per day [Normal] : Normal [On back] : On back [In crib] : In crib [Pacifier use] : Pacifier use [Water heater temperature set at <120 degrees F] : Water heater temperature set at <120 degrees F [Rear facing car seat in back seat] : Rear facing car seat in back seat [Carbon Monoxide Detectors] : Carbon monoxide detectors at home [Smoke Detectors] : Smoke detectors at home. [Up to date] : up to date [Born at ___ Wks Gestation] : The patient was born at [unfilled] weeks gestation [C/S] : via  section [[facility]] : [facility] [BW: _____] : weight of [unfilled] [Length: _____] : length of [unfilled] [HC: _____] : head circumference of [unfilled] [Age: ___] : [unfilled] year old mother [G: ___] : G [unfilled] [P: ___] : P [unfilled] [Rubella (Immune)] : Rubella immune [None] : There are no risk factors [DW: _____] : Discharge weight was [unfilled] [TS: ____] : TS bilirubin [unfilled] [@HOL: ____] : @ HOL [unfilled] [HepBsAG] : HepBsAg negative [HIV] : HIV negative [GBS] : GBS negative [VDRL/RPR (Reactive)] : VDRL/RPR nonreactive [Gun in Home] : No gun in home [Cigarette smoke exposure] : No cigarette smoke exposure [Exposure to electronic nicotine delivery system] : No exposure to electronic nicotine delivery system [de-identified] : similac  [FreeTextEntry1] : Healthy full term 40.4 week born via C section for bradycardia, female now 9 days old here for well child check.  Patient is above birth weight of 3440, now 3480 g.  Patient is being fed both breast and bottle similac; 2 oz q2-3 hours, advised mom if patient still hungry you can increase feeds to 3 oz q2-3 h.  No other concerns at this time.

## 2024-03-22 NOTE — ED PROVIDER NOTE - CARE PLAN
1 Principal Discharge DX:	COPD exacerbation   Principal Discharge DX:	Acute UTI  Secondary Diagnosis:	Sepsis  Secondary Diagnosis:	COPD exacerbation

## 2024-03-22 NOTE — ED PROVIDER NOTE - NSICDXPASTMEDICALHX_GEN_ALL_CORE_FT
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

## 2024-03-22 NOTE — ED PROVIDER NOTE - PROGRESS NOTE DETAILS
Ysabel Moreno MD (PGY3): Patient treated for sepsis.  Contraindication to 30 cc/kg fluid bolus because on POCUS patient with bilateral B-lines concerning for fluid overload.   CT negative for pneumonia.  Has right-sided pleural effusion.  UA positive for UTI.  Will admit patient for UTI and COPD exacerbation.

## 2024-03-22 NOTE — ED PROVIDER NOTE - ATTENDING CONTRIBUTION TO CARE
fiorella: Patient comes to the ED with short of breath he has baseline at home COPD with 3 L of oxygen and he brought it up to 4 because he had increasing shortness of breath home albuterol did not help him.  Additional history pacemaker A-fib hypertension high lipids COPD on Xarelto.    Respiratory rate 28 blood pressure 153/68 heart rate 72 O2 sat 91% on 3 L of nasal cannula    Patient with incredibly dry mucous membranes tongues are for road    Lungs no obvious crackles or wheezes on exam for me    Abdomen soft no rebound no guarding.  Patient with temperature of 104.1.  Incredibly likely he has infection in addition to his COPD.  Awaiting labs and refill    I performed a history and physical exam of the patient and discussed their management with the resident and /or advanced care provider. I personally made/approved the management plan and take responsibility for the patient management. I reviewed the resident and /or ACP's note and agree with the documented findings and plan of care. My medical decison making and observations are found above.

## 2024-03-22 NOTE — ED ADULT NURSE NOTE - OBJECTIVE STATEMENT
87 y/o male, a&ox4, received to ED from home for SOB. Pt endorses SOB while resting in bed, past medical history of HTN, pacemaker, and COPD on 3L NC at home. Pt arrives very tachypneic, after receiving 3 doses of Duonebs, pt endorses improvement in condition, maintaining well on 3L nc. Airway is patent, speaking in clear and coherent sentences. Respirations are even and unlabored, no signs of respiratory distress. 18GIV placed to right forearm, 20GIV placed to LAC, labs collected and sent off. Rectal temp 104.1, MD Moreno made aware. Skin intact, blanchable bright redness noted to the sacral region. No signs of pressure ulcers at this time.

## 2024-03-22 NOTE — ED ADULT NURSE NOTE - NSFALLRISKASMT_ED_ALL_ED_DT
[place] [facility]    Received call from [facility] at [facility] with [place]. Subjective: Caller states \"ear pain; hearing loss\"     Current Symptoms:   Rt ear pain; hearing loss   No drainage   Feels blocked    Onset:    4 day     Pain Severity:  3/10    Temperature:    None     What has been tried:   Sudafed   Ear cleaning stuff     Pregnant:   No     Recommended disposition:   PCP 3 today/tomorrow  Warm transfer to [facility] advice provided, patient verbalizes understanding; denies any other questions or concerns; instructed to call back for any new or worsening symptoms. Attention Provider: Thank you for allowing me to participate in the care of your patient. The patient was connected to triage in response to information provided to the ECC/PSC. Please do not respond through this encounter as the response is not directed to a shared pool.       Reason for Disposition   Patient wants to be seen    Protocols used: Earache-ADULT-[place] 23-Mar-2024 00:13

## 2024-03-23 DIAGNOSIS — R63.8 OTHER SYMPTOMS AND SIGNS CONCERNING FOOD AND FLUID INTAKE: ICD-10-CM

## 2024-03-23 DIAGNOSIS — D53.9 NUTRITIONAL ANEMIA, UNSPECIFIED: ICD-10-CM

## 2024-03-23 DIAGNOSIS — N39.0 URINARY TRACT INFECTION, SITE NOT SPECIFIED: ICD-10-CM

## 2024-03-23 DIAGNOSIS — N12 TUBULO-INTERSTITIAL NEPHRITIS, NOT SPECIFIED AS ACUTE OR CHRONIC: ICD-10-CM

## 2024-03-23 DIAGNOSIS — N40.0 BENIGN PROSTATIC HYPERPLASIA WITHOUT LOWER URINARY TRACT SYMPTOMS: ICD-10-CM

## 2024-03-23 DIAGNOSIS — J44.9 CHRONIC OBSTRUCTIVE PULMONARY DISEASE, UNSPECIFIED: ICD-10-CM

## 2024-03-23 DIAGNOSIS — R78.81 BACTEREMIA: ICD-10-CM

## 2024-03-23 DIAGNOSIS — I10 ESSENTIAL (PRIMARY) HYPERTENSION: ICD-10-CM

## 2024-03-23 DIAGNOSIS — R93.89 ABNORMAL FINDINGS ON DIAGNOSTIC IMAGING OF OTHER SPECIFIED BODY STRUCTURES: ICD-10-CM

## 2024-03-23 DIAGNOSIS — I48.20 CHRONIC ATRIAL FIBRILLATION, UNSPECIFIED: ICD-10-CM

## 2024-03-23 DIAGNOSIS — E78.5 HYPERLIPIDEMIA, UNSPECIFIED: ICD-10-CM

## 2024-03-23 DIAGNOSIS — A41.9 SEPSIS, UNSPECIFIED ORGANISM: ICD-10-CM

## 2024-03-23 DIAGNOSIS — R79.89 OTHER SPECIFIED ABNORMAL FINDINGS OF BLOOD CHEMISTRY: ICD-10-CM

## 2024-03-23 LAB
-  STREPTOCOCCUS SP. (NOT GRP A, B OR S PNEUMONIAE): SIGNIFICANT CHANGE UP
A1C WITH ESTIMATED AVERAGE GLUCOSE RESULT: 5.5 % — SIGNIFICANT CHANGE UP (ref 4–5.6)
ADD ON TEST-SPECIMEN IN LAB: SIGNIFICANT CHANGE UP
ANISOCYTOSIS BLD QL: SLIGHT — SIGNIFICANT CHANGE UP
APPEARANCE UR: ABNORMAL
BACTERIA # UR AUTO: ABNORMAL /HPF
BASE EXCESS BLDV CALC-SCNC: -2.6 MMOL/L — LOW (ref -2–3)
BASE EXCESS BLDV CALC-SCNC: -6 MMOL/L — LOW (ref -2–3)
BASOPHILS # BLD AUTO: 0 K/UL — SIGNIFICANT CHANGE UP (ref 0–0.2)
BASOPHILS NFR BLD AUTO: 0 % — SIGNIFICANT CHANGE UP (ref 0–2)
BILIRUB UR-MCNC: NEGATIVE — SIGNIFICANT CHANGE UP
BLOOD GAS VENOUS COMPREHENSIVE RESULT: SIGNIFICANT CHANGE UP
BURR CELLS BLD QL SMEAR: SLIGHT — SIGNIFICANT CHANGE UP
CA-I SERPL-SCNC: 1.21 MMOL/L — SIGNIFICANT CHANGE UP (ref 1.15–1.33)
CAST: 3 /LPF — SIGNIFICANT CHANGE UP (ref 0–4)
CHLORIDE BLDV-SCNC: 107 MMOL/L — SIGNIFICANT CHANGE UP (ref 96–108)
CHLORIDE BLDV-SCNC: 108 MMOL/L — SIGNIFICANT CHANGE UP (ref 96–108)
CO2 BLDV-SCNC: 19.6 MMOL/L — LOW (ref 22–26)
CO2 BLDV-SCNC: 24.5 MMOL/L — SIGNIFICANT CHANGE UP (ref 22–26)
COLOR SPEC: SIGNIFICANT CHANGE UP
DIFF PNL FLD: ABNORMAL
EOSINOPHIL # BLD AUTO: 0 K/UL — SIGNIFICANT CHANGE UP (ref 0–0.5)
EOSINOPHIL NFR BLD AUTO: 0 % — SIGNIFICANT CHANGE UP (ref 0–6)
ESTIMATED AVERAGE GLUCOSE: 111 — SIGNIFICANT CHANGE UP
FERRITIN SERPL-MCNC: 81 NG/ML — SIGNIFICANT CHANGE UP (ref 30–400)
FLUAV AG NPH QL: SIGNIFICANT CHANGE UP
FLUBV AG NPH QL: SIGNIFICANT CHANGE UP
GAS PNL BLDV: 137 MMOL/L — SIGNIFICANT CHANGE UP (ref 136–145)
GAS PNL BLDV: 138 MMOL/L — SIGNIFICANT CHANGE UP (ref 136–145)
GAS PNL BLDV: SIGNIFICANT CHANGE UP
GAS PNL BLDV: SIGNIFICANT CHANGE UP
GLUCOSE BLDV-MCNC: 199 MG/DL — HIGH (ref 70–99)
GLUCOSE BLDV-MCNC: 232 MG/DL — HIGH (ref 70–99)
GLUCOSE UR QL: NEGATIVE MG/DL — SIGNIFICANT CHANGE UP
GRAM STN FLD: ABNORMAL
HAPTOGLOB SERPL-MCNC: 137 MG/DL — SIGNIFICANT CHANGE UP (ref 34–200)
HCO3 BLDV-SCNC: 19 MMOL/L — LOW (ref 22–29)
HCO3 BLDV-SCNC: 23 MMOL/L — SIGNIFICANT CHANGE UP (ref 22–29)
HCT VFR BLD CALC: 35.9 % — LOW (ref 39–50)
HCT VFR BLDA CALC: 35 % — LOW (ref 39–51)
HCT VFR BLDA CALC: 36 % — LOW (ref 39–51)
HGB BLD CALC-MCNC: 11.5 G/DL — LOW (ref 12.6–17.4)
HGB BLD CALC-MCNC: 11.9 G/DL — LOW (ref 12.6–17.4)
HGB BLD-MCNC: 11.4 G/DL — LOW (ref 13–17)
IANC: 17.17 K/UL — HIGH (ref 1.8–7.4)
IRON SATN MFR SERPL: 11 % — LOW (ref 14–50)
IRON SATN MFR SERPL: 24 UG/DL — LOW (ref 45–165)
KETONES UR-MCNC: ABNORMAL MG/DL
LACTATE BLDV-MCNC: 2.2 MMOL/L — HIGH (ref 0.5–2)
LACTATE BLDV-MCNC: 2.8 MMOL/L — HIGH (ref 0.5–2)
LDH SERPL L TO P-CCNC: 250 U/L — HIGH (ref 135–225)
LEGIONELLA AG UR QL: NEGATIVE — SIGNIFICANT CHANGE UP
LEUKOCYTE ESTERASE UR-ACNC: ABNORMAL
LYMPHOCYTES # BLD AUTO: 0.78 K/UL — LOW (ref 1–3.3)
LYMPHOCYTES # BLD AUTO: 4.3 % — LOW (ref 13–44)
MACROCYTES BLD QL: SLIGHT — SIGNIFICANT CHANGE UP
MANUAL SMEAR VERIFICATION: SIGNIFICANT CHANGE UP
MCHC RBC-ENTMCNC: 31.8 GM/DL — LOW (ref 32–36)
MCHC RBC-ENTMCNC: 33.4 PG — SIGNIFICANT CHANGE UP (ref 27–34)
MCV RBC AUTO: 105.3 FL — HIGH (ref 80–100)
METHOD TYPE: SIGNIFICANT CHANGE UP
MONOCYTES # BLD AUTO: 0.47 K/UL — SIGNIFICANT CHANGE UP (ref 0–0.9)
MONOCYTES NFR BLD AUTO: 2.6 % — SIGNIFICANT CHANGE UP (ref 2–14)
NEUTROPHILS # BLD AUTO: 16.69 K/UL — HIGH (ref 1.8–7.4)
NEUTROPHILS NFR BLD AUTO: 92.2 % — HIGH (ref 43–77)
NITRITE UR-MCNC: POSITIVE
PCO2 BLDV: 33 MMHG — LOW (ref 42–55)
PCO2 BLDV: 43 MMHG — SIGNIFICANT CHANGE UP (ref 42–55)
PH BLDV: 7.34 — SIGNIFICANT CHANGE UP (ref 7.32–7.43)
PH BLDV: 7.36 — SIGNIFICANT CHANGE UP (ref 7.32–7.43)
PH UR: 5.5 — SIGNIFICANT CHANGE UP (ref 5–8)
PLAT MORPH BLD: NORMAL — SIGNIFICANT CHANGE UP
PLATELET # BLD AUTO: 128 K/UL — LOW (ref 150–400)
PLATELET COUNT - ESTIMATE: ABNORMAL
PO2 BLDV: 37 MMHG — SIGNIFICANT CHANGE UP (ref 25–45)
PO2 BLDV: 58 MMHG — HIGH (ref 25–45)
POIKILOCYTOSIS BLD QL AUTO: SLIGHT — SIGNIFICANT CHANGE UP
POLYCHROMASIA BLD QL SMEAR: SLIGHT — SIGNIFICANT CHANGE UP
POTASSIUM BLDV-SCNC: 3.4 MMOL/L — LOW (ref 3.5–5.1)
POTASSIUM BLDV-SCNC: 4.4 MMOL/L — SIGNIFICANT CHANGE UP (ref 3.5–5.1)
PROT UR-MCNC: 100 MG/DL
RBC # BLD: 3.41 M/UL — LOW (ref 4.2–5.8)
RBC # FLD: 14.2 % — SIGNIFICANT CHANGE UP (ref 10.3–14.5)
RBC BLD AUTO: NORMAL — SIGNIFICANT CHANGE UP
RBC CASTS # UR COMP ASSIST: 2 /HPF — SIGNIFICANT CHANGE UP (ref 0–4)
REVIEW: SIGNIFICANT CHANGE UP
RSV RNA NPH QL NAA+NON-PROBE: SIGNIFICANT CHANGE UP
SAO2 % BLDV: 55.9 % — LOW (ref 67–88)
SAO2 % BLDV: 89.8 % — HIGH (ref 67–88)
SARS-COV-2 RNA SPEC QL NAA+PROBE: SIGNIFICANT CHANGE UP
SP GR SPEC: 1.03 — SIGNIFICANT CHANGE UP (ref 1–1.03)
SPECIMEN SOURCE: SIGNIFICANT CHANGE UP
SPECIMEN SOURCE: SIGNIFICANT CHANGE UP
SQUAMOUS # UR AUTO: 5 /HPF — SIGNIFICANT CHANGE UP (ref 0–5)
TIBC SERPL-MCNC: 218 UG/DL — LOW (ref 220–430)
TROPONIN T, HIGH SENSITIVITY RESULT: 104 NG/L — CRITICAL HIGH
TROPONIN T, HIGH SENSITIVITY RESULT: 72 NG/L — CRITICAL HIGH
UIBC SERPL-MCNC: 194 UG/DL — SIGNIFICANT CHANGE UP (ref 110–370)
UROBILINOGEN FLD QL: 1 MG/DL — SIGNIFICANT CHANGE UP (ref 0.2–1)
VARIANT LYMPHS # BLD: 0.9 % — SIGNIFICANT CHANGE UP (ref 0–6)
WBC # BLD: 18.1 K/UL — HIGH (ref 3.8–10.5)
WBC # FLD AUTO: 18.1 K/UL — HIGH (ref 3.8–10.5)
WBC UR QL: 759 /HPF — HIGH (ref 0–5)

## 2024-03-23 PROCEDURE — 99223 1ST HOSP IP/OBS HIGH 75: CPT

## 2024-03-23 PROCEDURE — 71045 X-RAY EXAM CHEST 1 VIEW: CPT | Mod: 26

## 2024-03-23 PROCEDURE — 71250 CT THORAX DX C-: CPT | Mod: 26,MC

## 2024-03-23 RX ORDER — IPRATROPIUM/ALBUTEROL SULFATE 18-103MCG
3 AEROSOL WITH ADAPTER (GRAM) INHALATION EVERY 6 HOURS
Refills: 0 | Status: DISCONTINUED | OUTPATIENT
Start: 2024-03-23 | End: 2024-03-28

## 2024-03-23 RX ORDER — PIPERACILLIN AND TAZOBACTAM 4; .5 G/20ML; G/20ML
3.38 INJECTION, POWDER, LYOPHILIZED, FOR SOLUTION INTRAVENOUS EVERY 8 HOURS
Refills: 0 | Status: DISCONTINUED | OUTPATIENT
Start: 2024-03-23 | End: 2024-03-25

## 2024-03-23 RX ORDER — CEFTRIAXONE 500 MG/1
INJECTION, POWDER, FOR SOLUTION INTRAMUSCULAR; INTRAVENOUS
Refills: 0 | Status: DISCONTINUED | OUTPATIENT
Start: 2024-03-23 | End: 2024-03-23

## 2024-03-23 RX ORDER — CEFTRIAXONE 500 MG/1
1000 INJECTION, POWDER, FOR SOLUTION INTRAMUSCULAR; INTRAVENOUS ONCE
Refills: 0 | Status: COMPLETED | OUTPATIENT
Start: 2024-03-23 | End: 2024-03-23

## 2024-03-23 RX ORDER — WARFARIN SODIUM 2.5 MG/1
1 TABLET ORAL
Qty: 0 | Refills: 0 | DISCHARGE

## 2024-03-23 RX ORDER — BUDESONIDE AND FORMOTEROL FUMARATE DIHYDRATE 160; 4.5 UG/1; UG/1
2 AEROSOL RESPIRATORY (INHALATION)
Refills: 0 | Status: DISCONTINUED | OUTPATIENT
Start: 2024-03-23 | End: 2024-03-26

## 2024-03-23 RX ORDER — LOSARTAN POTASSIUM 100 MG/1
1 TABLET, FILM COATED ORAL
Qty: 0 | Refills: 0 | DISCHARGE

## 2024-03-23 RX ORDER — VANCOMYCIN HCL 1 G
VIAL (EA) INTRAVENOUS
Refills: 0 | Status: DISCONTINUED | OUTPATIENT
Start: 2024-03-23 | End: 2024-03-23

## 2024-03-23 RX ORDER — UMECLIDINIUM 62.5 UG/1
0 AEROSOL, POWDER ORAL
Qty: 0 | Refills: 0 | DISCHARGE

## 2024-03-23 RX ORDER — RIVAROXABAN 15 MG-20MG
20 KIT ORAL
Refills: 0 | Status: DISCONTINUED | OUTPATIENT
Start: 2024-03-23 | End: 2024-03-28

## 2024-03-23 RX ORDER — AZITHROMYCIN 500 MG/1
500 TABLET, FILM COATED ORAL EVERY 24 HOURS
Refills: 0 | Status: DISCONTINUED | OUTPATIENT
Start: 2024-03-23 | End: 2024-03-23

## 2024-03-23 RX ORDER — BUDESONIDE AND FORMOTEROL FUMARATE DIHYDRATE 160; 4.5 UG/1; UG/1
2 AEROSOL RESPIRATORY (INHALATION)
Refills: 0 | Status: DISCONTINUED | OUTPATIENT
Start: 2024-03-23 | End: 2024-03-28

## 2024-03-23 RX ORDER — METOPROLOL TARTRATE 50 MG
1.5 TABLET ORAL
Refills: 0 | DISCHARGE

## 2024-03-23 RX ORDER — TAMSULOSIN HYDROCHLORIDE 0.4 MG/1
0.8 CAPSULE ORAL AT BEDTIME
Refills: 0 | Status: DISCONTINUED | OUTPATIENT
Start: 2024-03-23 | End: 2024-03-28

## 2024-03-23 RX ORDER — TAMSULOSIN HYDROCHLORIDE 0.4 MG/1
1 CAPSULE ORAL
Qty: 0 | Refills: 0 | DISCHARGE

## 2024-03-23 RX ORDER — LEVALBUTEROL 1.25 MG/.5ML
3 SOLUTION, CONCENTRATE RESPIRATORY (INHALATION)
Refills: 0 | DISCHARGE

## 2024-03-23 RX ADMIN — CEFTRIAXONE 1000 MILLIGRAM(S): 500 INJECTION, POWDER, FOR SOLUTION INTRAMUSCULAR; INTRAVENOUS at 09:09

## 2024-03-23 RX ADMIN — RIVAROXABAN 20 MILLIGRAM(S): KIT at 19:03

## 2024-03-23 RX ADMIN — Medication 3 MILLILITER(S): at 10:33

## 2024-03-23 RX ADMIN — SODIUM CHLORIDE 500 MILLILITER(S): 9 INJECTION INTRAMUSCULAR; INTRAVENOUS; SUBCUTANEOUS at 00:22

## 2024-03-23 RX ADMIN — Medication 3 MILLILITER(S): at 21:49

## 2024-03-23 RX ADMIN — PIPERACILLIN AND TAZOBACTAM 25 GRAM(S): 4; .5 INJECTION, POWDER, LYOPHILIZED, FOR SOLUTION INTRAVENOUS at 21:29

## 2024-03-23 RX ADMIN — TAMSULOSIN HYDROCHLORIDE 0.8 MILLIGRAM(S): 0.4 CAPSULE ORAL at 21:28

## 2024-03-23 RX ADMIN — BUDESONIDE AND FORMOTEROL FUMARATE DIHYDRATE 2 PUFF(S): 160; 4.5 AEROSOL RESPIRATORY (INHALATION) at 21:28

## 2024-03-23 NOTE — H&P ADULT - PROBLEM/PLAN-2
Patient is wondering what she can and can not take for medication. She has a upcoming surgery and is just wondering when she should stop.   DISPLAY PLAN FREE TEXT

## 2024-03-23 NOTE — H&P ADULT - ATTENDING COMMENTS
Pt is an 87 yo M with PMH fTUD, former ETOH use d/o, BPH, a-fib (xarelto), COPD (3-3.5L NC), HTN, HLD, and s/p PPM p/w F/C, SOB, and rigors. On arrival, febrile to T 104.1, EKG with new TWI, trop 72---104, iron studies c/w JULIUS, BNP 3358, lactate 4.2---2.8, UA+ with UCx in lab, RVP neg, BCx +G+ cocci in pairs (strep species). CT chest with small R>L pleural effusion, 1.3x2cm R paratracheal LN, and BL perinephric stranding. Pt given cefepime, azithromycin, CTX, solumedrol 125mg, and NS 500cc with 3x duonebs. Pt seen and examined at bedside resting comfortably, sleeping, in no acute distress. Requested to be spoken to later as he "just finally fell asleep". Denies acute complaints; feels better than when he first presented. Plan to resume home meds and transition abx to zosyn; likely 10-14d course of abx once Cx clear. Will obtain MRSA PCR and f/u BCx data. Will need surveillance BCx in 24h. Given CT chest findings, will need repeat CT chest outpt @ 12 mo. Of note, pt sad as his wife passed in the last recent years and son passed 1wk ago; is retired . Has HHA at home. Discussed with HS, rest as outlined above.

## 2024-03-23 NOTE — H&P ADULT - TIME BILLING
review of laboratory data, radiology results, consultants' recommendations, documentation in [place], discussion with patient/ACP and interdisciplinary staff (such as , social workers, etc). Interventions were performed as documented above.

## 2024-03-23 NOTE — PATIENT PROFILE ADULT - FALL HARM RISK - HARM RISK INTERVENTIONS
Assistance with ambulation/Assistance OOB with selected safe patient handling equipment/Communicate Risk of Fall with Harm to all staff/Discuss with provider need for PT consult/Monitor gait and stability/Provide patient with walking aids - walker, cane, crutches/Reinforce activity limits and safety measures with patient and family/Review medications for side effects contributing to fall risk/Sit up slowly, dangle for a short time, stand at bedside before walking/Tailored Fall Risk Interventions/Toileting schedule using arm’s reach rule for commode and bathroom/Visual Cue: Yellow wristband and red socks/Bed in lowest position, wheels locked, appropriate side rails in place/Call bell, personal items and telephone in reach/Instruct patient to call for assistance before getting out of bed or chair/Non-slip footwear when patient is out of bed/[place] to call system/Physically safe environment - no spills, clutter or unnecessary equipment/Purposeful Proactive Rounding/Room/bathroom lighting operational, light cord in reach

## 2024-03-23 NOTE — ED ADULT NURSE REASSESSMENT NOTE - NS ED NURSE REASSESS COMMENT FT1
Pt awake and alert, vs as noted, pt remains on monitor. Breathing currently unlabored sating at 98% on 4L Pt offers no complaints. Awaiting bed assignment.
Pt resting in bed, sinus paced rhythm on cardiac monitor, Respirations are even and unlabored, no signs of respiratory distress.
Pt a&ox4, normal sinus rhythm on cardiac monitor, pt presents more comfortable and less tachypneic compared to arrival, endorses improvement in condition. Pt continues to be medicated as per MD orders. Md Moreno made aware of elevated rectal temp, no additional interventions at this time.

## 2024-03-23 NOTE — H&P ADULT - NSICDXPASTMEDICALHX_GEN_ALL_CORE_FT
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

## 2024-03-23 NOTE — H&P ADULT - NSHPSOCIALHISTORY_GEN_ALL_CORE
The patient is chronic smoker, quit > 35 years ago. Social alcohol/beer drinker. No illicit drug use.   , wife  10 years ago. Eldest son  last week from rectal bleeding.   Retired

## 2024-03-23 NOTE — H&P ADULT - ASSESSMENT
#UTI #leukocytosis   - UA positive, pending Ucx    #BPH  - cw flomax   - pending bladder scan and strict i/o    #pleural effusion #pneumonia  #B lines  - held IV fluids  - s/p cefepime and azithromycin  - started ceftriaxone     #pulmonary nodules   - f/u CT in 12 months     #lactate elevation  - 4 to 2, cw trend    #COPD  #HTN   88M with h/o COPD (home Ox) and Afib (on Eliquis/Xarelto?) and PPM who presents with fever and SOB. Pt found to be septic w/temp 104F rectal, tachy, desat. CT chest c/f atelectasis, interlobular septal thickening, and incidental pulm nodules with trace pericardial effusion. UA positive, pending UCX, asymptomatic with no dysuria. BCx positive, on empiric abx, pending cultures.       #sepsis #UTI asymptom #COPD  - tachycardic, febrile, hypoxic, +lactate 4, +leukocytosis s/p fluids and empiric abx  - RVP covid, neg; pending legionella, mrsa, strep __   - source: UA +, pending UCx vs. respiratory illness CT chest with atelectatis  - BCx: +GPcocc  - Abx: Ceftriaxone (3/23 - __ ), azithromycin (3/22 - __)   - IVF: ___ held off,    #COPD #homeOX  - h/o of COPD on home oxygen 3.5L  - CT chest with emphysema and atelectasis + pulm nodules and right paratracheal lymph nodes   - s/p albuterol PRN with NC, improved oxygen to 99%  - chest PT, incentive spirometry   - wean off oxygen as tolerated     #trace pericardial effusion  #B lines on pocus   # R pleural effusion   - No history of heart failure, no LE edema or HJR  - CT chest with small pleural effusion (r) and trace pericardial effusion   - proBNP ~3k  - pending TTE       #h/o AFIB, PPM, on Xarelto   - PPM ~2013, on blood thinner  - Xarelto ___ once a day    - las dose 3/22   - EKG: with     #BPH   - pending bladder scan, h/o retention i/s/o BPH __  - c/w tamsulosin 0.8 mg at bedtime     #pulm nodules  - new incidental pulm nodules  - f/u CT in 12 months     #HTN  - on toprol xl 75 mg   - BP normotensive, hold i/s/o sepsis     PPX  - DVT ppx: confirm if pt on eliquis vs xarelto at home for afib   - activity: as tolerated, pending PT   - updated family

## 2024-03-23 NOTE — H&P ADULT - NSHPPHYSICALEXAM_GEN_ALL_CORE
PHYSICAL EXAM:  GENERAL: NAD, Resting in bed  HEENT:  Head atraumatic, EOMI, PERRLA, conjunctiva and sclera clear; Moist mucous membranes, normal oropharynx  NECK: Supple, No JVD, no lymphadenopathy, no thyroid nodules or enlargement  CHEST/LUNG: Clear to auscultation bilaterally; No rales, rhonchi, wheezing, or rubs. Unlabored respirations on room air  HEART: Regular rate and rhythm; No murmurs, rubs, or gallops  ABDOMEN: Bowel sounds present; Soft, Nontender, Nondistended. No hepatomegally  EXTREMITIES:  2+ Peripheral Pulses, brisk capillary refill. No clubbing, cyanosis, or edema  NERVOUS SYSTEM:  Alert & Oriented X3, non-focal and spontaneous movements of all extremities  SKIN: No rashes or lesions PHYSICAL EXAM:  GENERAL: NAD, Resting in bed  HEENT:  Head atraumatic, EOMI, PERRLA, conjunctiva and sclera clear; Moist mucous membranes, normal oropharynx  NECK: Supple, No JVD, no lymphadenopathy, no thyroid nodules or enlargement  CHEST/LUNG: Clear anteriorly, mild expiratory wheezes posteriorly/laterally and inferior bibasilar crackes R>L  HEART: Regular rate and rhythm; No murmurs, rubs, or gallops  ABDOMEN: Bowel sounds present; Soft, Nontender, Nondistended. No hepatomegally  EXTREMITIES:  2+ Peripheral Pulses, brisk capillary refill. No clubbing, cyanosis, or edema  NERVOUS SYSTEM:  Alert & Oriented X3, non-focal and spontaneous movements of all extremities  SKIN: No rashes or lesions

## 2024-03-23 NOTE — H&P ADULT - HISTORY OF PRESENT ILLNESS
87 y/o M w/ hx of Afib on Eliquis w/ PPM, COPD on 3-3.5L NC (on pred?) daily presenting to ED with chills and shortness of breath that began prior to arrival. The patient states he was doing well on Thursday 3/22 and up until Friday 3/23 daytime. He had food with the aid yesterday evening and noticed an acute onset of chills/feverish with shortness of breath overnight that prompted him to come to ED for further evaluation. He is on home oxygen 3.5L at home and noticed his saturation go down to 80%. He noticed this happened a few weeks ago and was seen by outpatient urgent care and obtained a chest xr with no significant findings. He does report his urine being a darker color and endsoring mild urinary retention but states he has BPH on flomax. His shortness of breath is worse with exertion, but denies any chest pain, abdominal pain, n/v/d, dysuria, or hematuria. He does feel thristy. He has a history of afib and on eliquis. He does not have a history of known heart failure. No recent exposure to sick contacts or recent travel. Has been using his albuterol without any relief.   Of note patient's son passed well last week and according to son at bedside this has really affected him.  No falls or injuries.  Denies urinary or bowel changes.    On arrival in ED, the pt was tachypneic to 5L NC, normotensive and febrile rectally to 104F. Started on empiric abx and held IVF in the setting of B lines noticed on POCUS. Concern for fluid overlaod, elevated BNP.

## 2024-03-23 NOTE — H&P ADULT - NSHPLABSRESULTS_GEN_ALL_CORE
.  LABS:                         12.8   12.78 )-----------( 156      ( 22 Mar 2024 22:45 )             39.8         143  |  107  |  24<H>  ----------------------------<  134<H>  4.6   |  20<L>  |  1.15    Ca    9.8      22 Mar 2024 22:45    TPro  7.6  /  Alb  4.3  /  TBili  1.0  /  DBili  x   /  AST  18  /  ALT  15  /  AlkPhos  75  22    PT/INR - ( 22 Mar 2024 22:45 )   PT: 19.1 sec;   INR: 1.73 ratio         PTT - ( 22 Mar 2024 22:45 )  PTT:30.3 sec  Urinalysis Basic - ( 23 Mar 2024 03:25 )    Color: Dark Yellow / Appearance: Turbid / S.029 / pH: x  Gluc: x / Ketone: Trace mg/dL  / Bili: Negative / Urobili: 1.0 mg/dL   Blood: x / Protein: 100 mg/dL / Nitrite: Positive   Leuk Esterase: Moderate / RBC: 2 /HPF /  /HPF   Sq Epi: x / Non Sq Epi: 5 /HPF / Bacteria: Many /HPF            RADIOLOGY, EKG & ADDITIONAL TESTS: Reviewed. .  LABS:                         12.8   12.78 )-----------( 156      ( 22 Mar 2024 22:45 )             39.8         143  |  107  |  24<H>  ----------------------------<  134<H>  4.6   |  20<L>  |  1.15    Ca    9.8      22 Mar 2024 22:45    TPro  7.6  /  Alb  4.3  /  TBili  1.0  /  DBili  x   /  AST  18  /  ALT  15  /  AlkPhos  75  22    PT/INR - ( 22 Mar 2024 22:45 )   PT: 19.1 sec;   INR: 1.73 ratio         PTT - ( 22 Mar 2024 22:45 )  PTT:30.3 sec  Urinalysis Basic - ( 23 Mar 2024 03:25 )    Color: Dark Yellow / Appearance: Turbid / S.029 / pH: x  Gluc: x / Ketone: Trace mg/dL  / Bili: Negative / Urobili: 1.0 mg/dL   Blood: x / Protein: 100 mg/dL / Nitrite: Positive   Leuk Esterase: Moderate / RBC: 2 /HPF /  /HPF   Sq Epi: x / Non Sq Epi: 5 /HPF / Bacteria: Many /HPF            RADIOLOGY, EKG & ADDITIONAL TESTS: Reviewed.      FINDINGS: Evaluation of the thoracic organs/vasculature is limited   without intravenous contrast. Patient's respiratory motion degrades   images.    LUNGS AND AIRWAYS: Patent central airways. Mild emphysema. Question   interlobular septal thickening. Compressive atelectasis of the right >   left lung. Scattered calcified granulomata. Scattered bilateral pulmonary   nodules measuring up to 0.2-0.3 cm, for example, lingula (3:271) and   right upper lobe (3:196).  PLEURA: Small right > left pleural effusion.  HEART: Borderline heart size. Trace pericardial effusion.  VESSELS: Atherosclerosis. Normal caliber of the thoracic aorta. Main   pulmonary artery enlargement (3.8 cm), suggesting pulmonary arterial   hypertension.  MEDIASTINUM AND BLAZE: A 1.3 x 2.0 cm right paratracheal lymph node (2:54).  CHEST WALL AND LOWER NECK: Bilateral gynecomastia. Left-sided pacemaker.  UPPER ABDOMEN: Trace ascites. Question periportal edema. Fatty atrophy of   the visualized pancreas. Bilateral perinephric stranding.  BONES: Degenerative changes/paravertebral ossifications of the spine.   Mildanterior wedging of the spine, suggesting chronic.    IMPRESSION:    Small right > left pleural effusion with compressive atelectasis.   Question interlobular septal thickening. Recommend clinical correlation   to assess underlying infection.    Nonspecific pulmonary nodules. If the patient is at low risk for   malignancy, no further follow-up is needed. If the patient is at high   risk, 12 month follow-up CT chest may be obtained for further evaluation.

## 2024-03-24 ENCOUNTER — TRANSCRIPTION ENCOUNTER (OUTPATIENT)
Age: 89
End: 2024-03-24

## 2024-03-24 LAB
ADD ON TEST-SPECIMEN IN LAB: SIGNIFICANT CHANGE UP
ALBUMIN SERPL ELPH-MCNC: 3.8 G/DL — SIGNIFICANT CHANGE UP (ref 3.3–5)
ALP SERPL-CCNC: 55 U/L — SIGNIFICANT CHANGE UP (ref 40–120)
ALT FLD-CCNC: 17 U/L — SIGNIFICANT CHANGE UP (ref 4–41)
ANION GAP SERPL CALC-SCNC: 14 MMOL/L — SIGNIFICANT CHANGE UP (ref 7–14)
AST SERPL-CCNC: 40 U/L — SIGNIFICANT CHANGE UP (ref 4–40)
BASE EXCESS BLDV CALC-SCNC: -1.5 MMOL/L — SIGNIFICANT CHANGE UP (ref -2–3)
BASE EXCESS BLDV CALC-SCNC: -2.4 MMOL/L — LOW (ref -2–3)
BASOPHILS # BLD AUTO: 0.01 K/UL — SIGNIFICANT CHANGE UP (ref 0–0.2)
BASOPHILS NFR BLD AUTO: 0.1 % — SIGNIFICANT CHANGE UP (ref 0–2)
BILIRUB SERPL-MCNC: 0.7 MG/DL — SIGNIFICANT CHANGE UP (ref 0.2–1.2)
BLOOD GAS VENOUS COMPREHENSIVE RESULT: SIGNIFICANT CHANGE UP
BLOOD GAS VENOUS COMPREHENSIVE RESULT: SIGNIFICANT CHANGE UP
BUN SERPL-MCNC: 42 MG/DL — HIGH (ref 7–23)
CALCIUM SERPL-MCNC: 9.2 MG/DL — SIGNIFICANT CHANGE UP (ref 8.4–10.5)
CHLORIDE BLDV-SCNC: 104 MMOL/L — SIGNIFICANT CHANGE UP (ref 96–108)
CHLORIDE BLDV-SCNC: 105 MMOL/L — SIGNIFICANT CHANGE UP (ref 96–108)
CHLORIDE SERPL-SCNC: 105 MMOL/L — SIGNIFICANT CHANGE UP (ref 98–107)
CK MB BLD-MCNC: 1.6 % — SIGNIFICANT CHANGE UP (ref 0–2.5)
CK MB CFR SERPL CALC: 13.8 NG/ML — HIGH
CK SERPL-CCNC: 870 U/L — HIGH (ref 30–200)
CO2 BLDV-SCNC: 25.1 MMOL/L — SIGNIFICANT CHANGE UP (ref 22–26)
CO2 BLDV-SCNC: 26.7 MMOL/L — HIGH (ref 22–26)
CO2 SERPL-SCNC: 19 MMOL/L — LOW (ref 22–31)
CREAT SERPL-MCNC: 1.28 MG/DL — SIGNIFICANT CHANGE UP (ref 0.5–1.3)
EGFR: 54 ML/MIN/1.73M2 — LOW
EOSINOPHIL # BLD AUTO: 0 K/UL — SIGNIFICANT CHANGE UP (ref 0–0.5)
EOSINOPHIL NFR BLD AUTO: 0 % — SIGNIFICANT CHANGE UP (ref 0–6)
FERRITIN SERPL-MCNC: 218 NG/ML — SIGNIFICANT CHANGE UP (ref 30–400)
FOLATE SERPL-MCNC: 4.1 NG/ML — SIGNIFICANT CHANGE UP (ref 3.1–17.5)
GAS PNL BLDV: 137 MMOL/L — SIGNIFICANT CHANGE UP (ref 136–145)
GAS PNL BLDV: 139 MMOL/L — SIGNIFICANT CHANGE UP (ref 136–145)
GLUCOSE BLDV-MCNC: 130 MG/DL — HIGH (ref 70–99)
GLUCOSE BLDV-MCNC: 183 MG/DL — HIGH (ref 70–99)
GLUCOSE SERPL-MCNC: 167 MG/DL — HIGH (ref 70–99)
HAPTOGLOB SERPL-MCNC: 185 MG/DL — SIGNIFICANT CHANGE UP (ref 34–200)
HCO3 BLDV-SCNC: 24 MMOL/L — SIGNIFICANT CHANGE UP (ref 22–29)
HCO3 BLDV-SCNC: 25 MMOL/L — SIGNIFICANT CHANGE UP (ref 22–29)
HCT VFR BLD CALC: 33.8 % — LOW (ref 39–50)
HCT VFR BLDA CALC: 34 % — LOW (ref 39–51)
HCT VFR BLDA CALC: 36 % — LOW (ref 39–51)
HGB BLD CALC-MCNC: 11.2 G/DL — LOW (ref 12.6–17.4)
HGB BLD CALC-MCNC: 12 G/DL — LOW (ref 12.6–17.4)
HGB BLD-MCNC: 11.3 G/DL — LOW (ref 13–17)
IANC: 14.31 K/UL — HIGH (ref 1.8–7.4)
IMM GRANULOCYTES NFR BLD AUTO: 2.2 % — HIGH (ref 0–0.9)
IRON SATN MFR SERPL: 26 UG/DL — LOW (ref 45–165)
IRON SATN MFR SERPL: 9 % — LOW (ref 14–50)
LACTATE BLDV-MCNC: 3.9 MMOL/L — HIGH (ref 0.5–2)
LACTATE BLDV-MCNC: 5.2 MMOL/L — CRITICAL HIGH (ref 0.5–2)
LDH SERPL L TO P-CCNC: 325 U/L — HIGH (ref 135–225)
LYMPHOCYTES # BLD AUTO: 0.42 K/UL — LOW (ref 1–3.3)
LYMPHOCYTES # BLD AUTO: 2.7 % — LOW (ref 13–44)
MAGNESIUM SERPL-MCNC: 2.2 MG/DL — SIGNIFICANT CHANGE UP (ref 1.6–2.6)
MCHC RBC-ENTMCNC: 33.4 GM/DL — SIGNIFICANT CHANGE UP (ref 32–36)
MCHC RBC-ENTMCNC: 34.2 PG — HIGH (ref 27–34)
MCV RBC AUTO: 102.4 FL — HIGH (ref 80–100)
MONOCYTES # BLD AUTO: 0.63 K/UL — SIGNIFICANT CHANGE UP (ref 0–0.9)
MONOCYTES NFR BLD AUTO: 4 % — SIGNIFICANT CHANGE UP (ref 2–14)
MRSA PCR RESULT.: SIGNIFICANT CHANGE UP
NEUTROPHILS # BLD AUTO: 14.31 K/UL — HIGH (ref 1.8–7.4)
NEUTROPHILS NFR BLD AUTO: 91 % — HIGH (ref 43–77)
NRBC # BLD: 0 /100 WBCS — SIGNIFICANT CHANGE UP (ref 0–0)
NRBC # FLD: 0 K/UL — SIGNIFICANT CHANGE UP (ref 0–0)
PCO2 BLDV: 45 MMHG — SIGNIFICANT CHANGE UP (ref 42–55)
PCO2 BLDV: 50 MMHG — SIGNIFICANT CHANGE UP (ref 42–55)
PH BLDV: 7.31 — LOW (ref 7.32–7.43)
PH BLDV: 7.33 — SIGNIFICANT CHANGE UP (ref 7.32–7.43)
PHOSPHATE SERPL-MCNC: 3.3 MG/DL — SIGNIFICANT CHANGE UP (ref 2.5–4.5)
PLATELET # BLD AUTO: 129 K/UL — LOW (ref 150–400)
PO2 BLDV: 27 MMHG — SIGNIFICANT CHANGE UP (ref 25–45)
PO2 BLDV: 31 MMHG — SIGNIFICANT CHANGE UP (ref 25–45)
POTASSIUM BLDV-SCNC: 4.5 MMOL/L — SIGNIFICANT CHANGE UP (ref 3.5–5.1)
POTASSIUM BLDV-SCNC: 5.1 MMOL/L — SIGNIFICANT CHANGE UP (ref 3.5–5.1)
POTASSIUM SERPL-MCNC: 4.4 MMOL/L — SIGNIFICANT CHANGE UP (ref 3.5–5.3)
POTASSIUM SERPL-SCNC: 4.4 MMOL/L — SIGNIFICANT CHANGE UP (ref 3.5–5.3)
PROCALCITONIN SERPL-MCNC: 36.62 NG/ML — HIGH (ref 0.02–0.1)
PROT SERPL-MCNC: 6.5 G/DL — SIGNIFICANT CHANGE UP (ref 6–8.3)
RBC # BLD: 3.3 M/UL — LOW (ref 4.2–5.8)
RBC # BLD: 3.3 M/UL — LOW (ref 4.2–5.8)
RBC # FLD: 14.3 % — SIGNIFICANT CHANGE UP (ref 10.3–14.5)
RETICS #: 41.9 K/UL — SIGNIFICANT CHANGE UP (ref 25–125)
RETICS/RBC NFR: 1.3 % — SIGNIFICANT CHANGE UP (ref 0.5–2.5)
S AUREUS DNA NOSE QL NAA+PROBE: DETECTED
SAO2 % BLDV: 37.1 % — LOW (ref 67–88)
SAO2 % BLDV: 44.4 % — LOW (ref 67–88)
SODIUM SERPL-SCNC: 138 MMOL/L — SIGNIFICANT CHANGE UP (ref 135–145)
TIBC SERPL-MCNC: 275 UG/DL — SIGNIFICANT CHANGE UP (ref 220–430)
TRANSFERRIN SERPL-MCNC: 223 MG/DL — SIGNIFICANT CHANGE UP (ref 200–360)
TROPONIN T, HIGH SENSITIVITY RESULT: 68 NG/L — CRITICAL HIGH
UIBC SERPL-MCNC: 249 UG/DL — SIGNIFICANT CHANGE UP (ref 110–370)
VIT B12 SERPL-MCNC: 496 PG/ML — SIGNIFICANT CHANGE UP (ref 200–900)
WBC # BLD: 15.72 K/UL — HIGH (ref 3.8–10.5)
WBC # FLD AUTO: 15.72 K/UL — HIGH (ref 3.8–10.5)

## 2024-03-24 PROCEDURE — 99233 SBSQ HOSP IP/OBS HIGH 50: CPT | Mod: GC

## 2024-03-24 PROCEDURE — 71045 X-RAY EXAM CHEST 1 VIEW: CPT | Mod: 26

## 2024-03-24 RX ORDER — FERROUS SULFATE 325(65) MG
325 TABLET ORAL DAILY
Refills: 0 | Status: DISCONTINUED | OUTPATIENT
Start: 2024-03-24 | End: 2024-03-28

## 2024-03-24 RX ORDER — SODIUM CHLORIDE 9 MG/ML
1000 INJECTION, SOLUTION INTRAVENOUS ONCE
Refills: 0 | Status: COMPLETED | OUTPATIENT
Start: 2024-03-24 | End: 2024-03-24

## 2024-03-24 RX ORDER — ASCORBIC ACID 60 MG
500 TABLET,CHEWABLE ORAL DAILY
Refills: 0 | Status: DISCONTINUED | OUTPATIENT
Start: 2024-03-24 | End: 2024-03-28

## 2024-03-24 RX ADMIN — Medication 3 MILLILITER(S): at 03:40

## 2024-03-24 RX ADMIN — PIPERACILLIN AND TAZOBACTAM 25 GRAM(S): 4; .5 INJECTION, POWDER, LYOPHILIZED, FOR SOLUTION INTRAVENOUS at 19:36

## 2024-03-24 RX ADMIN — SODIUM CHLORIDE 1000 MILLILITER(S): 9 INJECTION, SOLUTION INTRAVENOUS at 12:18

## 2024-03-24 RX ADMIN — PIPERACILLIN AND TAZOBACTAM 25 GRAM(S): 4; .5 INJECTION, POWDER, LYOPHILIZED, FOR SOLUTION INTRAVENOUS at 10:52

## 2024-03-24 RX ADMIN — PIPERACILLIN AND TAZOBACTAM 25 GRAM(S): 4; .5 INJECTION, POWDER, LYOPHILIZED, FOR SOLUTION INTRAVENOUS at 04:19

## 2024-03-24 RX ADMIN — Medication 3 MILLILITER(S): at 21:59

## 2024-03-24 RX ADMIN — RIVAROXABAN 20 MILLIGRAM(S): KIT at 17:36

## 2024-03-24 RX ADMIN — TAMSULOSIN HYDROCHLORIDE 0.8 MILLIGRAM(S): 0.4 CAPSULE ORAL at 21:44

## 2024-03-24 RX ADMIN — Medication 3 MILLILITER(S): at 10:03

## 2024-03-24 RX ADMIN — BUDESONIDE AND FORMOTEROL FUMARATE DIHYDRATE 2 PUFF(S): 160; 4.5 AEROSOL RESPIRATORY (INHALATION) at 21:44

## 2024-03-24 RX ADMIN — BUDESONIDE AND FORMOTEROL FUMARATE DIHYDRATE 2 PUFF(S): 160; 4.5 AEROSOL RESPIRATORY (INHALATION) at 10:52

## 2024-03-24 RX ADMIN — Medication 3 MILLILITER(S): at 17:08

## 2024-03-24 NOTE — PHYSICAL THERAPY INITIAL EVALUATION ADULT - WEIGHT-BEARING RESTRICTIONS: SIT/STAND, REHAB EVAL
Lorin Wallace presents today for   Chief Complaint   Patient presents with    Follow-up       Is someone accompanying this pt? No    Is the patient using any DME equipment during OV? No    Depression Screening:  3 most recent PHQ Screens 7/28/2022   Little interest or pleasure in doing things Not at all   Feeling down, depressed, irritable, or hopeless Not at all   Total Score PHQ 2 0       Learning Assessment:  Learning Assessment 9/1/2020   PRIMARY LEARNER Patient   HIGHEST LEVEL OF EDUCATION - PRIMARY LEARNER  SOME COLLEGE   BARRIERS PRIMARY LEARNER NONE   CO-LEARNER CAREGIVER No   PRIMARY LANGUAGE ENGLISH   LEARNER PREFERENCE PRIMARY LISTENING   ANSWERED BY patient   RELATIONSHIP SELF       Fall Risk  Fall Risk Assessment, last 12 mths 8/24/2020   Able to walk? Yes   Fall in past 12 months? No       Health Maintenance reviewed and discussed and ordered per Provider. Health Maintenance Due   Topic Date Due    Cervical cancer screen  Never done    DTaP/Tdap/Td series (1 - Tdap) 01/02/2008    Shingrix Vaccine Age 50> (1 of 2) Never done    Pneumococcal 0-64 years (2 - PCV) 05/02/2014    COVID-19 Vaccine (3 - Booster for Pfizer series) 04/05/2022   . Coordination of Care:    1. Have you been to the ER, urgent care clinic since your last visit? Hospitalized since your last visit? No    2. Have you seen or consulted any other health care providers outside of the [address] [place] [address] since your last visit? Include any pap smears or colon screening. No    3. For patients aged 39-70: Has the patient had a colonoscopy / FIT/ Cologuard? Yes - no Care Gap present      If the patient is female:    4. For patients aged 41-77: Has the patient had a mammogram within the past 2 years? Yes - no Care Gap present      5. For patients aged 21-65: Has the patient had a pap smear? Yes - Care Gap present.  Rooming MA/LPN to request most recent results - full weight-bearing

## 2024-03-24 NOTE — DISCHARGE NOTE PROVIDER - NSFOLLOWUPCLINICS_GEN_ALL_ED_FT
[facility] Pulmonolgy and Sleep Medicine  Pulmonology  [address] [place] [address], [place] [address]  Phone: [phone]  Fax:      [facility] Pulmonolgy and [facility]  Pulmonology  [address]  [place] [address]  Phone: [phone]  Fax:     [facility] General Internal Medicine  General Internal Medicine  [address] [place] [id number]  Phone: [phone]  Fax:

## 2024-03-24 NOTE — PHYSICAL THERAPY INITIAL EVALUATION ADULT - PERTINENT HX OF CURRENT PROBLEM, REHAB EVAL
88M with h/o COPD (home O2) and Afib (on Eliquis/Xarelto?) and PPM who presents with fever and SOB. Pt found to be septic with temperature 104F rectal, tachycardic, desaturation. CT chest c/f atelectasis, interlobular septal thickening, and incidental pulm nodules with trace pericardial effusion. UA positive, pending UCX, asymptomatic with no dysuria. BCx positive, on empiric abx, pending cultures.

## 2024-03-24 NOTE — PHYSICAL THERAPY INITIAL EVALUATION ADULT - PATIENT PROFILE REVIEW, REHAB EVAL
ACTIVITY ORDER: OOB to chair; Spoke with RN Wilma Arana prior to PT evaluation-->Pt OK for PT consult/OOB activity; vitals taken; /67mmHg, heart rate 71bpm./yes

## 2024-03-24 NOTE — DISCHARGE NOTE PROVIDER - NSDCMRMEDTOKEN_GEN_ALL_CORE_FT
acetaminophen 325 mg oral tablet: 3 tab(s) orally every 8 hours, As Needed  Advair  mcg-21 mcg/inh inhalation aerosol: 2 puff(s) inhaled 2 times a day  albuterol 90 mcg/inh inhalation aerosol: 2 puff(s) inhaled 4 times a day, As Needed  docusate sodium 100 mg oral capsule: 1 cap(s) orally 3 times a day  Flomax 0.4 mg oral capsule: 2 cap(s) orally once a day (at bedtime)  losartan 50 mg oral tablet: 2 tab(s) orally once a day losartan 100 mg once a day in the morning  methylPREDNISolone 4 mg oral tablet: 1 tab(s) orally every other day  Norvasc 5 mg oral tablet: 1 tab(s) orally once a day  Toprol-XL 25 mg oral tablet, extended release: 1 tab(s) orally once a day (at bedtime)  Toprol-XL 50 mg oral tablet, extended release: 1 tab(s) orally once a day  Trelegy Ellipta 100 mcg-62.5 mcg-25 mcg/inh inhalation powder: 1 puff(s) inhaled once a day  Xarelto 20 mg oral tablet: 1 tab(s) orally once a day   acetaminophen 325 mg oral tablet: 2 tab(s) orally every 6 hours As needed Temp greater or equal to 38C (100.4F), Moderate Pain (4 - 6)  Advair  mcg-21 mcg/inh inhalation aerosol: 2 puff(s) inhaled 2 times a day  albuterol 90 mcg/inh inhalation aerosol: 2 puff(s) inhaled 4 times a day, As Needed  ascorbic acid 500 mg oral tablet: 1 tab(s) orally once a day  cefTRIAXone: 2 gram(s) intravenous once a day  chlorhexidine 2% topical pad: Apply topically to affected area once a day 1 Apply topically to affected area  docusate sodium 100 mg oral capsule: 1 cap(s) orally 3 times a day  ferrous sulfate 325 mg (65 mg elemental iron) oral tablet: 1 tab(s) orally once a day  Flomax 0.4 mg oral capsule: 2 cap(s) orally once a day (at bedtime)  ipratropium-albuterol 0.5 mg-2.5 mg/3 mL inhalation solution: 3 milliliter(s) inhaled every 6 hours  losartan 50 mg oral tablet: 2 tab(s) orally once a day losartan 100 mg once a day in the morning  methylPREDNISolone 4 mg oral tablet: 1 tab(s) orally every other day  mupirocin 2% topical ointment: Apply topically to affected area 2 times a day  Norvasc 5 mg oral tablet: 1 tab(s) orally once a day  Toprol-XL 25 mg oral tablet, extended release: 1 tab(s) orally once a day (at bedtime)  Toprol-XL 50 mg oral tablet, extended release: 1 tab(s) orally once a day  Trelegy Ellipta 100 mcg-62.5 mcg-25 mcg/inh inhalation powder: 1 puff(s) inhaled once a day  Xarelto 20 mg oral tablet: 1 tab(s) orally once a day   acetaminophen 325 mg oral tablet: 2 tab(s) orally every 6 hours As needed Temp greater or equal to 38C (100.4F), Moderate Pain (4 - 6)  Advair  mcg-21 mcg/inh inhalation aerosol: 2 puff(s) inhaled 2 times a day  albuterol 90 mcg/inh inhalation aerosol: 2 puff(s) inhaled 4 times a day, As Needed  ascorbic acid 500 mg oral tablet: 1 tab(s) orally once a day  cefTRIAXone: 2 gram(s) intravenous once a day  chlorhexidine 2% topical pad: Apply topically to affected area once a day 1 Apply topically to affected area  docusate sodium 100 mg oral capsule: 1 cap(s) orally 3 times a day  ferrous sulfate 325 mg (65 mg elemental iron) oral tablet: 1 tab(s) orally once a day  Flomax 0.4 mg oral capsule: 2 cap(s) orally once a day (at bedtime)  ipratropium-albuterol 0.5 mg-2.5 mg/3 mL inhalation solution: 3 milliliter(s) inhaled every 6 hours  losartan 50 mg oral tablet: 2 tab(s) orally once a day losartan 100 mg once a day in the morning  mupirocin 2% topical ointment: Apply topically to affected area 2 times a day  Norvasc 5 mg oral tablet: 1 tab(s) orally once a day  Toprol-XL 25 mg oral tablet, extended release: 1 tab(s) orally once a day (at bedtime)  Toprol-XL 50 mg oral tablet, extended release: 1 tab(s) orally once a day  Trelegy Ellipta 100 mcg-62.5 mcg-25 mcg/inh inhalation powder: 1 puff(s) inhaled once a day  Xarelto 20 mg oral tablet: 1 tab(s) orally once a day   acetaminophen 325 mg oral tablet: 2 tab(s) orally every 6 hours As needed Temp greater or equal to 38C (100.4F), Moderate Pain (4 - 6)  Advair  mcg-21 mcg/inh inhalation aerosol: 2 puff(s) inhaled 2 times a day  albuterol 90 mcg/inh inhalation aerosol: 2 puff(s) inhaled 4 times a day, As Needed  ascorbic acid 500 mg oral tablet: 1 tab(s) orally once a day  cefTRIAXone: 2 gram(s) intravenous once a day  chlorhexidine 2% topical pad: Apply topically to affected area once a day 1 Apply topically to affected area  docusate sodium 100 mg oral capsule: 1 cap(s) orally 3 times a day  ferrous sulfate 325 mg (65 mg elemental iron) oral tablet: 1 tab(s) orally once a day  Flomax 0.4 mg oral capsule: 2 cap(s) orally once a day (at bedtime)  ipratropium-albuterol 0.5 mg-2.5 mg/3 mL inhalation solution: 3 milliliter(s) inhaled every 6 hours as needed for SOB/wheezing  losartan 50 mg oral tablet: 2 tab(s) orally once a day losartan 100 mg once a day in the morning  mupirocin 2% topical ointment: Apply topically to affected area 2 times a day  Norvasc 5 mg oral tablet: 1 tab(s) orally once a day  Toprol-XL 25 mg oral tablet, extended release: 1 tab(s) orally once a day (at bedtime)  Toprol-XL 50 mg oral tablet, extended release: 1 tab(s) orally once a day  Trelegy Ellipta 100 mcg-62.5 mcg-25 mcg/inh inhalation powder: 1 puff(s) inhaled once a day  Xarelto 20 mg oral tablet: 1 tab(s) orally once a day   albuterol 90 mcg/inh inhalation aerosol: 2 puff(s) inhaled 4 times a day, As Needed  ascorbic acid 500 mg oral tablet: 1 tab(s) orally once a day  cefTRIAXone: 2 gram(s) intravenous once a day  docusate sodium 100 mg oral capsule: 1 cap(s) orally 3 times a day  ferrous sulfate 325 mg (65 mg elemental iron) oral tablet: 1 tab(s) orally once a day  Flomax 0.4 mg oral capsule: 2 cap(s) orally once a day (at bedtime)  ipratropium-albuterol 0.5 mg-2.5 mg/3 mL inhalation solution: 3 milliliter(s) inhaled every 6 hours as needed for SOB/wheezing  losartan 50 mg oral tablet: 2 tab(s) orally once a day losartan 100 mg once a day in the morning  mupirocin 2% topical ointment: Apply topically to affected area 2 times a day  Norvasc 5 mg oral tablet: 1 tab(s) orally once a day  Toprol-XL 25 mg oral tablet, extended release: 1 tab(s) orally once a day (at bedtime)  Toprol-XL 50 mg oral tablet, extended release: 1 tab(s) orally once a day  Trelegy Ellipta 100 mcg-62.5 mcg-25 mcg/inh inhalation powder: 1 puff(s) inhaled once a day  Xarelto 20 mg oral tablet: 1 tab(s) orally once a day   albuterol 90 mcg/inh inhalation aerosol: 2 puff(s) inhaled 4 times a day, As Needed  ascorbic acid 500 mg oral tablet: 1 tab(s) orally once a day  cefTRIAXone 2 g injection: 2 gram(s) intravenously once a day End Date: 04/22/2024  docusate sodium 100 mg oral capsule: 1 cap(s) orally 3 times a day  ferrous sulfate 325 mg (65 mg elemental iron) oral tablet: 1 tab(s) orally once a day  Flomax 0.4 mg oral capsule: 2 cap(s) orally once a day (at bedtime)  ipratropium-albuterol 0.5 mg-2.5 mg/3 mL inhalation solution: 3 milliliter(s) inhaled every 6 hours as needed for SOB/wheezing  losartan 50 mg oral tablet: 2 tab(s) orally once a day losartan 100 mg once a day in the morning  mupirocin 2% topical ointment: Apply topically to affected area 2 times a day  Norvasc 5 mg oral tablet: 1 tab(s) orally once a day  Toprol-XL 25 mg oral tablet, extended release: 1 tab(s) orally once a day (at bedtime)  Toprol-XL 50 mg oral tablet, extended release: 1 tab(s) orally once a day  Trelegy Ellipta 100 mcg-62.5 mcg-25 mcg/inh inhalation powder: 1 puff(s) inhaled once a day  Xarelto 20 mg oral tablet: 1 tab(s) orally once a day

## 2024-03-24 NOTE — PROGRESS NOTE ADULT - SUBJECTIVE AND OBJECTIVE BOX
PROGRESS NOTE:   Authored by Dr. Julissa Montiel MD (PGY-1). Pager [facility] [phone] / [facility]     Patient is a 88y old  Male who presents with a chief complaint of fever (23 Mar 2024 07:48)      SUBJECTIVE / OVERNIGHT EVENTS:  No acute events overnight.     ADDITIONAL REVIEW OF SYSTEMS:  Patient denies fevers, chills, chest pain, shortness of breath, nausea, abdominal pain, diarrhea, constipation, dysuria, leg swelling, headache, light headedness.    MEDICATIONS  (STANDING):  albuterol/ipratropium for Nebulization 3 milliLiter(s) Nebulizer every 6 hours  budesonide 160 MICROgram(s)/formoterol 4.5 MICROgram(s) Inhaler 2 Puff(s) Inhalation two times a day  budesonide 160 MICROgram(s)/formoterol 4.5 MICROgram(s) Inhaler 2 Puff(s) Inhalation two times a day  piperacillin/tazobactam IVPB.. 3.375 Gram(s) IV Intermittent every 8 hours  rivaroxaban 20 milliGRAM(s) Oral with dinner  tamsulosin 0.8 milliGRAM(s) Oral at bedtime    MEDICATIONS  (PRN):      CAPILLARY BLOOD GLUCOSE        I&O's Summary      PHYSICAL EXAM:  Vital Signs Last 24 Hrs  T(C): 36.6 (24 Mar 2024 07:51), Max: 37 (23 Mar 2024 11:10)  T(F): 97.8 (24 Mar 2024 07:51), Max: 98.6 (23 Mar 2024 11:10)  HR: 74 (24 Mar 2024 07:51) (68 - 74)  BP: 141/78 (24 Mar 2024 07:51) (120/71 - 163/73)  BP(mean): --  RR: 18 (24 Mar 2024 07:51) (14 - 18)  SpO2: 98% (24 Mar 2024 07:51) (95% - 99%)    Parameters below as of 24 Mar 2024 07:51  Patient On (Oxygen Delivery Method): nasal cannula  O2 Flow (L/min): 3      CONSTITUTIONAL: NAD, well-developed  RESPIRATORY: Normal respiratory effort; lungs are clear to auscultation bilaterally  CARDIOVASCULAR: Regular rate and rhythm, normal S1 and S2, no murmur/rub/gallop; No lower extremity edema; Peripheral pulses are 2+ bilaterally  ABDOMEN: Nontender to palpation, normoactive bowel sounds, no rebound/guarding; No hepatosplenomegaly  MUSCLOSKELETAL: no clubbing or cyanosis of digits; no joint swelling or tenderness to palpation  PSYCH: A+O to person, place, and time; affect appropriate    LABS:                        11.3   15.72 )-----------( 129      ( 24 Mar 2024 05:47 )             33.8     03-24    138  |  105  |  42<H>  ----------------------------<  167<H>  4.4   |  19<L>  |  1.28    Ca    9.2      24 Mar 2024 05:47  Phos  3.3     03-24  Mg     2.20     03-24    TPro  6.5  /  Alb  3.8  /  TBili  0.7  /  DBili  x   /  AST  40  /  ALT  17  /  AlkPhos  55  03-24    PT/INR - ( 22 Mar 2024 22:45 )   PT: 19.1 sec;   INR: 1.73 ratio         PTT - ( 22 Mar 2024 22:45 )  PTT:30.3 sec  CARDIAC MARKERS ( 24 Mar 2024 05:47 )  x     / x     / 870 U/L / x     / 13.8 ng/mL      Urinalysis Basic - ( 24 Mar 2024 05:47 )    Color: x / Appearance: x / SG: x / pH: x  Gluc: 167 mg/dL / Ketone: x  / Bili: x / Urobili: x   Blood: x / Protein: x / Nitrite: x   Leuk Esterase: x / RBC: x / WBC x   Sq Epi: x / Non Sq Epi: x / Bacteria: x        Culture - Blood (collected 22 Mar 2024 23:00)  Source: .Blood Blood-Peripheral  Gram Stain (23 Mar 2024 11:17):    Growth in anaerobic bottle: Gram positive cocci in pairs    Growth in aerobic bottle: Gram positive cocci in pairs  Preliminary Report (23 Mar 2024 11:17):    Growth in anaerobic bottle: Gram positive cocci in pairs    Growth in aerobic bottle: Gram positive cocci in pairs    Direct identification is available within approximately 3-5    hours either by Blood Panel Multiplexed PCR or Direct    MALDI-TOF. Details: [email or web address]  Organism: Blood Culture PCR (23 Mar 2024 11:49)  Organism: Blood Culture PCR (23 Mar 2024 11:49)    Culture - Blood (collected 22 Mar 2024 22:45)  Source: .Blood Blood-Peripheral  Gram Stain (23 Mar 2024 11:31):    Growth in anaerobic bottle: Gram positive cocci in pairs    Growth in aerobic bottle: Gram positive cocci in pairs  Preliminary Report (23 Mar 2024 11:31):    Growth in anaerobic bottle: Gram positive cocci in pairs    Growth in aerobic bottle: Gram positive cocci in pairs        Tele Reviewed:    RADIOLOGY & ADDITIONAL TESTS:  Results Reviewed:   Imaging Personally Reviewed:  Electrocardiogram Personally Reviewed:     PROGRESS NOTE:   Authored by Dr. Julissa Montiel MD (PGY-1). Pager [facility] [phone] / [facility]     Patient is a 88y old  Male who presents with a chief complaint of fever (23 Mar 2024 07:48)  - pt states he is doing better, no fever overnight, daughter is at bedside.     SUBJECTIVE / OVERNIGHT EVENTS:  No acute events overnight.     ADDITIONAL REVIEW OF SYSTEMS:  Patient denies fevers, chills, chest pain, shortness of breath, nausea, abdominal pain, diarrhea, constipation, dysuria, leg swelling, headache, light headedness.    MEDICATIONS  (STANDING):  albuterol/ipratropium for Nebulization 3 milliLiter(s) Nebulizer every 6 hours  budesonide 160 MICROgram(s)/formoterol 4.5 MICROgram(s) Inhaler 2 Puff(s) Inhalation two times a day  budesonide 160 MICROgram(s)/formoterol 4.5 MICROgram(s) Inhaler 2 Puff(s) Inhalation two times a day  piperacillin/tazobactam IVPB.. 3.375 Gram(s) IV Intermittent every 8 hours  rivaroxaban 20 milliGRAM(s) Oral with dinner  tamsulosin 0.8 milliGRAM(s) Oral at bedtime    MEDICATIONS  (PRN):      CAPILLARY BLOOD GLUCOSE        I&O's Summary      PHYSICAL EXAM:  Vital Signs Last 24 Hrs  T(C): 36.6 (24 Mar 2024 07:51), Max: 37 (23 Mar 2024 11:10)  T(F): 97.8 (24 Mar 2024 07:51), Max: 98.6 (23 Mar 2024 11:10)  HR: 74 (24 Mar 2024 07:51) (68 - 74)  BP: 141/78 (24 Mar 2024 07:51) (120/71 - 163/73)  BP(mean): --  RR: 18 (24 Mar 2024 07:51) (14 - 18)  SpO2: 98% (24 Mar 2024 07:51) (95% - 99%)    Parameters below as of 24 Mar 2024 07:51  Patient On (Oxygen Delivery Method): nasal cannula  O2 Flow (L/min): 3      CONSTITUTIONAL: NAD,   RESPIRATORY: Normal respiratory effort; lungs are clear to auscultation bilaterally  CARDIOVASCULAR: Regular rate and rhythm, normal S1 and S2, no murmur/rub/gallop; No lower extremity edema; Peripheral pulses are 2+ bilaterally  ABDOMEN: Nontender to palpation, normoactive bowel sounds, no rebound/guarding; No hepatosplenomegaly  MUSCLOSKELETAL: no clubbing or cyanosis of digits; no joint swelling or tenderness to palpation  PSYCH: A+O to person, place, and time; affect appropriate    LABS:                        11.3   15.72 )-----------( 129      ( 24 Mar 2024 05:47 )             33.8     03-24    138  |  105  |  42<H>  ----------------------------<  167<H>  4.4   |  19<L>  |  1.28    Ca    9.2      24 Mar 2024 05:47  Phos  3.3     03-24  Mg     2.20     03-24    TPro  6.5  /  Alb  3.8  /  TBili  0.7  /  DBili  x   /  AST  40  /  ALT  17  /  AlkPhos  55  03-24    PT/INR - ( 22 Mar 2024 22:45 )   PT: 19.1 sec;   INR: 1.73 ratio         PTT - ( 22 Mar 2024 22:45 )  PTT:30.3 sec  CARDIAC MARKERS ( 24 Mar 2024 05:47 )  x     / x     / 870 U/L / x     / 13.8 ng/mL      Urinalysis Basic - ( 24 Mar 2024 05:47 )    Color: x / Appearance: x / SG: x / pH: x  Gluc: 167 mg/dL / Ketone: x  / Bili: x / Urobili: x   Blood: x / Protein: x / Nitrite: x   Leuk Esterase: x / RBC: x / WBC x   Sq Epi: x / Non Sq Epi: x / Bacteria: x        Culture - Blood (collected 22 Mar 2024 23:00)  Source: .Blood Blood-Peripheral  Gram Stain (23 Mar 2024 11:17):    Growth in anaerobic bottle: Gram positive cocci in pairs    Growth in aerobic bottle: Gram positive cocci in pairs  Preliminary Report (23 Mar 2024 11:17):    Growth in anaerobic bottle: Gram positive cocci in pairs    Growth in aerobic bottle: Gram positive cocci in pairs    Direct identification is available within approximately 3-5    hours either by Blood Panel Multiplexed PCR or Direct    MALDI-TOF. Details: [email or web address]  Organism: Blood Culture PCR (23 Mar 2024 11:49)  Organism: Blood Culture PCR (23 Mar 2024 11:49)    Culture - Blood (collected 22 Mar 2024 22:45)  Source: .Blood Blood-Peripheral  Gram Stain (23 Mar 2024 11:31):    Growth in anaerobic bottle: Gram positive cocci in pairs    Growth in aerobic bottle: Gram positive cocci in pairs  Preliminary Report (23 Mar 2024 11:31):    Growth in anaerobic bottle: Gram positive cocci in pairs    Growth in aerobic bottle: Gram positive cocci in pairs        Tele Reviewed:    RADIOLOGY & ADDITIONAL TESTS:  Results Reviewed:   Imaging Personally Reviewed:  Electrocardiogram Personally Reviewed:

## 2024-03-24 NOTE — DISCHARGE NOTE PROVIDER - NSDCFUSCHEDAPPT_GEN_ALL_CORE_FT
Janelle Cortez  [facility]  PULMMED [address]  Scheduled Appointment: 03/28/2024    Piter Lewis  [facility]  PULMMED [address]  Scheduled Appointment: 04/03/2024     [facility]  [facility] [address]  Scheduled Appointment: 04/03/2024

## 2024-03-24 NOTE — PROVIDER CONTACT NOTE (CRITICAL VALUE NOTIFICATION) - SITUATION
1- Preliminary growth in anaerobic bottle for gram positive and cocci impairs.    2- Preliminary growth in aerobic and anaerobic bottles for gram positive and cocci impairs.
troponin is 68
Lactate on BVG is 5.2

## 2024-03-24 NOTE — PROGRESS NOTE ADULT - ASSESSMENT
88M with h/o COPD (home Ox) and Afib (on Eliquis/Xarelto?) and PPM who presents with fever and SOB. Pt found to be septic w/temp 104F rectal, tachy, desat. CT chest c/f atelectasis, interlobular septal thickening, and incidental pulm nodules with trace pericardial effusion. UA positive, pending UCX, asymptomatic with no dysuria. BCx positive, on empiric abx, pending cultures.            88M with h/o COPD (home Ox) and Afib (on Eliquis/Xarelto?) and PPM who presents with fever and SOB. Pt found to be septic w/temp 104F rectal, tachy, desat. CT chest c/f atelectasis, interlobular septal thickening, and incidental pulm nodules with trace pericardial effusion. UA positive, UCx +GNR and BCx 3/22 +strept lutetiensis pending susceptibility. Trend lactate now s/p IVF, pending TTE and monitor on tele

## 2024-03-24 NOTE — DISCHARGE NOTE PROVIDER - NSDCCPCAREPLAN_GEN_ALL_CORE_FT
PRINCIPAL DISCHARGE DIAGNOSIS  Diagnosis: Acute UTI  Assessment and Plan of Treatment: You presented to the hospital with fevers and chills. You didnt' have any pain with urination, but your urine cultures were positive for some bacteria. We treated you with IV antibiotics which helped.   Please continue to monitor your urine output and if you have any pain with urination, please let your primary care doctor know.      SECONDARY DISCHARGE DIAGNOSES  Diagnosis: Sepsis  Assessment and Plan of Treatment: You presented with fever and chills. We found your blood cultures positive for strep and urine cultures also positive for a different antibiotics and covered you with broad spectrum antibiotics which helped. The infection could be in your lungs exacerbating your COPD and/or in your urine.   Please follow-up with your primary care physician and return to the ED if you have any recurrent fever or chills.    Diagnosis: Macrocytic anemia  Assessment and Plan of Treatment: You have macrocytic anemia, please take your vitamin b12 supplementation and follow-up with your primary care doctor.    Diagnosis: Chronic atrial fibrillation  Assessment and Plan of Treatment: You have a history of atrial fibrillation, we resumed your xarelto. Please continue to follow-up with your primary care physician. We also obtained an echocardiogram.    Diagnosis: Pulmonary nodule  Assessment and Plan of Treatment: You had some pulmonary nodules on the CT scan, please follow-up with a repeat CT chest to monitor these nodules as outpt     PRINCIPAL DISCHARGE DIAGNOSIS  Diagnosis: Sepsis  Assessment and Plan of Treatment: You presented with fever and chills. We found your blood cultures positive for strep lutiensis and urine cultures also positive. You were covered with broad spectrum antibiotics and remained afebrile without growth in repeat culture. However, because it is a gram positive bacteremia, you had to have a transesophageal echocardiogram to confirm there was no bacterial infection in the heart. You were transferred to [place] for pacemaker extraction. Please follow-up with your primary care physician and return to the ED if you have any recurrent fever or chills.      SECONDARY DISCHARGE DIAGNOSES  Diagnosis: Macrocytic anemia  Assessment and Plan of Treatment: You have macrocytic anemia, please take your vitamin b12 supplementation and follow-up with your primary care doctor.    Diagnosis: Chronic atrial fibrillation  Assessment and Plan of Treatment: You have a history of atrial fibrillation, we resumed your xarelto. Please continue to follow-up with your primary care physician. We also obtained an echocardiogram which showed reduced heart function. Please follow up with your cardiologist.    Diagnosis: Pulmonary nodule  Assessment and Plan of Treatment: You had some pulmonary nodules on the CT scan, please follow-up with a repeat CT chest in 6 months to monitor these nodules.

## 2024-03-24 NOTE — DISCHARGE NOTE PROVIDER - NSDCFUADDAPPT_GEN_ALL_CORE_FT
APPTS ARE READY TO BE MADE: [x ] YES    Best Family or Patient Contact (if needed):    Additional Information about above appointments (if needed):    1: Pulmonary  2: Cardiology   3:     Other comments or requests:    APPTS ARE READY TO BE MADE: [x ] YES    Best Family or Patient Contact (if needed):    Additional Information about above appointments (if needed):    1: Pulmonary  2: Cardiology   3:   Prior to outreaching the patient, it was visible that the patient has secured a follow up appointment which was not scheduled by our team. on 04/03 at 11:20am with       Other comments or requests:    APPTS ARE READY TO BE MADE: [x ] YES    Best Family or Patient Contact (if needed):    Additional Information about above appointments (if needed):    1: Pulmonary  2: Cardiology   3:   Prior to outreaching the patient, it was visible that the patient has secured a follow up appointment which was not scheduled by our team. on 04/03 at 11:20am with     Met with patient face to face and provided patient with a non [facility] provider referral, but we are unable to schedule due to specialty but provided the patient with referral details.-[email or web address] [email or web address] cardio/pcp      Other comments or requests:

## 2024-03-24 NOTE — DIETITIAN INITIAL EVALUATION ADULT - NSICDXPASTMEDICALHX_GEN_ALL_CORE_FT
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

## 2024-03-24 NOTE — DIETITIAN INITIAL EVALUATION ADULT - OTHER INFO
Patient is A&O x 3 at baseline.  Patient is receiving a regular diet order in house.  Pt reports adequate PO intake with good appetite.  Food and fluid preferences discussed.  Denies difficulty chewing or swallowing.  No active GI distress such as nausea, vomit, diarrhea, constipation.  Pt has BM on 3/22 per RN flowsheets.  Skin noted 1 + right and left ankle edema, no pressure injury per RN flowsheets.  Admitted weight@87.1kg (3-23-24), height 185.4cm, BMI 25.3-normal weight status.  Labs 3/24 reviewed elevated BUN 42 with glucose 167, hydration encouraged as tolerated 2/2 UTI.  Patient is receptive to UTI education.   RD to remain available.

## 2024-03-24 NOTE — PROGRESS NOTE ADULT - PROBLEM SELECTOR PLAN 9
PPX  - DVT ppx: xarelto 20 mg   - activity: as tolerated, pending PT   - updated family PPX  - DVT ppx: xarelto 20 mg   - activity: as tolerated, pending PT   - updated family  - GOC: DNR/DNI  - Home care with aides at home

## 2024-03-24 NOTE — PHYSICAL THERAPY INITIAL EVALUATION ADULT - PRECAUTIONS/LIMITATIONS, REHAB EVAL
+3.5L of O2 through nasal cannula/cardiac precautions/fall precautions/oxygen therapy device and L/min

## 2024-03-24 NOTE — PROGRESS NOTE ADULT - PROBLEM SELECTOR PLAN 1
#severe sepsis #UTI asymptom #bacteremia  - p/w: tachycardic, febrile, hypoxic, +lactate 4, +leukocytosis s/p fluids and empiric abx  - RVP covid, neg; legionella neg, mrsa __, strep __   - source: UA +, UCx___ vs. respiratory illness CT chest with atelectatis  - BCx: +GPcocc = strep sp.  - Abx: Zosyn (3/23 - __ ), azithromycin (3/22 - __);  held off vanco  - IVF: consider resuming #severe sepsis #UTI asymptom #bacteremia  - p/w: tachycardic, febrile, hypoxic, +lactate 4, +leukocytosis s/p fluids and empiric abx  - RVP covid, neg; legionella neg, mrsa __, strep __   - source: UA +, UCx +GNR vs. respiratory illness CT chest with atelectatis  - BCx: +GPcocc = strep lutetiensis   - Abx: Zosyn (3/23 - __ ), azithromycin (3/22 - __);  held off vanco  - IVF: gave LR bolus on 3/24, f/u CXR

## 2024-03-24 NOTE — DISCHARGE NOTE PROVIDER - HOSPITAL COURSE
Pt is an 89 yo M with PMH fTUD, former ETOH use d/o, BPH, a-fib (xarelto), COPD (3-3.5L NC), HTN, HLD, and s/p PPM p/w F/C, SOB, and rigors. On arrival, febrile to T 104.1, EKG with new TWI, trop 72---104, iron studies c/w JULIUS, BNP 3358, lactate 4.2---2.8, UA+ with UCx in lab, RVP neg, BCx +G+ cocci in pairs (strep species). CT chest with small R>L pleural effusion, 1.3x2cm R paratracheal LN, and BL perinephric stranding. Pt given cefepime, azithromycin, CTX, solumedrol 125mg, and NS 500cc with 3x duonebs. Pt seen and examined at bedside resting comfortably, sleeping, in no acute distress. Requested to be spoken to later as he "just finally fell asleep". Denies acute complaints; feels better than when he first presented. Plan to resume home meds and transition abx to zosyn; likely 10-14d course of abx once Cx clear. Will obtain MRSA PCR and f/u BCx which was positive for strep luteinesis. 48 hr blood culture clearance was ___. Given CT chest findings, will need repeat CT chest outpt @ 12 mo. Of note, pt sad as his wife passed in the last recent years and son passed 1wk ago; is retired . Has HHA at home.      [] started zosyn, procal 36    [] bcx 3/22 + streptococcus lutetiensis, pending susceptibility, pending repeat bcx 3/25 AM   [] Ucx 3/23 +GNR 50-99k   [] trend lactate 5 on 3/24 gave 1bolus on 3/24, monitor fluid overload state   [] f/u CXR in AM on 3/25 s/p ivf   [] cardio:  TTE with elevated proBNP; afib on xarelto    [] pulm nodules f/o outpt    [] htn; held anti-htn resume once      Rx: Continued all home meds, held anti-htn, can resume once sepsis cleared  F/U: pulm/cardio and urology Pt is an 87 yo M with PMH fTUD, former ETOH use d/o, BPH, a-fib (xarelto), COPD (3-3.5L NC), HTN, HLD, and s/p PPM p/w F/C, SOB, and rigors. On arrival, febrile to T 104.1, EKG with new TWI, trop 72---104, iron studies c/w JULIUS, BNP 3358, lactate 4.2---2.8, UA+ with UCx in lab, RVP neg, BCx +G+ cocci in pairs (strep species). CT chest with small R>L pleural effusion, 1.3x2cm R paratracheal LN, and BL perinephric stranding. Pt given cefepime, azithromycin, CTX, solumedrol 125mg, and NS 500cc with 3x duonebs. Pt received Zosyn (3/23 -3/25), ceftriaxone (3/25-) and azithromycin (3/22). Pt was treated MRSA PCR was negative and f/u BCx which was positive for strep luteinesis. 48 hr blood culture clearance was ___. Given CT chest findings, will need repeat CT chest outpt @ 12 mo.  Urine culture grew ___. TTE showed EF 45-50%, global LV hypokinesis, enlarged RV cavity. Pt was resumed on home metoprolol and losartan. TTE could not definitively exclude endocarditis.    F/U: pulm/cardio and urology Pt is an 87 yo M with PMH fTUD, former ETOH use d/o, BPH, a-fib (xarelto), COPD (3-3.5L NC), HTN, HLD, and s/p PPM p/w F/C, SOB, and rigors. On arrival, febrile to T 104.1, EKG with new TWI, trop 72---104, iron studies c/w JULIUS, BNP 3358, lactate 4.2---2.8, UA+ with UCx in lab, RVP neg, BCx +G+ cocci in pairs (strep species). CT chest with small R>L pleural effusion, 1.3x2cm R paratracheal LN, and BL perinephric stranding. Pt given cefepime, azithromycin, CTX, solumedrol 125mg, and NS 500cc with 3x duonebs. Pt received Zosyn (3/23 -3/25), ceftriaxone (3/25-) and azithromycin (3/22). Pt was treated MRSA PCR was negative and f/u BCx which was positive for strep luteinesis. 48 hr blood culture clearance was no growth. Given CT chest findings, will need repeat CT chest outpt @ 12 mo.  Urine culture grew enterobacter cloaecae. TTE showed EF 45-50%, global LV hypokinesis, enlarged RV cavity. Pt was resumed on home metoprolol and losartan. TTE could not definitively exclude endocarditis. ID was consulted and recommended CAREN, as well as pacemaker extraction. CT abdomen pelvis showed no source of infection, however pt should have outpatient colonoscopy in the near future. Pt was arranged for transfer to [facility] for extraction of pacemaker and CAREN. Per ID, Pt will likely need 6 weeks of IV ceftriaxone 2 g due to hip hardware. ESR and CRP were sent, ESR was mildly elevated and CRP was elevated.     Pt is an 89 yo M with PMH fTUD, former ETOH use d/o, BPH, a-fib (xarelto), COPD (3-3.5L NC), HTN, HLD, and s/p PPM p/w F/C, SOB, and rigors. On arrival, febrile to T 104.1, EKG with new TWI, trop 72---104, iron studies c/w JULIUS, BNP 3358, lactate 4.2---2.8, UA+ with UCx in lab, RVP neg, BCx +G+ cocci in pairs (strep species). CT chest with small R>L pleural effusion, 1.3x2cm R paratracheal LN, and BL perinephric stranding. Pt given cefepime, azithromycin, CTX, solumedrol 125mg, and NS 500cc with 3x duonebs. Pt received Zosyn (3/23 -3/25), ceftriaxone (3/25-) and azithromycin (3/22). Pt was treated MRSA PCR was negative and f/u BCx which was positive for strep luteinesis. 48 hr blood culture clearance was no growth. Given CT chest findings, will need repeat CT chest outpt @ 12 mo.  Urine culture grew enterobacter cloaecae. TTE showed EF 45-50%, global LV hypokinesis, enlarged RV cavity. Pt was resumed on home metoprolol and losartan. TTE could not definitively exclude endocarditis. ID was consulted and recommended CAREN, as well as pacemaker extraction. CT abdomen pelvis showed no source of infection, however pt should have outpatient colonoscopy in the near future. Pt was arranged for transfer to [facility] for extraction of pacemaker and CAREN. Per ID, Pt will likely need 6 weeks of IV ceftriaxone 2 g due to hip hardware, however pt's insurance currently does not cover ceftriaxone so an alternative will have to be found if possible. ESR and CRP were sent, ESR was mildly elevated and CRP was elevated.     Pt is an 89 yo M with PMH fTUD, former ETOH use d/o, BPH, a-fib (xarelto), COPD (3-3.5L NC), HTN, HLD, and s/p PPM p/w F/C, SOB, and rigors. On arrival, febrile to T 104.1, EKG with new TWI, trop 72---104, iron studies c/w JULIUS, BNP 3358, lactate 4.2---2.8, UA+ with UCx in lab, RVP neg, BCx +G+ cocci in pairs (strep species). CT chest with small R>L pleural effusion, 1.3x2cm R paratracheal LN, and BL perinephric stranding. Pt given cefepime, azithromycin, CTX, solumedrol 125mg, and NS 500cc with 3x duonebs. Pt received Zosyn (3/23 -3/25), ceftriaxone (3/25-) and azithromycin (3/22). Pt was treated MRSA PCR was negative and f/u BCx which was positive for strep luteinesis. 48 hr blood culture clearance was no growth. Given CT chest findings, will need repeat CT chest outpt @ 12 mo.  Urine culture grew enterobacter cloaecae, but given no urinary symptoms was not treated. TTE showed EF 45-50%, global LV hypokinesis, enlarged RV cavity. Pt was resumed on home metoprolol and losartan. TTE could not definitively exclude endocarditis. ID was consulted and recommended CAREN, as well as pacemaker extraction. CT abdomen pelvis showed no source of infection, however did show a polyp and pt should have outpatient colonoscopy in the near future if within goals of care. Pt was arranged for transfer to [facility] for extraction of pacemaker and CAREN. Per ID, Pt will likely need 6 weeks of IV ceftriaxone 2 g due to hip hardware, however pt's insurance currently does not cover ceftriaxone so an alternative will have to be found if possible. ESR and CRP were sent, ESR was mildly elevated and CRP was elevated.

## 2024-03-24 NOTE — PROVIDER CONTACT NOTE (CRITICAL VALUE NOTIFICATION) - TEST AND RESULT REPORTED:
+ blood culture cocci in pairs in anaerobic bottle
lactate 5.2
1- Preliminary growth in anaerobic bottle for gram positive and cocci impairs.    2- Preliminary growth in aerobic and anaerobic bottles for gram positive and cocci impairs.
Troponin 68

## 2024-03-24 NOTE — PHYSICAL THERAPY INITIAL EVALUATION ADULT - ADDITIONAL COMMENTS
Pt lives alone in a private house with 1 step to enter; stair lift inside to get him to the 2nd floor where his bedroom is. Prior to hospital admission, pt was completely independent and used a single axis cane with ambulation. He does own a rolling walker as well if he needs. Pt has a home health aide 6 days/week 6 hours/day from 12-6. Pt denies any recent falls in the past 6 months.    Pt left comfortable seated in chair, NAD, all lines intact, all precautions maintained, with call bell in reach, visitor at bedside, SpO2 97%, and RN aware of PT evaluation.

## 2024-03-24 NOTE — DIETITIAN INITIAL EVALUATION ADULT - PERTINENT LABORATORY DATA
03-24    138  |  105  |  42<H>  ----------------------------<  167<H>  4.4   |  19<L>  |  1.28    Ca    9.2      24 Mar 2024 05:47  Phos  3.3     03-24  Mg     2.20     03-24    TPro  6.5  /  Alb  3.8  /  TBili  0.7  /  DBili  x   /  AST  40  /  ALT  17  /  AlkPhos  55  03-24  A1C with Estimated Average Glucose Result: 5.5 % (03-23-24 @ 09:15)

## 2024-03-24 NOTE — PROVIDER CONTACT NOTE (CRITICAL VALUE NOTIFICATION) - ASSESSMENT
Pt is alert and orientedx4. Denies pain at this time. On zosyn IV.
Patient is A&Ox4, denies pain, chest pain, shortness of breath, nausea, vomiting or dizziness.
Pt is alert and orientedx4. Denies CP, sob or palpitations at this time.

## 2024-03-24 NOTE — PROGRESS NOTE ADULT - ATTENDING COMMENTS
89 yo M w/ hx COPD on Home oxygen 3.5 L, afib on DOAC s/p PPM p/w SOB and chills found to have + UA and BCx positive for strep. pro BNP 3k; procal 36; MRSA neg    Sepsis- source unclear. elevated WBC and T 104 in ED             - UA+ UCx with 50 -99K gram neg rods; BCx strep Leutensis; ULAD neg; repeat BCx             - CT chest neg for overt PNA; ? b/l perinephric stranding no overt flank tenderness             - c/w Zosyn            - elevated lactate 5.2; 500 ml bolus; check CXR and repeat lactate post bolus and IVF as needed if elevated lactate              - ID c/s   Elevated Trop- elevated CPK no complaint of CP; trop downtrend; repeat CPK; TTE   Anemia- macrocytosis B12 and folate normal; Fe studies sig for Fe def; hapto neg; add feSo4 and vitamin C   COPD- s/p solumedrol in ED x 1; c/w home oxygen; ULAD neg off azithro; symbicort  Afib - rate controlled on Xarelto; BB on hold; Vpaced       MOLST prior in chart DNR/DNI

## 2024-03-24 NOTE — DIETITIAN INITIAL EVALUATION ADULT - PERTINENT MEDS FT
MEDICATIONS  (STANDING):  albuterol/ipratropium for Nebulization 3 milliLiter(s) Nebulizer every 6 hours  budesonide 160 MICROgram(s)/formoterol 4.5 MICROgram(s) Inhaler 2 Puff(s) Inhalation two times a day  budesonide 160 MICROgram(s)/formoterol 4.5 MICROgram(s) Inhaler 2 Puff(s) Inhalation two times a day  piperacillin/tazobactam IVPB.. 3.375 Gram(s) IV Intermittent every 8 hours  rivaroxaban 20 milliGRAM(s) Oral with dinner  tamsulosin 0.8 milliGRAM(s) Oral at bedtime    MEDICATIONS  (PRN):

## 2024-03-24 NOTE — DISCHARGE NOTE PROVIDER - CARE PROVIDER_API CALL
Piter Lewis  Pulmonary Disease  [address], [address]  [place] [address]  Phone: [phone]  Fax: [phone]  Follow Up Time: 1 week

## 2024-03-24 NOTE — DIETITIAN INITIAL EVALUATION ADULT - ORAL INTAKE PTA/DIET HISTORY
Patient has daughter at bedside today to assist in nutrition interview as pt noted Merlyn/English speaking.  Confirmed no known food allergies nor intolerance. Patient has a HHA at home, eating generally healthy and many fruits and vegetable consumption per daughter, dislike yogurt and some meats.  Small frequent meals at home.  Patient not aware of appetite or weight change PTA.  UBW ~ 191 lbs, used to 210 lbs > 1 years ago.    No significant weight change per [facility] weight history: 88.9kg (3-18-23), 91.4kg (2-14-23)

## 2024-03-24 NOTE — PROGRESS NOTE ADULT - PROBLEM SELECTOR PLAN 2
- hgb 11 (12), retic 1.3%, abs 42  - mcv 102-103  - b12 496 wnl  - b9 4.1 wnl  - pending peripheral smear, consider egd outpt

## 2024-03-24 NOTE — PROGRESS NOTE ADULT - PROBLEM SELECTOR PLAN 6
#h/o AFIB, PPM, on Xarelto   - PPM ~2013, on blood thinner  - Xarelto 20 mg once a day    - last dose 3/22, resumed on 3/23  - c/w tele monitoring

## 2024-03-24 NOTE — PHYSICAL THERAPY INITIAL EVALUATION ADULT - GENERAL OBSERVATIONS, REHAB EVAL
Pt encountered in semisupine position, no distress, AxOx4, with +IV, +cardiac monitor, +3.5L of O2 through nasal cannula, and visitor at bedside. Pt agreeable to participate in PT evaluation.

## 2024-03-24 NOTE — PHYSICAL THERAPY INITIAL EVALUATION ADULT - GAIT DISTANCE, PT EVAL
75 feet Vital Signs Last 24 Hrs  T(C): 37.5 (27 Sep 2021 07:57), Max: 37.5 (27 Sep 2021 07:57)  T(F): 99.5 (27 Sep 2021 07:57), Max: 99.5 (27 Sep 2021 07:57)  HR: 136 (27 Sep 2021 07:57) (136 - 136)  RR: 35 (27 Sep 2021 14:27) (24 - 35)  SpO2: 98% (27 Sep 2021 14:27) (98% - 100%)    Constitutional:  well appearing, alert and active  Eyes: PERRLA, no conjunctival injection, no eye discharge, EOMI  ENMT:   nasal congestion,  normal oropharynx, no exudates, no sores,   nasal flaring  Neck: Supple, suprasternal rectractions    Respiratory: good air entry bilaterally, with mild decrease on right, prolonged expiratory phase, scattered wheezing, no crackle or rhonchi.  subcostal rectractions  Cardiovascular: S1, S2, no murmur, RRR, capillary refill ~2sec, normal peripheral pulses  Gastrointestinal: Bowel sounds positive, Soft, nondistended, nontender  Skin: No rash

## 2024-03-24 NOTE — DISCHARGE NOTE PROVIDER - NSDCCPTREATMENT_GEN_ALL_CORE_FT
PRINCIPAL PROCEDURE  Procedure: CT chest wo con  Findings and Treatment: IMPRESSION:  Small right > left pleural effusion with compressive atelectasis.   Question interlobular septal thickening. Recommend clinical correlation   to assess underlying infection.  Nonspecific pulmonary nodules. If the patient is at low risk for   malignancy, no further follow-up is needed. If the patient is at high   risk, 12 month follow-up CT chest may be obtained for further evaluation.  Additional findings as described.        SECONDARY PROCEDURE  Procedure: Transthoracic echo  Findings and Treatment: CONCLUSIONS:      1. The left ventricular cavity is normal in size. Left ventricular wall thickness is normal. Left ventricular systolic function is mildly decreased with an ejection fraction visually estimated at 45 to 50%. There is global left ventricular hypokinesis.   2. The right ventricular cavity is enlarged in size and reduced systolic function. A device lead is visualized in the right heart.   3. Structurally normal mitral valve with normal leaflet excursion. There is calcification of the mitral valve annulus. There is mild mitral regurgitation.   4. The aortic valve anatomy cannot be determined with reduced systolic excursion. There is calcification of the aortic valve leaflets. The peak transaortic velocity is 2.41 m/s, peak transaortic gradient is 23.2 mmHg and mean transaortic gradient is 12.0 mmHg with an LVOT/aortic valve VTI ratio of 0.45. Probable mild aortic stenosis. There is trace aortic regurgitation.   5. The left atrium is moderately dilated with an indexed volume of 47 ml/m².   6. The right atrium is severely dilated with an indexed volume of 57.95 ml/m² and an indexed area of 14.72 cm²/m².       PRINCIPAL PROCEDURE  Procedure: CT chest wo con  Findings and Treatment: IMPRESSION:  Small right > left pleural effusion with compressive atelectasis.   Question interlobular septal thickening. Recommend clinical correlation   to assess underlying infection.  Nonspecific pulmonary nodules. If the patient is at low risk for   malignancy, no further follow-up is needed. If the patient is at high   risk, 12 month follow-up CT chest may be obtained for further evaluation.  Additional findings as described.        SECONDARY PROCEDURE  Procedure: Transthoracic echo  Findings and Treatment: CONCLUSIONS:      1. The left ventricular cavity is normal in size. Left ventricular wall thickness is normal. Left ventricular systolic function is mildly decreased with an ejection fraction visually estimated at 45 to 50%. There is global left ventricular hypokinesis.   2. The right ventricular cavity is enlarged in size and reduced systolic function. A device lead is visualized in the right heart.   3. Structurally normal mitral valve with normal leaflet excursion. There is calcification of the mitral valve annulus. There is mild mitral regurgitation.   4. The aortic valve anatomy cannot be determined with reduced systolic excursion. There is calcification of the aortic valve leaflets. The peak transaortic velocity is 2.41 m/s, peak transaortic gradient is 23.2 mmHg and mean transaortic gradient is 12.0 mmHg with an LVOT/aortic valve VTI ratio of 0.45. Probable mild aortic stenosis. There is trace aortic regurgitation.   5. The left atrium is moderately dilated with an indexed volume of 47 ml/m².   6. The right atrium is severely dilated with an indexed volume of 57.95 ml/m² and an indexed area of 14.72 cm²/m².      Procedure: CT abdomen pelvis wo/w con  Findings and Treatment:   < end of copied text >  LIVER: Indeterminate 1 cm hypervascular focus in the left hepatic lobe.  BILE DUCTS: Normal caliber.  GALLBLADDER: Within normal limits.  SPLEEN: Within normal limits.  PANCREAS: Scattered small pancreatic cysts measuring up to 1.0 cm. No   pancreatic ductal dilatation.  ADRENALS: Within normal limits.  KIDNEYS/URETERS: No hydronephrosis. Bilateral renal cysts and bilateral   subcentimeter hypodensities too small to characterize. Indeterminant 1.0   cm exophytic left lower pole lesion (2, 62).  BLADDER: Within normal limits.  REPRODUCTIVE ORGANS: Prostate is enlarged.  BOWEL: No bowel obstruction. Appendix is normal. Colonic diverticulosis   without evidence of acute diverticulitis. 1.1 cm polypoid   hyperattenuating focus in the mid transverse colon (2, 63)  PERITONEUM: No ascites.  VESSELS: Atherosclerotic changes.  RETROPERITONEUM/LYMPH NODES: No lymphadenopathy.  ABDOMINAL WALL: Tiny fat-containing umbilical hernia.  BONES: Degenerative changes. Right hip arthroplasty.  IMPRESSION:  No intra-abdominal source of infection identified.  Small indeterminate lesions in the liver and left kidney.  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  1.1 cm polypoid focus in the mid transverse colon.< from: CT Abdomen and Pelvis w/ IV Cont (03.27.24 @ 13:43) >

## 2024-03-24 NOTE — PROGRESS NOTE ADULT - PROBLEM SELECTOR PLAN 3
#demand ischemia #elevated trop  #trace pericardial effusion  #B lines on pocus   # R pleural effusion   - No history of heart failure, no LE edema or HJR  - CT chest with small pleural effusion (r) and trace pericardial effusion   - EKG with inverted t wave in II, III and v4-v6; so significant ST changes  - placed on tele (3/23)  - proBNP ~3k, trop 62 from 104, plateau, ck 870, ckmb 13.8   - pending TTE and cardio consult #demand ischemia #elevated trop  #trace pericardial effusion  #B lines on pocus   # R pleural effusion   - No history of heart failure, no LE edema or HJR  - CT chest with small pleural effusion (r) and trace pericardial effusion   - EKG with inverted t wave in II, III and v4-v6; so significant ST changes  - placed on tele (3/23)  - proBNP ~3k, trop 62 from 104, plateau, ck 870, ckmb 13.8   - pending TTE

## 2024-03-25 ENCOUNTER — RESULT REVIEW (OUTPATIENT)
Age: 89
End: 2024-03-25

## 2024-03-25 LAB
-  CEFTRIAXONE: SIGNIFICANT CHANGE UP
-  PENICILLIN: SIGNIFICANT CHANGE UP
-  VANCOMYCIN: SIGNIFICANT CHANGE UP
ALBUMIN SERPL ELPH-MCNC: 3.7 G/DL — SIGNIFICANT CHANGE UP (ref 3.3–5)
ALP SERPL-CCNC: 53 U/L — SIGNIFICANT CHANGE UP (ref 40–120)
ALT FLD-CCNC: 28 U/L — SIGNIFICANT CHANGE UP (ref 4–41)
ANION GAP SERPL CALC-SCNC: 13 MMOL/L — SIGNIFICANT CHANGE UP (ref 7–14)
AST SERPL-CCNC: 48 U/L — HIGH (ref 4–40)
BASE EXCESS BLDV CALC-SCNC: -3.2 MMOL/L — LOW (ref -2–3)
BILIRUB SERPL-MCNC: 0.8 MG/DL — SIGNIFICANT CHANGE UP (ref 0.2–1.2)
BLOOD GAS VENOUS COMPREHENSIVE RESULT: SIGNIFICANT CHANGE UP
BUN SERPL-MCNC: 33 MG/DL — HIGH (ref 7–23)
CALCIUM SERPL-MCNC: 9.2 MG/DL — SIGNIFICANT CHANGE UP (ref 8.4–10.5)
CHLORIDE BLDV-SCNC: SIGNIFICANT CHANGE UP MMOL/L (ref 96–108)
CHLORIDE SERPL-SCNC: 107 MMOL/L — SIGNIFICANT CHANGE UP (ref 98–107)
CO2 BLDV-SCNC: 22.3 MMOL/L — SIGNIFICANT CHANGE UP (ref 22–26)
CO2 SERPL-SCNC: 21 MMOL/L — LOW (ref 22–31)
CREAT SERPL-MCNC: 1.15 MG/DL — SIGNIFICANT CHANGE UP (ref 0.5–1.3)
CULTURE RESULTS: ABNORMAL
CULTURE RESULTS: ABNORMAL
EGFR: 62 ML/MIN/1.73M2 — SIGNIFICANT CHANGE UP
GAS PNL BLDV: 136 MMOL/L — SIGNIFICANT CHANGE UP (ref 136–145)
GLUCOSE BLDV-MCNC: 117 MG/DL — HIGH (ref 70–99)
GLUCOSE SERPL-MCNC: 122 MG/DL — HIGH (ref 70–99)
GRAM STN FLD: SIGNIFICANT CHANGE UP
HCO3 BLDV-SCNC: 21 MMOL/L — LOW (ref 22–29)
HCT VFR BLD CALC: 33.7 % — LOW (ref 39–50)
HCT VFR BLDA CALC: 36 % — LOW (ref 39–51)
HGB BLD CALC-MCNC: 11.9 G/DL — LOW (ref 12.6–17.4)
HGB BLD-MCNC: 10.8 G/DL — LOW (ref 13–17)
LACTATE BLDV-MCNC: 1.9 MMOL/L — SIGNIFICANT CHANGE UP (ref 0.5–2)
MAGNESIUM SERPL-MCNC: 2.2 MG/DL — SIGNIFICANT CHANGE UP (ref 1.6–2.6)
MCHC RBC-ENTMCNC: 32 GM/DL — SIGNIFICANT CHANGE UP (ref 32–36)
MCHC RBC-ENTMCNC: 33.2 PG — SIGNIFICANT CHANGE UP (ref 27–34)
MCV RBC AUTO: 103.7 FL — HIGH (ref 80–100)
METHOD TYPE: SIGNIFICANT CHANGE UP
NRBC # BLD: 0 /100 WBCS — SIGNIFICANT CHANGE UP (ref 0–0)
NRBC # FLD: 0 K/UL — SIGNIFICANT CHANGE UP (ref 0–0)
ORGANISM # SPEC MICROSCOPIC CNT: ABNORMAL
PCO2 BLDV: 35 MMHG — LOW (ref 42–55)
PH BLDV: 7.39 — SIGNIFICANT CHANGE UP (ref 7.32–7.43)
PHOSPHATE SERPL-MCNC: 2.7 MG/DL — SIGNIFICANT CHANGE UP (ref 2.5–4.5)
PLATELET # BLD AUTO: 122 K/UL — LOW (ref 150–400)
PO2 BLDV: 63 MMHG — HIGH (ref 25–45)
POTASSIUM BLDV-SCNC: 3.7 MMOL/L — SIGNIFICANT CHANGE UP (ref 3.5–5.1)
POTASSIUM SERPL-MCNC: 4.3 MMOL/L — SIGNIFICANT CHANGE UP (ref 3.5–5.3)
POTASSIUM SERPL-SCNC: 4.3 MMOL/L — SIGNIFICANT CHANGE UP (ref 3.5–5.3)
PROT SERPL-MCNC: 6.3 G/DL — SIGNIFICANT CHANGE UP (ref 6–8.3)
RBC # BLD: 3.25 M/UL — LOW (ref 4.2–5.8)
RBC # FLD: 14.5 % — SIGNIFICANT CHANGE UP (ref 10.3–14.5)
SAO2 % BLDV: 93 % — HIGH (ref 67–88)
SODIUM SERPL-SCNC: 141 MMOL/L — SIGNIFICANT CHANGE UP (ref 135–145)
SPECIMEN SOURCE: SIGNIFICANT CHANGE UP
WBC # BLD: 11.63 K/UL — HIGH (ref 3.8–10.5)
WBC # FLD AUTO: 11.63 K/UL — HIGH (ref 3.8–10.5)

## 2024-03-25 PROCEDURE — 99233 SBSQ HOSP IP/OBS HIGH 50: CPT | Mod: GC

## 2024-03-25 PROCEDURE — 93306 TTE W/DOPPLER COMPLETE: CPT | Mod: 26

## 2024-03-25 RX ORDER — CEFTRIAXONE 500 MG/1
2000 INJECTION, POWDER, FOR SOLUTION INTRAMUSCULAR; INTRAVENOUS EVERY 24 HOURS
Refills: 0 | Status: DISCONTINUED | OUTPATIENT
Start: 2024-03-25 | End: 2024-03-28

## 2024-03-25 RX ADMIN — PIPERACILLIN AND TAZOBACTAM 25 GRAM(S): 4; .5 INJECTION, POWDER, LYOPHILIZED, FOR SOLUTION INTRAVENOUS at 03:07

## 2024-03-25 RX ADMIN — BUDESONIDE AND FORMOTEROL FUMARATE DIHYDRATE 2 PUFF(S): 160; 4.5 AEROSOL RESPIRATORY (INHALATION) at 21:50

## 2024-03-25 RX ADMIN — TAMSULOSIN HYDROCHLORIDE 0.8 MILLIGRAM(S): 0.4 CAPSULE ORAL at 21:50

## 2024-03-25 RX ADMIN — Medication 325 MILLIGRAM(S): at 12:11

## 2024-03-25 RX ADMIN — PIPERACILLIN AND TAZOBACTAM 25 GRAM(S): 4; .5 INJECTION, POWDER, LYOPHILIZED, FOR SOLUTION INTRAVENOUS at 12:11

## 2024-03-25 RX ADMIN — Medication 3 MILLILITER(S): at 04:01

## 2024-03-25 RX ADMIN — Medication 3 MILLILITER(S): at 22:06

## 2024-03-25 RX ADMIN — Medication 3 MILLILITER(S): at 08:42

## 2024-03-25 RX ADMIN — CEFTRIAXONE 100 MILLIGRAM(S): 500 INJECTION, POWDER, FOR SOLUTION INTRAMUSCULAR; INTRAVENOUS at 16:55

## 2024-03-25 RX ADMIN — Medication 500 MILLIGRAM(S): at 12:11

## 2024-03-25 RX ADMIN — RIVAROXABAN 20 MILLIGRAM(S): KIT at 16:55

## 2024-03-25 RX ADMIN — Medication 3 MILLILITER(S): at 14:33

## 2024-03-25 RX ADMIN — BUDESONIDE AND FORMOTEROL FUMARATE DIHYDRATE 2 PUFF(S): 160; 4.5 AEROSOL RESPIRATORY (INHALATION) at 12:11

## 2024-03-25 NOTE — PROGRESS NOTE ADULT - PROBLEM SELECTOR PLAN 1
#severe sepsis #UTI asymptom #bacteremia  - p/w: tachycardic, febrile, hypoxic, +lactate 4, +leukocytosis s/p fluids and empiric abx  - RVP covid, neg; legionella neg, mrsa __, strep __   - source: UA +, UCx +GNR vs. respiratory illness CT chest with atelectatis  - BCx: +GPcocc = strep lutetiensis   - Abx: Zosyn (3/23 - __ ), azithromycin (3/22 - __);  held off vanco  - IVF: gave LR bolus on 3/24, f/u CXR #severe sepsis #UTI asymptom #bacteremia  - p/w: tachycardic, febrile, hypoxic, +lactate 4, +leukocytosis s/p fluids and empiric abx  - RVP covid, neg; legionella neg, mrsa neg, strep detected  - source: UA +, UCx +GNR vs. respiratory illness CT chest with atelectatis  - BCx: +GPcocc = strep lutetiensis, repeat blood cx drawn 3/25  - Abx: Zosyn (3/23 -3/25), ceftriaxone (3/25-, azithromycin (3/22);  held off vanco  - IVF: gave LR bolus on 3/24, CXR 3/24 unchanged

## 2024-03-25 NOTE — PROGRESS NOTE ADULT - PROBLEM SELECTOR PLAN 7
#COPD #homeOX  - h/o of COPD on home oxygen 3.5L  - CT chest with emphysema and atelectasis + pulm nodules and right paratracheal lymph nodes   - s/p albuterol PRN with NC, improved oxygen to 99%  - chest PT, incentive spirometry   - abx zosyn and azithro  - wean off oxygen as tolerated #COPD #homeOX  - h/o of COPD on home oxygen 3.5L  - CT chest with emphysema and atelectasis + pulm nodules and right paratracheal lymph nodes   - s/p albuterol PRN with NC, improved oxygen to 99%  - chest PT, incentive spirometry   - abx zosyn  - wean off oxygen as tolerated

## 2024-03-25 NOTE — PROGRESS NOTE ADULT - PROBLEM SELECTOR PLAN 9
PPX  - DVT ppx: xarelto 20 mg   - activity: as tolerated, pending PT   - updated family  - GOC: DNR/DNI  - Home care with aides at home PPX  - DVT ppx: xarelto 20 mg   - updated family  - GOC: DNR/DNI  - Home care with aides at home

## 2024-03-25 NOTE — PROGRESS NOTE ADULT - PROBLEM SELECTOR PLAN 6
#h/o AFIB, PPM, on Xarelto   - PPM ~2013, on blood thinner  - Xarelto 20 mg once a day    - last dose 3/22, resumed on 3/23  - c/w tele monitoring #h/o AFIB, PPM, on Xarelto   - PPM ~2013, on blood thinner  - Xarelto 20 mg once a day    - last dose 3/22, resumed on 3/23  - d/c telemetry

## 2024-03-25 NOTE — PROGRESS NOTE ADULT - ATTENDING COMMENTS
88M w/ COPD (3.5L home O2), AF (apixaban, s/p PPM) p/w SOB and chills and found to have pneumonia c/b Strep bacteremia and GNR UTI.     # Pneumonia/Strep bacteremia - continue Zosyn, repeat Bcx to document clearance, f/up ECHO  # UTI - Ucx w/ GNR, continue Zosyn, f/up speciation    - d/c telemetry  - Plan for home w/ home PT pending above    Time-based billing (NON-critical care).     50 minutes spent on total encounter; more than 50% of the visit was spent counseling and / or coordinating care by the attending physician.  The necessity of the time spent during the encounter on this date of service was due to:     documentation in [place], reviewing chart and coordinating care with patient/resident and interdisciplinary staff (such as , social workers, etc) as well as reviewing vitals, laboratory data, radiology, medication list, consultants' recommendations and prior records. Interventions were performed as documented above. 88M w/ COPD (3.5L home O2), AF (rivaroxaban, s/p PPM) p/w SOB and chills and found to have pneumonia c/b Strep bacteremia and GNR UTI.     # Pneumonia/Strep bacteremia - continue Zosyn, repeat Bcx to document clearance, f/up ECHO  # UTI - Ucx w/ GNR, continue Zosyn, f/up speciation    - d/c telemetry  - Plan for home w/ home PT pending above    Time-based billing (NON-critical care).     50 minutes spent on total encounter; more than 50% of the visit was spent counseling and / or coordinating care by the attending physician.  The necessity of the time spent during the encounter on this date of service was due to:     documentation in [place], reviewing chart and coordinating care with patient/resident and interdisciplinary staff (such as , social workers, etc) as well as reviewing vitals, laboratory data, radiology, medication list, consultants' recommendations and prior records. Interventions were performed as documented above. 88M w/ COPD (3.5L home O2), AF (rivaroxaban, s/p PPM) p/w SOB and chills and found to have pneumonia c/b Strep bacteremia and GNR UTI.     # Pneumonia/Strep bacteremia -  narrow to CTX, repeat Bcx to document clearance, f/up ECHO  # UTI - Ucx w/ GNR, continue Zosyn, f/up speciation    - d/c telemetry  - Plan for home w/ home PT pending above    Time-based billing (NON-critical care).     50 minutes spent on total encounter; more than 50% of the visit was spent counseling and / or coordinating care by the attending physician.  The necessity of the time spent during the encounter on this date of service was due to:     documentation in [place], reviewing chart and coordinating care with patient/resident and interdisciplinary staff (such as , social workers, etc) as well as reviewing vitals, laboratory data, radiology, medication list, consultants' recommendations and prior records. Interventions were performed as documented above.

## 2024-03-25 NOTE — PROGRESS NOTE ADULT - PROBLEM SELECTOR PLAN 5
#BPH   - pending bladder scan, h/o retention i/s/o BPH __  - c/w tamsulosin 0.8 mg at bedtime #BPH   - c/w tamsulosin 0.8 mg at bedtime

## 2024-03-25 NOTE — PROGRESS NOTE ADULT - ASSESSMENT
88M with h/o COPD (home Ox) and Afib (on Eliquis/Xarelto?) and PPM who presents with fever and SOB. Pt found to be septic w/temp 104F rectal, tachy, desat. CT chest c/f atelectasis, interlobular septal thickening, and incidental pulm nodules with trace pericardial effusion. UA positive, UCx +GNR and BCx 3/22 +strept lutetiensis pending susceptibility. Trend lactate now s/p IVF, pending TTE and monitor on tele

## 2024-03-25 NOTE — PROGRESS NOTE ADULT - PROBLEM SELECTOR PLAN 3
#demand ischemia #elevated trop  #trace pericardial effusion  #B lines on pocus   # R pleural effusion   - No history of heart failure, no LE edema or HJR  - CT chest with small pleural effusion (r) and trace pericardial effusion   - EKG with inverted t wave in II, III and v4-v6; so significant ST changes  - placed on tele (3/23)  - proBNP ~3k, trop 62 from 104, plateau, ck 870, ckmb 13.8   - pending TTE Induction #demand ischemia #elevated trop  #trace pericardial effusion  #B lines on pocus   # R pleural effusion   - No history of heart failure, no LE edema or HJR  - CT chest with small pleural effusion (r) and trace pericardial effusion   - EKG with inverted t wave in II, III and v4-v6; so significant ST changes  - placed on tele (3/23), d/c 3/25  - proBNP ~3k, trop 62 from 104, plateau, ck 870, ckmb 13.8   - pending TTE

## 2024-03-25 NOTE — PROGRESS NOTE ADULT - SUBJECTIVE AND OBJECTIVE BOX
PROGRESS NOTE:   Patient is a 88y old  Male who presents with a chief complaint of fever (23 Mar 2024 07:48)  - pt states he is doing better, no fever overnight, daughter is at bedside.     SUBJECTIVE / OVERNIGHT EVENTS:  No acute events overnight.     ADDITIONAL REVIEW OF SYSTEMS:  Patient denies fevers, chills, chest pain, shortness of breath, nausea, abdominal pain, diarrhea, constipation, dysuria, leg swelling, headache, light headedness.    MEDICATIONS  (STANDING):  albuterol/ipratropium for Nebulization 3 milliLiter(s) Nebulizer every 6 hours  budesonide 160 MICROgram(s)/formoterol 4.5 MICROgram(s) Inhaler 2 Puff(s) Inhalation two times a day  budesonide 160 MICROgram(s)/formoterol 4.5 MICROgram(s) Inhaler 2 Puff(s) Inhalation two times a day  piperacillin/tazobactam IVPB.. 3.375 Gram(s) IV Intermittent every 8 hours  rivaroxaban 20 milliGRAM(s) Oral with dinner  tamsulosin 0.8 milliGRAM(s) Oral at bedtime    MEDICATIONS  (PRN):      CAPILLARY BLOOD GLUCOSE        I&O's Summary      PHYSICAL EXAM:  Vital Signs Last 24 Hrs  T(C): 36.6 (24 Mar 2024 07:51), Max: 37 (23 Mar 2024 11:10)  T(F): 97.8 (24 Mar 2024 07:51), Max: 98.6 (23 Mar 2024 11:10)  HR: 74 (24 Mar 2024 07:51) (68 - 74)  BP: 141/78 (24 Mar 2024 07:51) (120/71 - 163/73)  BP(mean): --  RR: 18 (24 Mar 2024 07:51) (14 - 18)  SpO2: 98% (24 Mar 2024 07:51) (95% - 99%)    Parameters below as of 24 Mar 2024 07:51  Patient On (Oxygen Delivery Method): nasal cannula  O2 Flow (L/min): 3      CONSTITUTIONAL: NAD,   RESPIRATORY: Normal respiratory effort; lungs are clear to auscultation bilaterally  CARDIOVASCULAR: Regular rate and rhythm, normal S1 and S2, no murmur/rub/gallop; No lower extremity edema; Peripheral pulses are 2+ bilaterally  ABDOMEN: Nontender to palpation, normoactive bowel sounds, no rebound/guarding; No hepatosplenomegaly  MUSCLOSKELETAL: no clubbing or cyanosis of digits; no joint swelling or tenderness to palpation  PSYCH: A+O to person, place, and time; affect appropriate    LABS:                        11.3   15.72 )-----------( 129      ( 24 Mar 2024 05:47 )             33.8     03-24    138  |  105  |  42<H>  ----------------------------<  167<H>  4.4   |  19<L>  |  1.28    Ca    9.2      24 Mar 2024 05:47  Phos  3.3     03-24  Mg     2.20     03-24    TPro  6.5  /  Alb  3.8  /  TBili  0.7  /  DBili  x   /  AST  40  /  ALT  17  /  AlkPhos  55  03-24    PT/INR - ( 22 Mar 2024 22:45 )   PT: 19.1 sec;   INR: 1.73 ratio         PTT - ( 22 Mar 2024 22:45 )  PTT:30.3 sec  CARDIAC MARKERS ( 24 Mar 2024 05:47 )  x     / x     / 870 U/L / x     / 13.8 ng/mL      Urinalysis Basic - ( 24 Mar 2024 05:47 )    Color: x / Appearance: x / SG: x / pH: x  Gluc: 167 mg/dL / Ketone: x  / Bili: x / Urobili: x   Blood: x / Protein: x / Nitrite: x   Leuk Esterase: x / RBC: x / WBC x   Sq Epi: x / Non Sq Epi: x / Bacteria: x        Culture - Blood (collected 22 Mar 2024 23:00)  Source: .Blood Blood-Peripheral  Gram Stain (23 Mar 2024 11:17):    Growth in anaerobic bottle: Gram positive cocci in pairs    Growth in aerobic bottle: Gram positive cocci in pairs  Preliminary Report (23 Mar 2024 11:17):    Growth in anaerobic bottle: Gram positive cocci in pairs    Growth in aerobic bottle: Gram positive cocci in pairs    Direct identification is available within approximately 3-5    hours either by Blood Panel Multiplexed PCR or Direct    MALDI-TOF. Details: [email or web address]  Organism: Blood Culture PCR (23 Mar 2024 11:49)  Organism: Blood Culture PCR (23 Mar 2024 11:49)    Culture - Blood (collected 22 Mar 2024 22:45)  Source: .Blood Blood-Peripheral  Gram Stain (23 Mar 2024 11:31):    Growth in anaerobic bottle: Gram positive cocci in pairs    Growth in aerobic bottle: Gram positive cocci in pairs  Preliminary Report (23 Mar 2024 11:31):    Growth in anaerobic bottle: Gram positive cocci in pairs    Growth in aerobic bottle: Gram positive cocci in pairs        Tele Reviewed:    RADIOLOGY & ADDITIONAL TESTS:  Results Reviewed:   Imaging Personally Reviewed:  Electrocardiogram Personally Reviewed:     PROGRESS NOTE:   Patient is a 88y old  Male who presents with a chief complaint of fever (23 Mar 2024 07:48)    SUBJECTIVE / OVERNIGHT EVENTS:  No acute events overnight.     Patient seen and examined at bedside. Feeling well, endorsed feeling a little SOB. Denied chest pain, fever or chills.    ADDITIONAL REVIEW OF SYSTEMS:  Patient denies fevers, chills, chest pain, shortness of breath, nausea, abdominal pain, diarrhea, constipation, dysuria, leg swelling, headache, light headedness.    MEDICATIONS  (STANDING):  albuterol/ipratropium for Nebulization 3 milliLiter(s) Nebulizer every 6 hours  budesonide 160 MICROgram(s)/formoterol 4.5 MICROgram(s) Inhaler 2 Puff(s) Inhalation two times a day  budesonide 160 MICROgram(s)/formoterol 4.5 MICROgram(s) Inhaler 2 Puff(s) Inhalation two times a day  piperacillin/tazobactam IVPB.. 3.375 Gram(s) IV Intermittent every 8 hours  rivaroxaban 20 milliGRAM(s) Oral with dinner  tamsulosin 0.8 milliGRAM(s) Oral at bedtime    MEDICATIONS  (PRN):      CAPILLARY BLOOD GLUCOSE        I&O's Summary      PHYSICAL EXAM:  Vital Signs Last 24 Hrs  T(C): 36.8 (25 Mar 2024 08:12), Max: 37.1 (25 Mar 2024 04:16)  T(F): 98.2 (25 Mar 2024 08:12), Max: 98.8 (25 Mar 2024 04:16)  HR: 64 (25 Mar 2024 08:45) (64 - 78)  BP: 162/75 (25 Mar 2024 08:12) (132/67 - 162/75)  BP(mean): --  RR: 18 (25 Mar 2024 08:12) (17 - 18)  SpO2: 97% (25 Mar 2024 08:45) (95% - 99%)    Parameters below as of 25 Mar 2024 08:45  Patient On (Oxygen Delivery Method): nasal cannula      CONSTITUTIONAL: NAD,   RESPIRATORY: Normal respiratory effort; lungs are clear to auscultation bilaterally  CARDIOVASCULAR: Regular rate and rhythm, normal S1 and S2, no murmur/rub/gallop; No lower extremity edema; Peripheral pulses are 2+ bilaterally  ABDOMEN: Nontender to palpation, normoactive bowel sounds, no rebound/guarding; No hepatosplenomegaly  MUSCLOSKELETAL: no clubbing or cyanosis of digits; no joint swelling or tenderness to palpation  PSYCH: A+O to person, place, and time; affect appropriate    LABS:             CBC Full  -  ( 25 Mar 2024 05:30 )  WBC Count : 11.63 K/uL  RBC Count : 3.25 M/uL  Hemoglobin : 10.8 g/dL  Hematocrit : 33.7 %  Platelet Count - Automated : 122 K/uL  Mean Cell Volume : 103.7 fL  Mean Cell Hemoglobin : 33.2 pg  Mean Cell Hemoglobin Concentration : 32.0 gm/dL  Auto Neutrophil # : x  Auto Lymphocyte # : x  Auto Monocyte # : x  Auto Eosinophil # : x  Auto Basophil # : x  Auto Neutrophil % : x  Auto Lymphocyte % : x  Auto Monocyte % : x  Auto Eosinophil % : x  Auto Basophil % : x    03-25    141  |  107  |  33<H>  ----------------------------<  122<H>  4.3   |  21<L>  |  1.15    Ca    9.2      25 Mar 2024 05:30  Phos  2.7     03-25  Mg     2.20     03-25    TPro  6.3  /  Alb  3.7  /  TBili  0.8  /  DBili  x   /  AST  48<H>  /  ALT  28  /  AlkPhos  53  03-25       CARDIAC MARKERS ( 24 Mar 2024 05:47 )  x     / x     / 870 U/L / x     / 13.8 ng/mL      Urinalysis Basic - ( 24 Mar 2024 05:47 )    Color: x / Appearance: x / SG: x / pH: x  Gluc: 167 mg/dL / Ketone: x  / Bili: x / Urobili: x   Blood: x / Protein: x / Nitrite: x   Leuk Esterase: x / RBC: x / WBC x   Sq Epi: x / Non Sq Epi: x / Bacteria: x        Culture - Blood (collected 22 Mar 2024 23:00)  Source: .Blood Blood-Peripheral  Gram Stain (23 Mar 2024 11:17):    Growth in anaerobic bottle: Gram positive cocci in pairs    Growth in aerobic bottle: Gram positive cocci in pairs  Preliminary Report (23 Mar 2024 11:17):    Growth in anaerobic bottle: Gram positive cocci in pairs    Growth in aerobic bottle: Gram positive cocci in pairs    Direct identification is available within approximately 3-5    hours either by Blood Panel Multiplexed PCR or Direct    MALDI-TOF. Details: [email or web address]  Organism: Blood Culture PCR (23 Mar 2024 11:49)  Organism: Blood Culture PCR (23 Mar 2024 11:49)    Culture - Blood (collected 22 Mar 2024 22:45)  Source: .Blood Blood-Peripheral  Gram Stain (23 Mar 2024 11:31):    Growth in anaerobic bottle: Gram positive cocci in pairs    Growth in aerobic bottle: Gram positive cocci in pairs  Preliminary Report (23 Mar 2024 11:31):    Growth in anaerobic bottle: Gram positive cocci in pairs    Growth in aerobic bottle: Gram positive cocci in pairs        Tele Reviewed:    RADIOLOGY & ADDITIONAL TESTS:  Results Reviewed:   Imaging Personally Reviewed:  Electrocardiogram Personally Reviewed:

## 2024-03-26 ENCOUNTER — TRANSCRIPTION ENCOUNTER (OUTPATIENT)
Age: 89
End: 2024-03-26

## 2024-03-26 LAB
-  AMOXICILLIN/CLAVULANIC ACID: SIGNIFICANT CHANGE UP
-  AMPICILLIN/SULBACTAM: SIGNIFICANT CHANGE UP
-  AMPICILLIN: SIGNIFICANT CHANGE UP
-  AZTREONAM: SIGNIFICANT CHANGE UP
-  CEFAZOLIN: SIGNIFICANT CHANGE UP
-  CEFEPIME: SIGNIFICANT CHANGE UP
-  CEFOXITIN: SIGNIFICANT CHANGE UP
-  CEFTRIAXONE: SIGNIFICANT CHANGE UP
-  CIPROFLOXACIN: SIGNIFICANT CHANGE UP
-  ERTAPENEM: SIGNIFICANT CHANGE UP
-  GENTAMICIN: SIGNIFICANT CHANGE UP
-  IMIPENEM: SIGNIFICANT CHANGE UP
-  LEVOFLOXACIN: SIGNIFICANT CHANGE UP
-  MEROPENEM: SIGNIFICANT CHANGE UP
-  NITROFURANTOIN: SIGNIFICANT CHANGE UP
-  PIPERACILLIN/TAZOBACTAM: SIGNIFICANT CHANGE UP
-  TOBRAMYCIN: SIGNIFICANT CHANGE UP
-  TRIMETHOPRIM/SULFAMETHOXAZOLE: SIGNIFICANT CHANGE UP
ALBUMIN SERPL ELPH-MCNC: 3.6 G/DL — SIGNIFICANT CHANGE UP (ref 3.3–5)
ALP SERPL-CCNC: 59 U/L — SIGNIFICANT CHANGE UP (ref 40–120)
ALT FLD-CCNC: 27 U/L — SIGNIFICANT CHANGE UP (ref 4–41)
ANION GAP SERPL CALC-SCNC: 11 MMOL/L — SIGNIFICANT CHANGE UP (ref 7–14)
AST SERPL-CCNC: 29 U/L — SIGNIFICANT CHANGE UP (ref 4–40)
BILIRUB SERPL-MCNC: 1.3 MG/DL — HIGH (ref 0.2–1.2)
BUN SERPL-MCNC: 22 MG/DL — SIGNIFICANT CHANGE UP (ref 7–23)
CALCIUM SERPL-MCNC: 9.1 MG/DL — SIGNIFICANT CHANGE UP (ref 8.4–10.5)
CHLORIDE SERPL-SCNC: 105 MMOL/L — SIGNIFICANT CHANGE UP (ref 98–107)
CO2 SERPL-SCNC: 23 MMOL/L — SIGNIFICANT CHANGE UP (ref 22–31)
CREAT SERPL-MCNC: 1.03 MG/DL — SIGNIFICANT CHANGE UP (ref 0.5–1.3)
CULTURE RESULTS: ABNORMAL
EGFR: 70 ML/MIN/1.73M2 — SIGNIFICANT CHANGE UP
GLUCOSE SERPL-MCNC: 111 MG/DL — HIGH (ref 70–99)
HCT VFR BLD CALC: 31.7 % — LOW (ref 39–50)
HGB BLD-MCNC: 10.6 G/DL — LOW (ref 13–17)
MAGNESIUM SERPL-MCNC: 2.1 MG/DL — SIGNIFICANT CHANGE UP (ref 1.6–2.6)
MCHC RBC-ENTMCNC: 33.4 GM/DL — SIGNIFICANT CHANGE UP (ref 32–36)
MCHC RBC-ENTMCNC: 34.4 PG — HIGH (ref 27–34)
MCV RBC AUTO: 102.9 FL — HIGH (ref 80–100)
METHOD TYPE: SIGNIFICANT CHANGE UP
NRBC # BLD: 0 /100 WBCS — SIGNIFICANT CHANGE UP (ref 0–0)
NRBC # FLD: 0 K/UL — SIGNIFICANT CHANGE UP (ref 0–0)
ORGANISM # SPEC MICROSCOPIC CNT: ABNORMAL
ORGANISM # SPEC MICROSCOPIC CNT: ABNORMAL
PHOSPHATE SERPL-MCNC: 2.5 MG/DL — SIGNIFICANT CHANGE UP (ref 2.5–4.5)
PLATELET # BLD AUTO: 112 K/UL — LOW (ref 150–400)
POTASSIUM SERPL-MCNC: 4.4 MMOL/L — SIGNIFICANT CHANGE UP (ref 3.5–5.3)
POTASSIUM SERPL-SCNC: 4.4 MMOL/L — SIGNIFICANT CHANGE UP (ref 3.5–5.3)
PROT SERPL-MCNC: 6 G/DL — SIGNIFICANT CHANGE UP (ref 6–8.3)
RBC # BLD: 3.08 M/UL — LOW (ref 4.2–5.8)
RBC # FLD: 14 % — SIGNIFICANT CHANGE UP (ref 10.3–14.5)
SODIUM SERPL-SCNC: 139 MMOL/L — SIGNIFICANT CHANGE UP (ref 135–145)
SPECIMEN SOURCE: SIGNIFICANT CHANGE UP
WBC # BLD: 8.67 K/UL — SIGNIFICANT CHANGE UP (ref 3.8–10.5)
WBC # FLD AUTO: 8.67 K/UL — SIGNIFICANT CHANGE UP (ref 3.8–10.5)

## 2024-03-26 PROCEDURE — 99223 1ST HOSP IP/OBS HIGH 75: CPT | Mod: GC

## 2024-03-26 PROCEDURE — 99232 SBSQ HOSP IP/OBS MODERATE 35: CPT | Mod: GC

## 2024-03-26 RX ORDER — FLUTICASONE FUROATE, UMECLIDINIUM BROMIDE AND VILANTEROL TRIFENATATE 200; 62.5; 25 UG/1; UG/1; UG/1
1 POWDER RESPIRATORY (INHALATION) DAILY
Refills: 0 | Status: DISCONTINUED | OUTPATIENT
Start: 2024-03-26 | End: 2024-03-26

## 2024-03-26 RX ORDER — METOPROLOL TARTRATE 50 MG
50 TABLET ORAL DAILY
Refills: 0 | Status: DISCONTINUED | OUTPATIENT
Start: 2024-03-26 | End: 2024-03-28

## 2024-03-26 RX ORDER — FLUTICASONE FUROATE, UMECLIDINIUM BROMIDE AND VILANTEROL TRIFENATATE 200; 62.5; 25 UG/1; UG/1; UG/1
1 POWDER RESPIRATORY (INHALATION) DAILY
Refills: 0 | Status: DISCONTINUED | OUTPATIENT
Start: 2024-03-26 | End: 2024-03-28

## 2024-03-26 RX ORDER — ALBUTEROL 90 UG/1
2 AEROSOL, METERED ORAL EVERY 6 HOURS
Refills: 0 | Status: DISCONTINUED | OUTPATIENT
Start: 2024-03-26 | End: 2024-03-28

## 2024-03-26 RX ORDER — LOSARTAN POTASSIUM 100 MG/1
100 TABLET, FILM COATED ORAL DAILY
Refills: 0 | Status: DISCONTINUED | OUTPATIENT
Start: 2024-03-26 | End: 2024-03-28

## 2024-03-26 RX ADMIN — BUDESONIDE AND FORMOTEROL FUMARATE DIHYDRATE 2 PUFF(S): 160; 4.5 AEROSOL RESPIRATORY (INHALATION) at 12:53

## 2024-03-26 RX ADMIN — Medication 3 MILLILITER(S): at 09:02

## 2024-03-26 RX ADMIN — Medication 3 MILLILITER(S): at 04:31

## 2024-03-26 RX ADMIN — Medication 3 MILLILITER(S): at 21:15

## 2024-03-26 RX ADMIN — TAMSULOSIN HYDROCHLORIDE 0.8 MILLIGRAM(S): 0.4 CAPSULE ORAL at 22:43

## 2024-03-26 RX ADMIN — Medication 500 MILLIGRAM(S): at 12:53

## 2024-03-26 RX ADMIN — CEFTRIAXONE 100 MILLIGRAM(S): 500 INJECTION, POWDER, FOR SOLUTION INTRAMUSCULAR; INTRAVENOUS at 12:53

## 2024-03-26 RX ADMIN — Medication 50 MILLIGRAM(S): at 12:53

## 2024-03-26 RX ADMIN — FLUTICASONE FUROATE, UMECLIDINIUM BROMIDE AND VILANTEROL TRIFENATATE 1 PUFF(S): 200; 62.5; 25 POWDER RESPIRATORY (INHALATION) at 17:09

## 2024-03-26 RX ADMIN — RIVAROXABAN 20 MILLIGRAM(S): KIT at 17:10

## 2024-03-26 RX ADMIN — Medication 3 MILLILITER(S): at 14:25

## 2024-03-26 RX ADMIN — Medication 325 MILLIGRAM(S): at 12:53

## 2024-03-26 RX ADMIN — LOSARTAN POTASSIUM 100 MILLIGRAM(S): 100 TABLET, FILM COATED ORAL at 17:11

## 2024-03-26 NOTE — PROGRESS NOTE ADULT - PROBLEM SELECTOR PLAN 6
#h/o AFIB, PPM, on Xarelto   - PPM ~2013, on blood thinner  - Xarelto 20 mg once a day    - last dose 3/22, resumed on 3/23  - d/c telemetry

## 2024-03-26 NOTE — DISCHARGE NOTE NURSING/CASE MANAGEMENT/SOCIAL WORK - NSDCFUADDAPPT_GEN_ALL_CORE_FT
APPTS ARE READY TO BE MADE: [x ] YES    Best Family or Patient Contact (if needed):    Additional Information about above appointments (if needed):    1: Pulmonary  2: Cardiology   3:     Other comments or requests:

## 2024-03-26 NOTE — DISCHARGE NOTE NURSING/CASE MANAGEMENT/SOCIAL WORK - NSSCNAMETXT_GEN_ALL_CORE
[facility] [phone] Nurse to visit on the day following discharge. Other appropriate services to be arranged thereafter. Please contact the home care agency at the above phone number if you have not heard from them by approximately 12 noon on the day after your hospital discharge.

## 2024-03-26 NOTE — PROGRESS NOTE ADULT - PROBLEM SELECTOR PLAN 1
#severe sepsis #UTI asymptom #bacteremia  - p/w: tachycardic, febrile, hypoxic, +lactate 4, +leukocytosis s/p fluids and empiric abx  - RVP covid, neg; legionella neg, mrsa neg, strep detected  - source: UA +, UCx +GNR vs. respiratory illness CT chest with atelectatis  - BCx: +GPcocc = strep lutetiensis, repeat blood cx drawn 3/25  - Abx: Zosyn (3/23 -3/25), ceftriaxone (3/25-, azithromycin (3/22);  held off vanco  - IVF: gave LR bolus on 3/24, CXR 3/24 unchanged #severe sepsis #UTI asymptom #bacteremia  - p/w: tachycardic, febrile, hypoxic, +lactate 4, +leukocytosis s/p fluids and empiric abx  - RVP covid, neg; legionella neg, mrsa neg, strep detected  - source: UA +, UCx +GNR vs. respiratory illness CT chest with atelectatis  - BCx: +GPcocc = strep lutetiensis, repeat blood cx drawn 3/25, no growth 24 hrs  - Abx: Zosyn (3/23 -3/25), ceftriaxone (3/25-, azithromycin (3/22);  held off vanco  - IVF: gave LR bolus on 3/24, CXR 3/24 unchanged

## 2024-03-26 NOTE — DISCHARGE NOTE NURSING/CASE MANAGEMENT/SOCIAL WORK - NSDCPEFALRISK_GEN_ALL_CORE
For information on Fall & Injury Prevention, visit: [email or web address] OR  [email or web address] OR  https://www.cdc.gov/steadi/patient.html

## 2024-03-26 NOTE — PROGRESS NOTE ADULT - PROBLEM SELECTOR PLAN 8
#HTN  - on toprol xl 75 mg   - BP normotensive, hold i/s/o sepsis #HTN  - on toprol xl 75 mg   - resumed home toprol and losartan

## 2024-03-26 NOTE — CONSULT NOTE ADULT - ATTENDING COMMENTS
87 y/o M w/ hx of Afib on Eliquis w/ PPM, COPD on 3-3.5L NC (on pred?) daily presenting to ED with chills and shortness of breath that started suddenly. He was feeling well until 3/23. He recently went for deep dental cleaning within the past month and has partial dentures as well. Patient was found to meet sepsis criteria on admission with fever and leukocytosis. His blood cultures grew Streptococcus lutetiensis in 4/4 bottles. Patient has PPM and also with known R hip replacement, has been able to ambulate with cane as usual. UA with pyuria and UCx with Enterobacter cloacae, however patient without urinary symptoms. TTE was unable to exclude IE.    Micro:  BCx (3/22): Streptococcus lutetiensis (4/4 bottles)  BCx (3/25): no growth  UCx (3/22): E. cloacae    Abx:  Cefepime (3/22)  Zosyn (3/23-3/25)  Ceftriaxone (3/23, 3/25-)    Overall, high grade Streptococcus bacteremia, recent dental work, known PPM, R hip replacement, asymptomatic bacteruria  fever, leucocytosis, sepsis on presentation.       Suggest:  - IV ceftriaxone 2g qD  - Ensure repeat blood cultures remain negative at 72 hours  - Patient will need CAREN for further work-up of IE, especially in setting of PPM  - Depending on CAREN results, will need EP evaluation for PPM removal  - Check CT a/p (ideally with contrast) to work-up GI source of bacteremia  - He will need 6 weeks of antibiotics, likely IV ceftriaxone, pending rest of work-up  - Check ESR/CRP with next labs for baseline in setting of known hip replacement  - trend cbc, resolved leucocytosis.    Plan discussed with consulting team.     Jordan Shipman  Please contact through MS Teams   If no response or past 5 pm/weekend call [phone].

## 2024-03-26 NOTE — CONSULT NOTE ADULT - ASSESSMENT
87 y/o M w/ hx of Afib on Eliquis w/ PPM, COPD on 3-3.5L NC (on pred?) daily presenting to ED with chills and shortness of breath that started suddenly. He was feeling well until 3/23. He recently went for deep dental cleaning within the past month and has partial dentures as well. Patient was found to meet sepsis criteria on admission with fever and leukocytosis. His blood cultures grew Streptococcus lutetiensis in 4/4 bottles. Patient has PPM and also with known R hip replacement, has been able to ambulate with cane as usual. UA with pyuria and UCx with Enterobacter cloacae, however patient without urinary symptoms. TTE was unable to exclude IE.    Micro:  BCx (3/22): Streptococcus lutetiensis (4/4 bottles)  BCx (3/25): no growth  UCx (3/22): E. cloacae    Abx:  Cefepime (3/22)  Zosyn (3/23-3/25)  Ceftriaxone (3/23, 3/25-)    Overall, high grade Streptococcus bacteremia, recent dental work, known PPM, R hip replacement, asymptomatic bacteruria    Suggest:  - IV ceftriaxone 2g qD  - Ensure repeat blood cultures remain negative at 72 hours  - Patient will need CAREN for further work-up of IE, especially in setting of PPM  - Depending on CAREN results, will need EP evaluation for PPM removal  - He will need 6 weeks of antibiotics, likely IV ceftriaxone, pending rest of work-up  - Check ESR/CRP with next labs for baseline in setting of known hip replacement    Tim Schroeder, PGY4  ID Fellow  [employer] Teams Preferred  After 5pm/weekends call [phone] 89 y/o M w/ hx of Afib on Eliquis w/ PPM, COPD on 3-3.5L NC (on pred?) daily presenting to ED with chills and shortness of breath that started suddenly. He was feeling well until 3/23. He recently went for deep dental cleaning within the past month and has partial dentures as well. Patient was found to meet sepsis criteria on admission with fever and leukocytosis. His blood cultures grew Streptococcus lutetiensis in 4/4 bottles. Patient has PPM and also with known R hip replacement, has been able to ambulate with cane as usual. UA with pyuria and UCx with Enterobacter cloacae, however patient without urinary symptoms. TTE was unable to exclude IE.    Micro:  BCx (3/22): Streptococcus lutetiensis (4/4 bottles)  BCx (3/25): no growth  UCx (3/22): E. cloacae    Abx:  Cefepime (3/22)  Zosyn (3/23-3/25)  Ceftriaxone (3/23, 3/25-)    Overall, high grade Streptococcus bacteremia, recent dental work, known PPM, R hip replacement, asymptomatic bacteruria    Suggest:  - IV ceftriaxone 2g qD  - Ensure repeat blood cultures remain negative at 72 hours  - Patient will need CAREN for further work-up of IE, especially in setting of PPM  - Depending on CAREN results, will need EP evaluation for PPM removal  - Check CT a/p (ideally with contrast) to work-up GI source of bacteremia  - He will need 6 weeks of antibiotics, likely IV ceftriaxone, pending rest of work-up  - Check ESR/CRP with next labs for baseline in setting of known hip replacement    Tim Schroeder, PGY4  ID Fellow  [employer] Teams Preferred  After 5pm/weekends call [phone]

## 2024-03-26 NOTE — DISCHARGE NOTE NURSING/CASE MANAGEMENT/SOCIAL WORK - PATIENT PORTAL LINK FT
You can access the FollowMyHealth Patient Portal offered by [facility] by registering at the following website: [email or web address]. By joining [facility]’s FollowMyHealth portal, you will also be able to view your health information using other applications (apps) compatible with our system.

## 2024-03-26 NOTE — PROGRESS NOTE ADULT - ATTENDING COMMENTS
88M w/ COPD (3.5L home O2), AF (rivaroxaban, s/p PPM) p/w SOB and chills and found to have Strep lutetiensis bacteremia and enterbacter in urine.    #Strep lutetiensis bacteremia -  source not clear, 4/4 bottles from 3/22 positive, repeat Bcx 3/25 NGTD, TTE non-diagnostic for vegetation per discussion with echo cardiologist. ID consulted re: whether CAREN is needed to r/o endocarditis and for abx plan, ability to transition to oral abx.  Discussed w/ patient - CAREN would be w/in GOC, colonoscopy to further eval for colon cancer given Strep lutetiensis bacteremia not within GOC. Currently on CTX 2g IV daily.  # UTI - Ucx w/ enterobacter - not clear whether represents infection vs asymptomatic bacteriuria - will await ID recommendations re: need to broaden abx      - Plan for home w/ home PT pending above    Time-based billing (NON-critical care).     35 minutes spent on total encounter; more than 50% of the visit was spent counseling and / or coordinating care by the attending physician.  The necessity of the time spent during the encounter on this date of service was due to:     documentation in [place], reviewing chart and coordinating care with patient/resident and interdisciplinary staff (such as , social workers, etc) as well as reviewing vitals, laboratory data, radiology, medication list, consultants' recommendations and prior records. Interventions were performed as documented above.

## 2024-03-26 NOTE — CHART NOTE - NSCHARTNOTEFT_GEN_A_CORE
Attempted to see the patient face to face, however the patient was not present to discuss care at that time. Outreached the phone numbers on file and left a voicemail for the patient to return our call.  [phone] 03/26
Attempted to see the patient face to face, however the patient was not present to discuss care at that time. Outreached the phone numbers on file and left a voicemail for the patient to return our call.  [phone] 03/25

## 2024-03-26 NOTE — PROGRESS NOTE ADULT - SUBJECTIVE AND OBJECTIVE BOX
PROGRESS NOTE:   Patient is a 88y old  Male who presents with a chief complaint of fever (23 Mar 2024 07:48)    SUBJECTIVE / OVERNIGHT EVENTS:  No acute events overnight.     Patient seen and examined at bedside. Feeling well, endorsed feeling a little SOB. Denied chest pain, fever or chills.    ADDITIONAL REVIEW OF SYSTEMS:  Patient denies fevers, chills, chest pain, shortness of breath, nausea, abdominal pain, diarrhea, constipation, dysuria, leg swelling, headache, light headedness.    MEDICATIONS  (STANDING):  albuterol/ipratropium for Nebulization 3 milliLiter(s) Nebulizer every 6 hours  budesonide 160 MICROgram(s)/formoterol 4.5 MICROgram(s) Inhaler 2 Puff(s) Inhalation two times a day  budesonide 160 MICROgram(s)/formoterol 4.5 MICROgram(s) Inhaler 2 Puff(s) Inhalation two times a day  piperacillin/tazobactam IVPB.. 3.375 Gram(s) IV Intermittent every 8 hours  rivaroxaban 20 milliGRAM(s) Oral with dinner  tamsulosin 0.8 milliGRAM(s) Oral at bedtime    MEDICATIONS  (PRN):      CAPILLARY BLOOD GLUCOSE        I&O's Summary      PHYSICAL EXAM:  Vital Signs Last 24 Hrs  T(C): 36.8 (25 Mar 2024 08:12), Max: 37.1 (25 Mar 2024 04:16)  T(F): 98.2 (25 Mar 2024 08:12), Max: 98.8 (25 Mar 2024 04:16)  HR: 64 (25 Mar 2024 08:45) (64 - 78)  BP: 162/75 (25 Mar 2024 08:12) (132/67 - 162/75)  BP(mean): --  RR: 18 (25 Mar 2024 08:12) (17 - 18)  SpO2: 97% (25 Mar 2024 08:45) (95% - 99%)    Parameters below as of 25 Mar 2024 08:45  Patient On (Oxygen Delivery Method): nasal cannula      CONSTITUTIONAL: NAD,   RESPIRATORY: Normal respiratory effort; lungs are clear to auscultation bilaterally  CARDIOVASCULAR: Regular rate and rhythm, normal S1 and S2, no murmur/rub/gallop; No lower extremity edema; Peripheral pulses are 2+ bilaterally  ABDOMEN: Nontender to palpation, normoactive bowel sounds, no rebound/guarding; No hepatosplenomegaly  MUSCLOSKELETAL: no clubbing or cyanosis of digits; no joint swelling or tenderness to palpation  PSYCH: A+O to person, place, and time; affect appropriate    LABS:             CBC Full  -  ( 25 Mar 2024 05:30 )  WBC Count : 11.63 K/uL  RBC Count : 3.25 M/uL  Hemoglobin : 10.8 g/dL  Hematocrit : 33.7 %  Platelet Count - Automated : 122 K/uL  Mean Cell Volume : 103.7 fL  Mean Cell Hemoglobin : 33.2 pg  Mean Cell Hemoglobin Concentration : 32.0 gm/dL  Auto Neutrophil # : x  Auto Lymphocyte # : x  Auto Monocyte # : x  Auto Eosinophil # : x  Auto Basophil # : x  Auto Neutrophil % : x  Auto Lymphocyte % : x  Auto Monocyte % : x  Auto Eosinophil % : x  Auto Basophil % : x    03-25    141  |  107  |  33<H>  ----------------------------<  122<H>  4.3   |  21<L>  |  1.15    Ca    9.2      25 Mar 2024 05:30  Phos  2.7     03-25  Mg     2.20     03-25    TPro  6.3  /  Alb  3.7  /  TBili  0.8  /  DBili  x   /  AST  48<H>  /  ALT  28  /  AlkPhos  53  03-25       CARDIAC MARKERS ( 24 Mar 2024 05:47 )  x     / x     / 870 U/L / x     / 13.8 ng/mL      Urinalysis Basic - ( 24 Mar 2024 05:47 )    Color: x / Appearance: x / SG: x / pH: x  Gluc: 167 mg/dL / Ketone: x  / Bili: x / Urobili: x   Blood: x / Protein: x / Nitrite: x   Leuk Esterase: x / RBC: x / WBC x   Sq Epi: x / Non Sq Epi: x / Bacteria: x        Culture - Blood (collected 22 Mar 2024 23:00)  Source: .Blood Blood-Peripheral  Gram Stain (23 Mar 2024 11:17):    Growth in anaerobic bottle: Gram positive cocci in pairs    Growth in aerobic bottle: Gram positive cocci in pairs  Preliminary Report (23 Mar 2024 11:17):    Growth in anaerobic bottle: Gram positive cocci in pairs    Growth in aerobic bottle: Gram positive cocci in pairs    Direct identification is available within approximately 3-5    hours either by Blood Panel Multiplexed PCR or Direct    MALDI-TOF. Details: [email or web address]  Organism: Blood Culture PCR (23 Mar 2024 11:49)  Organism: Blood Culture PCR (23 Mar 2024 11:49)    Culture - Blood (collected 22 Mar 2024 22:45)  Source: .Blood Blood-Peripheral  Gram Stain (23 Mar 2024 11:31):    Growth in anaerobic bottle: Gram positive cocci in pairs    Growth in aerobic bottle: Gram positive cocci in pairs  Preliminary Report (23 Mar 2024 11:31):    Growth in anaerobic bottle: Gram positive cocci in pairs    Growth in aerobic bottle: Gram positive cocci in pairs        Tele Reviewed:    RADIOLOGY & ADDITIONAL TESTS:  Results Reviewed:   Imaging Personally Reviewed:  Electrocardiogram Personally Reviewed:     PROGRESS NOTE:   Patient is a 88y old  Male who presents with a chief complaint of fever (23 Mar 2024 07:48)    SUBJECTIVE / OVERNIGHT EVENTS:  No acute events overnight.     Patient seen and examined at bedside. Feeling well, continued to endorse feeling a little SOB. Denied chest pain, fever or chills.    ADDITIONAL REVIEW OF SYSTEMS:  Patient denies fevers, chills, chest pain, shortness of breath, nausea, abdominal pain, diarrhea, constipation, dysuria, leg swelling, headache, light headedness.    MEDICATIONS  (STANDING):  albuterol/ipratropium for Nebulization 3 milliLiter(s) Nebulizer every 6 hours  budesonide 160 MICROgram(s)/formoterol 4.5 MICROgram(s) Inhaler 2 Puff(s) Inhalation two times a day  budesonide 160 MICROgram(s)/formoterol 4.5 MICROgram(s) Inhaler 2 Puff(s) Inhalation two times a day  piperacillin/tazobactam IVPB.. 3.375 Gram(s) IV Intermittent every 8 hours  rivaroxaban 20 milliGRAM(s) Oral with dinner  tamsulosin 0.8 milliGRAM(s) Oral at bedtime    MEDICATIONS  (PRN):      CAPILLARY BLOOD GLUCOSE        I&O's Summary      PHYSICAL EXAM:  Vital Signs Last 24 Hrs  T(C): 36.8 (26 Mar 2024 12:46), Max: 37.1 (25 Mar 2024 18:10)  T(F): 98.3 (26 Mar 2024 12:46), Max: 98.8 (25 Mar 2024 18:10)  HR: 68 (26 Mar 2024 12:46) (64 - 87)  BP: 136/77 (26 Mar 2024 12:46) (115/53 - 146/68)  BP(mean): --  RR: 18 (26 Mar 2024 12:46) (18 - 20)  SpO2: 96% (26 Mar 2024 12:46) (93% - 100%)    Parameters below as of 26 Mar 2024 12:46  Patient On (Oxygen Delivery Method): nasal cannula      CONSTITUTIONAL: NAD,   RESPIRATORY: Normal respiratory effort; lungs are clear to auscultation bilaterally  CARDIOVASCULAR: Regular rate and rhythm, normal S1 and S2, no murmur/rub/gallop; No lower extremity edema; Peripheral pulses are 2+ bilaterally  ABDOMEN: Nontender to palpation, normoactive bowel sounds, no rebound/guarding; No hepatosplenomegaly  MUSCLOSKELETAL: no clubbing or cyanosis of digits; no joint swelling or tenderness to palpation  PSYCH: A+O to person, place, and time; affect appropriate    LABS:    CBC Full  -  ( 26 Mar 2024 05:05 )  WBC Count : 8.67 K/uL  RBC Count : 3.08 M/uL  Hemoglobin : 10.6 g/dL  Hematocrit : 31.7 %  Platelet Count - Automated : 112 K/uL  Mean Cell Volume : 102.9 fL  Mean Cell Hemoglobin : 34.4 pg  Mean Cell Hemoglobin Concentration : 33.4 gm/dL  Auto Neutrophil # : x  Auto Lymphocyte # : x  Auto Monocyte # : x  Auto Eosinophil # : x  Auto Basophil # : x  Auto Neutrophil % : x  Auto Lymphocyte % : x  Auto Monocyte % : x  Auto Eosinophil % : x  Auto Basophil % : x    03-26    139  |  105  |  22  ----------------------------<  111<H>  4.4   |  23  |  1.03    Ca    9.1      26 Mar 2024 05:05  Phos  2.5     03-26  Mg     2.10     03-26    TPro  6.0  /  Alb  3.6  /  TBili  1.3<H>  /  DBili  x   /  AST  29  /  ALT  27  /  AlkPhos  59  03-26     CARDIAC MARKERS ( 24 Mar 2024 05:47 )  x     / x     / 870 U/L / x     / 13.8 ng/mL      Urinalysis Basic - ( 24 Mar 2024 05:47 )    Color: x / Appearance: x / SG: x / pH: x  Gluc: 167 mg/dL / Ketone: x  / Bili: x / Urobili: x   Blood: x / Protein: x / Nitrite: x   Leuk Esterase: x / RBC: x / WBC x   Sq Epi: x / Non Sq Epi: x / Bacteria: x        Culture - Blood (collected 22 Mar 2024 23:00)  Source: .Blood Blood-Peripheral  Gram Stain (23 Mar 2024 11:17):    Growth in anaerobic bottle: Gram positive cocci in pairs    Growth in aerobic bottle: Gram positive cocci in pairs  Preliminary Report (23 Mar 2024 11:17):    Growth in anaerobic bottle: Gram positive cocci in pairs    Growth in aerobic bottle: Gram positive cocci in pairs    Direct identification is available within approximately 3-5    hours either by Blood Panel Multiplexed PCR or Direct    MALDI-TOF. Details: [email or web address]  Organism: Blood Culture PCR (23 Mar 2024 11:49)  Organism: Blood Culture PCR (23 Mar 2024 11:49)    Culture - Blood (collected 22 Mar 2024 22:45)  Source: .Blood Blood-Peripheral  Gram Stain (23 Mar 2024 11:31):    Growth in anaerobic bottle: Gram positive cocci in pairs    Growth in aerobic bottle: Gram positive cocci in pairs  Preliminary Report (23 Mar 2024 11:31):    Growth in anaerobic bottle: Gram positive cocci in pairs    Growth in aerobic bottle: Gram positive cocci in pairs        Tele Reviewed:    RADIOLOGY & ADDITIONAL TESTS:  Results Reviewed:   Imaging Personally Reviewed:  Electrocardiogram Personally Reviewed:

## 2024-03-26 NOTE — CONSULT NOTE ADULT - SUBJECTIVE AND OBJECTIVE BOX
Patient is a 88y old  Male who presents with a chief complaint of fever (26 Mar 2024 07:24)    HPI:  87 y/o M w/ hx of Afib on Eliquis w/ PPM, COPD on 3-3.5L NC (on pred?) daily presenting to ED with chills and shortness of breath that began prior to arrival. The patient states he was doing well on Thursday 3/22 and up until Friday 3/23 daytime. He had food with the aid yesterday evening and noticed an acute onset of chills/feverish with shortness of breath overnight that prompted him to come to ED for further evaluation. He is on home oxygen 3.5L at home and noticed his saturation go down to 80%. He noticed this happened a few weeks ago and was seen by outpatient urgent care and obtained a chest xr with no significant findings. He does report his urine being a darker color and endsoring mild urinary retention but states he has BPH on flomax. His shortness of breath is worse with exertion, but denies any chest pain, abdominal pain, n/v/d, dysuria, or hematuria. He does feel thristy. He has a history of afib and on eliquis. He does not have a history of known heart failure. No recent exposure to sick contacts or recent travel. Has been using his albuterol without any relief.   Of note patient's son passed well last week and according to son at bedside this has really affected him.  No falls or injuries.  Denies urinary or bowel changes.    On arrival in ED, the pt was tachypneic to 5L NC, normotensive and febrile rectally to 104F. Started on empiric abx and held IVF in the setting of B lines noticed on POCUS. Concern for fluid overlaod, elevated BNP.   (23 Mar 2024 07:48)       REVIEW OF SYSTEMS  Constitutional: No fevers, chills, weight loss or fatigue   Skin: No rash, no phlebitis	  Eyes: No discharge	  ENMT: No sore throat, oral thrush, ulcers or exudate  Respiratory: No cough, no SOB  Cardiovascular:  No chest pain, palpitations or edema   Gastrointestinal: No pain, nausea, vomiting, diarrhea or constipation	  Genitourinary: No dysuria, discharge or flank pain  MSK: No arthralgias or back pain   Neurological: No HA, no weakness, no seizures, no AMS       prior hospital charts reviewed [V]  primary team notes reviewed [V]  other consultant notes reviewed [V]    PAST MEDICAL & SURGICAL HISTORY:  Osteoarthritis of multiple joints, unspecified osteoarthritis type      Former smoker, stopped smoking many years ago  2PPD X 50yrs      Chronic obstructive pulmonary disease, unspecified COPD type      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, 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      Persistent atrial fibrillation      Pacemaker  [employer] Model [id number]  Serial [id number]      Idiopathic pulmonary fibrosis      Polyp of colon, unspecified part of colon, unspecified type      Benign prostatic hyperplasia with nocturia      Dyspnea on exertion      Oxygen desaturation during sleep  Use oxygen at night, nasal cannula      Essential hypertension      H/O amputation  age 19, R great toe, due to growth "tumor"      H/O arthroscopic knee surgery  Both knees, 2013      H/O cardiac pacemaker  2005      H/O repair of rotator cuff  2012          SOCIAL HISTORY:  - Denied smoking/vaping/alcohol/recreational drug use    FAMILY HISTORY:  FH: colon cancer  mother    FH: ovarian cancer  father    Allergies  No Known Allergies    ANTIMICROBIALS:  cefTRIAXone   IVPB 2000 every 24 hours      ANTIMICROBIALS (past 90 days):  MEDICATIONS  (STANDING):    azithromycin  IVPB   255 mL/Hr IV Intermittent (03-22-24 @ 23:47)    cefepime   IVPB   100 mL/Hr IV Intermittent (03-22-24 @ 23:12)    cefTRIAXone   IVPB   1000 milliGRAM(s) IV Intermittent (03-23-24 @ 09:09)    cefTRIAXone   IVPB   100 mL/Hr IV Intermittent (03-26-24 @ 12:53)   100 mL/Hr IV Intermittent (03-25-24 @ 16:55)    piperacillin/tazobactam IVPB..   25 mL/Hr IV Intermittent (03-25-24 @ 12:11)   25 mL/Hr IV Intermittent (03-25-24 @ 03:07)   25 mL/Hr IV Intermittent (03-24-24 @ 19:36)   25 mL/Hr IV Intermittent (03-24-24 @ 10:52)   25 mL/Hr IV Intermittent (03-24-24 @ 04:19)   25 mL/Hr IV Intermittent (03-23-24 @ 21:29)        OTHER MEDS:   MEDICATIONS  (STANDING):  albuterol    90 MICROgram(s) HFA Inhaler 2 every 6 hours  albuterol/ipratropium for Nebulization 3 every 6 hours  budesonide 160 MICROgram(s)/formoterol 4.5 MICROgram(s) Inhaler 2 two times a day  fluticasone furoate/umeclidinium/vilanterol 200-62.5-25 MICROgram(s) Inhaler 1 daily  losartan 100 daily  metoprolol succinate ER 50 daily  rivaroxaban 20 with dinner  tamsulosin 0.8 at bedtime      VITALS:  Vital Signs Last 24 Hrs  T(F): 98.3 (03-26-24 @ 16:41), Max: 104.1 (03-22-24 @ 23:29)    Vital Signs Last 24 Hrs  HR: 69 (03-26-24 @ 16:41) (64 - 87)  BP: 147/69 (03-26-24 @ 16:41) (115/53 - 147/69)  RR: 18 (03-26-24 @ 16:41)  SpO2: 96% (03-26-24 @ 16:41) (93% - 100%)  Wt(kg): --    EXAM:  General: Patient in no acute distress  HEENT: partial dentures in place  CV: S1+S2, no m/r/g appreciated, PPM site nontender/no fluctuance  Lungs: No respiratory distress, CTAB  Abd: Soft, nontender, no guarding   Ext: No cyanosis, no edema  Neuro: Alert and oriented  Skin: No wounds, venous stasis changes b/l shins  IV: No phlebitis      Labs:                        10.6   8.67  )-----------( 112      ( 26 Mar 2024 05:05 )             31.7     03-26    139  |  105  |  22  ----------------------------<  111<H>  4.4   |  23  |  1.03    Ca    9.1      26 Mar 2024 05:05  Phos  2.5     03-26  Mg     2.10     03-26    TPro  6.0  /  Alb  3.6  /  TBili  1.3<H>  /  DBili  x   /  AST  29  /  ALT  27  /  AlkPhos  59  03-26      WBC Trend:  WBC Count: 8.67 (03-26-24 @ 05:05)  WBC Count: 11.63 (03-25-24 @ 05:30)  WBC Count: 15.72 (03-24-24 @ 05:47)  WBC Count: 18.10 (03-23-24 @ 11:32)      Auto Neutrophil #: 14.31 K/uL (03-24-24 @ 05:47)  Auto Neutrophil #: 16.69 K/uL (03-23-24 @ 11:32)  Auto Neutrophil #: 11.98 K/uL (03-22-24 @ 22:45)      Creatine Trend:  Creatinine: 1.03 (03-26)  Creatinine: 1.15 (03-25)  Creatinine: 1.28 (03-24)  Creatinine: 1.15 (03-22)      Liver Biochemical Testing Trend:  Alanine Aminotransferase (ALT/SGPT): 27 (03-26)  Alanine Aminotransferase (ALT/SGPT): 28 (03-25)  Alanine Aminotransferase (ALT/SGPT): 17 (03-24)  Alanine Aminotransferase (ALT/SGPT): 15 (03-22)  Aspartate Aminotransferase (AST/SGOT): 29 (03-26-24 @ 05:05)  Aspartate Aminotransferase (AST/SGOT): 48 (03-25-24 @ 05:30)  Aspartate Aminotransferase (AST/SGOT): 40 (03-24-24 @ 05:47)  Aspartate Aminotransferase (AST/SGOT): 18 (03-22-24 @ 22:45)  Bilirubin Total: 1.3 (03-26)  Bilirubin Total: 0.8 (03-25)  Bilirubin Total: 0.7 (03-24)  Bilirubin Total: 1.0 (03-22)      Trend LDH  03-24-24 @ 05:47  325<H>  03-23-24 @ 01:38  250<H>      Auto Eosinophil %: 0.0 % (03-24-24 @ 05:47)      Urinalysis Basic - ( 26 Mar 2024 05:05 )    Color: x / Appearance: x / SG: x / pH: x  Gluc: 111 mg/dL / Ketone: x  / Bili: x / Urobili: x   Blood: x / Protein: x / Nitrite: x   Leuk Esterase: x / RBC: x / WBC x   Sq Epi: x / Non Sq Epi: x / Bacteria: x        MICROBIOLOGY:    MRSA PCR Result.: Paola (03-23-24 @ 11:12)      Culture - Sputum (collected 25 Mar 2024 18:22)  Source: .Sputum Sputum  Preliminary Report:    Normal Respiratory Merlyn present    Culture - Blood (collected 25 Mar 2024 05:46)  Source: .Blood Blood-Peripheral  Preliminary Report:    No growth at 24 hours    Culture - Blood (collected 25 Mar 2024 05:30)  Source: .Blood Blood-Venous  Preliminary Report:    No growth at 24 hours    Culture - Urine (collected 23 Mar 2024 06:55)  Source: Clean Catch Clean Catch (Midstream)  Final Report:    50,000 - 99,000 CFU/mL Enterobacter cloacae complex  Organism: Enterobacter cloacae complex  Organism: Enterobacter cloacae complex    Sensitivities:      Method Type: DASHA      -  Amoxicillin/Clavulanic Acid: R >16/8      -  Ampicillin: R >16 These ampicillin results predict results for amoxicillin      -  Ampicillin/Sulbactam: R 8/4      -  Aztreonam: S <=4      -  Cefazolin: R >16      -  Cefepime: S <=2      -  Cefoxitin: R >16      -  Ceftriaxone: S <=1 Enterobacter cloacae, Klebsiella aerogenes, and Citrobacter freundii may develop resistance during prolonged therapy.      -  Ciprofloxacin: S <=0.25      -  Ertapenem: S <=0.5      -  Gentamicin: S <=2      -  Imipenem: S <=1      -  Levofloxacin: S <=0.5      -  Meropenem: S <=1      -  Nitrofurantoin: S <=32 Should not be used to treat pyelonephritis      -  Piperacillin/Tazobactam: S <=8      -  Tobramycin: S <=2      -  Trimethoprim/Sulfamethoxazole: S <=0.5/9.5    Culture - Blood (collected 22 Mar 2024 23:00)  Source: .Blood Blood-Peripheral  Final Report:    Growth in aerobic and anaerobic bottles: Streptococcus lutetiensis    Direct identification is available within approximately 3-5    hours either by Blood Panel Multiplexed PCR or Direct    MALDI-TOF. Details: [email or web address]  Organism: Blood Culture PCR  Streptococcus lutetiensis  Organism: Streptococcus lutetiensis    Sensitivities:      Method Type: DASHA      -  Ceftriaxone: S <=0.25      -  Penicillin: S 0.12      -  Vancomycin: S 0.5  Organism: Blood Culture PCR    Sensitivities:      Method Type: PCR      -  Streptococcus sp. (Not Grp A, B or S pneumoniae): Detec    Culture - Blood (collected 22 Mar 2024 22:45)  Source: .Blood Blood-Peripheral  Final Report:    Growth in aerobic and anaerobic bottles: Streptococcus lutetiensis See    previous culture [id number]    Legionella Antigen, Urine: Negative (03-23 @ 09:24)  Rapid RVP Result: NotDetec (03-22 @ 22:45)    Procalcitonin, Serum: 36.62 (03-24)      Ferritin: 218 (03-24)  Ferritin: 81 (03-23)      Lactate Dehydrogenase, Serum: 325 (03-24)  Lactate Dehydrogenase, Serum: 250 (03-23)      Troponin T, High Sensitivity Result: 68 (03-24)  Troponin T, High Sensitivity Result: 104 (03-23)  Troponin T, High Sensitivity Result: 72 (03-22)    Blood Gas Venous - Lactate: 1.9 (03-25 @ 05:30)  Blood Gas Venous - Lactate: 3.9 (03-24 @ 16:33)  Blood Gas Venous - Lactate: 5.2 (03-24 @ 09:19)    A1C with Estimated Average Glucose Result: 5.5 % (03-23-24 @ 09:15)      RADIOLOGY:  imaging below personally reviewed    < from: Xray Chest 1 View- PORTABLE-Routine (Xray Chest 1 View- PORTABLE-Routine .) (03.24.24 @ 13:03) >  IMPRESSION: No localized pulmonary disease.    < end of copied text >  < from: CT Chest No Cont (03.23.24 @ 02:25) >  FINDINGS: Evaluation of the thoracic organs/vasculature is limited   without intravenous contrast. Patient's respiratory motion degrades   images.    LUNGS AND AIRWAYS: Patent central airways. Mild emphysema. Question   interlobular septal thickening. Compressive atelectasis of the right >   left lung. Scattered calcified granulomata. Scattered bilateral pulmonary   nodules measuring up to 0.2-0.3 cm, for example, lingula (3:271) and   right upper lobe (3:196).  PLEURA: Small right > left pleural effusion.  HEART: Borderline heart size. Trace pericardial effusion.  VESSELS: Atherosclerosis. Normal caliber of the thoracic aorta. Main   pulmonary artery enlargement (3.8 cm), suggesting pulmonary arterial   hypertension.  MEDIASTINUM AND BLAZE: A 1.3 x 2.0 cm right paratracheal lymph node (2:54).  CHEST WALL AND LOWER NECK: Bilateral gynecomastia. Left-sided pacemaker.  UPPER ABDOMEN: Trace ascites. Question periportal edema. Fatty atrophy of   the visualized pancreas. Bilateral perinephric stranding.  BONES: Degenerative changes/paravertebral ossifications of the spine.   Mildanterior wedging of the spine, suggesting chronic.    IMPRESSION:    Small right > left pleural effusion with compressive atelectasis.   Question interlobular septal thickening. Recommend clinical correlation   to assess underlying infection.    Nonspecific pulmonary nodules. If the patient is at low risk for   malignancy, no further follow-up is needed. If the patient is at high   risk, 12 month follow-up CT chest may be obtained for further evaluation.    Additional findings as described.    < end of copied text >   Patient is a 88y old  Male who presents with a chief complaint of fever (26 Mar 2024 07:24)    HPI:  89 y/o M w/ hx of Afib on Eliquis w/ PPM, COPD on 3-3.5L NC (on pred?) daily presenting to ED with chills and shortness of breath that began prior to arrival. The patient states he was doing well on Thursday 3/22 and up until Friday 3/23 daytime. He had food with the aid yesterday evening and noticed an acute onset of chills/feverish with shortness of breath overnight that prompted him to come to ED for further evaluation. He is on home oxygen 3.5L at home and noticed his saturation go down to 80%. He noticed this happened a few weeks ago and was seen by outpatient urgent care and obtained a chest xr with no significant findings. He does report his urine being a darker color and endsoring mild urinary retention but states he has BPH on flomax. His shortness of breath is worse with exertion, but denies any chest pain, abdominal pain, n/v/d, dysuria, or hematuria. He does feel thristy. He has a history of afib and on eliquis. He does not have a history of known heart failure. No recent exposure to sick contacts or recent travel. Has been using his albuterol without any relief.   Of note patient's son passed well last week and according to son at bedside this has really affected him.  No falls or injuries.  Denies urinary or bowel changes.    On arrival in ED, the pt was tachypneic to 5L NC, normotensive and febrile rectally to 104F. Started on empiric abx and held IVF in the setting of B lines noticed on POCUS. Concern for fluid overlaod, elevated BNP.   (23 Mar 2024 07:48)       REVIEW OF SYSTEMS  Constitutional: resolved fevers and chills,   Skin: No rash, no phlebitis	  Eyes: No discharge	  ENMT: No sore throat, oral thrush,  Respiratory: No cough, no SOB  Cardiovascular:  No chest pain,    Gastrointestinal: No pain, nausea, vomiting, diarrhea or constipation	  Genitourinary: No dysuria,   MSK: No arthralgias or back pain   Neurological: No HA,  no AMS   Extremities: some swelling      prior hospital charts reviewed [V]  primary team notes reviewed [V]  other consultant notes reviewed [V]        PAST MEDICAL & SURGICAL HISTORY:  Osteoarthritis of multiple joints, unspecified osteoarthritis type      Former smoker, stopped smoking many years ago  2PPD X 50yrs      Chronic obstructive pulmonary disease, unspecified COPD type      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, 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      Persistent atrial fibrillation      Pacemaker  [employer] Model [id number]  Serial [id number]      Idiopathic pulmonary fibrosis      Polyp of colon, unspecified part of colon, unspecified type      Benign prostatic hyperplasia with nocturia      Dyspnea on exertion      Oxygen desaturation during sleep  Use oxygen at night, nasal cannula      Essential hypertension      H/O amputation  age 19, R great toe, due to growth "tumor"      H/O arthroscopic knee surgery  Both knees, 2013      H/O cardiac pacemaker  2005      H/O repair of rotator cuff  2012          SOCIAL HISTORY:  - former smoker, prior Etoh use, lives at home.         FAMILY HISTORY:  FH: colon cancer  mother    FH: ovarian cancer  father        Allergies  No Known Allergies    ANTIMICROBIALS:  cefTRIAXone   IVPB 2000 every 24 hours      ANTIMICROBIALS (past 90 days):  MEDICATIONS  (STANDING):    azithromycin  IVPB   255 mL/Hr IV Intermittent (03-22-24 @ 23:47)    cefepime   IVPB   100 mL/Hr IV Intermittent (03-22-24 @ 23:12)    cefTRIAXone   IVPB   1000 milliGRAM(s) IV Intermittent (03-23-24 @ 09:09)    cefTRIAXone   IVPB   100 mL/Hr IV Intermittent (03-26-24 @ 12:53)   100 mL/Hr IV Intermittent (03-25-24 @ 16:55)    piperacillin/tazobactam IVPB..   25 mL/Hr IV Intermittent (03-25-24 @ 12:11)   25 mL/Hr IV Intermittent (03-25-24 @ 03:07)   25 mL/Hr IV Intermittent (03-24-24 @ 19:36)   25 mL/Hr IV Intermittent (03-24-24 @ 10:52)   25 mL/Hr IV Intermittent (03-24-24 @ 04:19)   25 mL/Hr IV Intermittent (03-23-24 @ 21:29)        OTHER MEDS:   MEDICATIONS  (STANDING):  albuterol    90 MICROgram(s) HFA Inhaler 2 every 6 hours  albuterol/ipratropium for Nebulization 3 every 6 hours  budesonide 160 MICROgram(s)/formoterol 4.5 MICROgram(s) Inhaler 2 two times a day  fluticasone furoate/umeclidinium/vilanterol 200-62.5-25 MICROgram(s) Inhaler 1 daily  losartan 100 daily  metoprolol succinate ER 50 daily  rivaroxaban 20 with dinner  tamsulosin 0.8 at bedtime      VITALS:  Vital Signs Last 24 Hrs  T(F): 98.3 (03-26-24 @ 16:41), Max: 104.1 (03-22-24 @ 23:29)    Vital Signs Last 24 Hrs  HR: 69 (03-26-24 @ 16:41) (64 - 87)  BP: 147/69 (03-26-24 @ 16:41) (115/53 - 147/69)  RR: 18 (03-26-24 @ 16:41)  SpO2: 96% (03-26-24 @ 16:41) (93% - 100%)  Wt(kg): --      EXAM:  General: Patient in no acute distress  EYES: No discharge   ENT: partial dentures in place  CV: S1+S2, PPM site nontender/no fluctuance  Lungs: No respiratory distress, CTAB  Abd: Soft, nontender,   : No ash   Ext: No edema  Neuro: Alert and oriented  Skin: No wounds,   Vascular: venous stasis changes b/l shins, palpable pulses.   IV: No phlebitis      Labs:                        10.6   8.67  )-----------( 112      ( 26 Mar 2024 05:05 )             31.7     03-26    139  |  105  |  22  ----------------------------<  111<H>  4.4   |  23  |  1.03    Ca    9.1      26 Mar 2024 05:05  Phos  2.5     03-26  Mg     2.10     03-26    TPro  6.0  /  Alb  3.6  /  TBili  1.3<H>  /  DBili  x   /  AST  29  /  ALT  27  /  AlkPhos  59  03-26      WBC Trend:  WBC Count: 8.67 (03-26-24 @ 05:05)  WBC Count: 11.63 (03-25-24 @ 05:30)  WBC Count: 15.72 (03-24-24 @ 05:47)  WBC Count: 18.10 (03-23-24 @ 11:32)      Auto Neutrophil #: 14.31 K/uL (03-24-24 @ 05:47)  Auto Neutrophil #: 16.69 K/uL (03-23-24 @ 11:32)  Auto Neutrophil #: 11.98 K/uL (03-22-24 @ 22:45)      Creatine Trend:  Creatinine: 1.03 (03-26)  Creatinine: 1.15 (03-25)  Creatinine: 1.28 (03-24)  Creatinine: 1.15 (03-22)      Liver Biochemical Testing Trend:  Alanine Aminotransferase (ALT/SGPT): 27 (03-26)  Alanine Aminotransferase (ALT/SGPT): 28 (03-25)  Alanine Aminotransferase (ALT/SGPT): 17 (03-24)  Alanine Aminotransferase (ALT/SGPT): 15 (03-22)  Aspartate Aminotransferase (AST/SGOT): 29 (03-26-24 @ 05:05)  Aspartate Aminotransferase (AST/SGOT): 48 (03-25-24 @ 05:30)  Aspartate Aminotransferase (AST/SGOT): 40 (03-24-24 @ 05:47)  Aspartate Aminotransferase (AST/SGOT): 18 (03-22-24 @ 22:45)  Bilirubin Total: 1.3 (03-26)  Bilirubin Total: 0.8 (03-25)  Bilirubin Total: 0.7 (03-24)  Bilirubin Total: 1.0 (03-22)      Trend LDH  03-24-24 @ 05:47  325<H>  03-23-24 @ 01:38  250<H>      Auto Eosinophil %: 0.0 % (03-24-24 @ 05:47)      Urinalysis Basic - ( 26 Mar 2024 05:05 )    Color: x / Appearance: x / SG: x / pH: x  Gluc: 111 mg/dL / Ketone: x  / Bili: x / Urobili: x   Blood: x / Protein: x / Nitrite: x   Leuk Esterase: x / RBC: x / WBC x   Sq Epi: x / Non Sq Epi: x / Bacteria: x        MICROBIOLOGY:    MRSA PCR Result.: Paola (03-23-24 @ 11:12)      Culture - Sputum (collected 25 Mar 2024 18:22)  Source: .Sputum Sputum  Preliminary Report:    Normal Respiratory Merlyn present    Culture - Blood (collected 25 Mar 2024 05:46)  Source: .Blood Blood-Peripheral  Preliminary Report:    No growth at 24 hours    Culture - Blood (collected 25 Mar 2024 05:30)  Source: .Blood Blood-Venous  Preliminary Report:    No growth at 24 hours    Culture - Urine (collected 23 Mar 2024 06:55)  Source: Clean Catch Clean Catch (Midstream)  Final Report:    50,000 - 99,000 CFU/mL Enterobacter cloacae complex  Organism: Enterobacter cloacae complex  Organism: Enterobacter cloacae complex    Sensitivities:      Method Type: DASHA      -  Amoxicillin/Clavulanic Acid: R >16/8      -  Ampicillin: R >16 These ampicillin results predict results for amoxicillin      -  Ampicillin/Sulbactam: R 8/4      -  Aztreonam: S <=4      -  Cefazolin: R >16      -  Cefepime: S <=2      -  Cefoxitin: R >16      -  Ceftriaxone: S <=1 Enterobacter cloacae, Klebsiella aerogenes, and Citrobacter freundii may develop resistance during prolonged therapy.      -  Ciprofloxacin: S <=0.25      -  Ertapenem: S <=0.5      -  Gentamicin: S <=2      -  Imipenem: S <=1      -  Levofloxacin: S <=0.5      -  Meropenem: S <=1      -  Nitrofurantoin: S <=32 Should not be used to treat pyelonephritis      -  Piperacillin/Tazobactam: S <=8      -  Tobramycin: S <=2      -  Trimethoprim/Sulfamethoxazole: S <=0.5/9.5    Culture - Blood (collected 22 Mar 2024 23:00)  Source: .Blood Blood-Peripheral  Final Report:    Growth in aerobic and anaerobic bottles: Streptococcus lutetiensis    Direct identification is available within approximately 3-5    hours either by Blood Panel Multiplexed PCR or Direct    MALDI-TOF. Details: [email or web address]  Organism: Blood Culture PCR  Streptococcus lutetiensis  Organism: Streptococcus lutetiensis    Sensitivities:      Method Type: DASHA      -  Ceftriaxone: S <=0.25      -  Penicillin: S 0.12      -  Vancomycin: S 0.5  Organism: Blood Culture PCR    Sensitivities:      Method Type: PCR      -  Streptococcus sp. (Not Grp A, B or S pneumoniae): Detec    Culture - Blood (collected 22 Mar 2024 22:45)  Source: .Blood Blood-Peripheral  Final Report:    Growth in aerobic and anaerobic bottles: Streptococcus lutetiensis See    previous culture [id number]    Legionella Antigen, Urine: Negative (03-23 @ 09:24)  Rapid RVP Result: NotDetec (03-22 @ 22:45)    Procalcitonin, Serum: 36.62 (03-24)      Ferritin: 218 (03-24)  Ferritin: 81 (03-23)      Lactate Dehydrogenase, Serum: 325 (03-24)  Lactate Dehydrogenase, Serum: 250 (03-23)      Troponin T, High Sensitivity Result: 68 (03-24)  Troponin T, High Sensitivity Result: 104 (03-23)  Troponin T, High Sensitivity Result: 72 (03-22)    Blood Gas Venous - Lactate: 1.9 (03-25 @ 05:30)  Blood Gas Venous - Lactate: 3.9 (03-24 @ 16:33)  Blood Gas Venous - Lactate: 5.2 (03-24 @ 09:19)    A1C with Estimated Average Glucose Result: 5.5 % (03-23-24 @ 09:15)      RADIOLOGY:  imaging below personally reviewed    < from: Xray Chest 1 View- PORTABLE-Routine (Xray Chest 1 View- PORTABLE-Routine .) (03.24.24 @ 13:03) >  IMPRESSION: No localized pulmonary disease.      < from: CT Chest No Cont (03.23.24 @ 02:25) >  FINDINGS: Evaluation of the thoracic organs/vasculature is limited   without intravenous contrast. Patient's respiratory motion degrades   images.    LUNGS AND AIRWAYS: Patent central airways. Mild emphysema. Question   interlobular septal thickening. Compressive atelectasis of the right >   left lung. Scattered calcified granulomata. Scattered bilateral pulmonary   nodules measuring up to 0.2-0.3 cm, for example, lingula (3:271) and   right upper lobe (3:196).  PLEURA: Small right > left pleural effusion.  HEART: Borderline heart size. Trace pericardial effusion.  VESSELS: Atherosclerosis. Normal caliber of the thoracic aorta. Main   pulmonary artery enlargement (3.8 cm), suggesting pulmonary arterial   hypertension.  MEDIASTINUM AND BLAZE: A 1.3 x 2.0 cm right paratracheal lymph node (2:54).  CHEST WALL AND LOWER NECK: Bilateral gynecomastia. Left-sided pacemaker.  UPPER ABDOMEN: Trace ascites. Question periportal edema. Fatty atrophy of   the visualized pancreas. Bilateral perinephric stranding.  BONES: Degenerative changes/paravertebral ossifications of the spine.   Mildanterior wedging of the spine, suggesting chronic.    IMPRESSION:    Small right > left pleural effusion with compressive atelectasis.   Question interlobular septal thickening. Recommend clinical correlation   to assess underlying infection.    Nonspecific pulmonary nodules. If the patient is at low risk for   malignancy, no further follow-up is needed. If the patient is at high   risk, 12 month follow-up CT chest may be obtained for further evaluation.    Additional findings as described.

## 2024-03-26 NOTE — DISCHARGE NOTE NURSING/CASE MANAGEMENT/SOCIAL WORK - NSDCVIVACCINE_GEN_ALL_CORE_FT
influenza, injectable, quadrivalent, preservative free; 03-Oct-2018 10:13; Bony Gamino (MARCO); [employer]; [id number] (Exp. Date: 30-Jun-2019); IntraMuscular; Deltoid Right.; 0.5 milliLiter(s); VIS (VIS Published: 07-Aug-2015, VIS Presented: 03-Oct-2018);

## 2024-03-26 NOTE — PROGRESS NOTE ADULT - PROBLEM SELECTOR PLAN 7
#COPD #homeOX  - h/o of COPD on home oxygen 3.5L  - CT chest with emphysema and atelectasis + pulm nodules and right paratracheal lymph nodes   - s/p albuterol PRN with NC, improved oxygen to 99%  - chest PT, incentive spirometry   - abx zosyn  - wean off oxygen as tolerated

## 2024-03-26 NOTE — PROGRESS NOTE ADULT - PROBLEM SELECTOR PLAN 3
#demand ischemia #elevated trop  #trace pericardial effusion  #B lines on pocus   # R pleural effusion   - No history of heart failure, no LE edema or HJR  - CT chest with small pleural effusion (r) and trace pericardial effusion   - EKG with inverted t wave in II, III and v4-v6; so significant ST changes  - placed on tele (3/23), d/c 3/25  - proBNP ~3k, trop 62 from 104, plateau, ck 870, ckmb 13.8   - pending TTE #demand ischemia #elevated trop  #trace pericardial effusion  #B lines on pocus   # R pleural effusion   - No history of heart failure, no LE edema or HJR  - CT chest with small pleural effusion (r) and trace pericardial effusion   - EKG with inverted t wave in II, III and v4-v6; so significant ST changes  - placed on tele (3/23), d/c 3/25  - proBNP ~3k, trop 62 from 104, plateau, ck 870, ckmb 13.8   - TTE 3/25: EF 45-50%, global LV hypokinesis, RV cavity enlargement size

## 2024-03-27 LAB
ALBUMIN SERPL ELPH-MCNC: 3.5 G/DL — SIGNIFICANT CHANGE UP (ref 3.3–5)
ALP SERPL-CCNC: 55 U/L — SIGNIFICANT CHANGE UP (ref 40–120)
ALT FLD-CCNC: 27 U/L — SIGNIFICANT CHANGE UP (ref 4–41)
ANION GAP SERPL CALC-SCNC: 11 MMOL/L — SIGNIFICANT CHANGE UP (ref 7–14)
APTT BLD: 32.5 SEC — SIGNIFICANT CHANGE UP (ref 24.5–35.6)
AST SERPL-CCNC: 24 U/L — SIGNIFICANT CHANGE UP (ref 4–40)
BASE EXCESS BLDV CALC-SCNC: -2.2 MMOL/L — LOW (ref -2–3)
BASOPHILS # BLD AUTO: 0.02 K/UL — SIGNIFICANT CHANGE UP (ref 0–0.2)
BASOPHILS NFR BLD AUTO: 0.3 % — SIGNIFICANT CHANGE UP (ref 0–2)
BILIRUB SERPL-MCNC: 1.2 MG/DL — SIGNIFICANT CHANGE UP (ref 0.2–1.2)
BUN SERPL-MCNC: 17 MG/DL — SIGNIFICANT CHANGE UP (ref 7–23)
CALCIUM SERPL-MCNC: 8.9 MG/DL — SIGNIFICANT CHANGE UP (ref 8.4–10.5)
CHLORIDE SERPL-SCNC: 104 MMOL/L — SIGNIFICANT CHANGE UP (ref 98–107)
CO2 BLDV-SCNC: 23.5 MMOL/L — SIGNIFICANT CHANGE UP (ref 22–26)
CO2 SERPL-SCNC: 21 MMOL/L — LOW (ref 22–31)
CREAT SERPL-MCNC: 0.85 MG/DL — SIGNIFICANT CHANGE UP (ref 0.5–1.3)
CRP SERPL-MCNC: 54.2 MG/L — HIGH
CULTURE RESULTS: SIGNIFICANT CHANGE UP
EGFR: 84 ML/MIN/1.73M2 — SIGNIFICANT CHANGE UP
EOSINOPHIL # BLD AUTO: 0.14 K/UL — SIGNIFICANT CHANGE UP (ref 0–0.5)
EOSINOPHIL NFR BLD AUTO: 1.8 % — SIGNIFICANT CHANGE UP (ref 0–6)
ERYTHROCYTE [SEDIMENTATION RATE] IN BLOOD: 17 MM/HR — HIGH (ref 1–15)
GAS PNL BLDV: SIGNIFICANT CHANGE UP
GLUCOSE SERPL-MCNC: 98 MG/DL — SIGNIFICANT CHANGE UP (ref 70–99)
HCO3 BLDV-SCNC: 22 MMOL/L — SIGNIFICANT CHANGE UP (ref 22–29)
HCT VFR BLD CALC: 31.8 % — LOW (ref 39–50)
HGB BLD-MCNC: 10.7 G/DL — LOW (ref 13–17)
IANC: 6.53 K/UL — SIGNIFICANT CHANGE UP (ref 1.8–7.4)
IMM GRANULOCYTES NFR BLD AUTO: 0.4 % — SIGNIFICANT CHANGE UP (ref 0–0.9)
INR BLD: 2.05 RATIO — HIGH (ref 0.85–1.18)
LACTATE BLDV-MCNC: 1.6 MMOL/L — SIGNIFICANT CHANGE UP (ref 0.5–2)
LYMPHOCYTES # BLD AUTO: 0.65 K/UL — LOW (ref 1–3.3)
LYMPHOCYTES # BLD AUTO: 8.2 % — LOW (ref 13–44)
MAGNESIUM SERPL-MCNC: 2 MG/DL — SIGNIFICANT CHANGE UP (ref 1.6–2.6)
MCHC RBC-ENTMCNC: 33.6 GM/DL — SIGNIFICANT CHANGE UP (ref 32–36)
MCHC RBC-ENTMCNC: 34.2 PG — HIGH (ref 27–34)
MCV RBC AUTO: 101.6 FL — HIGH (ref 80–100)
MONOCYTES # BLD AUTO: 0.56 K/UL — SIGNIFICANT CHANGE UP (ref 0–0.9)
MONOCYTES NFR BLD AUTO: 7.1 % — SIGNIFICANT CHANGE UP (ref 2–14)
NEUTROPHILS # BLD AUTO: 6.53 K/UL — SIGNIFICANT CHANGE UP (ref 1.8–7.4)
NEUTROPHILS NFR BLD AUTO: 82.2 % — HIGH (ref 43–77)
NRBC # BLD: 0 /100 WBCS — SIGNIFICANT CHANGE UP (ref 0–0)
NRBC # FLD: 0 K/UL — SIGNIFICANT CHANGE UP (ref 0–0)
PCO2 BLDV: 37 MMHG — LOW (ref 42–55)
PH BLDV: 7.39 — SIGNIFICANT CHANGE UP (ref 7.32–7.43)
PHOSPHATE SERPL-MCNC: 2.5 MG/DL — SIGNIFICANT CHANGE UP (ref 2.5–4.5)
PLATELET # BLD AUTO: 122 K/UL — LOW (ref 150–400)
PO2 BLDV: 58 MMHG — HIGH (ref 25–45)
POTASSIUM SERPL-MCNC: 3.8 MMOL/L — SIGNIFICANT CHANGE UP (ref 3.5–5.3)
POTASSIUM SERPL-SCNC: 3.8 MMOL/L — SIGNIFICANT CHANGE UP (ref 3.5–5.3)
PROT SERPL-MCNC: 5.9 G/DL — LOW (ref 6–8.3)
PROTHROM AB SERPL-ACNC: 22.7 SEC — HIGH (ref 9.5–13)
RBC # BLD: 3.13 M/UL — LOW (ref 4.2–5.8)
RBC # FLD: 13.8 % — SIGNIFICANT CHANGE UP (ref 10.3–14.5)
SAO2 % BLDV: 89 % — HIGH (ref 67–88)
SODIUM SERPL-SCNC: 136 MMOL/L — SIGNIFICANT CHANGE UP (ref 135–145)
SPECIMEN SOURCE: SIGNIFICANT CHANGE UP
WBC # BLD: 7.93 K/UL — SIGNIFICANT CHANGE UP (ref 3.8–10.5)
WBC # FLD AUTO: 7.93 K/UL — SIGNIFICANT CHANGE UP (ref 3.8–10.5)

## 2024-03-27 PROCEDURE — 93279 PRGRMG DEV EVAL PM/LDLS PM: CPT | Mod: 26

## 2024-03-27 PROCEDURE — 74177 CT ABD & PELVIS W/CONTRAST: CPT | Mod: 26

## 2024-03-27 PROCEDURE — 99222 1ST HOSP IP/OBS MODERATE 55: CPT | Mod: FS

## 2024-03-27 PROCEDURE — 99232 SBSQ HOSP IP/OBS MODERATE 35: CPT

## 2024-03-27 PROCEDURE — 99232 SBSQ HOSP IP/OBS MODERATE 35: CPT | Mod: GC

## 2024-03-27 RX ORDER — MUPIROCIN 20 MG/G
1 OINTMENT TOPICAL
Qty: 0 | Refills: 0 | DISCHARGE
Start: 2024-03-27

## 2024-03-27 RX ORDER — ACETAMINOPHEN 500 MG
2 TABLET ORAL
Qty: 0 | Refills: 0 | DISCHARGE
Start: 2024-03-27

## 2024-03-27 RX ORDER — CHLORHEXIDINE GLUCONATE 213 G/1000ML
1 SOLUTION TOPICAL
Refills: 0 | Status: DISCONTINUED | OUTPATIENT
Start: 2024-03-27 | End: 2024-03-28

## 2024-03-27 RX ORDER — FERROUS SULFATE 325(65) MG
1 TABLET ORAL
Qty: 0 | Refills: 0 | DISCHARGE
Start: 2024-03-27

## 2024-03-27 RX ORDER — ACETAMINOPHEN 500 MG
650 TABLET ORAL EVERY 6 HOURS
Refills: 0 | Status: DISCONTINUED | OUTPATIENT
Start: 2024-03-27 | End: 2024-03-28

## 2024-03-27 RX ORDER — CHLORHEXIDINE GLUCONATE 213 G/1000ML
1 SOLUTION TOPICAL
Qty: 0 | Refills: 0 | DISCHARGE
Start: 2024-03-27

## 2024-03-27 RX ORDER — CEFTRIAXONE 500 MG/1
2 INJECTION, POWDER, FOR SOLUTION INTRAMUSCULAR; INTRAVENOUS
Qty: 0 | Refills: 0 | DISCHARGE
Start: 2024-03-27

## 2024-03-27 RX ORDER — ASCORBIC ACID 60 MG
1 TABLET,CHEWABLE ORAL
Qty: 0 | Refills: 0 | DISCHARGE
Start: 2024-03-27

## 2024-03-27 RX ORDER — MUPIROCIN 20 MG/G
1 OINTMENT TOPICAL
Refills: 0 | Status: DISCONTINUED | OUTPATIENT
Start: 2024-03-27 | End: 2024-03-28

## 2024-03-27 RX ORDER — IPRATROPIUM/ALBUTEROL SULFATE 18-103MCG
3 AEROSOL WITH ADAPTER (GRAM) INHALATION
Qty: 0 | Refills: 0 | DISCHARGE
Start: 2024-03-27

## 2024-03-27 RX ADMIN — LOSARTAN POTASSIUM 100 MILLIGRAM(S): 100 TABLET, FILM COATED ORAL at 06:00

## 2024-03-27 RX ADMIN — Medication 3 MILLILITER(S): at 03:15

## 2024-03-27 RX ADMIN — Medication 3 MILLILITER(S): at 22:46

## 2024-03-27 RX ADMIN — Medication 650 MILLIGRAM(S): at 11:22

## 2024-03-27 RX ADMIN — Medication 650 MILLIGRAM(S): at 12:20

## 2024-03-27 RX ADMIN — Medication 3 MILLILITER(S): at 08:26

## 2024-03-27 RX ADMIN — Medication 50 MILLIGRAM(S): at 06:00

## 2024-03-27 RX ADMIN — FLUTICASONE FUROATE, UMECLIDINIUM BROMIDE AND VILANTEROL TRIFENATATE 1 PUFF(S): 200; 62.5; 25 POWDER RESPIRATORY (INHALATION) at 10:46

## 2024-03-27 RX ADMIN — Medication 325 MILLIGRAM(S): at 11:22

## 2024-03-27 RX ADMIN — Medication 500 MILLIGRAM(S): at 11:23

## 2024-03-27 RX ADMIN — TAMSULOSIN HYDROCHLORIDE 0.8 MILLIGRAM(S): 0.4 CAPSULE ORAL at 21:16

## 2024-03-27 RX ADMIN — RIVAROXABAN 20 MILLIGRAM(S): KIT at 17:37

## 2024-03-27 RX ADMIN — MUPIROCIN 1 APPLICATION(S): 20 OINTMENT TOPICAL at 17:38

## 2024-03-27 RX ADMIN — Medication 3 MILLILITER(S): at 14:39

## 2024-03-27 RX ADMIN — CHLORHEXIDINE GLUCONATE 1 APPLICATION(S): 213 SOLUTION TOPICAL at 12:56

## 2024-03-27 RX ADMIN — CEFTRIAXONE 100 MILLIGRAM(S): 500 INJECTION, POWDER, FOR SOLUTION INTRAMUSCULAR; INTRAVENOUS at 12:56

## 2024-03-27 NOTE — PROGRESS NOTE ADULT - SUBJECTIVE AND OBJECTIVE BOX
88yPatient is a 88y old  Male who presents with a chief complaint of Fever (27 Mar 2024 12:52)      Interval history:  Afebrile, no new complains.       Allergies:   No Known Allergies    Antimicrobials:  cefTRIAXone   IVPB 2000 milliGRAM(s) IV Intermittent every 24 hours      REVIEW OF SYSTEMS:  No chest pain   No SOB  No N/V      Vital Signs Last 24 Hrs  T(C): 36.9 (03-27-24 @ 13:21), Max: 36.9 (03-26-24 @ 22:11)  T(F): 98.4 (03-27-24 @ 13:21), Max: 98.5 (03-26-24 @ 22:11)  HR: 72 (03-27-24 @ 14:39) (70 - 75)  BP: 137/76 (03-27-24 @ 13:21) (133/74 - 143/80)  BP(mean): --  RR: 18 (03-27-24 @ 13:21) (18 - 19)  SpO2: 97% (03-27-24 @ 14:39) (95% - 98%)      PHYSICAL EXAM:  Pt in no acute distress, alert, awake.   breathing comfortably on NC   non distended abdomen  no edema LE   no phlebitis                             10.7   7.93  )-----------( 122      ( 27 Mar 2024 06:05 )             31.8   03-27    136  |  104  |  17  ----------------------------<  98  3.8   |  21<L>  |  0.85    Ca    8.9      27 Mar 2024 06:05  Phos  2.5     03-27  Mg     2.00     03-27    TPro  5.9<L>  /  Alb  3.5  /  TBili  1.2  /  DBili  x   /  AST  24  /  ALT  27  /  AlkPhos  55  03-27      LIVER FUNCTIONS - ( 27 Mar 2024 06:05 )  Alb: 3.5 g/dL / Pro: 5.9 g/dL / ALK PHOS: 55 U/L / ALT: 27 U/L / AST: 24 U/L / GGT: x               Culture - Sputum (collected 25 Mar 2024 18:22)  Source: .Sputum Sputum  Gram Stain (25 Mar 2024 23:16):    Rare polymorphonuclear leukocytes per low power field    Rare Squamous epithelial cells per low power field    No organisms seen per oil power field  Final Report (27 Mar 2024 17:18):    Normal Respiratory Merlyn present    Culture - Blood (collected 25 Mar 2024 05:46)  Source: .Blood Blood-Peripheral  Preliminary Report (27 Mar 2024 10:02):    No growth at 48 Hours    Culture - Blood (collected 25 Mar 2024 05:30)  Source: .Blood Blood-Venous  Preliminary Report (27 Mar 2024 10:02):    No growth at 48 Hours        Radiology:  < from: CT Abdomen and Pelvis w/ IV Cont (03.27.24 @ 13:43) >  IMPRESSION:  No intra-abdominal source of infection identified.    Small indeterminate lesions in the liver and left kidney.    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    1.1 cm polypoid focus in the mid transverse colon.

## 2024-03-27 NOTE — PROGRESS NOTE ADULT - PROBLEM SELECTOR PLAN 1
#severe sepsis #UTI asymptom #bacteremia  - p/w: tachycardic, febrile, hypoxic, +lactate 4, +leukocytosis s/p fluids and empiric abx  - RVP covid, neg; legionella neg, mrsa neg, strep detected  - source: UA +, UCx +GNR vs. respiratory illness CT chest with atelectatis  - BCx: +GPcocc = strep lutetiensis, repeat blood cx drawn 3/25, no growth 24 hrs  - Abx: Zosyn (3/23 -3/25), ceftriaxone (3/25-, azithromycin (3/22);  held off vanco  - IVF: gave LR bolus on 3/24, CXR 3/24 unchanged #severe sepsis #UTI asymptom #bacteremia  - p/w: tachycardic, febrile, hypoxic, +lactate 4, +leukocytosis s/p fluids and empiric abx  - RVP covid, neg; legionella neg, mrsa neg, strep detected  - source: UA +, UCx +GNR vs. respiratory illness CT chest with atelectatis  - BCx: +GPcocc = strep lutetiensis, repeat blood cx drawn 3/25, no growth 24 hrs  - Abx: Zosyn (3/23 -3/25), ceftriaxone (3/25-, azithromycin (3/22);  held off vanco  - IVF: gave LR bolus on 3/24, CXR 3/24 unchanged  -CT a/p 3/27 w/o abdominal source of infection  -CAREN planned for 3/28 #severe sepsis #UTI asymptom #bacteremia  - p/w: tachycardic, febrile, hypoxic, +lactate 4, +leukocytosis  - RVP covid, neg; legionella neg, mrsa neg, strep detected  - source: 4/4 strep lutetiensis, repeat blood cx drawn 3/25, no growth 24 hrs  - Abx: Zosyn (3/23 -3/25), ceftriaxone (3/25-), azithromycin (3/22);   -CT a/p 3/27 w/o abdominal source of infection  -CAREN planned for 3/28  -EP consulted given concern for potential PPM lead infection  -ID consulted  -Plan for CTX x6 weeks pending above workup

## 2024-03-27 NOTE — PROGRESS NOTE ADULT - SUBJECTIVE AND OBJECTIVE BOX
PROGRESS NOTE:   Patient is a 88y old  Male who presents with a chief complaint of fever (23 Mar 2024 07:48)    SUBJECTIVE / OVERNIGHT EVENTS:  No acute events overnight.     Patient seen and examined at bedside. Feeling well, continued to endorse feeling a little SOB. Denied chest pain, fever or chills.    ADDITIONAL REVIEW OF SYSTEMS:  Patient denies fevers, chills, chest pain, shortness of breath, nausea, abdominal pain, diarrhea, constipation, dysuria, leg swelling, headache, light headedness.    MEDICATIONS  (STANDING):  albuterol/ipratropium for Nebulization 3 milliLiter(s) Nebulizer every 6 hours  budesonide 160 MICROgram(s)/formoterol 4.5 MICROgram(s) Inhaler 2 Puff(s) Inhalation two times a day  budesonide 160 MICROgram(s)/formoterol 4.5 MICROgram(s) Inhaler 2 Puff(s) Inhalation two times a day  piperacillin/tazobactam IVPB.. 3.375 Gram(s) IV Intermittent every 8 hours  rivaroxaban 20 milliGRAM(s) Oral with dinner  tamsulosin 0.8 milliGRAM(s) Oral at bedtime    MEDICATIONS  (PRN):      CAPILLARY BLOOD GLUCOSE        I&O's Summary      PHYSICAL EXAM:  Vital Signs Last 24 Hrs  T(C): 36.8 (26 Mar 2024 12:46), Max: 37.1 (25 Mar 2024 18:10)  T(F): 98.3 (26 Mar 2024 12:46), Max: 98.8 (25 Mar 2024 18:10)  HR: 68 (26 Mar 2024 12:46) (64 - 87)  BP: 136/77 (26 Mar 2024 12:46) (115/53 - 146/68)  BP(mean): --  RR: 18 (26 Mar 2024 12:46) (18 - 20)  SpO2: 96% (26 Mar 2024 12:46) (93% - 100%)    Parameters below as of 26 Mar 2024 12:46  Patient On (Oxygen Delivery Method): nasal cannula      CONSTITUTIONAL: NAD,   RESPIRATORY: Normal respiratory effort; lungs are clear to auscultation bilaterally  CARDIOVASCULAR: Regular rate and rhythm, normal S1 and S2, no murmur/rub/gallop; No lower extremity edema; Peripheral pulses are 2+ bilaterally  ABDOMEN: Nontender to palpation, normoactive bowel sounds, no rebound/guarding; No hepatosplenomegaly  MUSCLOSKELETAL: no clubbing or cyanosis of digits; no joint swelling or tenderness to palpation  PSYCH: A+O to person, place, and time; affect appropriate    LABS:    CBC Full  -  ( 26 Mar 2024 05:05 )  WBC Count : 8.67 K/uL  RBC Count : 3.08 M/uL  Hemoglobin : 10.6 g/dL  Hematocrit : 31.7 %  Platelet Count - Automated : 112 K/uL  Mean Cell Volume : 102.9 fL  Mean Cell Hemoglobin : 34.4 pg  Mean Cell Hemoglobin Concentration : 33.4 gm/dL  Auto Neutrophil # : x  Auto Lymphocyte # : x  Auto Monocyte # : x  Auto Eosinophil # : x  Auto Basophil # : x  Auto Neutrophil % : x  Auto Lymphocyte % : x  Auto Monocyte % : x  Auto Eosinophil % : x  Auto Basophil % : x    03-26    139  |  105  |  22  ----------------------------<  111<H>  4.4   |  23  |  1.03    Ca    9.1      26 Mar 2024 05:05  Phos  2.5     03-26  Mg     2.10     03-26    TPro  6.0  /  Alb  3.6  /  TBili  1.3<H>  /  DBili  x   /  AST  29  /  ALT  27  /  AlkPhos  59  03-26     CARDIAC MARKERS ( 24 Mar 2024 05:47 )  x     / x     / 870 U/L / x     / 13.8 ng/mL      Urinalysis Basic - ( 24 Mar 2024 05:47 )    Color: x / Appearance: x / SG: x / pH: x  Gluc: 167 mg/dL / Ketone: x  / Bili: x / Urobili: x   Blood: x / Protein: x / Nitrite: x   Leuk Esterase: x / RBC: x / WBC x   Sq Epi: x / Non Sq Epi: x / Bacteria: x        Culture - Blood (collected 22 Mar 2024 23:00)  Source: .Blood Blood-Peripheral  Gram Stain (23 Mar 2024 11:17):    Growth in anaerobic bottle: Gram positive cocci in pairs    Growth in aerobic bottle: Gram positive cocci in pairs  Preliminary Report (23 Mar 2024 11:17):    Growth in anaerobic bottle: Gram positive cocci in pairs    Growth in aerobic bottle: Gram positive cocci in pairs    Direct identification is available within approximately 3-5    hours either by Blood Panel Multiplexed PCR or Direct    MALDI-TOF. Details: [email or web address]  Organism: Blood Culture PCR (23 Mar 2024 11:49)  Organism: Blood Culture PCR (23 Mar 2024 11:49)    Culture - Blood (collected 22 Mar 2024 22:45)  Source: .Blood Blood-Peripheral  Gram Stain (23 Mar 2024 11:31):    Growth in anaerobic bottle: Gram positive cocci in pairs    Growth in aerobic bottle: Gram positive cocci in pairs  Preliminary Report (23 Mar 2024 11:31):    Growth in anaerobic bottle: Gram positive cocci in pairs    Growth in aerobic bottle: Gram positive cocci in pairs        Tele Reviewed:    RADIOLOGY & ADDITIONAL TESTS:  Results Reviewed:   Imaging Personally Reviewed:  Electrocardiogram Personally Reviewed:     PROGRESS NOTE:   Patient is a 88y old  Male who presents with a chief complaint of fever (23 Mar 2024 07:48)    SUBJECTIVE / OVERNIGHT EVENTS:  No acute events overnight.     Patient seen and examined at bedside. Feeling well today. Denied SOB, chest pain, fever or chills.    ADDITIONAL REVIEW OF SYSTEMS:  Patient denies fevers, chills, chest pain, shortness of breath, nausea, abdominal pain, diarrhea, constipation, dysuria, leg swelling, headache, light headedness.    MEDICATIONS  (STANDING):  albuterol/ipratropium for Nebulization 3 milliLiter(s) Nebulizer every 6 hours  budesonide 160 MICROgram(s)/formoterol 4.5 MICROgram(s) Inhaler 2 Puff(s) Inhalation two times a day  budesonide 160 MICROgram(s)/formoterol 4.5 MICROgram(s) Inhaler 2 Puff(s) Inhalation two times a day  piperacillin/tazobactam IVPB.. 3.375 Gram(s) IV Intermittent every 8 hours  rivaroxaban 20 milliGRAM(s) Oral with dinner  tamsulosin 0.8 milliGRAM(s) Oral at bedtime    MEDICATIONS  (PRN):      CAPILLARY BLOOD GLUCOSE        I&O's Summary      PHYSICAL EXAM:  Vital Signs Last 24 Hrs  T(C): 36.9 (27 Mar 2024 13:21), Max: 36.9 (26 Mar 2024 22:11)  T(F): 98.4 (27 Mar 2024 13:21), Max: 98.5 (26 Mar 2024 22:11)  HR: 70 (27 Mar 2024 13:21) (69 - 75)  BP: 137/76 (27 Mar 2024 13:21) (133/74 - 147/69)  BP(mean): --  RR: 18 (27 Mar 2024 13:21) (18 - 19)  SpO2: 96% (27 Mar 2024 13:21) (95% - 98%)    Parameters below as of 27 Mar 2024 13:21  Patient On (Oxygen Delivery Method): nasal cannula  O2 Flow (L/min): 4    CONSTITUTIONAL: NAD,   RESPIRATORY: Normal respiratory effort; lungs are clear to auscultation bilaterally  CARDIOVASCULAR: Regular rate and rhythm, normal S1 and S2, no murmur/rub/gallop; No lower extremity edema; Peripheral pulses are 2+ bilaterally  ABDOMEN: Nontender to palpation, normoactive bowel sounds, no rebound/guarding; No hepatosplenomegaly  MUSCLOSKELETAL: no clubbing or cyanosis of digits; no joint swelling or tenderness to palpation  PSYCH: A+O to person, place, and time; affect appropriate    LABS:    CBC Full  -  ( 27 Mar 2024 06:05 )  WBC Count : 7.93 K/uL  RBC Count : 3.13 M/uL  Hemoglobin : 10.7 g/dL  Hematocrit : 31.8 %  Platelet Count - Automated : 122 K/uL  Mean Cell Volume : 101.6 fL  Mean Cell Hemoglobin : 34.2 pg  Mean Cell Hemoglobin Concentration : 33.6 gm/dL  Auto Neutrophil # : 6.53 K/uL  Auto Lymphocyte # : 0.65 K/uL  Auto Monocyte # : 0.56 K/uL  Auto Eosinophil # : 0.14 K/uL  Auto Basophil # : 0.02 K/uL  Auto Neutrophil % : 82.2 %  Auto Lymphocyte % : 8.2 %  Auto Monocyte % : 7.1 %  Auto Eosinophil % : 1.8 %  Auto Basophil % : 0.3 %    03-27    136  |  104  |  17  ----------------------------<  98  3.8   |  21<L>  |  0.85    Ca    8.9      27 Mar 2024 06:05  Phos  2.5     03-27  Mg     2.00     03-27    TPro  5.9<L>  /  Alb  3.5  /  TBili  1.2  /  DBili  x   /  AST  24  /  ALT  27  /  AlkPhos  55  03-27    CARDIAC MARKERS ( 24 Mar 2024 05:47 )  x     / x     / 870 U/L / x     / 13.8 ng/mL      Urinalysis Basic - ( 24 Mar 2024 05:47 )    Color: x / Appearance: x / SG: x / pH: x  Gluc: 167 mg/dL / Ketone: x  / Bili: x / Urobili: x   Blood: x / Protein: x / Nitrite: x   Leuk Esterase: x / RBC: x / WBC x   Sq Epi: x / Non Sq Epi: x / Bacteria: x        Culture - Blood (collected 22 Mar 2024 23:00)  Source: .Blood Blood-Peripheral  Gram Stain (23 Mar 2024 11:17):    Growth in anaerobic bottle: Gram positive cocci in pairs    Growth in aerobic bottle: Gram positive cocci in pairs  Preliminary Report (23 Mar 2024 11:17):    Growth in anaerobic bottle: Gram positive cocci in pairs    Growth in aerobic bottle: Gram positive cocci in pairs    Direct identification is available within approximately 3-5    hours either by Blood Panel Multiplexed PCR or Direct    MALDI-TOF. Details: [email or web address]  Organism: Blood Culture PCR (23 Mar 2024 11:49)  Organism: Blood Culture PCR (23 Mar 2024 11:49)    Culture - Blood (collected 22 Mar 2024 22:45)  Source: .Blood Blood-Peripheral  Gram Stain (23 Mar 2024 11:31):    Growth in anaerobic bottle: Gram positive cocci in pairs    Growth in aerobic bottle: Gram positive cocci in pairs  Preliminary Report (23 Mar 2024 11:31):    Growth in anaerobic bottle: Gram positive cocci in pairs    Growth in aerobic bottle: Gram positive cocci in pairs        Tele Reviewed:    RADIOLOGY & ADDITIONAL TESTS:  Results Reviewed:   Imaging Personally Reviewed:  Electrocardiogram Personally Reviewed:

## 2024-03-27 NOTE — PROGRESS NOTE ADULT - ASSESSMENT
88M with h/o COPD (home Ox) and Afib (on Eliquis/Xarelto?) and PPM who presents with fever and SOB. Pt found to be septic w/temp 104F rectal, tachy, desat. CT chest c/f atelectasis, interlobular septal thickening, and incidental pulm nodules with trace pericardial effusion. UA positive, UCx +GNR and BCx 3/22 +strept lutetiensis pending susceptibility. Trend lactate now s/p IVF, pending TTE and monitor on tele           88M with h/o COPD (home Ox) and Afib (on Xarelto) and PPM who presents with fever and SOB. Pt found to have severe sepssi w/temp 104F rectal, tachycardia, elevated lactate with BCx +. 4/4 +strept lutetiensis - currently receiving ceftriaxone and pending CAREN and surveillance cultures.

## 2024-03-27 NOTE — CONSULT NOTE ADULT - SUBJECTIVE AND OBJECTIVE BOX
CHIEF COMPLAINT:  Called to evaluate patient with gram positive bacteremia and has PPM in place     HISTORY OF PRESENT ILLNESS:  87 y/o M w/ hx of Afib on Eliquis w/ PPM, COPD on 3-3.5L NC (on pred?) daily presenting to ED with chills and shortness of breath. The patient and daughter at the bedside state that he was doing well on Thursday 3/22 and up until Friday 3/23 daytime. His aid noticed that patient had acute onset of chills/fever with shortness of breath overnight that prompted him to come to ED for further evaluation. He is on home oxygen 3.5L at home and noticed his saturation went down to 80%. He noticed this happened a few weeks ago and was seen by outpatient urgent care and obtained a chest CXR with no significant findings. He does report his urine being a darker color and endsoring mild urinary retention but states he has BPH on flomax. His shortness of breath was worse with exertion, but denies any chest pain, abdominal pain, n/v/d, dysuria, or hematuria. He has a history of afib and is on Eliquis. He denies any recent exposure to sick contacts or recent travel. Has been using his albuterol without any relief. On arrival in ED, the pt was tachypneic to 5L NC, normotensive and febrile rectally to 104F. Started on empiric abx. Blood cultures revealed Streptococcus lutetiensis (4/4 bottles). EP was called for possible PPM extraction. Patient has a SJM device set in VVIR mode(see full interrogation note). He is V pacing 98%. Patient denies any chest pain, SOB, palpitations or dizziness at this time. He is resting in bed. On O2 4L NC. Echocardiogram left ventricular systolic function is mildly decreased with an ejection fraction visually estimated at 45 to 50%. There is global left ventricular hypokinesis.     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  Persistent atrial fibrillation  Pacemaker  [employer] Model [id number]  Serial [id number]  Idiopathic pulmonary fibrosis  Polyp of colon, unspecified part of colon, unspecified type  Benign prostatic hyperplasia with nocturia  Dyspnea on exertion  Oxygen desaturation during sleep  Use oxygen at night, nasal cannula  Essential hypertension  H/O amputation  age 19, R great toe, due to growth "tumor"  H/O arthroscopic knee surgery  Both knees, 2013  H/O cardiac pacemaker  2005  H/O repair of rotator cuff  2012      PREVIOUS DIAGNOSTIC TESTING:    Echocardiogram:   from: TTE W or WO Ultrasound Enhancing Agent (03.25.24 @ 14:41)  CONCLUSIONS:      1. The left ventricular cavity is normal in size. Left ventricular wall thickness is normal. Left ventricular systolic function is mildly decreased with an ejection fraction visually estimated at 45 to 50%. There is global left ventricular hypokinesis.   2. The right ventricular cavity is enlarged in size and reduced systolic function. A device lead is visualized in the right heart.   3. Structurally normal mitral valve with normal leaflet excursion. There is calcification of the mitral valve annulus. There is mild mitral regurgitation.   4. The aortic valve anatomy cannot be determined with reduced systolic excursion. There is calcification of the aortic valve leaflets. The peak transaortic velocity is 2.41 m/s, peak transaortic gradient is 23.2 mmHg and mean transaortic gradient is 12.0 mmHg with an LVOT/aortic valve VTI ratio of 0.45. Probable mild aortic stenosis. There is trace aortic regurgitation.   5. The left atrium is moderately dilated with an indexed volume of 47 ml/m².   6. The right atrium is severely dilated with an indexed volume of 57.95 ml/m² and an indexed area of 14.72 cm²/m².    ________________________________________________________________________________________  FINDINGS:     Left Ventricle:  The left ventricular cavity is normal in size. Left ventricular wall thickness is normal. Left ventricular systolic function is mildly decreased with an ejection fraction visually estimated at 45 to 50%. There is global left ventricular hypokinesis.     Right Ventricle:  The right ventricular cavity is enlarged in size and reduced systolic function. A device lead is visualized in the right heart.     Left Atrium:  The left atrium is moderately dilated with an indexed volume of 47 ml/m².     Right Atrium:  The right atrium is severely dilated with an indexed volume of 57.95 ml/m² and an indexed area of 14.72 cm²/m².     Aortic Valve:  The aortic valve anatomy cannot be determined with reduced systolic excursion. There is calcification of the aortic valve leaflets. The peak transaortic velocity is 2.41 m/s, peak transaortic gradient is 23.2 mmHg and mean transaortic gradient is 12.0 mmHg with an LVOT/aortic valve VTI ratio of 0.45. Probable mild aortic stenosis. There is trace aortic regurgitation.     Mitral Valve:  Structurally normal mitral valve with normal leaflet excursion. There is calcification of the mitral valve annulus. There is mild mitral regurgitation.     Tricuspid Valve:  Structurally normal tricuspid valve with normal leaflet excursion. There is mild tricuspid regurgitation.     Pulmonic Valve:  Structurally normal pulmonic valve with normal leaflet excursion. There is trace pulmonic regurgitation.     Aorta:  The aortic root appears normal in size. The aortic root at the sinuses of Valsalva is normal in size, measuring 3.35 cm (indexed 1.59 cm/m²).     Pericardium:  No pericardial effusion seen.     Systemic Veins:  The inferior vena cava is dilated measuring 2.70 cm in diameter, (dilated >2.1cm) with abnormal inspiratory collapse (abnormal <50%) consistent with elevated right atrial pressure (~15, range 10-20mmHg).  ____________________________________________________________________  QUANTITATIVE DATA:  Left Ventricle Measurements: (Indexed to BSA)     IVSd (2D):   1.1 cm  LVPWd (2D):  1.0 cm  LVIDd (2D):  5.3 cm  LVIDs (2D):  3.6 cm  LV Mass:     202 g  96.1 g/m²  Visualized LV EF%: 45 to 50%     MV E Vmax:    1.15 m/s  MV A Vmax:    0.34 m/s  MV E/A:       3.34  e' lateral:   18.40 cm/s  e' medial:    9.25 cm/s  E/e' lateral: 6.25  E/e' medial:  12.43  E/e' Average: 8.32  MV DT:        165 msec    Aorta Measurements: (Normal range) (Indexed to BSA)     Sinuses of Valsalva: 3.35 cm (3.1 - 3.7 cm)       LeftAtrium Measurements: (Indexed to BSA)  LA Diam 2D:        4.50 cm  LA Vol s, MOD A4C: 98.20 ml.  LA Vol s, MOD A2C: 74.40 ml.  LA Vol s, MOD BP:  87.60 ml  41.61 ml/m²    Right Ventricle Measurements: Right Atrial Measurements:     TV Kimberley. S':      9.79 cm/s    RA Vol:       122.00 ml  RV Base (RVID1): 5.1 cm       RA Vol Index: 57.95 ml/m²  RV Mid (RVID2):  3.6 cm       LVOT / RVOT/ Qp/Qs Data: (Indexed to BSA)  LVOT Vmax: 1.10 m/s  LVOT VTI:  18.90 cm    Aortic Valve Measurements:  AV Vmax:           2.4 m/s  AV Peak Gradient:       23.2 mmHg  AV Mean Gradient:       12.0 mmHg  AV VTI:                 42.1 cm  AV VTI Ratio:           0.45  AoV Dimensionless Index 0.45    Mitral Valve Measurements:     MV Vmax:      1.46 m/s  MV VTI, kimberley   0.337 m  MV Mean Grad: 2.0 mmHg  MV Peak Grad: 8.5 mmHg  MV E Vmax:    1.2 m/s  MV A Vmax:    0.3 m/s  MV E/A:       3.3       Tricuspid Valve Measurements:     RA Pressure: 15 mmHg   	    MEDICATIONS:  losartan 100 milliGRAM(s) Oral daily  metoprolol succinate ER 50 milliGRAM(s) Oral daily  rivaroxaban 20 milliGRAM(s) Oral with dinner  cefTRIAXone   IVPB 2000 milliGRAM(s) IV Intermittent every 24 hours  albuterol    90 MICROgram(s) HFA Inhaler 2 Puff(s) Inhalation every 6 hours  albuterol/ipratropium for Nebulization 3 milliLiter(s) Nebulizer every 6 hours  budesonide 160 MICROgram(s)/formoterol 4.5 MICROgram(s) Inhaler 2 Puff(s) Inhalation two times a day  fluticasone furoate/umeclidinium/vilanterol 200-62.5-25 MICROgram(s) Inhaler 1 Puff(s) Inhalation daily  acetaminophen     Tablet .. 650 milliGRAM(s) Oral every 6 hours PRN  ascorbic acid 500 milliGRAM(s) Oral daily  chlorhexidine 2% Cloths 1 Application(s) Topical <User Schedule>  ferrous    sulfate 325 milliGRAM(s) Oral daily  mupirocin 2% Ointment 1 Application(s) Both Nostrils two times a day  tamsulosin 0.8 milliGRAM(s) Oral at bedtime      FAMILY HISTORY:  FH: colon cancer  mother    FH: ovarian cancer  father    SOCIAL HISTORY:      [-] Smoker  [-] Alcohol  [-] Drugs    Allergies    No Known Allergies    Intolerances    REVIEW OF SYSTEMS:  CONSTITUTIONAL: No fever, weight loss, + fatigue  EYES: No eye pain, visual disturbances, or discharge  ENMT:  No difficulty hearing, tinnitus, vertigo; No sinus or throat pain  NECK: No pain or stiffness  RESPIRATORY: No cough, wheezing, chills or hemoptysis; No Shortness of Breath  CARDIOVASCULAR: No chest pain, palpitations, passing out, dizziness, or leg swelling  GASTROINTESTINAL: No abdominal or epigastric pain. No nausea, vomiting, or hematemesis; No diarrhea or constipation. No melena or hematochezia.  GENITOURINARY: No dysuria, frequency, hematuria, or incontinence  NEUROLOGICAL: No headaches, memory loss, loss of strength, numbness, or tremors  SKIN: No itching, burning, rashes, or lesions   LYMPH Nodes: No enlarged glands  ENDOCRINE: No heat or cold intolerance; No hair loss  MUSCULOSKELETAL: No joint pain or swelling; No muscle, back, or extremity pain  PSYCHIATRIC: No depression, anxiety, mood swings, or difficulty sleeping  HEME/LYMPH: No easy bruising, or bleeding gums  ALLERY AND IMMUNOLOGIC: No hives or eczema	    [X] All others negative	  [ ] Unable to obtain    PHYSICAL EXAM:  T(C): 36.7 (03-27-24 @ 06:00), Max: 36.9 (03-26-24 @ 22:11)  HR: 72 (03-27-24 @ 08:26) (69 - 76)  BP: 143/80 (03-27-24 @ 06:00) (133/74 - 147/69)  RR: 19 (03-27-24 @ 06:00) (18 - 19)  SpO2: 98% (03-27-24 @ 08:26) (95% - 98%)  Wt(kg): --  I&O's Summary    26 Mar 2024 07:01  -  27 Mar 2024 07:00  --------------------------------------------------------  IN: 1070 mL / OUT: 1450 mL / NET: -380 mL        Appearance: No acute distress noted	  Cardiovascular: Normal S1 S2, No JVD, No murmurs, No edema  Respiratory: Lungs clear to auscultation	  Psychiatry: A & O x 3, Mood & affect appropriate  Gastrointestinal:  Soft, Non-tender, + BS	  Extremities: Normal range of motion, No clubbing, cyanosis or edema      TELEMETRY: Not on telemetry    ECG:  	  RADIOLOGY:  OTHER: 	  	  LABS:	 	    CARDIAC MARKERS:                        10.7   7.93  )-----------( 122      ( 27 Mar 2024 06:05 )             31.8     03-27    136  |  104  |  17  ----------------------------<  98  3.8   |  21<L>  |  0.85    Ca    8.9      27 Mar 2024 06:05  Phos  2.5     03-27  Mg     2.00     03-27    TPro  5.9<L>  /  Alb  3.5  /  TBili  1.2  /  DBili  x   /  AST  24  /  ALT  27  /  AlkPhos  55  03-27    proBNP:   Lipid Profile:   HgA1c:   TSH:          CHIEF COMPLAINT:  Called to evaluate patient with gram positive bacteremia and has PPM in place     HISTORY OF PRESENT ILLNESS:  87 y/o M w/ hx of Afib on Eliquis w/ PPM, COPD on 3-3.5L NC (on pred?) daily presenting to ED with chills and shortness of breath. The patient and daughter at the bedside state that he was doing well on Thursday 3/22 and up until Friday 3/23 daytime. His aid noticed that patient had acute onset of chills/fever with shortness of breath overnight that prompted him to come to ED for further evaluation. He recently went for deep dental cleaning within the past month and has partial dentures as well. He is on home oxygen 3.5L at home and noticed his saturation went down to 80%. He noticed this happened a few weeks ago and was seen by outpatient urgent care and obtained a chest CXR with no significant findings. He does report his urine being a darker color and endsoring mild urinary retention but states he has BPH on flomax. His shortness of breath was worse with exertion, but denies any chest pain, abdominal pain, n/v/d, dysuria, or hematuria. He has a history of afib and is on Eliquis. He denies any recent exposure to sick contacts or recent travel. Has been using his albuterol without any relief. On arrival in ED, the pt was tachypneic to 5L NC, normotensive and febrile rectally to 104F. Started on empiric abx. Blood cultures revealed Streptococcus lutetiensis (4/4 bottles). He is on IV antibiotics. EP was called for possible PPM extraction. Patient has a SJM device set in VVIR mode(see full interrogation note). He is V pacing 98%. Patient denies any chest pain, SOB, palpitations or dizziness at this time. He is resting in bed. On O2 4L NC. Echocardiogram left ventricular systolic function is mildly decreased with an ejection fraction visually estimated at 45 to 50%. There is global left ventricular hypokinesis.     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  Persistent atrial fibrillation  Pacemaker  [employer] Model [id number]  Serial [id number]  Idiopathic pulmonary fibrosis  Polyp of colon, unspecified part of colon, unspecified type  Benign prostatic hyperplasia with nocturia  Dyspnea on exertion  Oxygen desaturation during sleep  Use oxygen at night, nasal cannula  Essential hypertension  H/O amputation  age 19, R great toe, due to growth "tumor"  H/O arthroscopic knee surgery  Both knees, 2013  H/O cardiac pacemaker  2005  H/O repair of rotator cuff  2012      PREVIOUS DIAGNOSTIC TESTING:    Echocardiogram:   from: TTE W or WO Ultrasound Enhancing Agent (03.25.24 @ 14:41)  CONCLUSIONS:      1. The left ventricular cavity is normal in size. Left ventricular wall thickness is normal. Left ventricular systolic function is mildly decreased with an ejection fraction visually estimated at 45 to 50%. There is global left ventricular hypokinesis.   2. The right ventricular cavity is enlarged in size and reduced systolic function. A device lead is visualized in the right heart.   3. Structurally normal mitral valve with normal leaflet excursion. There is calcification of the mitral valve annulus. There is mild mitral regurgitation.   4. The aortic valve anatomy cannot be determined with reduced systolic excursion. There is calcification of the aortic valve leaflets. The peak transaortic velocity is 2.41 m/s, peak transaortic gradient is 23.2 mmHg and mean transaortic gradient is 12.0 mmHg with an LVOT/aortic valve VTI ratio of 0.45. Probable mild aortic stenosis. There is trace aortic regurgitation.   5. The left atrium is moderately dilated with an indexed volume of 47 ml/m².   6. The right atrium is severely dilated with an indexed volume of 57.95 ml/m² and an indexed area of 14.72 cm²/m².    ________________________________________________________________________________________  FINDINGS:     Left Ventricle:  The left ventricular cavity is normal in size. Left ventricular wall thickness is normal. Left ventricular systolic function is mildly decreased with an ejection fraction visually estimated at 45 to 50%. There is global left ventricular hypokinesis.     Right Ventricle:  The right ventricular cavity is enlarged in size and reduced systolic function. A device lead is visualized in the right heart.     Left Atrium:  The left atrium is moderately dilated with an indexed volume of 47 ml/m².     Right Atrium:  The right atrium is severely dilated with an indexed volume of 57.95 ml/m² and an indexed area of 14.72 cm²/m².     Aortic Valve:  The aortic valve anatomy cannot be determined with reduced systolic excursion. There is calcification of the aortic valve leaflets. The peak transaortic velocity is 2.41 m/s, peak transaortic gradient is 23.2 mmHg and mean transaortic gradient is 12.0 mmHg with an LVOT/aortic valve VTI ratio of 0.45. Probable mild aortic stenosis. There is trace aortic regurgitation.     Mitral Valve:  Structurally normal mitral valve with normal leaflet excursion. There is calcification of the mitral valve annulus. There is mild mitral regurgitation.     Tricuspid Valve:  Structurally normal tricuspid valve with normal leaflet excursion. There is mild tricuspid regurgitation.     Pulmonic Valve:  Structurally normal pulmonic valve with normal leaflet excursion. There is trace pulmonic regurgitation.     Aorta:  The aortic root appears normal in size. The aortic root at the sinuses of Valsalva is normal in size, measuring 3.35 cm (indexed 1.59 cm/m²).     Pericardium:  No pericardial effusion seen.     Systemic Veins:  The inferior vena cava is dilated measuring 2.70 cm in diameter, (dilated >2.1cm) with abnormal inspiratory collapse (abnormal <50%) consistent with elevated right atrial pressure (~15, range 10-20mmHg).  ____________________________________________________________________  QUANTITATIVE DATA:  Left Ventricle Measurements: (Indexed to BSA)     IVSd (2D):   1.1 cm  LVPWd (2D):  1.0 cm  LVIDd (2D):  5.3 cm  LVIDs (2D):  3.6 cm  LV Mass:     202 g  96.1 g/m²  Visualized LV EF%: 45 to 50%     MV E Vmax:    1.15 m/s  MV A Vmax:    0.34 m/s  MV E/A:       3.34  e' lateral:   18.40 cm/s  e' medial:    9.25 cm/s  E/e' lateral: 6.25  E/e' medial:  12.43  E/e' Average: 8.32  MV DT:        165 msec    Aorta Measurements: (Normal range) (Indexed to BSA)     Sinuses of Valsalva: 3.35 cm (3.1 - 3.7 cm)       LeftAtrium Measurements: (Indexed to BSA)  LA Diam 2D:        4.50 cm  LA Vol s, MOD A4C: 98.20 ml.  LA Vol s, MOD A2C: 74.40 ml.  LA Vol s, MOD BP:  87.60 ml  41.61 ml/m²    Right Ventricle Measurements: Right Atrial Measurements:     TV Kimberley. S':      9.79 cm/s    RA Vol:       122.00 ml  RV Base (RVID1): 5.1 cm       RA Vol Index: 57.95 ml/m²  RV Mid (RVID2):  3.6 cm       LVOT / RVOT/ Qp/Qs Data: (Indexed to BSA)  LVOT Vmax: 1.10 m/s  LVOT VTI:  18.90 cm    Aortic Valve Measurements:  AV Vmax:           2.4 m/s  AV Peak Gradient:       23.2 mmHg  AV Mean Gradient:       12.0 mmHg  AV VTI:                 42.1 cm  AV VTI Ratio:           0.45  AoV Dimensionless Index 0.45    Mitral Valve Measurements:     MV Vmax:      1.46 m/s  MV VTI, kimberley   0.337 m  MV Mean Grad: 2.0 mmHg  MV Peak Grad: 8.5 mmHg  MV E Vmax:    1.2 m/s  MV A Vmax:    0.3 m/s  MV E/A:       3.3       Tricuspid Valve Measurements:     RA Pressure: 15 mmHg   	    MEDICATIONS:  losartan 100 milliGRAM(s) Oral daily  metoprolol succinate ER 50 milliGRAM(s) Oral daily  rivaroxaban 20 milliGRAM(s) Oral with dinner  cefTRIAXone   IVPB 2000 milliGRAM(s) IV Intermittent every 24 hours  albuterol    90 MICROgram(s) HFA Inhaler 2 Puff(s) Inhalation every 6 hours  albuterol/ipratropium for Nebulization 3 milliLiter(s) Nebulizer every 6 hours  budesonide 160 MICROgram(s)/formoterol 4.5 MICROgram(s) Inhaler 2 Puff(s) Inhalation two times a day  fluticasone furoate/umeclidinium/vilanterol 200-62.5-25 MICROgram(s) Inhaler 1 Puff(s) Inhalation daily  acetaminophen     Tablet .. 650 milliGRAM(s) Oral every 6 hours PRN  ascorbic acid 500 milliGRAM(s) Oral daily  chlorhexidine 2% Cloths 1 Application(s) Topical <User Schedule>  ferrous    sulfate 325 milliGRAM(s) Oral daily  mupirocin 2% Ointment 1 Application(s) Both Nostrils two times a day  tamsulosin 0.8 milliGRAM(s) Oral at bedtime      FAMILY HISTORY:  FH: colon cancer  mother    FH: ovarian cancer  father    SOCIAL HISTORY:      [-] Smoker  [-] Alcohol  [-] Drugs    Allergies    No Known Allergies    Intolerances    REVIEW OF SYSTEMS:  CONSTITUTIONAL: No fever, weight loss, + fatigue  EYES: No eye pain, visual disturbances, or discharge  ENMT:  No difficulty hearing, tinnitus, vertigo; No sinus or throat pain  NECK: No pain or stiffness  RESPIRATORY: No cough, wheezing, chills or hemoptysis; No Shortness of Breath  CARDIOVASCULAR: No chest pain, palpitations, passing out, dizziness, or leg swelling  GASTROINTESTINAL: No abdominal or epigastric pain. No nausea, vomiting, or hematemesis; No diarrhea or constipation. No melena or hematochezia.  GENITOURINARY: No dysuria, frequency, hematuria, or incontinence  NEUROLOGICAL: No headaches, memory loss, loss of strength, numbness, or tremors  SKIN: No itching, burning, rashes, or lesions   LYMPH Nodes: No enlarged glands  ENDOCRINE: No heat or cold intolerance; No hair loss  MUSCULOSKELETAL: No joint pain or swelling; No muscle, back, or extremity pain  PSYCHIATRIC: No depression, anxiety, mood swings, or difficulty sleeping  HEME/LYMPH: No easy bruising, or bleeding gums  ALLERY AND IMMUNOLOGIC: No hives or eczema	    [X] All others negative	  [ ] Unable to obtain    PHYSICAL EXAM:  T(C): 36.7 (03-27-24 @ 06:00), Max: 36.9 (03-26-24 @ 22:11)  HR: 72 (03-27-24 @ 08:26) (69 - 76)  BP: 143/80 (03-27-24 @ 06:00) (133/74 - 147/69)  RR: 19 (03-27-24 @ 06:00) (18 - 19)  SpO2: 98% (03-27-24 @ 08:26) (95% - 98%)  Wt(kg): --  I&O's Summary    26 Mar 2024 07:01  -  27 Mar 2024 07:00  --------------------------------------------------------  IN: 1070 mL / OUT: 1450 mL / NET: -380 mL        Appearance: No acute distress noted	  Cardiovascular: Normal S1 S2, No JVD, No murmurs, No edema  Respiratory: Lungs clear to auscultation	  Psychiatry: A & O x 3, Mood & affect appropriate  Gastrointestinal:  Soft, Non-tender, + BS	  Extremities: Normal range of motion, No clubbing, cyanosis or edema      TELEMETRY: Not on telemetry    ECG:  	  RADIOLOGY:  OTHER: 	  	  LABS:	 	    CARDIAC MARKERS:                        10.7   7.93  )-----------( 122      ( 27 Mar 2024 06:05 )             31.8     03-27    136  |  104  |  17  ----------------------------<  98  3.8   |  21<L>  |  0.85    Ca    8.9      27 Mar 2024 06:05  Phos  2.5     03-27  Mg     2.00     03-27    TPro  5.9<L>  /  Alb  3.5  /  TBili  1.2  /  DBili  x   /  AST  24  /  ALT  27  /  AlkPhos  55  03-27      TSH: pending

## 2024-03-27 NOTE — PROGRESS NOTE ADULT - ATTENDING COMMENTS
88M w/ COPD (3.5L home O2), AF (rivaroxaban, s/p PPM) p/w SOB and chills and found to have high grade Strep lutetiensis bacteremia with unclear source.    - ID consulted  - Surveillance BCx NGTD (3/25)  - TTE non-diagnostic, CAREN scheduled for 3/28  - CT A/P without source of infection  - Has R THR, no currently e/o infection  - EP consulted given concern for potential PPM lead infection  - Continue CTX 2g IV daily, plan for 6 week course pending the above, will need PICC and home infusion on discharge    Time-based billing (NON-critical care).     35 minutes spent on total encounter; more than 50% of the visit was spent counseling and / or coordinating care by the attending physician.  The necessity of the time spent during the encounter on this date of service was due to:     documentation in [place], reviewing chart and coordinating care with patient/resident and interdisciplinary staff (such as , social workers, etc) as well as reviewing vitals, laboratory data, radiology, medication list, consultants' recommendations and prior records. Interventions were performed as documented above.

## 2024-03-27 NOTE — PROGRESS NOTE ADULT - PROBLEM SELECTOR PLAN 3
#demand ischemia #elevated trop  #trace pericardial effusion  #B lines on pocus   # R pleural effusion   - No history of heart failure, no LE edema or HJR  - CT chest with small pleural effusion (r) and trace pericardial effusion   - EKG with inverted t wave in II, III and v4-v6; so significant ST changes  - placed on tele (3/23), d/c 3/25  - proBNP ~3k, trop 62 from 104, plateau, ck 870, ckmb 13.8   - TTE 3/25: EF 45-50%, global LV hypokinesis, RV cavity enlargement size

## 2024-03-27 NOTE — CONSULT NOTE ADULT - ASSESSMENT
89 y/o M w/ hx of Afib on Eliquis w/ PPM, COPD on 3-3.5L NC (on pred?) daily presenting to ED with chills and shortness of breath that started suddenly. He was feeling well until 3/23. Patient was found to have sepsis with fever and leukocytosis on admission. On IV antibiotics as per ID. His blood cultures grew Streptococcus lutetiensis in 4/4 bottles. Patient has PPM and also with known R hip replacement, has been able to ambulate with cane as usual. UA with pyuria and UCx with Enterobacter cloacae. EP was called for possible PPM extraction. Patient has a SJM device set in VVIR mode(see full interrogation note). He is V pacing 98%. Patient denies any chest pain, SOB, palpitations or dizziness at this time. He is resting in bed. On O2 4L NC. Echocardiogram left ventricular systolic function is mildly decreased with an ejection fraction visually estimated at 45 to 50%. There is global left ventricular hypokinesis.   - maintain K>4 and Mg>2  - Continue IV antibiotics as per ID  - Continue BB and Xarelto  - Will d/w Ep attending regarding possible PPM extraction  - Will follow up 89 y/o M w/ hx of Afib on Eliquis w/ PPM, COPD on 3-3.5L NC (on pred?) daily presenting to ED with chills and shortness of breath that started suddenly. He was feeling well until 3/23. Patient was found to have sepsis with fever and leukocytosis on admission. On IV antibiotics as per ID. His blood cultures grew Streptococcus lutetiensis in 4/4 bottles. Patient has PPM and also with known R hip replacement, has been able to ambulate with cane as usual. UA with pyuria and UCx with Enterobacter cloacae. EP was called for possible PPM extraction. Patient has a SJM device set in VVIR mode(see full interrogation note). He is V pacing 98%. Patient denies any chest pain, SOB, palpitations or dizziness at this time. He is resting in bed. On O2 4L NC. Echocardiogram left ventricular systolic function is mildly decreased with an ejection fraction visually estimated at 45 to 50%. There is global left ventricular hypokinesis.   - maintain K>4 and Mg>2  - Continue IV antibiotics as per ID  - Continue BB and Xarelto  - Will d/w EP attending regarding possible PPM extraction  - Will follow up    Addendum:  Discussed with Dr. Calhoun and Dr. Cross regarding PPM extraction. Dr. Cross spoke to patient and his daughter regarding trasferring patient to [facility] for PPM extraction. Patient and daughter is agreeable.   Plan for transfer to [facility] for PPM extraction

## 2024-03-27 NOTE — PROGRESS NOTE ADULT - PROBLEM SELECTOR PLAN 6
#h/o AFIB, PPM, on Xarelto   - PPM ~2013, on blood thinner  - Xarelto 20 mg once a day    - last dose 3/22, resumed on 3/23  - d/c telemetry #h/o AFIB, PPM, on Xarelto   - PPM ~2013  - Xarelto 20 mg once a day    - last dose 3/22, resumed on 3/23

## 2024-03-27 NOTE — PROGRESS NOTE ADULT - PROBLEM SELECTOR PLAN 2
- hgb 11 (12), retic 1.3%, abs 42  - mcv 102-103  - b12 496 wnl  - b9 4.1 wnl  - pending peripheral smear, consider egd outpt - hgb 11 (12), retic 1.3%, abs 42  - mcv 102-103  - b12 496 wnl  - b9 4.1 wnl

## 2024-03-27 NOTE — PROCEDURE NOTE - ADDITIONAL PROCEDURE DETAILS
Appropriate pacing and sensing. Capture threshold tested via iterative testing. No events corresponding tot his admission. No reprogramming done    Device information:  Device                                 Model #                               Serial #                  Implant date    Aldridge                              Assurity MRI [id number]                 [id number]                  10/13/2022    RA Lead( ? capped)           Tendril SDX- 1688T/46cm        [id number]                 [id number]    RV Lead                            [id number]                                     [id number]              5/31/2005

## 2024-03-27 NOTE — PROGRESS NOTE ADULT - ASSESSMENT
87 y/o M w/ hx of Afib on Eliquis w/ PPM, COPD on 3-3.5L NC (on pred?) daily presenting to ED with chills and shortness of breath that started suddenly. He was feeling well until 3/23. He recently went for deep dental cleaning within the past month and has partial dentures as well. Patient was found to meet sepsis criteria on admission with fever and leukocytosis. His blood cultures grew Streptococcus lutetiensis in 4/4 bottles. Patient has PPM and also with known R hip replacement, has been able to ambulate with cane as usual. UA with pyuria and UCx with Enterobacter cloacae, however patient without urinary symptoms. TTE was unable to exclude IE.    Micro:  BCx (3/22): Streptococcus lutetiensis (4/4 bottles)  BCx (3/25): no growth  UCx (3/22): E. cloacae    Abx:  Cefepime (3/22)  Zosyn (3/23-3/25)  Ceftriaxone (3/23, 3/25-)    Overall, high grade Streptococcus bacteremia, recent dental work, known PPM, R hip replacement, asymptomatic bacteruria  fever, leucocytosis, sepsis on presentation.       Suggest:  - IV ceftriaxone 2g qD  - repeat blood cx NTD   - Pending CAREN for further work-up of IE, especially in setting of PPM  - EP evaluation for PPM removal  - CT Abd/pelvis with no obvious source but polypoid lesion noted in transverse colon.   - strep lutetiensis is from strep bovis group, pt needs colonoscopy, can be done as outpt.   - He will need 6 weeks of antibiotics, likely IV ceftriaxone, pending rest of work-up  - ESR/CRP noted.   - trend cbc, resolved leucocytosis.    Plan discussed with medicine and EP.       Jordan Shipman  Please contact through MS Teams   If no response or past 5 pm/weekend call [phone].

## 2024-03-27 NOTE — CONSULT NOTE ADULT - NS ATTEND AMEND GEN_ALL_CORE FT
89 y/o M w/ hx of Afib on Eliquis w/ PPM, COPD on 3-3.5L NC (on pred?) daily presenting to ED with chills and shortness of breath that started suddenly. He was feeling well until 3/23. Patient was found to have sepsis with fever and leukocytosis on admission. On IV antibiotics as per ID. His blood cultures grew Streptococcus lutetiensis in 4/4 bottles. Patient has PPM and also with known R hip replacement, has been able to ambulate with cane as usual. UA with pyuria and UCx with Enterobacter cloacae. EP was called for possible PPM extraction. Patient has a SJM device set in VVIR mode(see full interrogation note). He is V pacing 98%. Patient denies any chest pain, SOB, palpitations or dizziness at this time. He is resting in bed. On O2 4L NC. Echocardiogram left ventricular systolic function is mildly decreased with an ejection fraction visually estimated at 45 to 50%. There is global left ventricular hypokinesis.  Continue IV antibiotics as per ID. Continue BB and Xarelto. Patient to undergo PPM extraction. Spoke to patient and his daughter regarding transferring patient to [facility] for PPM extraction. Patient and daughter are agreeable.

## 2024-03-28 ENCOUNTER — NON-APPOINTMENT (OUTPATIENT)
Age: 89
End: 2024-03-28

## 2024-03-28 ENCOUNTER — APPOINTMENT (OUTPATIENT)
Dept: PULMONOLOGY | Facility: CLINIC | Age: 89
End: 2024-03-28

## 2024-03-28 ENCOUNTER — INPATIENT (INPATIENT)
Facility: HOSPITAL | Age: 89
LOS: 6 days | Discharge: HOME HEALTH | DRG: 854 | End: 2024-04-04
Attending: INTERNAL MEDICINE | Admitting: INTERNAL MEDICINE
Payer: MEDICARE

## 2024-03-28 VITALS
SYSTOLIC BLOOD PRESSURE: 144 MMHG | RESPIRATION RATE: 18 BRPM | DIASTOLIC BLOOD PRESSURE: 78 MMHG | HEART RATE: 70 BPM | OXYGEN SATURATION: 98 % | TEMPERATURE: 98 F

## 2024-03-28 VITALS
TEMPERATURE: 98 F | RESPIRATION RATE: 18 BRPM | HEART RATE: 92 BPM | SYSTOLIC BLOOD PRESSURE: 162 MMHG | DIASTOLIC BLOOD PRESSURE: 70 MMHG | OXYGEN SATURATION: 93 %

## 2024-03-28 DIAGNOSIS — J44.9 CHRONIC OBSTRUCTIVE PULMONARY DISEASE, UNSPECIFIED: ICD-10-CM

## 2024-03-28 DIAGNOSIS — I24.89 OTHER FORMS OF ACUTE ISCHEMIC HEART DISEASE: ICD-10-CM

## 2024-03-28 DIAGNOSIS — Z95.0 PRESENCE OF CARDIAC PACEMAKER: Chronic | ICD-10-CM

## 2024-03-28 DIAGNOSIS — Z89.9 ACQUIRED ABSENCE OF LIMB, UNSPECIFIED: Chronic | ICD-10-CM

## 2024-03-28 DIAGNOSIS — Z98.890 OTHER SPECIFIED POSTPROCEDURAL STATES: Chronic | ICD-10-CM

## 2024-03-28 DIAGNOSIS — D53.9 NUTRITIONAL ANEMIA, UNSPECIFIED: ICD-10-CM

## 2024-03-28 DIAGNOSIS — E78.5 HYPERLIPIDEMIA, UNSPECIFIED: ICD-10-CM

## 2024-03-28 DIAGNOSIS — N40.0 BENIGN PROSTATIC HYPERPLASIA WITHOUT LOWER URINARY TRACT SYMPTOMS: ICD-10-CM

## 2024-03-28 DIAGNOSIS — N40.1 BENIGN PROSTATIC HYPERPLASIA WITH LOWER URINARY TRACT SYMPTOMS: ICD-10-CM

## 2024-03-28 DIAGNOSIS — I10 ESSENTIAL (PRIMARY) HYPERTENSION: ICD-10-CM

## 2024-03-28 DIAGNOSIS — Z29.9 ENCOUNTER FOR PROPHYLACTIC MEASURES, UNSPECIFIED: ICD-10-CM

## 2024-03-28 DIAGNOSIS — R93.89 ABNORMAL FINDINGS ON DIAGNOSTIC IMAGING OF OTHER SPECIFIED BODY STRUCTURES: ICD-10-CM

## 2024-03-28 DIAGNOSIS — A41.9 SEPSIS, UNSPECIFIED ORGANISM: ICD-10-CM

## 2024-03-28 DIAGNOSIS — Z45.010 ENCOUNTER FOR CHECKING AND TESTING OF CARDIAC PACEMAKER PULSE GENERATOR [BATTERY]: ICD-10-CM

## 2024-03-28 DIAGNOSIS — I48.20 CHRONIC ATRIAL FIBRILLATION, UNSPECIFIED: ICD-10-CM

## 2024-03-28 LAB
ALBUMIN SERPL ELPH-MCNC: 3.4 G/DL — SIGNIFICANT CHANGE UP (ref 3.3–5)
ALP SERPL-CCNC: 55 U/L — SIGNIFICANT CHANGE UP (ref 40–120)
ALT FLD-CCNC: 24 U/L — SIGNIFICANT CHANGE UP (ref 4–41)
ANION GAP SERPL CALC-SCNC: 12 MMOL/L — SIGNIFICANT CHANGE UP (ref 7–14)
APTT BLD: 34.2 SEC — SIGNIFICANT CHANGE UP (ref 24.5–35.6)
AST SERPL-CCNC: 19 U/L — SIGNIFICANT CHANGE UP (ref 4–40)
BASOPHILS # BLD AUTO: 0.04 K/UL — SIGNIFICANT CHANGE UP (ref 0–0.2)
BASOPHILS NFR BLD AUTO: 0.5 % — SIGNIFICANT CHANGE UP (ref 0–2)
BILIRUB SERPL-MCNC: 1 MG/DL — SIGNIFICANT CHANGE UP (ref 0.2–1.2)
BUN SERPL-MCNC: 15 MG/DL — SIGNIFICANT CHANGE UP (ref 7–23)
CALCIUM SERPL-MCNC: 8.9 MG/DL — SIGNIFICANT CHANGE UP (ref 8.4–10.5)
CHLORIDE SERPL-SCNC: 104 MMOL/L — SIGNIFICANT CHANGE UP (ref 98–107)
CO2 SERPL-SCNC: 22 MMOL/L — SIGNIFICANT CHANGE UP (ref 22–31)
CREAT SERPL-MCNC: 0.87 MG/DL — SIGNIFICANT CHANGE UP (ref 0.5–1.3)
DEPRECATED S PNEUM 1 IGG SER-MCNC: 0.5 MCG/ML — SIGNIFICANT CHANGE UP
DEPRECATED S PNEUM12 IGG SER-MCNC: 0.1 MCG/ML — SIGNIFICANT CHANGE UP
DEPRECATED S PNEUM14 IGG SER-MCNC: 8 MCG/ML — SIGNIFICANT CHANGE UP
DEPRECATED S PNEUM17 IGG SER-MCNC: 0.1 MCG/ML — SIGNIFICANT CHANGE UP
DEPRECATED S PNEUM19 IGG SER-MCNC: 0.4 MCG/ML — SIGNIFICANT CHANGE UP
DEPRECATED S PNEUM19 IGG SER-MCNC: 18.3 MCG/ML — SIGNIFICANT CHANGE UP
DEPRECATED S PNEUM2 IGG SER-MCNC: <0.1 MCG/ML — SIGNIFICANT CHANGE UP
DEPRECATED S PNEUM20 IGG SER-MCNC: 0.3 MCG/ML — SIGNIFICANT CHANGE UP
DEPRECATED S PNEUM22 IGG SER-MCNC: 3.5 MCG/ML — SIGNIFICANT CHANGE UP
DEPRECATED S PNEUM23 IGG SER-MCNC: 26.8 MCG/ML — SIGNIFICANT CHANGE UP
DEPRECATED S PNEUM3 IGG SER-MCNC: 0.1 MCG/ML — SIGNIFICANT CHANGE UP
DEPRECATED S PNEUM4 IGG SER-MCNC: 5.8 MCG/ML — SIGNIFICANT CHANGE UP
DEPRECATED S PNEUM5 IGG SER-MCNC: 0.1 MCG/ML — SIGNIFICANT CHANGE UP
DEPRECATED S PNEUM8 IGG SER-MCNC: 0.6 MCG/ML — SIGNIFICANT CHANGE UP
DEPRECATED S PNEUM9 IGG SER-MCNC: 0.1 MCG/ML — SIGNIFICANT CHANGE UP
DEPRECATED S PNEUM9 IGG SER-MCNC: 4.3 MCG/ML — SIGNIFICANT CHANGE UP
EGFR: 84 ML/MIN/1.73M2 — SIGNIFICANT CHANGE UP
EOSINOPHIL # BLD AUTO: 0.16 K/UL — SIGNIFICANT CHANGE UP (ref 0–0.5)
EOSINOPHIL NFR BLD AUTO: 1.9 % — SIGNIFICANT CHANGE UP (ref 0–6)
GLUCOSE SERPL-MCNC: 100 MG/DL — HIGH (ref 70–99)
HCT VFR BLD CALC: 31.1 % — LOW (ref 39–50)
HGB BLD-MCNC: 10.4 G/DL — LOW (ref 13–17)
IANC: 7.09 K/UL — SIGNIFICANT CHANGE UP (ref 1.8–7.4)
IMM GRANULOCYTES NFR BLD AUTO: 0.5 % — SIGNIFICANT CHANGE UP (ref 0–0.9)
IMMUNOLOGIST REVIEW: SIGNIFICANT CHANGE UP
INR BLD: 2.09 RATIO — HIGH (ref 0.85–1.18)
LYMPHOCYTES # BLD AUTO: 0.58 K/UL — LOW (ref 1–3.3)
LYMPHOCYTES # BLD AUTO: 6.8 % — LOW (ref 13–44)
MAGNESIUM SERPL-MCNC: 1.9 MG/DL — SIGNIFICANT CHANGE UP (ref 1.6–2.6)
MCHC RBC-ENTMCNC: 33.4 GM/DL — SIGNIFICANT CHANGE UP (ref 32–36)
MCHC RBC-ENTMCNC: 33.5 PG — SIGNIFICANT CHANGE UP (ref 27–34)
MCV RBC AUTO: 100.3 FL — HIGH (ref 80–100)
MONOCYTES # BLD AUTO: 0.64 K/UL — SIGNIFICANT CHANGE UP (ref 0–0.9)
MONOCYTES NFR BLD AUTO: 7.5 % — SIGNIFICANT CHANGE UP (ref 2–14)
NEUTROPHILS # BLD AUTO: 7.09 K/UL — SIGNIFICANT CHANGE UP (ref 1.8–7.4)
NEUTROPHILS NFR BLD AUTO: 82.8 % — HIGH (ref 43–77)
NRBC # BLD: 0 /100 WBCS — SIGNIFICANT CHANGE UP (ref 0–0)
NRBC # FLD: 0 K/UL — SIGNIFICANT CHANGE UP (ref 0–0)
PHOSPHATE SERPL-MCNC: 2.6 MG/DL — SIGNIFICANT CHANGE UP (ref 2.5–4.5)
PLATELET # BLD AUTO: 135 K/UL — LOW (ref 150–400)
POTASSIUM SERPL-MCNC: 3.9 MMOL/L — SIGNIFICANT CHANGE UP (ref 3.5–5.3)
POTASSIUM SERPL-SCNC: 3.9 MMOL/L — SIGNIFICANT CHANGE UP (ref 3.5–5.3)
PROT SERPL-MCNC: 5.6 G/DL — LOW (ref 6–8.3)
PROTHROM AB SERPL-ACNC: 22.9 SEC — HIGH (ref 9.5–13)
RBC # BLD: 3.1 M/UL — LOW (ref 4.2–5.8)
RBC # FLD: 13.7 % — SIGNIFICANT CHANGE UP (ref 10.3–14.5)
S PNEUM SEROTYPE IGG SER-IMP: 0.2 MCG/ML — SIGNIFICANT CHANGE UP
S PNEUM SEROTYPE IGG SER-IMP: 0.4 MCG/ML — SIGNIFICANT CHANGE UP
S PNEUM SEROTYPE IGG SER-IMP: 0.5 MCG/ML — SIGNIFICANT CHANGE UP
S PNEUM SEROTYPE IGG SER-IMP: 1.1 MCG/ML — SIGNIFICANT CHANGE UP
S PNEUM SEROTYPE IGG SER-IMP: 13.3 MCG/ML — SIGNIFICANT CHANGE UP
S PNEUM SEROTYPE IGG SER-IMP: 4.8 MCG/ML — SIGNIFICANT CHANGE UP
S PNEUM SEROTYPE IGG SER-IMP: 5.8 MCG/ML — SIGNIFICANT CHANGE UP
SODIUM SERPL-SCNC: 138 MMOL/L — SIGNIFICANT CHANGE UP (ref 135–145)
STREPTOCOCCUS PNEUMONIAE IGG SEROTYPE INTERPRETATION: SIGNIFICANT CHANGE UP
WBC # BLD: 8.55 K/UL — SIGNIFICANT CHANGE UP (ref 3.8–10.5)
WBC # FLD AUTO: 8.55 K/UL — SIGNIFICANT CHANGE UP (ref 3.8–10.5)

## 2024-03-28 PROCEDURE — 99497 ADVNCD CARE PLAN 30 MIN: CPT | Mod: 25

## 2024-03-28 PROCEDURE — 99232 SBSQ HOSP IP/OBS MODERATE 35: CPT

## 2024-03-28 PROCEDURE — 99223 1ST HOSP IP/OBS HIGH 75: CPT | Mod: 25

## 2024-03-28 PROCEDURE — 99231 SBSQ HOSP IP/OBS SF/LOW 25: CPT | Mod: FS

## 2024-03-28 PROCEDURE — 99239 HOSP IP/OBS DSCHRG MGMT >30: CPT

## 2024-03-28 RX ORDER — FLUTICASONE PROPIONATE AND SALMETEROL 50; 250 UG/1; UG/1
2 POWDER ORAL; RESPIRATORY (INHALATION)
Qty: 0 | Refills: 0 | DISCHARGE

## 2024-03-28 RX ORDER — ACETAMINOPHEN 500 MG
650 TABLET ORAL EVERY 6 HOURS
Refills: 0 | Status: DISCONTINUED | OUTPATIENT
Start: 2024-03-28 | End: 2024-04-01

## 2024-03-28 RX ORDER — ALBUTEROL 90 UG/1
2 AEROSOL, METERED ORAL
Qty: 0 | Refills: 0 | DISCHARGE

## 2024-03-28 RX ORDER — IPRATROPIUM/ALBUTEROL SULFATE 18-103MCG
3 AEROSOL WITH ADAPTER (GRAM) INHALATION EVERY 6 HOURS
Refills: 0 | Status: DISCONTINUED | OUTPATIENT
Start: 2024-03-28 | End: 2024-03-28

## 2024-03-28 RX ORDER — METOPROLOL TARTRATE 50 MG
1 TABLET ORAL
Refills: 0 | DISCHARGE

## 2024-03-28 RX ORDER — FERROUS SULFATE 325(65) MG
325 TABLET ORAL DAILY
Refills: 0 | Status: DISCONTINUED | OUTPATIENT
Start: 2024-03-28 | End: 2024-04-02

## 2024-03-28 RX ORDER — RIVAROXABAN 15 MG-20MG
20 KIT ORAL DAILY
Refills: 0 | Status: DISCONTINUED | OUTPATIENT
Start: 2024-03-28 | End: 2024-03-28

## 2024-03-28 RX ORDER — LANOLIN ALCOHOL/MO/W.PET/CERES
3 CREAM (GRAM) TOPICAL AT BEDTIME
Refills: 0 | Status: DISCONTINUED | OUTPATIENT
Start: 2024-03-28 | End: 2024-04-02

## 2024-03-28 RX ORDER — LOSARTAN POTASSIUM 100 MG/1
100 TABLET, FILM COATED ORAL DAILY
Refills: 0 | Status: DISCONTINUED | OUTPATIENT
Start: 2024-03-28 | End: 2024-04-02

## 2024-03-28 RX ORDER — RIVAROXABAN 15 MG-20MG
1 KIT ORAL
Refills: 0 | DISCHARGE

## 2024-03-28 RX ORDER — AMLODIPINE BESYLATE 2.5 MG/1
1 TABLET ORAL
Qty: 0 | Refills: 0 | DISCHARGE

## 2024-03-28 RX ORDER — ONDANSETRON 8 MG/1
4 TABLET, FILM COATED ORAL EVERY 8 HOURS
Refills: 0 | Status: DISCONTINUED | OUTPATIENT
Start: 2024-03-28 | End: 2024-04-02

## 2024-03-28 RX ORDER — TAMSULOSIN HYDROCHLORIDE 0.4 MG/1
2 CAPSULE ORAL
Refills: 0 | DISCHARGE

## 2024-03-28 RX ORDER — ALBUTEROL 90 UG/1
2 AEROSOL, METERED ORAL EVERY 6 HOURS
Refills: 0 | Status: DISCONTINUED | OUTPATIENT
Start: 2024-03-28 | End: 2024-04-02

## 2024-03-28 RX ORDER — CEFTRIAXONE 500 MG/1
2000 INJECTION, POWDER, FOR SOLUTION INTRAMUSCULAR; INTRAVENOUS EVERY 24 HOURS
Refills: 0 | Status: DISCONTINUED | OUTPATIENT
Start: 2024-03-28 | End: 2024-04-02

## 2024-03-28 RX ORDER — AMLODIPINE BESYLATE 2.5 MG/1
5 TABLET ORAL DAILY
Refills: 0 | Status: DISCONTINUED | OUTPATIENT
Start: 2024-03-28 | End: 2024-04-02

## 2024-03-28 RX ORDER — METOPROLOL TARTRATE 50 MG
75 TABLET ORAL DAILY
Refills: 0 | Status: DISCONTINUED | OUTPATIENT
Start: 2024-03-28 | End: 2024-04-02

## 2024-03-28 RX ORDER — TAMSULOSIN HYDROCHLORIDE 0.4 MG/1
0.8 CAPSULE ORAL AT BEDTIME
Refills: 0 | Status: DISCONTINUED | OUTPATIENT
Start: 2024-03-28 | End: 2024-04-02

## 2024-03-28 RX ORDER — FLUTICASONE FUROATE, UMECLIDINIUM BROMIDE AND VILANTEROL TRIFENATATE 200; 62.5; 25 UG/1; UG/1; UG/1
1 POWDER RESPIRATORY (INHALATION)
Refills: 0 | DISCHARGE

## 2024-03-28 RX ORDER — TIOTROPIUM BROMIDE 18 UG/1
2 CAPSULE ORAL; RESPIRATORY (INHALATION) DAILY
Refills: 0 | Status: DISCONTINUED | OUTPATIENT
Start: 2024-03-28 | End: 2024-04-02

## 2024-03-28 RX ORDER — BUDESONIDE AND FORMOTEROL FUMARATE DIHYDRATE 160; 4.5 UG/1; UG/1
2 AEROSOL RESPIRATORY (INHALATION)
Refills: 0 | Status: DISCONTINUED | OUTPATIENT
Start: 2024-03-28 | End: 2024-04-02

## 2024-03-28 RX ADMIN — Medication 650 MILLIGRAM(S): at 09:49

## 2024-03-28 RX ADMIN — FLUTICASONE FUROATE, UMECLIDINIUM BROMIDE AND VILANTEROL TRIFENATATE 1 PUFF(S): 200; 62.5; 25 POWDER RESPIRATORY (INHALATION) at 09:52

## 2024-03-28 RX ADMIN — TAMSULOSIN HYDROCHLORIDE 0.8 MILLIGRAM(S): 0.4 CAPSULE ORAL at 21:49

## 2024-03-28 RX ADMIN — Medication 325 MILLIGRAM(S): at 13:34

## 2024-03-28 RX ADMIN — MUPIROCIN 1 APPLICATION(S): 20 OINTMENT TOPICAL at 17:40

## 2024-03-28 RX ADMIN — Medication 3 MILLILITER(S): at 03:55

## 2024-03-28 RX ADMIN — MUPIROCIN 1 APPLICATION(S): 20 OINTMENT TOPICAL at 06:36

## 2024-03-28 RX ADMIN — CEFTRIAXONE 100 MILLIGRAM(S): 500 INJECTION, POWDER, FOR SOLUTION INTRAMUSCULAR; INTRAVENOUS at 13:34

## 2024-03-28 RX ADMIN — Medication 500 MILLIGRAM(S): at 13:34

## 2024-03-28 RX ADMIN — Medication 50 MILLIGRAM(S): at 05:14

## 2024-03-28 RX ADMIN — Medication 3 MILLILITER(S): at 15:54

## 2024-03-28 RX ADMIN — CHLORHEXIDINE GLUCONATE 1 APPLICATION(S): 213 SOLUTION TOPICAL at 05:09

## 2024-03-28 RX ADMIN — LOSARTAN POTASSIUM 100 MILLIGRAM(S): 100 TABLET, FILM COATED ORAL at 05:14

## 2024-03-28 RX ADMIN — Medication 3 MILLILITER(S): at 11:46

## 2024-03-28 RX ADMIN — RIVAROXABAN 20 MILLIGRAM(S): KIT at 17:39

## 2024-03-28 RX ADMIN — Medication 650 MILLIGRAM(S): at 10:14

## 2024-03-28 NOTE — PROGRESS NOTE ADULT - SUBJECTIVE AND OBJECTIVE BOX
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    Persistent atrial fibrillation    Pacemaker  [employer] Model [id number]  Serial [id number]    Idiopathic pulmonary fibrosis    Polyp of colon, unspecified part of colon, unspecified type    Benign prostatic hyperplasia with nocturia    Dyspnea on exertion    Oxygen desaturation during sleep  Use oxygen at night, nasal cannula    Essential hypertension    H/O amputation  age 19, R great toe, due to growth "tumor"    H/O arthroscopic knee surgery  Both knees, 2013    H/O cardiac pacemaker  2005    H/O repair of rotator cuff  2012        MEDICATIONS  (STANDING):  albuterol    90 MICROgram(s) HFA Inhaler 2 Puff(s) Inhalation every 6 hours  albuterol/ipratropium for Nebulization 3 milliLiter(s) Nebulizer every 6 hours  ascorbic acid 500 milliGRAM(s) Oral daily  budesonide 160 MICROgram(s)/formoterol 4.5 MICROgram(s) Inhaler 2 Puff(s) Inhalation two times a day  cefTRIAXone   IVPB 2000 milliGRAM(s) IV Intermittent every 24 hours  chlorhexidine 2% Cloths 1 Application(s) Topical <User Schedule>  ferrous    sulfate 325 milliGRAM(s) Oral daily  fluticasone furoate/umeclidinium/vilanterol 200-62.5-25 MICROgram(s) Inhaler 1 Puff(s) Inhalation daily  losartan 100 milliGRAM(s) Oral daily  metoprolol succinate ER 50 milliGRAM(s) Oral daily  mupirocin 2% Ointment 1 Application(s) Both Nostrils two times a day  rivaroxaban 20 milliGRAM(s) Oral with dinner  tamsulosin 0.8 milliGRAM(s) Oral at bedtime    MEDICATIONS  (PRN):  acetaminophen     Tablet .. 650 milliGRAM(s) Oral every 6 hours PRN Temp greater or equal to 38C (100.4F), Moderate Pain (4 - 6)            Vital Signs Last 24 Hrs  T(C): 36.4 (27 Mar 2024 21:23), Max: 36.9 (27 Mar 2024 13:21)  T(F): 97.6 (27 Mar 2024 21:23), Max: 98.4 (27 Mar 2024 13:21)  HR: 69 (28 Mar 2024 03:55) (69 - 75)  BP: 143/82 (27 Mar 2024 21:23) (137/76 - 143/82)  BP(mean): --  RR: 18 (27 Mar 2024 21:23) (18 - 18)  SpO2: 97% (28 Mar 2024 03:55) (94% - 97%)    Parameters below as of 28 Mar 2024 03:55  Patient On (Oxygen Delivery Method): nasal cannula        LABS:                        10.4   8.55  )-----------( 135      ( 28 Mar 2024 07:42 )             31.1     03-28    138  |  104  |  15  ----------------------------<  100<H>  3.9   |  22  |  0.87    Ca    8.9      28 Mar 2024 07:42  Phos  2.6     03-28  Mg     1.90     03-28    TPro  5.6<L>  /  Alb  3.4  /  TBili  1.0  /  DBili  x   /  AST  19  /  ALT  24  /  AlkPhos  55  03-28        PT/INR - ( 28 Mar 2024 07:42 )   PT: 22.9 sec;   INR: 2.09 ratio         PTT - ( 28 Mar 2024 07:42 )  PTT:34.2 sec  Urinalysis Basic - ( 28 Mar 2024 07:42 )    Color: x / Appearance: x / SG: x / pH: x  Gluc: 100 mg/dL / Ketone: x  / Bili: x / Urobili: x   Blood: x / Protein: x / Nitrite: x   Leuk Esterase: x / RBC: x / WBC x   Sq Epi: x / Non Sq Epi: x / Bacteria: x      I&O's Summary    27 Mar 2024 07:01  -  28 Mar 2024 07:00  --------------------------------------------------------  IN: 300 mL / OUT: 900 mL / NET: -600 mL      BNP  RADIOLOGY & ADDITIONAL STUDIES:      PHYSICAL EXAM:    GENERAL: In no apparent distress, well nourished, and hydrated.  HEART: Regular rate and rhythm; No murmurs, rubs, or gallops.  PULMONARY: Clear to auscultation and percussion.  No rales, wheezing, or rhonchi bilaterally.  ABDOMEN: Soft, Nontender, Nondistended; Bowel sounds present  EXTREMITIES:  2+ Peripheral Pulses, No clubbing, cyanosis, or edema  NEUROLOGICAL: Grossly nonfocal

## 2024-03-28 NOTE — PATIENT PROFILE ADULT - FALL HARM RISK - HARM RISK INTERVENTIONS
Assistance with ambulation/Assistance OOB with selected safe patient handling equipment/Communicate Risk of Fall with Harm to all staff/Discuss with provider need for PT consult/Monitor gait and stability/Provide patient with walking aids - walker, cane, crutches/Reinforce activity limits and safety measures with patient and family/Review medications for side effects contributing to fall risk/Sit up slowly, dangle for a short time, stand at bedside before walking/Tailored Fall Risk Interventions/Toileting schedule using arm’s reach rule for commode and bathroom/Visual Cue: Yellow wristband and red socks/Bed in lowest position, wheels locked, appropriate side rails in place/Call bell, personal items and telephone in reach/Instruct patient to call for assistance before getting out of bed or chair/Non-slip footwear when patient is out of bed/[place] to call system/Physically safe environment - no spills, clutter or unnecessary equipment/Purposeful Proactive Rounding/Room/bathroom lighting operational, light cord in reach

## 2024-03-28 NOTE — H&P ADULT - NSHPPHYSICALEXAM_GEN_ALL_CORE
Vital Signs Last 24 Hrs  T(C): 36.5 (28 Mar 2024 22:55), Max: 36.8 (28 Mar 2024 15:15)  T(F): 97.7 (28 Mar 2024 22:55), Max: 98.2 (28 Mar 2024 15:15)  HR: 92 (28 Mar 2024 22:55) (68 - 92)  BP: 162/70 (28 Mar 2024 22:55) (139/74 - 162/70)  BP(mean): --  RR: 18 (28 Mar 2024 22:55) (18 - 18)  SpO2: 93% (28 Mar 2024 22:55) (93% - 98%)    Parameters below as of 28 Mar 2024 22:55  Patient On (Oxygen Delivery Method): nasal cannula  O2 Flow (L/min): 4      CONSTITUTIONAL: Well-groomed, in no apparent distress  EYES: No conjunctival or scleral injection, non-icteric  ENMT: No external nasal lesions; MMM  RESPIRATORY: Breathing comfortably; lungs CTA without wheeze/rhonchi/rales  CARDIOVASCULAR: +S1S2, RRR; no lower extremity edema  GASTROINTESTINAL: No tenderness, +BS throughout, no rebound/guarding  NEUROLOGIC: No gross focal neurological deficits, AAOX3  PSYCHIATRIC: mood and affect appropriate; appropriate insight and judgment

## 2024-03-28 NOTE — PROGRESS NOTE ADULT - ASSESSMENT
89 y/o M w/ hx of Afib on Eliquis w/ PPM, COPD on 3-3.5L NC (on pred?) daily presenting to ED with chills and shortness of breath that started suddenly. Patient was found to have sepsis with fever and leukocytosis on admission. On IV antibiotics as per ID. His blood cultures grew Streptococcus lutetiensis in 4/4 bottles. UA with pyuria and UCx with Enterobacter cloacae. EP was called for possible PPM extraction. Patient has a SJM device set in VVIR mode(see full interrogation note). He is V pacing 98%. TTE with EF 45 to 50%, global left ventricular hypokinesis.     ## Bacteremia   ## PPM     - Discussed with Dr. Calhoun and Dr. Cross regarding PPM extraction. Dr. Cross spoke to patient and his daughter regarding trasferring patient to [facility] for PPM extraction. Patient and daughter is agreeable.   Plan for transfer to [facility] for PPM extraction  - Pending [facility] transferring for PPM extraction, Primary team made aware   - maintain K>4 and Mg>2  - Continue IV antibiotics as per ID  - Continue BB and Xarelto  - Appreciate ID rec.   - Continue care per primary team

## 2024-03-28 NOTE — H&P ADULT - PROBLEM SELECTOR PLAN 1
- p/w: tachycardic, febrile, hypoxic, +lactate 4, +leukocytosis  - RVP covid, neg; legionella neg, mrsa neg, strep detected  - source: 4/4 strep lutetiensis, repeat blood cx drawn 3/25, no growth 24 hrs  - Abx: Zosyn (3/23 -3/25), ceftriaxone (3/25-), azithromycin (3/22);   -CT a/p 3/27 w/o abdominal source of infection  -EP consulted given concern for potential PPM lead infection, planning for PPM extraction at [facility], will be transferred  -ID consulted, recommending CAREN to r/o endocarditis  -Plan for CTX x6 weeks pending above workup, however CTX not covered by pt's insurance so will need alternative. - p/w: tachycardic, febrile, hypoxic, +lactate 4, +leukocytosis  - RVP covid, neg; legionella neg, mrsa neg, strep detected  - source: 4/4 strep lutetiensis, repeat blood cx drawn 3/25, no growth 24 hrs  - Abx: Zosyn (3/23 -3/25), ceftriaxone (3/25-), azithromycin (3/22);   -CT a/p 3/27 w/o abdominal source of infection  -EP consulted given concern for potential PPM lead infection, planning for PPM extraction at [facility], will be transferred  -ID consulted, recommending CAREN to r/o endocarditis  -Plan for CTX 2g x6 weeks pending above workup, however CTX not covered by pt's insurance so will need alternative.

## 2024-03-28 NOTE — PROGRESS NOTE ADULT - PROBLEM SELECTOR PLAN 1
#severe sepsis #UTI asymptom #bacteremia  - p/w: tachycardic, febrile, hypoxic, +lactate 4, +leukocytosis  - RVP covid, neg; legionella neg, mrsa neg, strep detected  - source: 4/4 strep lutetiensis, repeat blood cx drawn 3/25, no growth 24 hrs  - Abx: Zosyn (3/23 -3/25), ceftriaxone (3/25-), azithromycin (3/22);   -CT a/p 3/27 w/o abdominal source of infection  -CAREN planned for 3/28  -EP consulted given concern for potential PPM lead infection  -ID consulted  -Plan for CTX x6 weeks pending above workup #severe sepsis #UTI asymptom #bacteremia  - p/w: tachycardic, febrile, hypoxic, +lactate 4, +leukocytosis  - RVP covid, neg; legionella neg, mrsa neg, strep detected  - source: 4/4 strep lutetiensis, repeat blood cx drawn 3/25, no growth 24 hrs  - Abx: Zosyn (3/23 -3/25), ceftriaxone (3/25-), azithromycin (3/22);   -CT a/p 3/27 w/o abdominal source of infection  -CAREN planned for 3/28  -EP consulted given concern for potential PPM lead infection, planning for PPM extraction at [facility], will be transferred  -ID consulted, recommending CAREN to r/o endocarditis  -Plan for CTX x6 weeks pending above workup, however CTX not covered by pt's insurance so will need alternative

## 2024-03-28 NOTE — H&P ADULT - NSICDXPASTMEDICALHX_GEN_ALL_CORE_FT
PAST MEDICAL HISTORY:  Benign prostatic hyperplasia with nocturia     Chronic obstructive pulmonary disease, unspecified COPD type     Dyspnea on exertion     Essential hypertension     ETOH abuse in recovery, 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    Former smoker, stopped smoking many years ago 2PPD X 50yrs    Idiopathic pulmonary fibrosis     Osteoarthritis of multiple joints, unspecified osteoarthritis type     Oxygen desaturation during sleep Use oxygen at night, nasal cannula    Pacemaker [employer] Model [id number]  Serial [id number]    Persistent atrial fibrillation     Polyp of colon, unspecified part of colon, unspecified type

## 2024-03-28 NOTE — H&P ADULT - NSHPLABSRESULTS_GEN_ALL_CORE
10.4   8.55  )-----------( 135      ( 28 Mar 2024 07:42 )             31.1       03-28    138  |  104  |  15  ----------------------------<  100<H>  3.9   |  22  |  0.87    Ca    8.9      28 Mar 2024 07:42  Phos  2.6     03-28  Mg     1.90     03-28    TPro  5.6<L>  /  Alb  3.4  /  TBili  1.0  /  DBili  x   /  AST  19  /  ALT  24  /  AlkPhos  55  03-28              Urinalysis Basic - ( 28 Mar 2024 07:42 )    Color: x / Appearance: x / SG: x / pH: x  Gluc: 100 mg/dL / Ketone: x  / Bili: x / Urobili: x   Blood: x / Protein: x / Nitrite: x   Leuk Esterase: x / RBC: x / WBC x   Sq Epi: x / Non Sq Epi: x / Bacteria: x        PT/INR - ( 28 Mar 2024 07:42 )   PT: 22.9 sec;   INR: 2.09 ratio         PTT - ( 28 Mar 2024 07:42 )  PTT:34.2 sec          CAPILLARY BLOOD GLUCOSE

## 2024-03-28 NOTE — PROGRESS NOTE ADULT - NS ATTEND AMEND GEN_ALL_CORE FT
89 y/o M w/ hx of Afib on Eliquis w/ PPM, COPD on 3-3.5L NC (on pred?) daily presenting to ED with chills and shortness of breath that started suddenly. Patient was found to have sepsis with fever and leukocytosis on admission. On IV antibiotics as per ID. His blood cultures grew Streptococcus lutetiensis in 4/4 bottles. UA with pyuria and UCx with Enterobacter cloacae. EP was called for possible PPM extraction. Patient has a SJM device set in VVIR mode(see full interrogation note). He is V pacing 98%. TTE with EF 45 to 50%, global left ventricular hypokinesis. Plan for transfer to [facility] for PPM extraction. Continue BB, but hold AC for upcoming lead extraction.

## 2024-03-28 NOTE — PROGRESS NOTE ADULT - ASSESSMENT
88M with h/o COPD (home Ox) and Afib (on Xarelto) and PPM who presents with fever and SOB. Pt found to have severe sepssi w/temp 104F rectal, tachycardia, elevated lactate with BCx +. 4/4 +strept lutetiensis - currently receiving ceftriaxone and pending CAREN and surveillance cultures.

## 2024-03-28 NOTE — PROGRESS NOTE ADULT - PROBLEM SELECTOR PLAN 4
#pulm nodules  - new incidental pulm nodules  - f/u CT in 12 months

## 2024-03-28 NOTE — PROGRESS NOTE ADULT - SUBJECTIVE AND OBJECTIVE BOX
88yPatient is a 88y old  Male who presents with a chief complaint of fever (28 Mar 2024 08:42)      Interval history:  Afebrile, feels well, daughter at bedside.       Allergies:   No Known Allergies      Antimicrobials:  cefTRIAXone   IVPB 2000 milliGRAM(s) IV Intermittent every 24 hours      REVIEW OF SYSTEMS:  No chest pain   No abdominal pain  No rash.         Vital Signs Last 24 Hrs  T(C): 36.8 (03-28-24 @ 15:15), Max: 36.8 (03-28-24 @ 15:15)  T(F): 98.2 (03-28-24 @ 15:15), Max: 98.2 (03-28-24 @ 15:15)  HR: 68 (03-28-24 @ 16:00) (68 - 72)  BP: 139/74 (03-28-24 @ 15:15) (139/74 - 143/82)  BP(mean): --  RR: 18 (03-28-24 @ 15:15) (18 - 18)  SpO2: 96% (03-28-24 @ 15:15) (94% - 97%)      PHYSICAL EXAM:  Pt in no acute distress, alert, awake.   breathing comfortably on NC   non distended abdomen  no edema LE   no phlebitis                             10.4   8.55  )-----------( 135      ( 28 Mar 2024 07:42 )             31.1   03-28    138  |  104  |  15  ----------------------------<  100<H>  3.9   |  22  |  0.87    Ca    8.9      28 Mar 2024 07:42  Phos  2.6     03-28  Mg     1.90     03-28    TPro  5.6<L>  /  Alb  3.4  /  TBili  1.0  /  DBili  x   /  AST  19  /  ALT  24  /  AlkPhos  55  03-28      LIVER FUNCTIONS - ( 28 Mar 2024 07:42 )  Alb: 3.4 g/dL / Pro: 5.6 g/dL / ALK PHOS: 55 U/L / ALT: 24 U/L / AST: 19 U/L / GGT: x               Culture - Sputum (collected 25 Mar 2024 18:22)  Source: .Sputum Sputum  Gram Stain (25 Mar 2024 23:16):    Rare polymorphonuclear leukocytes per low power field    Rare Squamous epithelial cells per low power field    No organisms seen per oil power field  Final Report (27 Mar 2024 17:18):    Normal Respiratory Merlyn present        Radiology:  < from: CT Abdomen and Pelvis w/ IV Cont (03.27.24 @ 13:43) >  IMPRESSION:  No intra-abdominal source of infection identified.    Small indeterminate lesions in the liver and left kidney.    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    1.1 cm polypoid focus in the mid transverse colon.

## 2024-03-28 NOTE — PROGRESS NOTE ADULT - ATTENDING COMMENTS
88M w/ COPD (3.5L home O2), AF (rivaroxaban, s/p PPM) p/w SOB and chills and found to have high grade Strep lutetiensis bacteremia with unclear source, cultures cleared and planning for transfer to [facility] for CAREN/PPM extraction.    - ID consulted  - Surveillance BCx NGTD (3/25)  - TTE non-diagnostic, planning for CAREN and PPM extraction at [facility]  - CT A/P without source of infection, did show colonic poly, outpatient c-scope if within GOC  - Has R THR, no currently e/o infection  - EP consulted given concern for potential PPM lead infection - planning for extraction at [facility]  - Continue CTX 2g IV daily, plan for 6 week course pending the above, will need PICC and home infusion on discharge    Time-based billing (NON-critical care).     35 minutes spent on total encounter; more than 50% of the visit was spent counseling and / or coordinating care by the attending physician.  The necessity of the time spent during the encounter on this date of service was due to:     documentation in [place], reviewing chart and coordinating care with patient/resident and interdisciplinary staff (such as , social workers, etc) as well as reviewing vitals, laboratory data, radiology, medication list, consultants' recommendations and prior records. Interventions were performed as documented above. 88M w/ COPD (3.5L home O2), AF (rivaroxaban, s/p PPM) p/w SOB and chills and found to have high grade Strep lutetiensis bacteremia with unclear source, cultures cleared and planning for transfer to [facility] for CAREN/PPM extraction.    - ID consulted  - Surveillance BCx NGTD (3/25)  - TTE non-diagnostic, planning for CAREN and PPM extraction at [facility]  - CT A/P without source of infection, did show colonic poly, outpatient c-scope if within GOC  - Has R THR, no currently e/o infection  - EP consulted given concern for potential PPM lead infection - planning for extraction at [facility]  - Continue CTX 2g IV daily, plan for 6 week course pending the above, will need PICC and home infusion on discharge

## 2024-03-28 NOTE — PROGRESS NOTE ADULT - PROBLEM SELECTOR PLAN 6
#h/o AFIB, PPM, on Xarelto   - PPM ~2013  - Xarelto 20 mg once a day    - last dose 3/22, resumed on 3/23

## 2024-03-28 NOTE — H&P ADULT - PROBLEM/PLAN-2
DISPLAY PLAN FREE TEXT Time (Mins): 2 Prep: The treated area was degreased with pre-peel cleanser, and vaseline was applied for protection of mucous membranes. Post Peel Care: After the procedure, the treatment area was washed with soap and water, and a post-peel cream was applied. Sun protection and post-care instructions were reviewed with the patient. Detail Level: Zone Frost (0,1+,2+,3+,4+): 0 Post-Care Instructions: I reviewed with the patient in detail post-care instructions. Patient should avoid sun exposure and wear sun protection. Chemical Peel: 20% TCA Erythema: mild Consent: Prior to the procedure, written consent was obtained and risks were reviewed, including but not limited to: redness, peeling, blistering, pigmentary change, scarring, infection, and pain.

## 2024-03-28 NOTE — PROGRESS NOTE ADULT - SUBJECTIVE AND OBJECTIVE BOX
PROGRESS NOTE:   Patient is a 88y old  Male who presents with a chief complaint of fever (23 Mar 2024 07:48)    SUBJECTIVE / OVERNIGHT EVENTS:  No acute events overnight.     Patient seen and examined at bedside. Feeling well today. Denied SOB, chest pain, fever or chills.    ADDITIONAL REVIEW OF SYSTEMS:  Patient denies fevers, chills, chest pain, shortness of breath, nausea, abdominal pain, diarrhea, constipation, dysuria, leg swelling, headache, light headedness.    MEDICATIONS  (STANDING):  albuterol/ipratropium for Nebulization 3 milliLiter(s) Nebulizer every 6 hours  budesonide 160 MICROgram(s)/formoterol 4.5 MICROgram(s) Inhaler 2 Puff(s) Inhalation two times a day  budesonide 160 MICROgram(s)/formoterol 4.5 MICROgram(s) Inhaler 2 Puff(s) Inhalation two times a day  piperacillin/tazobactam IVPB.. 3.375 Gram(s) IV Intermittent every 8 hours  rivaroxaban 20 milliGRAM(s) Oral with dinner  tamsulosin 0.8 milliGRAM(s) Oral at bedtime    MEDICATIONS  (PRN):      CAPILLARY BLOOD GLUCOSE        I&O's Summary      PHYSICAL EXAM:  Vital Signs Last 24 Hrs  T(C): 36.9 (27 Mar 2024 13:21), Max: 36.9 (26 Mar 2024 22:11)  T(F): 98.4 (27 Mar 2024 13:21), Max: 98.5 (26 Mar 2024 22:11)  HR: 70 (27 Mar 2024 13:21) (69 - 75)  BP: 137/76 (27 Mar 2024 13:21) (133/74 - 147/69)  BP(mean): --  RR: 18 (27 Mar 2024 13:21) (18 - 19)  SpO2: 96% (27 Mar 2024 13:21) (95% - 98%)    Parameters below as of 27 Mar 2024 13:21  Patient On (Oxygen Delivery Method): nasal cannula  O2 Flow (L/min): 4    CONSTITUTIONAL: NAD,   RESPIRATORY: Normal respiratory effort; lungs are clear to auscultation bilaterally  CARDIOVASCULAR: Regular rate and rhythm, normal S1 and S2, no murmur/rub/gallop; No lower extremity edema; Peripheral pulses are 2+ bilaterally  ABDOMEN: Nontender to palpation, normoactive bowel sounds, no rebound/guarding; No hepatosplenomegaly  MUSCLOSKELETAL: no clubbing or cyanosis of digits; no joint swelling or tenderness to palpation  PSYCH: A+O to person, place, and time; affect appropriate    LABS:    CBC Full  -  ( 27 Mar 2024 06:05 )  WBC Count : 7.93 K/uL  RBC Count : 3.13 M/uL  Hemoglobin : 10.7 g/dL  Hematocrit : 31.8 %  Platelet Count - Automated : 122 K/uL  Mean Cell Volume : 101.6 fL  Mean Cell Hemoglobin : 34.2 pg  Mean Cell Hemoglobin Concentration : 33.6 gm/dL  Auto Neutrophil # : 6.53 K/uL  Auto Lymphocyte # : 0.65 K/uL  Auto Monocyte # : 0.56 K/uL  Auto Eosinophil # : 0.14 K/uL  Auto Basophil # : 0.02 K/uL  Auto Neutrophil % : 82.2 %  Auto Lymphocyte % : 8.2 %  Auto Monocyte % : 7.1 %  Auto Eosinophil % : 1.8 %  Auto Basophil % : 0.3 %    03-27    136  |  104  |  17  ----------------------------<  98  3.8   |  21<L>  |  0.85    Ca    8.9      27 Mar 2024 06:05  Phos  2.5     03-27  Mg     2.00     03-27    TPro  5.9<L>  /  Alb  3.5  /  TBili  1.2  /  DBili  x   /  AST  24  /  ALT  27  /  AlkPhos  55  03-27    CARDIAC MARKERS ( 24 Mar 2024 05:47 )  x     / x     / 870 U/L / x     / 13.8 ng/mL      Urinalysis Basic - ( 24 Mar 2024 05:47 )    Color: x / Appearance: x / SG: x / pH: x  Gluc: 167 mg/dL / Ketone: x  / Bili: x / Urobili: x   Blood: x / Protein: x / Nitrite: x   Leuk Esterase: x / RBC: x / WBC x   Sq Epi: x / Non Sq Epi: x / Bacteria: x        Culture - Blood (collected 22 Mar 2024 23:00)  Source: .Blood Blood-Peripheral  Gram Stain (23 Mar 2024 11:17):    Growth in anaerobic bottle: Gram positive cocci in pairs    Growth in aerobic bottle: Gram positive cocci in pairs  Preliminary Report (23 Mar 2024 11:17):    Growth in anaerobic bottle: Gram positive cocci in pairs    Growth in aerobic bottle: Gram positive cocci in pairs    Direct identification is available within approximately 3-5    hours either by Blood Panel Multiplexed PCR or Direct    MALDI-TOF. Details: [email or web address]  Organism: Blood Culture PCR (23 Mar 2024 11:49)  Organism: Blood Culture PCR (23 Mar 2024 11:49)    Culture - Blood (collected 22 Mar 2024 22:45)  Source: .Blood Blood-Peripheral  Gram Stain (23 Mar 2024 11:31):    Growth in anaerobic bottle: Gram positive cocci in pairs    Growth in aerobic bottle: Gram positive cocci in pairs  Preliminary Report (23 Mar 2024 11:31):    Growth in anaerobic bottle: Gram positive cocci in pairs    Growth in aerobic bottle: Gram positive cocci in pairs        Tele Reviewed:    RADIOLOGY & ADDITIONAL TESTS:  Results Reviewed:   Imaging Personally Reviewed:  Electrocardiogram Personally Reviewed:     PROGRESS NOTE:   Patient is a 88y old  Male who presents with a chief complaint of fever (23 Mar 2024 07:48)    SUBJECTIVE / OVERNIGHT EVENTS:  No acute events overnight.     Patient seen and examined at bedside. Feeling well today, pending transfer to [facility] for pacemaker extraction and CAREN. Denied SOB, chest pain, fever or chills.    ADDITIONAL REVIEW OF SYSTEMS:  Patient denies fevers, chills, chest pain, shortness of breath, nausea, abdominal pain, diarrhea, constipation, dysuria, leg swelling, headache, light headedness.    MEDICATIONS  (STANDING):  albuterol/ipratropium for Nebulization 3 milliLiter(s) Nebulizer every 6 hours  budesonide 160 MICROgram(s)/formoterol 4.5 MICROgram(s) Inhaler 2 Puff(s) Inhalation two times a day  budesonide 160 MICROgram(s)/formoterol 4.5 MICROgram(s) Inhaler 2 Puff(s) Inhalation two times a day  piperacillin/tazobactam IVPB.. 3.375 Gram(s) IV Intermittent every 8 hours  rivaroxaban 20 milliGRAM(s) Oral with dinner  tamsulosin 0.8 milliGRAM(s) Oral at bedtime    MEDICATIONS  (PRN):      CAPILLARY BLOOD GLUCOSE        I&O's Summary      PHYSICAL EXAM:  Vital Signs Last 24 Hrs  T(C): 36.4 (03-27-24 @ 21:23), Max: 36.4 (03-27-24 @ 21:23)  T(F): 97.6 (03-27-24 @ 21:23), Max: 97.6 (03-27-24 @ 21:23)  HR: 69 (03-28-24 @ 03:55) (69 - 75)  BP: 143/82 (03-27-24 @ 21:23) (143/82 - 143/82)  BP(mean): --  RR: 18 (03-27-24 @ 21:23) (18 - 18)  SpO2: 97% (03-28-24 @ 03:55) (94% - 97%)      CONSTITUTIONAL: NAD,   RESPIRATORY: Normal respiratory effort; lungs are clear to auscultation bilaterally  CARDIOVASCULAR: Regular rate and rhythm, normal S1 and S2, no murmur/rub/gallop; No lower extremity edema; Peripheral pulses are 2+ bilaterally  ABDOMEN: Nontender to palpation, normoactive bowel sounds, no rebound/guarding; No hepatosplenomegaly  MUSCLOSKELETAL: no clubbing or cyanosis of digits; no joint swelling or tenderness to palpation  PSYCH: A+O to person, place, and time; affect appropriate    LABS:  CBC Full  -  ( 28 Mar 2024 07:42 )  WBC Count : 8.55 K/uL  RBC Count : 3.10 M/uL  Hemoglobin : 10.4 g/dL  Hematocrit : 31.1 %  Platelet Count - Automated : 135 K/uL  Mean Cell Volume : 100.3 fL  Mean Cell Hemoglobin : 33.5 pg  Mean Cell Hemoglobin Concentration : 33.4 gm/dL  Auto Neutrophil # : 7.09 K/uL  Auto Lymphocyte # : 0.58 K/uL  Auto Monocyte # : 0.64 K/uL  Auto Eosinophil # : 0.16 K/uL  Auto Basophil # : 0.04 K/uL  Auto Neutrophil % : 82.8 %  Auto Lymphocyte % : 6.8 %  Auto Monocyte % : 7.5 %  Auto Eosinophil % : 1.9 %  Auto Basophil % : 0.5 %    03-28    138  |  104  |  15  ----------------------------<  100<H>  3.9   |  22  |  0.87    Ca    8.9      28 Mar 2024 07:42  Phos  2.6     03-28  Mg     1.90     03-28    TPro  5.6<L>  /  Alb  3.4  /  TBili  1.0  /  DBili  x   /  AST  19  /  ALT  24  /  AlkPhos  55  03-28    CARDIAC MARKERS ( 24 Mar 2024 05:47 )  x     / x     / 870 U/L / x     / 13.8 ng/mL      Urinalysis Basic - ( 24 Mar 2024 05:47 )    Color: x / Appearance: x / SG: x / pH: x  Gluc: 167 mg/dL / Ketone: x  / Bili: x / Urobili: x   Blood: x / Protein: x / Nitrite: x   Leuk Esterase: x / RBC: x / WBC x   Sq Epi: x / Non Sq Epi: x / Bacteria: x        Culture - Blood (collected 22 Mar 2024 23:00)  Source: .Blood Blood-Peripheral  Gram Stain (23 Mar 2024 11:17):    Growth in anaerobic bottle: Gram positive cocci in pairs    Growth in aerobic bottle: Gram positive cocci in pairs  Preliminary Report (23 Mar 2024 11:17):    Growth in anaerobic bottle: Gram positive cocci in pairs    Growth in aerobic bottle: Gram positive cocci in pairs    Direct identification is available within approximately 3-5    hours either by Blood Panel Multiplexed PCR or Direct    MALDI-TOF. Details: [email or web address]  Organism: Blood Culture PCR (23 Mar 2024 11:49)  Organism: Blood Culture PCR (23 Mar 2024 11:49)    Culture - Blood (collected 22 Mar 2024 22:45)  Source: .Blood Blood-Peripheral  Gram Stain (23 Mar 2024 11:31):    Growth in anaerobic bottle: Gram positive cocci in pairs    Growth in aerobic bottle: Gram positive cocci in pairs  Preliminary Report (23 Mar 2024 11:31):    Growth in anaerobic bottle: Gram positive cocci in pairs    Growth in aerobic bottle: Gram positive cocci in pairs        Tele Reviewed:    RADIOLOGY & ADDITIONAL TESTS:  Results Reviewed:   Imaging Personally Reviewed:  Electrocardiogram Personally Reviewed:

## 2024-03-28 NOTE — PROGRESS NOTE ADULT - PROVIDER SPECIALTY LIST ADULT
Electrophysiology
Infectious Disease
Infectious Disease
Internal Medicine

## 2024-03-28 NOTE — PATIENT PROFILE ADULT - HEALTH LITERACY
Haven't been able to find a provider/location that will handle this for the patient. If the patient finds one, and the PCP is ok with it, we can try to submit it whether in network or not due to the lack of being able to provide the service in network. (Something to keep in mind as well, if this service is that hard to find for use in treating alcohol issues, it may not be covered by the insurance or may have strict criteria for coverage.)   no

## 2024-03-28 NOTE — PROGRESS NOTE ADULT - PROBLEM SELECTOR PLAN 9
PPX  - DVT ppx: xarelto 20 mg   - updated family  - GOC: DNR/DNI  - Home care with aides at home PPX  - DVT ppx: xarelto 20 mg   - GOC: DNR/DNI  - Home care with aides at home

## 2024-03-28 NOTE — PROGRESS NOTE ADULT - ASSESSMENT
89 y/o M w/ hx of Afib on Eliquis w/ PPM, COPD on 3-3.5L NC (on pred?) daily presenting to ED with chills and shortness of breath that started suddenly. He was feeling well until 3/23. He recently went for deep dental cleaning within the past month and has partial dentures as well. Patient was found to meet sepsis criteria on admission with fever and leukocytosis. His blood cultures grew Streptococcus lutetiensis in 4/4 bottles. Patient has PPM and also with known R hip replacement, has been able to ambulate with cane as usual. UA with pyuria and UCx with Enterobacter cloacae, however patient without urinary symptoms. TTE was unable to exclude IE.    Micro:  BCx (3/22): Streptococcus lutetiensis (4/4 bottles)  BCx (3/25): no growth  UCx (3/22): E. cloacae    Abx:  Cefepime (3/22)  Zosyn (3/23-3/25)  Ceftriaxone (3/23, 3/25-)    Overall, high grade Streptococcus bacteremia, recent dental work, known PPM, R hip replacement, asymptomatic bacteruria  fever, leucocytosis, sepsis on presentation, now resolved.       Suggest:  - IV ceftriaxone 2g qD  - repeat blood cx NTD   - Pending CAREN for further work-up of IE, especially in setting of PPM  - daughter at bedside, wants CAREN and based on the result would like to decide if she wants him to get PPM removed here or [facility] [place].   - EP on board.   - CT Abd/pelvis with no obvious source but polypoid lesion noted in transverse colon.   - strep lutetiensis is from strep bovis group, pt needs colonoscopy, can be done as outpt. per daughter pt due and would schedule as outpt.   - He will need 6 weeks of antibiotics given presence of prosthetic hip, likely IV ceftriaxone, pending rest of work-up  - ESR/CRP noted.   - trend cbc, resolved leucocytosis.    Plan discussed with Medicine Attending.        Jordan Shipman  Please contact through MS Teams   If no response or past 5 pm/weekend call [phone].

## 2024-03-28 NOTE — H&P ADULT - HISTORY OF PRESENT ILLNESS
88M with h/o COPD (home Ox) and Afib (on Xarelto) and PPM who presents with fever and SOB. Pt found to have severe sepssi w/temp 104F rectal, tachycardia, elevated lactate with BCx +. 4/4 +strept lutetiensis - currently receiving ceftriaxone and pending CAREN and surveillance cultures.  Pt transferred to [facility] for lead extraction.

## 2024-03-28 NOTE — H&P ADULT - CONVERSATION DETAILS
Discussed patient's current prognosis and treatment options.  Discussed different life-sustaining measures including but not limited to noninvasive and/or invasive ventilatory support, cardiac resuscitation efforts, dialysis, IV fluid, IV antibiotics.     Patient AGREES to: noninvasive ventilatory support, invasive ventilatory support, and cardiac resuscitation.     *pt previously had DNR/DNI order. However, given that he is pending a procedure tomorrow, he is amenable to rescinding DNR/DNI order for now. Patient will need another conversation with EP team or primary team after procedure regarding his advanced directives going forward.  Patient FULL CODE for now

## 2024-03-28 NOTE — H&P ADULT - PROBLEM SELECTOR PLAN 3
-No previous known h/o CHF.   - proBNP ~3k, trop 62 from 104, plateau, ck 870, ckmb 13.8   - TTE 3/25: EF 45-50%, global LV hypokinesis, RV cavity enlargement size.

## 2024-03-28 NOTE — CONSULT NOTE ADULT - ASSESSMENT
EKG: Personally Reviewed    Echo:   3/25/24      1. The left ventricular cavity is normal in size. Left ventricular wall thickness is normal. Left ventricular systolic function is mildly decreased with an ejection fraction visually estimated at 45 to 50%. There is global left ventricular hypokinesis.   2. The right ventricular cavity is enlarged in size and reduced systolic function. A device lead is visualized in the right heart.   3. Structurally normal mitral valve with normal leaflet excursion. There is calcification of the mitral valve annulus. There is mild mitral regurgitation.   4. The aortic valve anatomy cannot be determined with reduced systolic excursion. There is calcification of the aortic valve leaflets. The peak transaortic velocity is 2.41 m/s, peak transaortic gradient is 23.2 mmHg and mean transaortic gradient is 12.0 mmHg with an LVOT/aortic valve VTI ratio of 0.45. Probable mild aortic stenosis. There is trace aortic regurgitation.   5. The left atrium is moderately dilated with an indexed volume of 47 ml/m².   6. The right atrium is severely dilated with an indexed volume of 57.95 ml/m² and an indexed area of 14.72 cm²/m².    Imaging: Personally Reviewed    Interpretation of Telemetry: V-paced at 70, frequent PVCs    88M PMH Afib on Eliquis s/p SJM PPM, COPD on 3-3.5L NC admitted to [facility] for bacteremia iso dental procedure. Evaluated by EP for pacemaker lead extraction. Currently patient asymptomatic. TTE showing mildly decreased EF. 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 Plan for lead extraction with Micra placement. Patient DID received rivaroxaban at 17:00 on 3/28 prior to transfer.     Recommendations:   - hold AC prior to lead extraction  - NPOpMN for potential lead extraction/Micra placement  - continue infection management per primary team      Brittany Hill MD  PGY-4, Cardiology  Available on TEAMS    For all new consults  [email or web address]  Login: [email or web address]      EKG: Personally Reviewed    Echo:   3/25/24      1. The left ventricular cavity is normal in size. Left ventricular wall thickness is normal. Left ventricular systolic function is mildly decreased with an ejection fraction visually estimated at 45 to 50%. There is global left ventricular hypokinesis.   2. The right ventricular cavity is enlarged in size and reduced systolic function. A device lead is visualized in the right heart.   3. Structurally normal mitral valve with normal leaflet excursion. There is calcification of the mitral valve annulus. There is mild mitral regurgitation.   4. The aortic valve anatomy cannot be determined with reduced systolic excursion. There is calcification of the aortic valve leaflets. The peak transaortic velocity is 2.41 m/s, peak transaortic gradient is 23.2 mmHg and mean transaortic gradient is 12.0 mmHg with an LVOT/aortic valve VTI ratio of 0.45. Probable mild aortic stenosis. There is trace aortic regurgitation.   5. The left atrium is moderately dilated with an indexed volume of 47 ml/m².   6. The right atrium is severely dilated with an indexed volume of 57.95 ml/m² and an indexed area of 14.72 cm²/m².    Imaging: Personally Reviewed    Interpretation of Telemetry: sinus vs rate-controlled aflutter in high 60s-70s, frequent PVCs    88M PMH Afib on Eliquis s/p SJM PPM, COPD on 3-3.5L NC admitted to [facility] for bacteremia iso dental procedure. Evaluated by EP for pacemaker lead extraction. Currently patient asymptomatic. TTE showing mildly decreased EF. 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 Plan for lead extraction with Micra placement. Patient DID received rivaroxaban at 17:00 on 3/28 prior to transfer.     Recommendations:   - hold AC prior to lead extraction  - NPOpMN for potential lead extraction/Micra placement  - continue infection management per primary team      Brittany Hill MD  PGY-4, Cardiology  Available on TEAMS    For all new consults  [email or web address]  Login: [email or web address]

## 2024-03-29 LAB
ALBUMIN SERPL ELPH-MCNC: 3.4 G/DL — SIGNIFICANT CHANGE UP (ref 3.3–5)
ALP SERPL-CCNC: 63 U/L — SIGNIFICANT CHANGE UP (ref 40–120)
ALT FLD-CCNC: 27 U/L — SIGNIFICANT CHANGE UP (ref 10–45)
ANION GAP SERPL CALC-SCNC: 11 MMOL/L — SIGNIFICANT CHANGE UP (ref 5–17)
AST SERPL-CCNC: 21 U/L — SIGNIFICANT CHANGE UP (ref 10–40)
BASOPHILS # BLD AUTO: 0.02 K/UL — SIGNIFICANT CHANGE UP (ref 0–0.2)
BASOPHILS NFR BLD AUTO: 0.2 % — SIGNIFICANT CHANGE UP (ref 0–2)
BILIRUB SERPL-MCNC: 0.8 MG/DL — SIGNIFICANT CHANGE UP (ref 0.2–1.2)
BLD GP AB SCN SERPL QL: NEGATIVE — SIGNIFICANT CHANGE UP
BUN SERPL-MCNC: 14 MG/DL — SIGNIFICANT CHANGE UP (ref 7–23)
CALCIUM SERPL-MCNC: 8.9 MG/DL — SIGNIFICANT CHANGE UP (ref 8.4–10.5)
CHLORIDE SERPL-SCNC: 103 MMOL/L — SIGNIFICANT CHANGE UP (ref 96–108)
CHOLEST SERPL-MCNC: 122 MG/DL — SIGNIFICANT CHANGE UP
CO2 SERPL-SCNC: 22 MMOL/L — SIGNIFICANT CHANGE UP (ref 22–31)
CREAT SERPL-MCNC: 0.81 MG/DL — SIGNIFICANT CHANGE UP (ref 0.5–1.3)
EGFR: 85 ML/MIN/1.73M2 — SIGNIFICANT CHANGE UP
EOSINOPHIL # BLD AUTO: 0.18 K/UL — SIGNIFICANT CHANGE UP (ref 0–0.5)
EOSINOPHIL NFR BLD AUTO: 2.1 % — SIGNIFICANT CHANGE UP (ref 0–6)
GLUCOSE SERPL-MCNC: 96 MG/DL — SIGNIFICANT CHANGE UP (ref 70–99)
HCT VFR BLD CALC: 32.8 % — LOW (ref 39–50)
HDLC SERPL-MCNC: 42 MG/DL — SIGNIFICANT CHANGE UP
HGB BLD-MCNC: 10.6 G/DL — LOW (ref 13–17)
IMM GRANULOCYTES NFR BLD AUTO: 0.6 % — SIGNIFICANT CHANGE UP (ref 0–0.9)
LIPID PNL WITH DIRECT LDL SERPL: 68 MG/DL — SIGNIFICANT CHANGE UP
LYMPHOCYTES # BLD AUTO: 0.62 K/UL — LOW (ref 1–3.3)
LYMPHOCYTES # BLD AUTO: 7.1 % — LOW (ref 13–44)
MAGNESIUM SERPL-MCNC: 2 MG/DL — SIGNIFICANT CHANGE UP (ref 1.6–2.6)
MCHC RBC-ENTMCNC: 32.3 GM/DL — SIGNIFICANT CHANGE UP (ref 32–36)
MCHC RBC-ENTMCNC: 33.3 PG — SIGNIFICANT CHANGE UP (ref 27–34)
MCV RBC AUTO: 103.1 FL — HIGH (ref 80–100)
MONOCYTES # BLD AUTO: 0.62 K/UL — SIGNIFICANT CHANGE UP (ref 0–0.9)
MONOCYTES NFR BLD AUTO: 7.1 % — SIGNIFICANT CHANGE UP (ref 2–14)
NEUTROPHILS # BLD AUTO: 7.2 K/UL — SIGNIFICANT CHANGE UP (ref 1.8–7.4)
NEUTROPHILS NFR BLD AUTO: 82.9 % — HIGH (ref 43–77)
NON HDL CHOLESTEROL: 81 MG/DL — SIGNIFICANT CHANGE UP
NRBC # BLD: 0 /100 WBCS — SIGNIFICANT CHANGE UP (ref 0–0)
PHOSPHATE SERPL-MCNC: 2.5 MG/DL — SIGNIFICANT CHANGE UP (ref 2.5–4.5)
PLATELET # BLD AUTO: 163 K/UL — SIGNIFICANT CHANGE UP (ref 150–400)
POTASSIUM SERPL-MCNC: 3.8 MMOL/L — SIGNIFICANT CHANGE UP (ref 3.5–5.3)
POTASSIUM SERPL-SCNC: 3.8 MMOL/L — SIGNIFICANT CHANGE UP (ref 3.5–5.3)
PROT SERPL-MCNC: 6 G/DL — SIGNIFICANT CHANGE UP (ref 6–8.3)
RBC # BLD: 3.18 M/UL — LOW (ref 4.2–5.8)
RBC # FLD: 13.6 % — SIGNIFICANT CHANGE UP (ref 10.3–14.5)
RH IG SCN BLD-IMP: NEGATIVE — SIGNIFICANT CHANGE UP
SODIUM SERPL-SCNC: 136 MMOL/L — SIGNIFICANT CHANGE UP (ref 135–145)
TRIGL SERPL-MCNC: 59 MG/DL — SIGNIFICANT CHANGE UP
WBC # BLD: 8.69 K/UL — SIGNIFICANT CHANGE UP (ref 3.8–10.5)
WBC # FLD AUTO: 8.69 K/UL — SIGNIFICANT CHANGE UP (ref 3.8–10.5)

## 2024-03-29 PROCEDURE — 99233 SBSQ HOSP IP/OBS HIGH 50: CPT

## 2024-03-29 PROCEDURE — 99223 1ST HOSP IP/OBS HIGH 75: CPT

## 2024-03-29 RX ORDER — RIVAROXABAN 15 MG-20MG
20 KIT ORAL
Refills: 0 | Status: COMPLETED | OUTPATIENT
Start: 2024-03-30 | End: 2024-03-30

## 2024-03-29 RX ORDER — RIVAROXABAN 15 MG-20MG
20 KIT ORAL
Refills: 0 | Status: DISCONTINUED | OUTPATIENT
Start: 2024-03-29 | End: 2024-03-29

## 2024-03-29 RX ORDER — CHLORHEXIDINE GLUCONATE 213 G/1000ML
1 SOLUTION TOPICAL DAILY
Refills: 0 | Status: DISCONTINUED | OUTPATIENT
Start: 2024-03-29 | End: 2024-04-02

## 2024-03-29 RX ADMIN — BUDESONIDE AND FORMOTEROL FUMARATE DIHYDRATE 2 PUFF(S): 160; 4.5 AEROSOL RESPIRATORY (INHALATION) at 05:25

## 2024-03-29 RX ADMIN — LOSARTAN POTASSIUM 100 MILLIGRAM(S): 100 TABLET, FILM COATED ORAL at 05:25

## 2024-03-29 RX ADMIN — TAMSULOSIN HYDROCHLORIDE 0.8 MILLIGRAM(S): 0.4 CAPSULE ORAL at 21:42

## 2024-03-29 RX ADMIN — BUDESONIDE AND FORMOTEROL FUMARATE DIHYDRATE 2 PUFF(S): 160; 4.5 AEROSOL RESPIRATORY (INHALATION) at 18:10

## 2024-03-29 RX ADMIN — Medication 650 MILLIGRAM(S): at 20:11

## 2024-03-29 RX ADMIN — Medication 75 MILLIGRAM(S): at 05:25

## 2024-03-29 RX ADMIN — TIOTROPIUM BROMIDE 2 PUFF(S): 18 CAPSULE ORAL; RESPIRATORY (INHALATION) at 11:57

## 2024-03-29 RX ADMIN — Medication 325 MILLIGRAM(S): at 11:57

## 2024-03-29 RX ADMIN — CHLORHEXIDINE GLUCONATE 1 APPLICATION(S): 213 SOLUTION TOPICAL at 11:57

## 2024-03-29 RX ADMIN — RIVAROXABAN 20 MILLIGRAM(S): KIT at 18:10

## 2024-03-29 RX ADMIN — CEFTRIAXONE 100 MILLIGRAM(S): 500 INJECTION, POWDER, FOR SOLUTION INTRAMUSCULAR; INTRAVENOUS at 14:01

## 2024-03-29 RX ADMIN — Medication 650 MILLIGRAM(S): at 21:11

## 2024-03-29 RX ADMIN — AMLODIPINE BESYLATE 5 MILLIGRAM(S): 2.5 TABLET ORAL at 05:25

## 2024-03-29 NOTE — PROGRESS NOTE ADULT - ASSESSMENT
88M with h/o COPD (home O2) and Afib (on Xarelto) and PPM who presents with fever and SOB. Pt found to have severe sepsis and Strept lutetiensis bacteremia. Pt transferred to [facility] for lead extraction.

## 2024-03-29 NOTE — PROGRESS NOTE ADULT - PROBLEM SELECTOR PLAN 1
- presented with tachycardia, fever, +lactate 4, +leukocytosis  - RVP covid, neg; legionella neg, mrsa neg, strep detected  -bld cx with strep lutetiensis, repeat blood cx drawn 3/25, no growth 24 hrs  - currently on Ceftriaxone (3/25-)  - CT a/p 3/27 w/o abdominal source of infection  - EP consulted given concern for potential PPM lead infection, planning for PPM extraction 4/1  - seen by ID at [facility] ; recc'd  CAREN to r/o endocarditis  -Plan for CTX 2g x6 weeks pending above workup, however CTX not covered by pt's insurance so will need alternative. - presented with tachycardia, fever, +lactate 4, +leukocytosis  - RVP covid, neg; legionella neg, mrsa neg, strep detected  -bld cx with strep lutetiensis, repeat blood cx drawn 3/25, no growth 24 hrs  - currently on Ceftriaxone (3/25-)  - CT a/p 3/27 w/o abdominal source of infection  - EP consulted given concern for potential PPM lead infection, planning for PPM extraction 4/1  - seen by ID at [facility] ; recc'd  CAREN to r/o endocarditis  - ID consult for follow up  -Plan for CTX 2g x6 weeks pending above workup, however CTX not covered by pt's insurance so will need alternative.

## 2024-03-29 NOTE — PROVIDER CONTACT NOTE (OTHER) - ASSESSMENT
Pt A&Ox4, able to make needs known. Pt c/o pain to IV site, red and discomfort. VSS. No s/s of bleeding. Pt denies cp, denies SOB.

## 2024-03-29 NOTE — PROGRESS NOTE ADULT - SUBJECTIVE AND OBJECTIVE BOX
Patient is a 88y old  Male who presents with a chief complaint of     SUBJECTIVE / OVERNIGHT EVENTS:    MEDICATIONS  (STANDING):  amLODIPine   Tablet 5 milliGRAM(s) Oral daily  budesonide  80 MICROgram(s)/formoterol 4.5 MICROgram(s) Inhaler 2 Puff(s) Inhalation two times a day  cefTRIAXone   IVPB 2000 milliGRAM(s) IV Intermittent every 24 hours  chlorhexidine 2% Cloths 1 Application(s) Topical daily  ferrous    sulfate 325 milliGRAM(s) Oral daily  losartan 100 milliGRAM(s) Oral daily  metoprolol succinate ER 75 milliGRAM(s) Oral daily  rivaroxaban 20 milliGRAM(s) Oral with dinner  tamsulosin 0.8 milliGRAM(s) Oral at bedtime  tiotropium 2.5 MICROgram(s) Inhaler 2 Puff(s) Inhalation daily    MEDICATIONS  (PRN):  acetaminophen     Tablet .. 650 milliGRAM(s) Oral every 6 hours PRN Temp greater or equal to 38C (100.4F), Mild Pain (1 - 3)  albuterol    90 MICROgram(s) HFA Inhaler 2 Puff(s) Inhalation every 6 hours PRN Shortness of Breath and/or Wheezing  aluminum hydroxide/magnesium hydroxide/simethicone Suspension 30 milliLiter(s) Oral every 4 hours PRN Dyspepsia  melatonin 3 milliGRAM(s) Oral at bedtime PRN Insomnia  ondansetron Injectable 4 milliGRAM(s) IV Push every 8 hours PRN Nausea and/or Vomiting      Vital Signs Last 24 Hrs  T(C): 36.6 (29 Mar 2024 11:08), Max: 36.8 (28 Mar 2024 15:15)  T(F): 97.8 (29 Mar 2024 11:08), Max: 98.2 (28 Mar 2024 15:15)  HR: 70 (29 Mar 2024 11:08) (68 - 92)  BP: 130/77 (29 Mar 2024 11:08) (130/77 - 162/70)  BP(mean): --  RR: 18 (29 Mar 2024 11:08) (18 - 18)  SpO2: 95% (29 Mar 2024 11:08) (93% - 98%)    Parameters below as of 29 Mar 2024 11:08  Patient On (Oxygen Delivery Method): nasal cannula  O2 Flow (L/min): 4    CAPILLARY BLOOD GLUCOSE        I&O's Summary    29 Mar 2024 07:01  -  29 Mar 2024 13:51  --------------------------------------------------------  IN: 0 mL / OUT: 200 mL / NET: -200 mL        PHYSICAL EXAM:  GENERAL: NAD, well-developed  HEAD:  Atraumatic, Normocephalic  EYES: EOMI, PERRLA, conjunctiva and sclera clear  NECK: Supple, No JVD  CHEST/LUNG: Clear to auscultation bilaterally; No wheeze  HEART: Regular rate and rhythm; No murmurs, rubs, or gallops  ABDOMEN: Soft, Nontender, Nondistended; Bowel sounds present  EXTREMITIES:  2+ Peripheral Pulses, No clubbing, cyanosis, or edema  PSYCH: AAOx3  NEUROLOGY: non-focal  SKIN: No rashes or lesions    LABS:                        10.6   8.69  )-----------( 163      ( 29 Mar 2024 07:03 )             32.8     03-29    136  |  103  |  14  ----------------------------<  96  3.8   |  22  |  0.81    Ca    8.9      29 Mar 2024 07:03  Phos  2.5     03-29  Mg     2.0     03-29    TPro  6.0  /  Alb  3.4  /  TBili  0.8  /  DBili  x   /  AST  21  /  ALT  27  /  AlkPhos  63  03-29    PT/INR - ( 28 Mar 2024 07:42 )   PT: 22.9 sec;   INR: 2.09 ratio         PTT - ( 28 Mar 2024 07:42 )  PTT:34.2 sec      Urinalysis Basic - ( 29 Mar 2024 07:03 )    Color: x / Appearance: x / SG: x / pH: x  Gluc: 96 mg/dL / Ketone: x  / Bili: x / Urobili: x   Blood: x / Protein: x / Nitrite: x   Leuk Esterase: x / RBC: x / WBC x   Sq Epi: x / Non Sq Epi: x / Bacteria: x        RADIOLOGY & ADDITIONAL TESTS:    Imaging Personally Reviewed:    Consultant(s) Notes Reviewed:      Care Discussed with Consultants/Other Providers:   Patient is a 88y old  Male who presents with a chief complaint of fever    SUBJECTIVE / OVERNIGHT EVENTS:  Pt seen and examined. No acute events overnight. He denies CP, fever/chills. Reports mild TOLENTINO which is his baseline.     MEDICATIONS  (STANDING):  amLODIPine   Tablet 5 milliGRAM(s) Oral daily  budesonide  80 MICROgram(s)/formoterol 4.5 MICROgram(s) Inhaler 2 Puff(s) Inhalation two times a day  cefTRIAXone   IVPB 2000 milliGRAM(s) IV Intermittent every 24 hours  chlorhexidine 2% Cloths 1 Application(s) Topical daily  ferrous    sulfate 325 milliGRAM(s) Oral daily  losartan 100 milliGRAM(s) Oral daily  metoprolol succinate ER 75 milliGRAM(s) Oral daily  rivaroxaban 20 milliGRAM(s) Oral with dinner  tamsulosin 0.8 milliGRAM(s) Oral at bedtime  tiotropium 2.5 MICROgram(s) Inhaler 2 Puff(s) Inhalation daily    MEDICATIONS  (PRN):  acetaminophen     Tablet .. 650 milliGRAM(s) Oral every 6 hours PRN Temp greater or equal to 38C (100.4F), Mild Pain (1 - 3)  albuterol    90 MICROgram(s) HFA Inhaler 2 Puff(s) Inhalation every 6 hours PRN Shortness of Breath and/or Wheezing  aluminum hydroxide/magnesium hydroxide/simethicone Suspension 30 milliLiter(s) Oral every 4 hours PRN Dyspepsia  melatonin 3 milliGRAM(s) Oral at bedtime PRN Insomnia  ondansetron Injectable 4 milliGRAM(s) IV Push every 8 hours PRN Nausea and/or Vomiting      Vital Signs Last 24 Hrs  T(C): 36.6 (29 Mar 2024 11:08), Max: 36.8 (28 Mar 2024 15:15)  T(F): 97.8 (29 Mar 2024 11:08), Max: 98.2 (28 Mar 2024 15:15)  HR: 70 (29 Mar 2024 11:08) (68 - 92)  BP: 130/77 (29 Mar 2024 11:08) (130/77 - 162/70)  BP(mean): --  RR: 18 (29 Mar 2024 11:08) (18 - 18)  SpO2: 95% (29 Mar 2024 11:08) (93% - 98%)    Parameters below as of 29 Mar 2024 11:08  Patient On (Oxygen Delivery Method): nasal cannula  O2 Flow (L/min): 4    CAPILLARY BLOOD GLUCOSE        I&O's Summary    29 Mar 2024 07:01  -  29 Mar 2024 13:51  --------------------------------------------------------  IN: 0 mL / OUT: 200 mL / NET: -200 mL        PHYSICAL EXAM:  GENERAL: NAD, well-groomed  HEAD:  Atraumatic, Normocephalic  EYES: EOMI, PERRLA, conjunctiva and sclera clear  NECK: Supple, No JVD  CHEST/LUNG: Clear to auscultation bilaterally; No wheeze  HEART: Regular rate and rhythm; No murmurs, rubs, or gallops  ABDOMEN: Soft, Nontender, Nondistended; Bowel sounds present  EXTREMITIES:  2+ Peripheral Pulses, No clubbing, cyanosis, or edema  PSYCH: AAOx3  NEUROLOGY: non-focal  SKIN: No rashes or lesions    LABS:                        10.6   8.69  )-----------( 163      ( 29 Mar 2024 07:03 )             32.8     03-29    136  |  103  |  14  ----------------------------<  96  3.8   |  22  |  0.81    Ca    8.9      29 Mar 2024 07:03  Phos  2.5     03-29  Mg     2.0     03-29    TPro  6.0  /  Alb  3.4  /  TBili  0.8  /  DBili  x   /  AST  21  /  ALT  27  /  AlkPhos  63  03-29    PT/INR - ( 28 Mar 2024 07:42 )   PT: 22.9 sec;   INR: 2.09 ratio         PTT - ( 28 Mar 2024 07:42 )  PTT:34.2 sec      Urinalysis Basic - ( 29 Mar 2024 07:03 )    Color: x / Appearance: x / SG: x / pH: x  Gluc: 96 mg/dL / Ketone: x  / Bili: x / Urobili: x   Blood: x / Protein: x / Nitrite: x   Leuk Esterase: x / RBC: x / WBC x   Sq Epi: x / Non Sq Epi: x / Bacteria: x        RADIOLOGY & ADDITIONAL TESTS:    Imaging Personally Reviewed:    Consultant(s) Notes Reviewed:      Care Discussed with Consultants/Other Providers:

## 2024-03-29 NOTE — PROGRESS NOTE ADULT - SUBJECTIVE AND OBJECTIVE BOX
24H hour events: tele AF -  rates ~60-70s. Pt feeling well, denies chest pain/dizziness/fevers.     MEDICATIONS:  amLODIPine   Tablet 5 milliGRAM(s) Oral daily  losartan 100 milliGRAM(s) Oral daily  metoprolol succinate ER 75 milliGRAM(s) Oral daily  cefTRIAXone   IVPB 2000 milliGRAM(s) IV Intermittent every 24 hours  albuterol    90 MICROgram(s) HFA Inhaler 2 Puff(s) Inhalation every 6 hours PRN  budesonide  80 MICROgram(s)/formoterol 4.5 MICROgram(s) Inhaler 2 Puff(s) Inhalation two times a day  tiotropium 2.5 MICROgram(s) Inhaler 2 Puff(s) Inhalation daily  acetaminophen     Tablet .. 650 milliGRAM(s) Oral every 6 hours PRN  melatonin 3 milliGRAM(s) Oral at bedtime PRN  ondansetron Injectable 4 milliGRAM(s) IV Push every 8 hours PRN  aluminum hydroxide/magnesium hydroxide/simethicone Suspension 30 milliLiter(s) Oral every 4 hours PRN  ferrous    sulfate 325 milliGRAM(s) Oral daily  tamsulosin 0.8 milliGRAM(s) Oral at bedtime    REVIEW OF SYSTEMS:  Complete 12point ROS negative.    PHYSICAL EXAM:  T(C): 36.6 (03-29-24 @ 04:04), Max: 36.8 (03-28-24 @ 15:15)  HR: 69 (03-29-24 @ 04:04) (68 - 92)  BP: 144/78 (03-29-24 @ 04:04) (139/74 - 162/70)  RR: 18 (03-29-24 @ 04:04) (18 - 18)  SpO2: 97% (03-29-24 @ 04:04) (93% - 98%)  Wt(kg): --  I&O's Summary      Appearance: Normal	  HEENT: NC/AT  Cardiovascular: regular rates  Respiratory: Lungs clear to auscultation	  Psychiatry: A & O x 3, Mood & affect appropriate  Neurologic: Non-focal  Extremities: No BLE edema    LABS:	 	    CBC Full  -  ( 29 Mar 2024 07:03 )  WBC Count : 8.69 K/uL  Hemoglobin : 10.6 g/dL  Hematocrit : 32.8 %  Platelet Count - Automated : 163 K/uL  Mean Cell Volume : 103.1 fl  Mean Cell Hemoglobin : 33.3 pg  Mean Cell Hemoglobin Concentration : 32.3 gm/dL  Auto Neutrophil # : 7.20 K/uL  Auto Lymphocyte # : 0.62 K/uL  Auto Monocyte # : 0.62 K/uL  Auto Eosinophil # : 0.18 K/uL  Auto Basophil # : 0.02 K/uL  Auto Neutrophil % : 82.9 %  Auto Lymphocyte % : 7.1 %  Auto Monocyte % : 7.1 %  Auto Eosinophil % : 2.1 %  Auto Basophil % : 0.2 %    03-29    136  |  103  |  14  ----------------------------<  96  3.8   |  22  |  0.81  03-28    138  |  104  |  15  ----------------------------<  100<H>  3.9   |  22  |  0.87    Ca    8.9      29 Mar 2024 07:03  Ca    8.9      28 Mar 2024 07:42  Phos  2.5     03-29  Phos  2.6     03-28  Mg     2.0     03-29  Mg     1.90     03-28    TPro  6.0  /  Alb  3.4  /  TBili  0.8  /  DBili  x   /  AST  21  /  ALT  27  /  AlkPhos  63  03-29  TPro  5.6<L>  /  Alb  3.4  /  TBili  1.0  /  DBili  x   /  AST  19  /  ALT  24  /  AlkPhos  55  03-28      proBNP: Pro-Brain Natriuretic Peptide (03.22.24 @ 22:45)    Pro-Brain Natriuretic Peptide: 3358: Acute congestive heart failure is unlikely if NT-proBNP is < 300 pg/mL.  Consider acute congestive heart failure if:  AGE                     NT-proBNP RESULT  ---                           -------------  < 50 YEARS          >  450 pg/mL  50 - 75 YEARS      >  900 pg/mL  > 75 YEARS          > 1800 pg/mL pg/mL      CARDIAC MARKERS: Troponin T, High Sensitivity (03.24.24 @ 05:47)    Troponin T, High Sensitivity Result: 68: TYPE:(C=Critical, N=Notification, A=Abnormal) C  TESTS: TROP  DATE/TIME CALLED: 03/24/2024 07:42:52 EDT  CALLED TO: F. AGUINAG RN  READ BACK (2 Patient Identifiers)(Y/N): Y  READ BACK VALUES (Y/N): Y  CALLED BY: ALYSIA  Rapid changes upward or downward in high-sensitivity troponin levels  strongly suggest acute myocardial injury. Hemolysis may falsely lower  results. Renal impairment may increase results.  Normal: <6 - 14 ng/L  Indeterminate: 15 - 51 ng/L  Elevated: >51 ng/L  Please see "[email or web address] on the [facility] intranet for  more information. ng/L      RADIOLOGY: < from: Xray Chest 1 View- PORTABLE-Routine (Xray Chest 1 View- PORTABLE-Routine .) (03.24.24 @ 13:03) >  FINDINGS:    Lungs show no focal consolidations.  Heart size and left-sided dual-lead pacemaker are unchanged.  Trace right effusion.  No pneumothorax.        COMPARISON: March 23 without change.        IMPRESSION: No localized pulmonary disease.    < end of copied text >      PREVIOUS DIAGNOSTIC TESTING:    [ ] Echocardiogram: < from: TTE W or WO Ultrasound Enhancing Agent (03.25.24 @ 14:41) >     CONCLUSIONS:      1. The left ventricular cavity is normal in size. Left ventricular wall thickness is normal. Left ventricular systolic function is mildly decreased with an ejection fraction visually estimated at 45 to 50%. There is global left ventricular hypokinesis.   2. The right ventricular cavity is enlarged in size and reduced systolic function. A device lead is visualized in the right heart.   3. Structurally normal mitral valve with normal leaflet excursion. There is calcification of the mitral valve annulus. There is mild mitral regurgitation.   4. The aortic valve anatomy cannot be determined with reduced systolic excursion. There is calcification of the aortic valve leaflets. The peak transaortic velocity is 2.41 m/s, peak transaortic gradient is 23.2 mmHg and mean transaortic gradient is 12.0 mmHg with an LVOT/aortic valve VTI ratio of 0.45. Probable mild aortic stenosis. There is trace aortic regurgitation.   5. The left atrium is moderately dilated with an indexed volume of 47 ml/m².   6. The right atrium is severely dilated with an indexed volume of 57.95 ml/m² and an indexed area of 14.72 cm²/m².    ________________________________________________________________________________________  FINDINGS:     Left Ventricle:  The left ventricular cavity is normal in size. Left ventricular wall thickness is normal. Left ventricular systolic function is mildly decreased with an ejection fraction visually estimated at 45 to 50%. There is global left ventricular hypokinesis.     Right Ventricle:  The right ventricular cavity is enlarged in size and reduced systolic function. A device lead is visualized in the right heart.     Left Atrium:  The left atrium is moderately dilated with an indexed volume of 47 ml/m².     Right Atrium:  The right atrium is severely dilated with an indexed volume of 57.95 ml/m² and an indexed area of 14.72 cm²/m².     Aortic Valve:  The aortic valve anatomy cannot be determined with reduced systolic excursion. There is calcification of the aortic valve leaflets. The peak transaortic velocity is 2.41 m/s, peak transaortic gradient is 23.2 mmHg and mean transaortic gradient is 12.0 mmHg with an LVOT/aortic valve VTI ratio of 0.45. Probable mild aortic stenosis. There is trace aortic regurgitation.     Mitral Valve:  Structurally normal mitral valve with normal leaflet excursion. There is calcification of the mitral valve annulus. There is mild mitral regurgitation.     Tricuspid Valve:  Structurally normal tricuspid valve with normal leaflet excursion. There is mild tricuspid regurgitation.     Pulmonic Valve:  Structurally normal pulmonic valve with normal leaflet excursion. There is trace pulmonic regurgitation.     Aorta:  The aortic root appears normal in size. The aortic root at the sinuses of Valsalva is normal in size, measuring 3.35 cm (indexed 1.59 cm/m²).     Pericardium:  No pericardial effusion seen.     Systemic Veins:  The inferior vena cava is dilated measuring 2.70 cm in diameter, (dilated >2.1cm) with abnormal inspiratory collapse (abnormal <50%) consistent with elevated right atrial pressure (~15, range 10-20mmHg).    < end of copied text >      [ ]   < from: CT Chest No Cont (03.23.24 @ 02:25) >  IMPRESSION:    Small right > left pleural effusion with compressive atelectasis.   Question interlobular septal thickening. Recommend clinical correlation   to assess underlying infection.    Nonspecific pulmonary nodules. If the patient is at low risk for   malignancy, no further follow-up is needed. If the patient is at high   risk, 12 month follow-up CT chest may be obtained for further evaluation.    Additional findings as described.    < end of copied text >

## 2024-03-29 NOTE — PROGRESS NOTE ADULT - ASSESSMENT
88M PMH longstanding persistent Afib on Eliquis s/p SJM PPM, COPD on 3-3.5L NC at home admitted to [facility] for fever/chills/SOB; found to have streptococcus lutetiensis (4/4 bottles) bacteremia iso dental procedure. Evaluated by EP for pacemaker lead extraction. Currently patient asymptomatic. TTE showing mildly decreased EF 45-50%. Pt transferred to [facility] for lead extraction with Micra placement. Patient DID received rivaroxaban at 17:00 on 3/28 prior to transfer.     #strep lutetiensis bacteremia 4/4 bottles  #longstanding persistent AF w/ HB s/p PPM  #COPD on hold O2 3.5L    - tele here with AF- rates ~60-70s. Pt asymptomatic, on 4L O2  - Pt on Eliquis at home but switched to Xarelto d/t insurance issues. Pt received Xarelto 3/28/23 PM, procedure to be rescheduled to next week Tuesday.   - Pt is dependent, will plan for leadless PPM implant.   -- Will plan for system extraction and leadless PPM implant on Tuesday with Dr. Quan. Can do CAREN on table on Tuesday.   - Please continue Xarelto inpatient; HOLD MONDAY IN PREP FOR EXTRACTION TUESDAY  - Continue antibiotics per ID, currently on Ceftriaxone. Appreciate recs.   - CT chest with pulm nodules - f/u outpatient in 12 months   - Keep on tele. Keep K>4, Mg>2  - Discussed with EP attending and primary team.     Device information:  Model: [employer] MRI 2272 #[id number] 10/13/22  RA lead (?capped) - Tendril SDX [id number] #[id number] implanted 5/31/2005  RV lead - [id number] #[id number] implanted 5/31/2005 88M PMH longstanding persistent Afib on Eliquis s/p SJM PPM, COPD on 3-3.5L NC at home admitted to [facility] for fever/chills/SOB; found to have streptococcus lutetiensis (4/4 bottles) bacteremia iso dental procedure. Evaluated by EP for pacemaker lead extraction. Currently patient asymptomatic. TTE showing mildly decreased EF 45-50%. Pt transferred to [facility] for lead extraction with Micra placement. Patient DID received rivaroxaban at 17:00 on 3/28 prior to transfer.     #strep lutetiensis bacteremia 4/4 bottles  #longstanding persistent AF w/ HB s/p PPM  #COPD on hold O2 3.5L    - tele here with AF- rates ~60-70s. Pt asymptomatic, on 4L O2  - Pt on Eliquis at home but switched to Xarelto d/t insurance issues. Pt received Xarelto 3/28/23 PM, procedure to be rescheduled to next week Tuesday.   - Pt is dependent, will plan for leadless PPM implant.   -- Will plan for system extraction and leadless PPM implant on Tuesday with Dr. Quan. Can do CAREN on table on Tuesday.   - Please continue Xarelto inpatient; HOLD SUNDAY IN PREP FOR EXTRACTION TUESDAY  - Continue antibiotics per ID, currently on Ceftriaxone. Appreciate recs.   - CT chest with pulm nodules - f/u outpatient in 12 months   - Keep on tele. Keep K>4, Mg>2  - Discussed with EP attending and primary team.     Device information:  Model: [employer] MRI 2272 #[id number] 10/13/22  RA lead (?capped) - Tendril SDX [id number] #[id number] implanted 5/31/2005  RV lead - [id number] #[id number] implanted 5/31/2005

## 2024-03-29 NOTE — CONSULT NOTE ADULT - ASSESSMENT
88 year old patient with bilat heel blanching erythema c/w DTIs  - Patient seen and evaluated  - Heel DTIs noted with no open lesions, no signs of infection in feet, very mild  - Recommend z flow boots in bed at all times  - Recommend cavilon to bilat heels  - Podiatry signing off at this time, reconsult as needed

## 2024-03-29 NOTE — CONSULT NOTE ADULT - ASSESSMENT
87 y/o M w/ hx of Afib on Eliquis w/ PPM, COPD on 3-3.5L NC (on pred?) daily presenting to ED with chills and shortness of breath that started suddenly. He was feeling well until 3/23. He recently went for deep dental cleaning within the past month and has partial dentures as well. Patient was found to meet sepsis criteria on admission with fever and leukocytosis. His blood cultures grew Streptococcus lutetiensis in 4/4 bottles. Patient has PPM and also with known R hip replacement, has been able to ambulate with cane as usual. UA with pyuria and UCx with Enterobacter cloacae, however patient without urinary symptoms. TTE was unable to exclude IE.    Micro:  BCx (3/22): Streptococcus lutetiensis (4/4 bottles)  BCx (3/25): no growth  UCx (3/22): E. cloacae    Abx:  Cefepime (3/22)  Zosyn (3/23-3/25)  Ceftriaxone (3/23, 3/25-)    Overall, high grade Streptococcus bacteremia, recent dental work, known PPM, R hip replacement, asymptomatic bacteruria also recetnly had exploration of PPM pocket at [facility]     Suggest:  - IV ceftriaxone 2g qD  TTE reviewed  no definitive endocarditis but not definitive   Follow up BC are negative   no symptoms of UTI  for extraction next week   will need line to complete at least  4 weeks of ab   CAREN on hold for now     needs picc or midline   This organism is associated with colon pathology and he has a hs of young

## 2024-03-30 LAB
ANION GAP SERPL CALC-SCNC: 15 MMOL/L — SIGNIFICANT CHANGE UP (ref 5–17)
BUN SERPL-MCNC: 13 MG/DL — SIGNIFICANT CHANGE UP (ref 7–23)
CALCIUM SERPL-MCNC: 8.9 MG/DL — SIGNIFICANT CHANGE UP (ref 8.4–10.5)
CHLORIDE SERPL-SCNC: 102 MMOL/L — SIGNIFICANT CHANGE UP (ref 96–108)
CHOLEST SERPL-MCNC: 133 MG/DL — SIGNIFICANT CHANGE UP
CO2 SERPL-SCNC: 21 MMOL/L — LOW (ref 22–31)
CREAT SERPL-MCNC: 0.99 MG/DL — SIGNIFICANT CHANGE UP (ref 0.5–1.3)
CULTURE RESULTS: SIGNIFICANT CHANGE UP
CULTURE RESULTS: SIGNIFICANT CHANGE UP
EGFR: 74 ML/MIN/1.73M2 — SIGNIFICANT CHANGE UP
GLUCOSE SERPL-MCNC: 96 MG/DL — SIGNIFICANT CHANGE UP (ref 70–99)
HCT VFR BLD CALC: 34.9 % — LOW (ref 39–50)
HDLC SERPL-MCNC: 40 MG/DL — LOW
HGB BLD-MCNC: 11 G/DL — LOW (ref 13–17)
LIPID PNL WITH DIRECT LDL SERPL: 80 MG/DL — SIGNIFICANT CHANGE UP
MCHC RBC-ENTMCNC: 31.5 GM/DL — LOW (ref 32–36)
MCHC RBC-ENTMCNC: 32.6 PG — SIGNIFICANT CHANGE UP (ref 27–34)
MCV RBC AUTO: 103.6 FL — HIGH (ref 80–100)
MRSA PCR RESULT.: SIGNIFICANT CHANGE UP
NON HDL CHOLESTEROL: 93 MG/DL — SIGNIFICANT CHANGE UP
NRBC # BLD: 0 /100 WBCS — SIGNIFICANT CHANGE UP (ref 0–0)
PLATELET # BLD AUTO: 186 K/UL — SIGNIFICANT CHANGE UP (ref 150–400)
POTASSIUM SERPL-MCNC: 3.8 MMOL/L — SIGNIFICANT CHANGE UP (ref 3.5–5.3)
POTASSIUM SERPL-SCNC: 3.8 MMOL/L — SIGNIFICANT CHANGE UP (ref 3.5–5.3)
RBC # BLD: 3.37 M/UL — LOW (ref 4.2–5.8)
RBC # FLD: 13.5 % — SIGNIFICANT CHANGE UP (ref 10.3–14.5)
S AUREUS DNA NOSE QL NAA+PROBE: SIGNIFICANT CHANGE UP
SODIUM SERPL-SCNC: 138 MMOL/L — SIGNIFICANT CHANGE UP (ref 135–145)
SPECIMEN SOURCE: SIGNIFICANT CHANGE UP
SPECIMEN SOURCE: SIGNIFICANT CHANGE UP
TRIGL SERPL-MCNC: 65 MG/DL — SIGNIFICANT CHANGE UP
WBC # BLD: 6.75 K/UL — SIGNIFICANT CHANGE UP (ref 3.8–10.5)
WBC # FLD AUTO: 6.75 K/UL — SIGNIFICANT CHANGE UP (ref 3.8–10.5)

## 2024-03-30 PROCEDURE — 99233 SBSQ HOSP IP/OBS HIGH 50: CPT

## 2024-03-30 RX ADMIN — TIOTROPIUM BROMIDE 2 PUFF(S): 18 CAPSULE ORAL; RESPIRATORY (INHALATION) at 11:22

## 2024-03-30 RX ADMIN — TAMSULOSIN HYDROCHLORIDE 0.8 MILLIGRAM(S): 0.4 CAPSULE ORAL at 21:10

## 2024-03-30 RX ADMIN — Medication 325 MILLIGRAM(S): at 12:00

## 2024-03-30 RX ADMIN — LOSARTAN POTASSIUM 100 MILLIGRAM(S): 100 TABLET, FILM COATED ORAL at 05:12

## 2024-03-30 RX ADMIN — CHLORHEXIDINE GLUCONATE 1 APPLICATION(S): 213 SOLUTION TOPICAL at 11:22

## 2024-03-30 RX ADMIN — BUDESONIDE AND FORMOTEROL FUMARATE DIHYDRATE 2 PUFF(S): 160; 4.5 AEROSOL RESPIRATORY (INHALATION) at 05:11

## 2024-03-30 RX ADMIN — AMLODIPINE BESYLATE 5 MILLIGRAM(S): 2.5 TABLET ORAL at 05:11

## 2024-03-30 RX ADMIN — Medication 75 MILLIGRAM(S): at 05:11

## 2024-03-30 RX ADMIN — CEFTRIAXONE 100 MILLIGRAM(S): 500 INJECTION, POWDER, FOR SOLUTION INTRAMUSCULAR; INTRAVENOUS at 13:38

## 2024-03-30 RX ADMIN — RIVAROXABAN 20 MILLIGRAM(S): KIT at 17:16

## 2024-03-30 RX ADMIN — BUDESONIDE AND FORMOTEROL FUMARATE DIHYDRATE 2 PUFF(S): 160; 4.5 AEROSOL RESPIRATORY (INHALATION) at 17:16

## 2024-03-30 NOTE — DIETITIAN INITIAL EVALUATION ADULT - ADD RECOMMEND
1) Continue regular, easy to chew diet   2) Add Ensure Plus High Protein x1/day   3) Add multivitamin and vitamin C daily  4) Monitor PO intake, diet tolerance, weight trends, labs, GI function, and skin integrity    Stacey Bolton MS, RDN, CDN ([employer])

## 2024-03-30 NOTE — DIETITIAN INITIAL EVALUATION ADULT - ORAL INTAKE PTA/DIET HISTORY
PTA per pt  -Intake: very good PO intake and appetite   -Chewing/Swallowing: softer foods are easier to tolerate with dentures   -Allergies/Intolerances: NKFA

## 2024-03-30 NOTE — DIETITIAN INITIAL EVALUATION ADULT - PERTINENT MEDS FT
MEDICATIONS  (STANDING):  amLODIPine   Tablet 5 milliGRAM(s) Oral daily  budesonide  80 MICROgram(s)/formoterol 4.5 MICROgram(s) Inhaler 2 Puff(s) Inhalation two times a day  cefTRIAXone   IVPB 2000 milliGRAM(s) IV Intermittent every 24 hours  chlorhexidine 2% Cloths 1 Application(s) Topical daily  ferrous    sulfate 325 milliGRAM(s) Oral daily  losartan 100 milliGRAM(s) Oral daily  metoprolol succinate ER 75 milliGRAM(s) Oral daily  rivaroxaban 20 milliGRAM(s) Oral with dinner  tamsulosin 0.8 milliGRAM(s) Oral at bedtime  tiotropium 2.5 MICROgram(s) Inhaler 2 Puff(s) Inhalation daily    MEDICATIONS  (PRN):  acetaminophen     Tablet .. 650 milliGRAM(s) Oral every 6 hours PRN Temp greater or equal to 38C (100.4F), Mild Pain (1 - 3)  albuterol    90 MICROgram(s) HFA Inhaler 2 Puff(s) Inhalation every 6 hours PRN Shortness of Breath and/or Wheezing  aluminum hydroxide/magnesium hydroxide/simethicone Suspension 30 milliLiter(s) Oral every 4 hours PRN Dyspepsia  melatonin 3 milliGRAM(s) Oral at bedtime PRN Insomnia  ondansetron Injectable 4 milliGRAM(s) IV Push every 8 hours PRN Nausea and/or Vomiting

## 2024-03-30 NOTE — DIETITIAN INITIAL EVALUATION ADULT - NSICDXPASTMEDICALHX_GEN_ALL_CORE_FT
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

## 2024-03-30 NOTE — DIETITIAN INITIAL EVALUATION ADULT - PERTINENT LABORATORY DATA
03-30    138  |  102  |  13  ----------------------------<  96  3.8   |  21<L>  |  0.99    Ca    8.9      30 Mar 2024 06:58  Phos  2.5     03-29  Mg     2.0     03-29    TPro  6.0  /  Alb  3.4  /  TBili  0.8  /  DBili  x   /  AST  21  /  ALT  27  /  AlkPhos  63  03-29  A1C with Estimated Average Glucose Result: 5.5 % (03-23-24 @ 09:15)

## 2024-03-30 NOTE — DIETITIAN INITIAL EVALUATION ADULT - OTHER INFO
GI/Intake:   -Good PO intake in-house; per flowsheet, % intake of meals   -No BM documented thus far; no bowel regimen ordered   -Easy to chew for ease with dentures     Pulm:   -Hx of COPD; no exacerbation at this time   -Supplemental O2 via NC     Weight Hx:   -Current dosing: none noted   -Per flowsheet: 215 pounds (3/29) - ? accuracy   -Per previous RD notes/[place] HIE: 192 pounds (3/24/24), 196 pounds (5/16/23)

## 2024-03-30 NOTE — PROGRESS NOTE ADULT - SUBJECTIVE AND OBJECTIVE BOX
24H hour events: No acute overnight events    MEDICATIONS:  amLODIPine   Tablet 5 milliGRAM(s) Oral daily  losartan 100 milliGRAM(s) Oral daily  metoprolol succinate ER 75 milliGRAM(s) Oral daily  rivaroxaban 20 milliGRAM(s) Oral with dinner  cefTRIAXone   IVPB 2000 milliGRAM(s) IV Intermittent every 24 hours  albuterol    90 MICROgram(s) HFA Inhaler 2 Puff(s) Inhalation every 6 hours PRN  budesonide  80 MICROgram(s)/formoterol 4.5 MICROgram(s) Inhaler 2 Puff(s) Inhalation two times a day  tiotropium 2.5 MICROgram(s) Inhaler 2 Puff(s) Inhalation daily  acetaminophen     Tablet .. 650 milliGRAM(s) Oral every 6 hours PRN  melatonin 3 milliGRAM(s) Oral at bedtime PRN  ondansetron Injectable 4 milliGRAM(s) IV Push every 8 hours PRN  aluminum hydroxide/magnesium hydroxide/simethicone Suspension 30 milliLiter(s) Oral every 4 hours PRN  chlorhexidine 2% Cloths 1 Application(s) Topical daily  ferrous    sulfate 325 milliGRAM(s) Oral daily  tamsulosin 0.8 milliGRAM(s) Oral at bedtime      REVIEW OF SYSTEMS:  See HPI, otherwise ROS negative.    PHYSICAL EXAM:  T(C): 36.7 (03-30-24 @ 11:21), Max: 36.7 (03-30-24 @ 11:21)  HR: 70 (03-30-24 @ 11:21) (69 - 70)  BP: 138/77 (03-30-24 @ 11:21) (126/76 - 154/79)  RR: 18 (03-30-24 @ 11:21) (18 - 18)  SpO2: 98% (03-30-24 @ 11:21) (96% - 98%)  Wt(kg): --  I&O's Summary    29 Mar 2024 07:01  -  30 Mar 2024 07:00  --------------------------------------------------------  IN: 240 mL / OUT: 900 mL / NET: -660 mL      Appearance: Alert. NAD	  HEENT:   NC/AT	  Cardiovascular: +S1S2  Respiratory: CTA B/L	  Psychiatry: A & O x 3, Mood & affect appropriate  Gastrointestinal:  Soft, NT.ND., + BS	  Neurologic: Non-focal  Extremities: No edema BLE  Vascular: Peripheral pulses palpable 2+ bilaterally    LABS:	 	  CBC Full  -  ( 30 Mar 2024 06:58 )  WBC Count : 6.75 K/uL  Hemoglobin : 11.0 g/dL  Hematocrit : 34.9 %  Platelet Count - Automated : 186 K/uL  Mean Cell Volume : 103.6 fl  Mean Cell Hemoglobin : 32.6 pg  Mean Cell Hemoglobin Concentration : 31.5 gm/dL  Auto Neutrophil # : x  Auto Lymphocyte # : x  Auto Monocyte # : x  Auto Eosinophil # : x  Auto Basophil # : x  Auto Neutrophil % : x  Auto Lymphocyte % : x  Auto Monocyte % : x  Auto Eosinophil % : x  Auto Basophil % : x    03-30    138  |  102  |  13  ----------------------------<  96  3.8   |  21<L>  |  0.99  03-29    136  |  103  |  14  ----------------------------<  96  3.8   |  22  |  0.81    Ca    8.9      30 Mar 2024 06:58  Ca    8.9      29 Mar 2024 07:03  Phos  2.5     03-29  Mg     2.0     03-29    TPro  6.0  /  Alb  3.4  /  TBili  0.8  /  DBili  x   /  AST  21  /  ALT  27  /  AlkPhos  63  03-29    TELEMETRY: AF, V paced 70s    ECHO:  TRANSTHORACIC ECHOCARDIOGRAM REPORT  ________________________________________________________________________________                                      _______       Pt. Name:       ASHLEY KENDALL Study Date:    3/25/2024  MRN:            [id number]          YOB: 1935  Accession #:    [id number]         Age:           88 years  Account#:       [id number]          Gender:        M  Heart Rate:     62 bpm            Height:        73.00 in (185.42 cm)  Rhythm:     Weight:        190.00 lb (86.18 kg)  Blood Pressure: 162/75 mmHg       BSA/BMI:       2.11 m² / 25.07 kg/m²  ________________________________________________________________________________________  Referring Physician:    [id number] Terrie Skinner  Interpreting Physician: Binu Dash M.D.  Primary Sonographer:    Ida Jones RDCS    CPT:               ECHO TTE WO CON COMP W DOPP - 20595.m  Indication(s):     Dyspnea, unspecified - R06.00  Procedure:         Transthoracic echocardiogram with 2-D, M-mode and complete                     spectral and color flow Doppler.  Ordering Location: [facility]  Admission Status:  Inpatient  Study Information: Image quality for this study is fair.    _______________________________________________________________________________________     CONCLUSIONS:      1. The left ventricular cavity is normal in size. Left ventricular wall thickness is normal. Left ventricular systolic function is mildly decreased with an ejection fraction visually estimated at 45 to 50%. There is global left ventricular hypokinesis.   2. The right ventricular cavity is enlarged in size and reduced systolic function. A device lead is visualized in the right heart.   3. Structurally normal mitral valve with normal leaflet excursion. There is calcification of the mitral valve annulus. There is mild mitral regurgitation.   4. The aortic valve anatomy cannot be determined with reduced systolic excursion. There is calcification of the aortic valve leaflets. The peak transaortic velocity is 2.41 m/s, peak transaortic gradient is 23.2 mmHg and mean transaortic gradient is 12.0 mmHg with an LVOT/aortic valve VTI ratio of 0.45. Probable mild aortic stenosis. There is trace aortic regurgitation.   5. The left atrium is moderately dilated with an indexed volume of 47 ml/m².   6. The right atrium is severely dilated with an indexed volume of 57.95 ml/m² and an indexed area of 14.72 cm²/m².    ________________________________________________________________________________________  FINDINGS:     Left Ventricle:  The left ventricular cavity is normal in size. Left ventricular wall thickness is normal. Left ventricular systolic function is mildly decreased with an ejection fraction visually estimated at 45 to 50%. There is global left ventricular hypokinesis.     Right Ventricle:  The right ventricular cavity is enlarged in size and reduced systolic function. A device lead is visualized in the right heart.     Left Atrium:  The left atrium is moderately dilated with an indexed volume of 47 ml/m².     Right Atrium:  The right atrium is severely dilated with an indexed volume of 57.95 ml/m² and an indexed area of 14.72 cm²/m².     Aortic Valve:  The aortic valve anatomy cannot be determined with reduced systolic excursion. There is calcification of the aortic valve leaflets. The peak transaortic velocity is 2.41 m/s, peak transaortic gradient is 23.2 mmHg and mean transaortic gradient is 12.0 mmHg with an LVOT/aortic valve VTI ratio of 0.45. Probable mild aortic stenosis. There is trace aortic regurgitation.     Mitral Valve:  Structurally normal mitral valve with normal leaflet excursion. There is calcification of the mitral valve annulus. There is mild mitral regurgitation.     Tricuspid Valve:  Structurally normal tricuspid valve with normal leaflet excursion. There is mild tricuspid regurgitation.     Pulmonic Valve:  Structurally normal pulmonic valve with normal leaflet excursion. There is trace pulmonic regurgitation.     Aorta:  The aortic root appears normal in size. The aortic root at the sinuses of Valsalva is normal in size, measuring 3.35 cm (indexed 1.59 cm/m²).     Pericardium:  No pericardial effusion seen.     Systemic Veins:  The inferior vena cava is dilated measuring 2.70 cm in diameter, (dilated >2.1cm) with abnormal inspiratory collapse (abnormal <50%) consistent with elevated right atrial pressure (~15, range 10-20mmHg).  ____________________________________________________________________  QUANTITATIVE DATA:  Left Ventricle Measurements: (Indexed to BSA)     IVSd (2D):   1.1 cm  LVPWd (2D):  1.0 cm  LVIDd (2D):  5.3 cm  LVIDs (2D):  3.6 cm  LV Mass:     202 g  96.1 g/m²  Visualized LV EF%: 45 to 50%     MV E Vmax:    1.15 m/s  MV A Vmax:    0.34 m/s  MV E/A:       3.34  e' lateral:   18.40 cm/s  e' medial:    9.25 cm/s  E/e' lateral: 6.25  E/e' medial:  12.43  E/e' Average: 8.32  MV DT:        165 msec    Aorta Measurements: (Normal range) (Indexed to BSA)     Sinuses of Valsalva: 3.35 cm (3.1 - 3.7 cm)       LeftAtrium Measurements: (Indexed to BSA)  LA Diam 2D:        4.50 cm  LA Vol s, MOD A4C: 98.20 ml.  LA Vol s, MOD A2C: 74.40 ml.  LA Vol s, MOD BP:  87.60 ml  41.61 ml/m²    Right Ventricle Measurements: Right Atrial Measurements:     TV Ramon. S':      9.79 cm/s    RA Vol:       122.00 ml  RV Base (RVID1): 5.1 cm       RA Vol Index: 57.95 ml/m²  RV Mid (RVID2):  3.6 cm       LVOT / RVOT/ Qp/Qs Data: (Indexed to BSA)  LVOT Vmax: 1.10 m/s  LVOT VTI:  18.90 cm    Aortic Valve Measurements:  AV Vmax:           2.4 m/s  AV Peak Gradient:       23.2 mmHg  AV Mean Gradient:       12.0 mmHg  AV VTI:                 42.1 cm  AV VTI Ratio:           0.45  AoV Dimensionless Index 0.45    Mitral Valve Measurements:     MV Vmax:      1.46 m/s  MV VTI, ramon   0.337 m  MV Mean Grad: 2.0 mmHg  MV Peak Grad: 8.5 mmHg  MV E Vmax:    1.2 m/s  MV A Vmax:    0.3 m/s  MV E/A:       3.3       Tricuspid Valve Measurements:     RA Pressure: 15 mmHg    ________________________________________________________________________________________  Electronically signed on 3/25/2024 at 5:10:18 PM by Binu Dash M.D.         *** Final ***

## 2024-03-30 NOTE — DIETITIAN INITIAL EVALUATION ADULT - REASON FOR ADMISSION
"88M with h/o COPD (home O2) and Afib (on Xarelto) and PPM who presents with fever and SOB. Pt found to have severe sepsis and Strept lutetiensis bacteremia. Pt transferred to [facility] for lead extraction"

## 2024-03-30 NOTE — PROGRESS NOTE ADULT - ASSESSMENT
88M with h/o COPD (home O2) and Afib (on Xarelto) and PPM who presents with fever and SOB. Pt found to have severe sepsis and Strept lutetiensis bacteremia. Pt transferred to [facility] for lead extraction.

## 2024-03-30 NOTE — PROGRESS NOTE ADULT - SUBJECTIVE AND OBJECTIVE BOX
SUBJECTIVE / OVERNIGHT EVENTS:  Today is hospital day 2d. There are no new issues or overnight events.   Did not endorse any headache, lightheadedness, vertigo, shortness of breathe, cough, chest pain, palpitations, tachycardia, abdominal pain, nausea, vomiting, diarrhea or constipation currently    HPI:  88M with h/o COPD (home Ox) and Afib (on Xarelto) and PPM who presents with fever and SOB. Pt found to have severe sepssi w/temp 104F rectal, tachycardia, elevated lactate with BCx +. 4/4 +strept lutetiensis - currently receiving ceftriaxone and pending CAREN and surveillance cultures.  Pt transferred to [facility] for lead extraction.  (28 Mar 2024 22:58)    MEDICATIONS  (STANDING):  amLODIPine   Tablet 5 milliGRAM(s) Oral daily  budesonide  80 MICROgram(s)/formoterol 4.5 MICROgram(s) Inhaler 2 Puff(s) Inhalation two times a day  cefTRIAXone   IVPB 2000 milliGRAM(s) IV Intermittent every 24 hours  chlorhexidine 2% Cloths 1 Application(s) Topical daily  ferrous    sulfate 325 milliGRAM(s) Oral daily  losartan 100 milliGRAM(s) Oral daily  metoprolol succinate ER 75 milliGRAM(s) Oral daily  rivaroxaban 20 milliGRAM(s) Oral with dinner  tamsulosin 0.8 milliGRAM(s) Oral at bedtime  tiotropium 2.5 MICROgram(s) Inhaler 2 Puff(s) Inhalation daily    MEDICATIONS  (PRN):  acetaminophen     Tablet .. 650 milliGRAM(s) Oral every 6 hours PRN Temp greater or equal to 38C (100.4F), Mild Pain (1 - 3)  albuterol    90 MICROgram(s) HFA Inhaler 2 Puff(s) Inhalation every 6 hours PRN Shortness of Breath and/or Wheezing  aluminum hydroxide/magnesium hydroxide/simethicone Suspension 30 milliLiter(s) Oral every 4 hours PRN Dyspepsia  melatonin 3 milliGRAM(s) Oral at bedtime PRN Insomnia  ondansetron Injectable 4 milliGRAM(s) IV Push every 8 hours PRN Nausea and/or Vomiting    HOME MEDICATIONS:  albuterol 90 mcg/inh inhalation aerosol: 2 puff(s) inhaled 4 times a day, As Needed  ascorbic acid 500 mg oral tablet: 1 tab(s) orally once a day  cefTRIAXone: 2 gram(s) intravenous once a day  docusate sodium 100 mg oral capsule: 1 cap(s) orally 3 times a day  ferrous sulfate 325 mg (65 mg elemental iron) oral tablet: 1 tab(s) orally once a day  Flomax 0.4 mg oral capsule: 2 cap(s) orally once a day (at bedtime)  ipratropium-albuterol 0.5 mg-2.5 mg/3 mL inhalation solution: 3 milliliter(s) inhaled every 6 hours as needed for SOB/wheezing  losartan 50 mg oral tablet: 2 tab(s) orally once a day losartan 100 mg once a day in the morning  mupirocin 2% topical ointment: Apply topically to affected area 2 times a day  Norvasc 5 mg oral tablet: 1 tab(s) orally once a day  Toprol-XL 25 mg oral tablet, extended release: 1 tab(s) orally once a day (at bedtime)  Toprol-XL 50 mg oral tablet, extended release: 1 tab(s) orally once a day  Trelegy Ellipta 100 mcg-62.5 mcg-25 mcg/inh inhalation powder: 1 puff(s) inhaled once a day  Xarelto 20 mg oral tablet: 1 tab(s) orally once a day    PHYSICAL EXAM  Vital Signs Last 24 Hrs  T(C): 36.7 (30 Mar 2024 11:21), Max: 36.7 (30 Mar 2024 11:21)  T(F): 98 (30 Mar 2024 11:21), Max: 98 (30 Mar 2024 11:21)  HR: 70 (30 Mar 2024 11:21) (69 - 70)  BP: 138/77 (30 Mar 2024 11:21) (126/76 - 154/79)  BP(mean): --  RR: 18 (30 Mar 2024 11:21) (18 - 18)  SpO2: 98% (30 Mar 2024 11:21) (96% - 98%)    Parameters below as of 30 Mar 2024 11:21  Patient On (Oxygen Delivery Method): nasal cannula  O2 Flow (L/min): 4      03-29-24 @ 07:01  -  03-30-24 @ 07:00  --------------------------------------------------------  IN: 240 mL / OUT: 900 mL / NET: -660 mL      CONSTITUTIONAL: Well-groomed, in no apparent distress;  EYES: No conjunctival or scleral injection, non-icteric;  ENMT: No external nasal lesions; Normal outer ears;  NECK: Trachea midline;  RESPIRATORY: Normal respiratory effort; Decreased breathe sounds bilaterally without wheeze/rhonchi/rales;  CARDIOVASCULAR: Regular rate and rhythm;  GASTROINTESTINAL: Non-distended; No palpable masses; No rebound/guarding;  EXTREMITIES:  No lower extremity edema;  NEUROLOGY: A+O to person, place, and time; Does respond to commands appropriately;  PSYCHIATRY: Mood and Affect appropriate    LABS:                        11.0   6.75  )-----------( 186      ( 30 Mar 2024 06:58 )             34.9     03-30    138  |  102  |  13  ----------------------------<  96  3.8   |  21<L>  |  0.99    Ca    8.9      30 Mar 2024 06:58  Phos  2.5     03-29  Mg     2.0     03-29    TPro  6.0  /  Alb  3.4  /  TBili  0.8  /  DBili  x   /  AST  21  /  ALT  27  /  AlkPhos  63  03-29          Urinalysis Basic - ( 30 Mar 2024 06:58 )    Color: x / Appearance: x / SG: x / pH: x  Gluc: 96 mg/dL / Ketone: x  / Bili: x / Urobili: x   Blood: x / Protein: x / Nitrite: x   Leuk Esterase: x / RBC: x / WBC x   Sq Epi: x / Non Sq Epi: x / Bacteria: x        SARS-CoV-2: NotDetec (22 Mar 2024 22:45)      RADIOLOGY & ADDITIONAL TESTS:  EKG    Xray Chest 1 View- PORTABLE-Routine:   ACC: [id number] EXAM:  XR CHEST PORTABLE ROUTINE 1V   ORDERED BY: SUHAS ROSAS     PROCEDURE DATE:  03/24/2024          INTERPRETATION:  CLINICAL INFORMATION: COPD    TIME OF EXAMINATION: March 24 at 12:40 PM    EXAM: Portable chest    FINDINGS:    Lungs show no focal consolidations.  Heart size and left-sided dual-lead pacemaker are unchanged.  Trace right effusion.  No pneumothorax.        COMPARISON: March 23 without change.        IMPRESSION: No localized pulmonary disease.    --- End of Report ---            JARAD TONY MD; Attending Radiologist  This document has been electronically signed. Mar 24 2024  1:53PM (03-24-24 @ 13:03)  CT Chest No Cont:   ACC: [id number] EXAM:  CT CHEST   ORDERED BY: ANIBAL ROSS     PROCEDURE DATE:  03/23/2024          INTERPRETATION:  CLINICAL INDICATION: Shortness of breath, eval pna    PROCEDURE: CT of the chest was performed without intravenous contrast.   Coronal and sagittal reconstruction images were obtained.    CONTRAST/COMPLICATIONS:  IV Contrast: None  Oral Contrast: None  Complications: None    COMPARISON: None.    FINDINGS: Evaluation of the thoracic organs/vasculature is limited   without intravenous contrast. Patient's respiratory motion degrades   images.    LUNGS AND AIRWAYS: Patent central airways. Mild emphysema. Question   interlobular septal thickening. Compressive atelectasis of the right >   left lung. Scattered calcified granulomata. Scattered bilateral pulmonary   nodules measuring up to 0.2-0.3 cm, for example, lingula (3:271) and   right upper lobe (3:196).  PLEURA: Small right > left pleural effusion.  HEART: Borderline heart size. Trace pericardial effusion.  VESSELS: Atherosclerosis. Normal caliber of the thoracic aorta. Main   pulmonary artery enlargement (3.8 cm), suggesting pulmonary arterial   hypertension.  MEDIASTINUM AND BLAZE: A 1.3 x 2.0 cm right paratracheal lymph node (2:54).  CHEST WALL AND LOWER NECK: Bilateral gynecomastia. Left-sided pacemaker.  UPPER ABDOMEN: Trace ascites. Question periportal edema. Fatty atrophy of   the visualized pancreas. Bilateral perinephric stranding.  BONES: Degenerative changes/paravertebral ossifications of the spine.   Mildanterior wedging of the spine, suggesting chronic.    IMPRESSION:    Small right > left pleural effusion with compressive atelectasis.   Question interlobular septal thickening. Recommend clinical correlation   to assess underlying infection.    Nonspecific pulmonary nodules. If the patient is at low risk for   malignancy, no further follow-up is needed. If the patient is at high   risk, 12 month follow-up CT chest may be obtained for further evaluation.    Additional findings as described.    --- End of Report ---            YNES SCHAEFFER MD; Attending Radiologist  This document has been electronically signed. Mar 23 2024  2:44AM (03-23-24 @ 02:25)  Xray Chest 1 View- PORTABLE-Urgent:   ACC: [id number] EXAM:  XR CHEST PORTABLE URGENT 1V   ORDERED BY: ANIBAL ROSS     PROCEDURE DATE:  03/23/2024          INTERPRETATION:  EXAMINATION: XR CHEST URGENT    CLINICAL INDICATION: Shortness of breath.    TECHNIQUE: Single frontal, portable view of the chest was obtained.    COMPARISON: None.    FINDINGS:  The cardiomediastinal silhouette is  not accurately assessed in this AP   projection. Left chest wall pacemaker.  Right basilar airspace opacities likely representing subsegmental   atelectasis.  There is no pneumothorax or pleural effusion.  No acute bony abnormality. Left humeral head suture anchors.    IMPRESSION:  Right basilar airspace opacities likely representing subsegmental   atelectasis. Left lung is clear.    --- End of Report ---          JARAD DUMONT MD; Resident Radiologist  This document has been electronically signed.  MAMTA MENJIVAR MD; Attending Radiologist  This document has been electronically signed. Mar 23 2024 11:03AM (03-23-24 @ 00:18)

## 2024-03-30 NOTE — DIETITIAN INITIAL EVALUATION ADULT - PROBLEM SELECTOR PLAN 10
Spoke with patient and his wife. Informed to keep current appt. VTE ppx: home xarelto on hold pending procedure. scd for now

## 2024-03-30 NOTE — DIETITIAN INITIAL EVALUATION ADULT - PROBLEM SELECTOR PLAN 1
- p/w: tachycardic, febrile, hypoxic, +lactate 4, +leukocytosis  - RVP covid, neg; legionella neg, mrsa neg, strep detected  - source: 4/4 strep lutetiensis, repeat blood cx drawn 3/25, no growth 24 hrs  - Abx: Zosyn (3/23 -3/25), ceftriaxone (3/25-), azithromycin (3/22);   -CT a/p 3/27 w/o abdominal source of infection  -EP consulted given concern for potential PPM lead infection, planning for PPM extraction at [facility], will be transferred  -ID consulted, recommending CAREN to r/o endocarditis  -Plan for CTX 2g x6 weeks pending above workup, however CTX not covered by pt's insurance so will need alternative.

## 2024-03-30 NOTE — PROGRESS NOTE ADULT - PROBLEM SELECTOR PLAN 1
- presented with tachycardia, fever, +lactate 4, +leukocytosis  - RVP covid, neg; legionella neg, mrsa neg, strep detected  -bld cx with strep lutetiensis, repeat blood cx drawn 3/25, no growth 24 hrs  - currently on Ceftriaxone (3/25-)  - CT a/p 3/27 w/o abdominal source of infection  - EP planning for PPM extraction 4/2. Hold xarelto on Sunday > SCDs  - seen by ID at [facility] ; recc'd  CAREN to r/o endocarditis  - f/u ID recs. Plan for CTX 2g x6 weeks pending above workup, however CTX not covered by pt's insurance so will need alternative.

## 2024-03-30 NOTE — PROGRESS NOTE ADULT - ASSESSMENT
88M PMH longstanding persistent Afib on Eliquis s/p SJM PPM, COPD on 3-3.5L NC at home admitted to [facility] for fever/chills/SOB; found to have streptococcus lutetiensis (4/4 bottles) bacteremia iso dental procedure. Evaluated by EP for pacemaker lead extraction. Currently patient asymptomatic. TTE showing mildly decreased EF 45-50%. Pt transferred to [facility] for lead extraction with Micra placement. Patient DID received rivaroxaban at 17:00 on 3/28 prior to transfer.     #strep lutetiensis bacteremia 4/4 bottles  #longstanding persistent AF w/ HB s/p PPM  #COPD on hold O2 3.5L    - tele here with AF- rates ~60-70s. Pt asymptomatic, on 4L O2  - Pt on Eliquis at home but switched to Xarelto d/t insurance issues. Pt received Xarelto 3/28/23 PM, procedure to be rescheduled to next week Tuesday.   - Pt is dependent, will plan for leadless PPM implant.   - Will plan for system extraction and leadless PPM implant on Tuesday with Dr. Quan. Can do CAREN on table on Tuesday.   - Please continue Xarelto inpatient; HOLD SUNDAY IN PREP FOR EXTRACTION TUESDAY  - Continue antibiotics per ID, currently on Ceftriaxone.- Keep on tele. Keep K>4, Mg>2    Device information:  Model: [employer] Assurity MRI [id number] #[id number] 10/13/22  RA lead (?capped) - Tendril SDX [id number] #[id number] implanted 5/31/2005  RV lead - [id number] #[id number] implanted 5/31/2005    Discussed with Dr. See

## 2024-03-31 LAB
ANION GAP SERPL CALC-SCNC: 14 MMOL/L — SIGNIFICANT CHANGE UP (ref 5–17)
BUN SERPL-MCNC: 16 MG/DL — SIGNIFICANT CHANGE UP (ref 7–23)
CALCIUM SERPL-MCNC: 8.6 MG/DL — SIGNIFICANT CHANGE UP (ref 8.4–10.5)
CHLORIDE SERPL-SCNC: 102 MMOL/L — SIGNIFICANT CHANGE UP (ref 96–108)
CO2 SERPL-SCNC: 19 MMOL/L — LOW (ref 22–31)
CREAT SERPL-MCNC: 0.98 MG/DL — SIGNIFICANT CHANGE UP (ref 0.5–1.3)
EGFR: 75 ML/MIN/1.73M2 — SIGNIFICANT CHANGE UP
GLUCOSE SERPL-MCNC: 93 MG/DL — SIGNIFICANT CHANGE UP (ref 70–99)
HCT VFR BLD CALC: 31.9 % — LOW (ref 39–50)
HGB BLD-MCNC: 10.1 G/DL — LOW (ref 13–17)
MCHC RBC-ENTMCNC: 31.7 GM/DL — LOW (ref 32–36)
MCHC RBC-ENTMCNC: 32.7 PG — SIGNIFICANT CHANGE UP (ref 27–34)
MCV RBC AUTO: 103.2 FL — HIGH (ref 80–100)
NRBC # BLD: 0 /100 WBCS — SIGNIFICANT CHANGE UP (ref 0–0)
PLATELET # BLD AUTO: 186 K/UL — SIGNIFICANT CHANGE UP (ref 150–400)
POTASSIUM SERPL-MCNC: 3.8 MMOL/L — SIGNIFICANT CHANGE UP (ref 3.5–5.3)
POTASSIUM SERPL-SCNC: 3.8 MMOL/L — SIGNIFICANT CHANGE UP (ref 3.5–5.3)
RBC # BLD: 3.09 M/UL — LOW (ref 4.2–5.8)
RBC # FLD: 13.3 % — SIGNIFICANT CHANGE UP (ref 10.3–14.5)
SODIUM SERPL-SCNC: 135 MMOL/L — SIGNIFICANT CHANGE UP (ref 135–145)
WBC # BLD: 6.9 K/UL — SIGNIFICANT CHANGE UP (ref 3.8–10.5)
WBC # FLD AUTO: 6.9 K/UL — SIGNIFICANT CHANGE UP (ref 3.8–10.5)

## 2024-03-31 PROCEDURE — 99232 SBSQ HOSP IP/OBS MODERATE 35: CPT

## 2024-03-31 RX ADMIN — BUDESONIDE AND FORMOTEROL FUMARATE DIHYDRATE 2 PUFF(S): 160; 4.5 AEROSOL RESPIRATORY (INHALATION) at 05:42

## 2024-03-31 RX ADMIN — TIOTROPIUM BROMIDE 2 PUFF(S): 18 CAPSULE ORAL; RESPIRATORY (INHALATION) at 13:27

## 2024-03-31 RX ADMIN — AMLODIPINE BESYLATE 5 MILLIGRAM(S): 2.5 TABLET ORAL at 05:42

## 2024-03-31 RX ADMIN — BUDESONIDE AND FORMOTEROL FUMARATE DIHYDRATE 2 PUFF(S): 160; 4.5 AEROSOL RESPIRATORY (INHALATION) at 17:56

## 2024-03-31 RX ADMIN — CEFTRIAXONE 100 MILLIGRAM(S): 500 INJECTION, POWDER, FOR SOLUTION INTRAMUSCULAR; INTRAVENOUS at 13:27

## 2024-03-31 RX ADMIN — CHLORHEXIDINE GLUCONATE 1 APPLICATION(S): 213 SOLUTION TOPICAL at 13:31

## 2024-03-31 RX ADMIN — LOSARTAN POTASSIUM 100 MILLIGRAM(S): 100 TABLET, FILM COATED ORAL at 05:42

## 2024-03-31 RX ADMIN — Medication 650 MILLIGRAM(S): at 04:40

## 2024-03-31 RX ADMIN — Medication 75 MILLIGRAM(S): at 05:42

## 2024-03-31 RX ADMIN — TAMSULOSIN HYDROCHLORIDE 0.8 MILLIGRAM(S): 0.4 CAPSULE ORAL at 21:27

## 2024-03-31 RX ADMIN — Medication 325 MILLIGRAM(S): at 13:27

## 2024-03-31 NOTE — PROGRESS NOTE ADULT - ASSESSMENT
87 y/o M w/ hx of Afib on Eliquis w/ PPM, COPD on 3-3.5L NC (on pred?) daily presenting to ED with chills and shortness of breath that started suddenly. He was feeling well until 3/23. He recently went for deep dental cleaning within the past month and has partial dentures as well. Patient was found to meet sepsis criteria on admission with fever and leukocytosis. His blood cultures grew Streptococcus lutetiensis in 4/4 bottles. Patient has PPM and also with known R hip replacement, has been able to ambulate with cane as usual. UA with pyuria and UCx with Enterobacter cloacae, however patient without urinary symptoms. TTE was unable to exclude IE.    Micro:  BCx (3/22): Streptococcus lutetiensis (4/4 bottles)  BCx (3/25): no growth  UCx (3/22): E. cloacae    Abx:  Cefepime (3/22)  Zosyn (3/23-3/25)  Ceftriaxone (3/23, 3/25-)  on - IV ceftriaxone 2g qD  Overall, high grade Streptococcus bacteremia, recent dental work, known PPM, R hip replacement, asymptomatic bacteruria also recently had exploration of PPM pocket at [facility]     Suggest:    TTE reviewed  no definitive endocarditis but not definitive   Follow up BC are negative   no symptoms of UTI  for extraction next week   will need line to complete at least  4 weeks of ab   CAREN on hold for now     needs picc or midline   This organism is associated with colon pathology and he has a hs of polyp- needs GI input      Brandy Quan M.D. ,   please reach via teams   If no answer, or after 5PM/ weekends,  then please call  [phone]    Assessment and plan discussed with the primary team .

## 2024-03-31 NOTE — PROGRESS NOTE ADULT - PROBLEM SELECTOR PLAN 1
- presented with tachycardia, fever, +lactate 4, +leukocytosis  - RVP covid, neg; legionella neg, mrsa neg, strep detected  -bld cx with strep lutetiensis, repeat blood cx drawn 3/25, no growth 24 hrs  - currently on Ceftriaxone (3/25-)  - CT a/p 3/27 w/o abdominal source of infection  - EP planning for PPM extraction 4/2. Hold xarelto on Sunday > SCDs  - seen by ID at [facility] ; recc'd  CAREN to r/o endocarditis  - f/u ID recs. Plan for CTX 2g x6 weeks pending above workup, however CTX not covered by pt's insurance so will need alternative.

## 2024-03-31 NOTE — PROGRESS NOTE ADULT - SUBJECTIVE AND OBJECTIVE BOX
SUBJECTIVE / OVERNIGHT EVENTS:  Today is hospital day 3d. There are no new issues or overnight events.   Did not endorse any headache, lightheadedness, vertigo, shortness of breathe, cough, chest pain, palpitations, tachycardia, abdominal pain, nausea, vomiting, diarrhea or constipation currently    HPI:  88M with h/o COPD (home Ox) and Afib (on Xarelto) and PPM who presents with fever and SOB. Pt found to have severe sepssi w/temp 104F rectal, tachycardia, elevated lactate with BCx +. 4/4 +strept lutetiensis - currently receiving ceftriaxone and pending CAREN and surveillance cultures.  Pt transferred to [facility] for lead extraction.  (28 Mar 2024 22:58)    MEDICATIONS  (STANDING):  amLODIPine   Tablet 5 milliGRAM(s) Oral daily  budesonide  80 MICROgram(s)/formoterol 4.5 MICROgram(s) Inhaler 2 Puff(s) Inhalation two times a day  cefTRIAXone   IVPB 2000 milliGRAM(s) IV Intermittent every 24 hours  chlorhexidine 2% Cloths 1 Application(s) Topical daily  ferrous    sulfate 325 milliGRAM(s) Oral daily  losartan 100 milliGRAM(s) Oral daily  metoprolol succinate ER 75 milliGRAM(s) Oral daily  tamsulosin 0.8 milliGRAM(s) Oral at bedtime  tiotropium 2.5 MICROgram(s) Inhaler 2 Puff(s) Inhalation daily    MEDICATIONS  (PRN):  acetaminophen     Tablet .. 650 milliGRAM(s) Oral every 6 hours PRN Temp greater or equal to 38C (100.4F), Mild Pain (1 - 3)  albuterol    90 MICROgram(s) HFA Inhaler 2 Puff(s) Inhalation every 6 hours PRN Shortness of Breath and/or Wheezing  aluminum hydroxide/magnesium hydroxide/simethicone Suspension 30 milliLiter(s) Oral every 4 hours PRN Dyspepsia  melatonin 3 milliGRAM(s) Oral at bedtime PRN Insomnia  ondansetron Injectable 4 milliGRAM(s) IV Push every 8 hours PRN Nausea and/or Vomiting    HOME MEDICATIONS:  albuterol 90 mcg/inh inhalation aerosol: 2 puff(s) inhaled 4 times a day, As Needed  ascorbic acid 500 mg oral tablet: 1 tab(s) orally once a day  cefTRIAXone: 2 gram(s) intravenous once a day  docusate sodium 100 mg oral capsule: 1 cap(s) orally 3 times a day  ferrous sulfate 325 mg (65 mg elemental iron) oral tablet: 1 tab(s) orally once a day  Flomax 0.4 mg oral capsule: 2 cap(s) orally once a day (at bedtime)  ipratropium-albuterol 0.5 mg-2.5 mg/3 mL inhalation solution: 3 milliliter(s) inhaled every 6 hours as needed for SOB/wheezing  losartan 50 mg oral tablet: 2 tab(s) orally once a day losartan 100 mg once a day in the morning  mupirocin 2% topical ointment: Apply topically to affected area 2 times a day  Norvasc 5 mg oral tablet: 1 tab(s) orally once a day  Toprol-XL 25 mg oral tablet, extended release: 1 tab(s) orally once a day (at bedtime)  Toprol-XL 50 mg oral tablet, extended release: 1 tab(s) orally once a day  Trelegy Ellipta 100 mcg-62.5 mcg-25 mcg/inh inhalation powder: 1 puff(s) inhaled once a day  Xarelto 20 mg oral tablet: 1 tab(s) orally once a day    PHYSICAL EXAM  Vital Signs Last 24 Hrs  T(C): 36.6 (31 Mar 2024 04:42), Max: 36.7 (30 Mar 2024 11:21)  T(F): 97.9 (31 Mar 2024 04:42), Max: 98 (30 Mar 2024 11:21)  HR: 74 (31 Mar 2024 04:42) (70 - 74)  BP: 138/71 (31 Mar 2024 04:42) (117/68 - 138/77)  BP(mean): --  RR: 18 (31 Mar 2024 04:42) (18 - 18)  SpO2: 98% (31 Mar 2024 04:42) (97% - 98%)    Parameters below as of 31 Mar 2024 04:42  Patient On (Oxygen Delivery Method): nasal cannula  O2 Flow (L/min): 4      03-30-24 @ 07:01  -  03-31-24 @ 07:00  --------------------------------------------------------  IN: 480 mL / OUT: 1050 mL / NET: -570 mL      CONSTITUTIONAL: Well-groomed, in no apparent distress;  EYES: No conjunctival or scleral injection, non-icteric;  ENMT: No external nasal lesions; Normal outer ears;  NECK: Trachea midline;  RESPIRATORY: Normal respiratory effort; Decreased breathe sounds bilaterally without wheeze/rhonchi/rales;  CARDIOVASCULAR: Regular rate and rhythm;  GASTROINTESTINAL: Non-distended; No palpable masses; No rebound/guarding;  EXTREMITIES:  No lower extremity edema;  NEUROLOGY: A+O to person, place, and time; Does respond to commands appropriately;  PSYCHIATRY: Mood and Affect appropriate    LABS:                        10.1   6.90  )-----------( 186      ( 31 Mar 2024 07:34 )             31.9     03-31    135  |  102  |  16  ----------------------------<  93  3.8   |  19<L>  |  0.98    Ca    8.6      31 Mar 2024 07:32            Urinalysis Basic - ( 31 Mar 2024 07:32 )    Color: x / Appearance: x / SG: x / pH: x  Gluc: 93 mg/dL / Ketone: x  / Bili: x / Urobili: x   Blood: x / Protein: x / Nitrite: x   Leuk Esterase: x / RBC: x / WBC x   Sq Epi: x / Non Sq Epi: x / Bacteria: x        SARS-CoV-2: NotDetec (22 Mar 2024 22:45)      RADIOLOGY & ADDITIONAL TESTS:  EKG    Xray Chest 1 View- PORTABLE-Routine:   ACC: [id number] EXAM:  XR CHEST PORTABLE ROUTINE 1V   ORDERED BY: SUHAS ROSAS     PROCEDURE DATE:  03/24/2024          INTERPRETATION:  CLINICAL INFORMATION: COPD    TIME OF EXAMINATION: March 24 at 12:40 PM    EXAM: Portable chest    FINDINGS:    Lungs show no focal consolidations.  Heart size and left-sided dual-lead pacemaker are unchanged.  Trace right effusion.  No pneumothorax.        COMPARISON: March 23 without change.        IMPRESSION: No localized pulmonary disease.    --- End of Report ---            JARAD TONY MD; Attending Radiologist  This document has been electronically signed. Mar 24 2024  1:53PM (03-24-24 @ 13:03)  CT Chest No Cont:   ACC: [id number] EXAM:  CT CHEST   ORDERED BY: ANIBAL ROSS     PROCEDURE DATE:  03/23/2024          INTERPRETATION:  CLINICAL INDICATION: Shortness of breath, eval pna    PROCEDURE: CT of the chest was performed without intravenous contrast.   Coronal and sagittal reconstruction images were obtained.    CONTRAST/COMPLICATIONS:  IV Contrast: None  Oral Contrast: None  Complications: None    COMPARISON: None.    FINDINGS: Evaluation of the thoracic organs/vasculature is limited   without intravenous contrast. Patient's respiratory motion degrades   images.    LUNGS AND AIRWAYS: Patent central airways. Mild emphysema. Question   interlobular septal thickening. Compressive atelectasis of the right >   left lung. Scattered calcified granulomata. Scattered bilateral pulmonary   nodules measuring up to 0.2-0.3 cm, for example, lingula (3:271) and   right upper lobe (3:196).  PLEURA: Small right > left pleural effusion.  HEART: Borderline heart size. Trace pericardial effusion.  VESSELS: Atherosclerosis. Normal caliber of the thoracic aorta. Main   pulmonary artery enlargement (3.8 cm), suggesting pulmonary arterial   hypertension.  MEDIASTINUM AND BLAZE: A 1.3 x 2.0 cm right paratracheal lymph node (2:54).  CHEST WALL AND LOWER NECK: Bilateral gynecomastia. Left-sided pacemaker.  UPPER ABDOMEN: Trace ascites. Question periportal edema. Fatty atrophy of   the visualized pancreas. Bilateral perinephric stranding.  BONES: Degenerative changes/paravertebral ossifications of the spine.   Mildanterior wedging of the spine, suggesting chronic.    IMPRESSION:    Small right > left pleural effusion with compressive atelectasis.   Question interlobular septal thickening. Recommend clinical correlation   to assess underlying infection.    Nonspecific pulmonary nodules. If the patient is at low risk for   malignancy, no further follow-up is needed. If the patient is at high   risk, 12 month follow-up CT chest may be obtained for further evaluation.    Additional findings as described.    --- End of Report ---            YNES SCHAEFFER MD; Attending Radiologist  This document has been electronically signed. Mar 23 2024  2:44AM (03-23-24 @ 02:25)  Xray Chest 1 View- PORTABLE-Urgent:   ACC: [id number] EXAM:  XR CHEST PORTABLE URGENT 1V   ORDERED BY: ANIBAL ROSS     PROCEDURE DATE:  03/23/2024          INTERPRETATION:  EXAMINATION: XR CHEST URGENT    CLINICAL INDICATION: Shortness of breath.    TECHNIQUE: Single frontal, portable view of the chest was obtained.    COMPARISON: None.    FINDINGS:  The cardiomediastinal silhouette is  not accurately assessed in this AP   projection. Left chest wall pacemaker.  Right basilar airspace opacities likely representing subsegmental   atelectasis.  There is no pneumothorax or pleural effusion.  No acute bony abnormality. Left humeral head suture anchors.    IMPRESSION:  Right basilar airspace opacities likely representing subsegmental   atelectasis. Left lung is clear.    --- End of Report ---          JARAD DUMONT MD; Resident Radiologist  This document has been electronically signed.  MAMTA MENJIVAR MD; Attending Radiologist  This document has been electronically signed. Mar 23 2024 11:03AM (03-23-24 @ 00:18)

## 2024-03-31 NOTE — PROGRESS NOTE ADULT - SUBJECTIVE AND OBJECTIVE BOX
Patient is a 88y old  Male who presents with a chief complaint of "88M with h/o COPD (home O2) and Afib (on Xarelto) and PPM who presents with fever and SOB. Pt found to have severe sepsis and Strept lutetiensis bacteremia. 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      Persistent atrial fibrillation      Pacemaker  [employer] Model [id number]  Serial [id number]      Idiopathic pulmonary fibrosis      Polyp of colon, unspecified part of colon, unspecified type      Benign prostatic hyperplasia with nocturia      Dyspnea on exertion      Oxygen desaturation during sleep  Use oxygen at night, nasal cannula      Essential hypertension      H/O amputation  age 19, R great toe, due to growth "tumor"      H/O arthroscopic knee surgery  Both knees, 2013      H/O cardiac pacemaker  2005      H/O repair of rotator cuff  2012        Allergies    No Known Allergies    Intolerances      Antimicrobials:    cefTRIAXone   IVPB 2000 milliGRAM(s) IV Intermittent every 24 hours    MEDICATIONS  (STANDING):  amLODIPine   Tablet 5 milliGRAM(s) Oral daily  budesonide  80 MICROgram(s)/formoterol 4.5 MICROgram(s) Inhaler 2 Puff(s) Inhalation two times a day  cefTRIAXone   IVPB 2000 milliGRAM(s) IV Intermittent every 24 hours  chlorhexidine 2% Cloths 1 Application(s) Topical daily  ferrous    sulfate 325 milliGRAM(s) Oral daily  losartan 100 milliGRAM(s) Oral daily  metoprolol succinate ER 75 milliGRAM(s) Oral daily  tamsulosin 0.8 milliGRAM(s) Oral at bedtime  tiotropium 2.5 MICROgram(s) Inhaler 2 Puff(s) Inhalation daily      Vital Signs Last 24 Hrs  T(C): 36.8 (03-31-24 @ 11:23), Max: 36.8 (03-31-24 @ 11:23)  T(F): 98.2 (03-31-24 @ 11:23), Max: 98.2 (03-31-24 @ 11:23)  HR: 70 (03-31-24 @ 11:23) (70 - 74)  BP: 126/70 (03-31-24 @ 11:23) (117/68 - 138/71)  BP(mean): --  RR: 18 (03-31-24 @ 11:23) (18 - 18)  SpO2: 97% (03-31-24 @ 11:23) (97% - 98%)    Physical Exam:    Constitutional well preserved,comfortable,pleasant    HEENT PERRLA EOMI,No pallor or icterus    No oral exudate or erythema    Neck supple no JVD or LN    Chest Good AE,CTA    CVS RRR S1 S2 WNl No murmur or rub or gallop    Abd soft BS normal No tenderness no masses    Ext No cyanosis clubbing or edema    IV site no erythema tenderness or discharge    Joints no swelling or LOM    CNS AAO X 3 no focal    Lab Data:                          10.1   6.90  )-----------( 186      ( 31 Mar 2024 07:34 )             31.9       03-31    135  |  102  |  16  ----------------------------<  93  3.8   |  19<L>  |  0.98    Ca    8.6      31 Mar 2024 07:32        Urinalysis Basic - ( 31 Mar 2024 07:32 )    Color: x / Appearance: x / SG: x / pH: x  Gluc: 93 mg/dL / Ketone: x  / Bili: x / Urobili: x   Blood: x / Protein: x / Nitrite: x   Leuk Esterase: x / RBC: x / WBC x   Sq Epi: x / Non Sq Epi: x / Bacteria: x        .Sputum Sputum  03-25-24   Normal Respiratory Merlyn present  --    Rare polymorphonuclear leukocytes per low power field  Rare Squamous epithelial cells per low power field  No organisms seen per oil power field      .Blood Blood-Peripheral  03-25-24   No growth at 5 days  --  --      .Blood Blood-Venous  03-25-24   No growth at 5 days  --  --                    WBC Count: 6.90 (03-31-24 @ 07:34)  WBC Count: 6.75 (03-30-24 @ 06:58)  WBC Count: 8.69 (03-29-24 @ 07:03)  WBC Count: 8.55 (03-28-24 @ 07:42)  WBC Count: 7.93 (03-27-24 @ 06:05)  WBC Count: 8.67 (03-26-24 @ 05:05)  WBC Count: 11.63 (03-25-24 @ 05:30)       Bilirubin Total: 0.8 mg/dL (03-29-24 @ 07:03)  Aspartate Aminotransferase (AST/SGOT): 21 U/L (03-29-24 @ 07:03)  Alanine Aminotransferase (ALT/SGPT): 27 U/L (03-29-24 @ 07:03)  Alkaline Phosphatase: 63 U/L (03-29-24 @ 07:03)  Bilirubin Total: 1.0 mg/dL (03-28-24 @ 07:42)  Aspartate Aminotransferase (AST/SGOT): 19 U/L (03-28-24 @ 07:42)  Alanine Aminotransferase (ALT/SGPT): 24 U/L (03-28-24 @ 07:42)  Alkaline Phosphatase: 55 U/L (03-28-24 @ 07:42)  Bilirubin Total: 1.2 mg/dL (03-27-24 @ 06:05)  Aspartate Aminotransferase (AST/SGOT): 24 U/L (03-27-24 @ 06:05)  Alanine Aminotransferase (ALT/SGPT): 27 U/L (03-27-24 @ 06:05)  Alkaline Phosphatase: 55 U/L (03-27-24 @ 06:05)         Patient is a 88y old  Male who presents with a chief complaint of "88M with h/o COPD (home O2) and Afib (on Xarelto) and PPM who presents with fever and SOB. Pt found to have severe sepsis and Strept lutetiensis bacteremia. 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      Persistent atrial fibrillation      Pacemaker  [employer] Model [id number]  Serial [id number]      Idiopathic pulmonary fibrosis      Polyp of colon, unspecified part of colon, unspecified type      Benign prostatic hyperplasia with nocturia      Dyspnea on exertion      Oxygen desaturation during sleep  Use oxygen at night, nasal cannula      Essential hypertension      H/O amputation  age 19, R great toe, due to growth "tumor"      H/O arthroscopic knee surgery  Both knees, 2013      H/O cardiac pacemaker  2005      H/O repair of rotator cuff  2012        Allergies    No Known Allergies    Intolerances      Antimicrobials:    cefTRIAXone   IVPB 2000 milliGRAM(s) IV Intermittent every 24 hours    MEDICATIONS  (STANDING):  amLODIPine   Tablet 5 milliGRAM(s) Oral daily  budesonide  80 MICROgram(s)/formoterol 4.5 MICROgram(s) Inhaler 2 Puff(s) Inhalation two times a day  cefTRIAXone   IVPB 2000 milliGRAM(s) IV Intermittent every 24 hours  chlorhexidine 2% Cloths 1 Application(s) Topical daily  ferrous    sulfate 325 milliGRAM(s) Oral daily  losartan 100 milliGRAM(s) Oral daily  metoprolol succinate ER 75 milliGRAM(s) Oral daily  tamsulosin 0.8 milliGRAM(s) Oral at bedtime  tiotropium 2.5 MICROgram(s) Inhaler 2 Puff(s) Inhalation daily      Vital Signs Last 24 Hrs  T(C): 36.8 (03-31-24 @ 11:23), Max: 36.8 (03-31-24 @ 11:23)  T(F): 98.2 (03-31-24 @ 11:23), Max: 98.2 (03-31-24 @ 11:23)  HR: 70 (03-31-24 @ 11:23) (70 - 74)  BP: 126/70 (03-31-24 @ 11:23) (117/68 - 138/71)  BP(mean): --  RR: 18 (03-31-24 @ 11:23) (18 - 18)  SpO2: 97% (03-31-24 @ 11:23) (97% - 98%)    Physical Exam:    Constitutional well preserved,comfortable,pleasant    HEENT PERRLA EOMI,No pallor or icterus    poor dentition    Neck supple no JVD or LN    Chest Good AE,CTA    CVS  S1 S2   PPM site without erythema    Abd soft BS normal No tenderness    Ext No cyanosis clubbing or edema    IV site no erythema tenderness or discharge    Joints no swelling or LOM    CNS AAO X 3 no focal    Lab Data:                          10.1   6.90  )-----------( 186      ( 31 Mar 2024 07:34 )             31.9       03-31    135  |  102  |  16  ----------------------------<  93  3.8   |  19<L>  |  0.98    Ca    8.6      31 Mar 2024 07:32            .Sputum Sputum  03-25-24   Normal Respiratory Merlyn present  --    Rare polymorphonuclear leukocytes per low power field  Rare Squamous epithelial cells per low power field  No organisms seen per oil power field      .Blood Blood-Peripheral  03-25-24   No growth at 5 days  --  --      .Blood Blood-Venous  03-25-24   No growth at 5 days  --  --        Culture - Blood (03.22.24 @ 23:00)    -  Vancomycin: S 0.5   -  Penicillin: S 0.12   -  Ceftriaxone: S <=0.25   Gram Stain:   Growth in anaerobic bottle: Gram positive cocci in pairs  Growth in aerobic bottle: Gram positive cocci in pairs   -  Streptococcus sp. (Not Grp A, B or S pneumoniae): Detec   Specimen Source: .Blood Blood-Peripheral   Organism: Blood Culture PCR   Organism: Streptococcus lutetiensis   Culture Results:   Growth in aerobic and anaerobic bottles: Streptococcus lutetiensis  Direct identification is available within approximately 3-5  hours either by Blood Panel Multiplexed PCR or Direct  MALDI-TOF. Details: [email or web address]   Organism Identification: Blood Culture PCR  Streptococcus lutetiensis   Method Type: PCR   Method Type: DASHA                WBC Count: 6.90 (03-31-24 @ 07:34)  WBC Count: 6.75 (03-30-24 @ 06:58)  WBC Count: 8.69 (03-29-24 @ 07:03)  WBC Count: 8.55 (03-28-24 @ 07:42)  WBC Count: 7.93 (03-27-24 @ 06:05)  WBC Count: 8.67 (03-26-24 @ 05:05)  WBC Count: 11.63 (03-25-24 @ 05:30)       Bilirubin Total: 0.8 mg/dL (03-29-24 @ 07:03)  Aspartate Aminotransferase (AST/SGOT): 21 U/L (03-29-24 @ 07:03)  Alanine Aminotransferase (ALT/SGPT): 27 U/L (03-29-24 @ 07:03)  Alkaline Phosphatase: 63 U/L (03-29-24 @ 07:03)  Bilirubin Total: 1.0 mg/dL (03-28-24 @ 07:42)  Aspartate Aminotransferase (AST/SGOT): 19 U/L (03-28-24 @ 07:42)  Alanine Aminotransferase (ALT/SGPT): 24 U/L (03-28-24 @ 07:42)  Alkaline Phosphatase: 55 U/L (03-28-24 @ 07:42)  Bilirubin Total: 1.2 mg/dL (03-27-24 @ 06:05)  Aspartate Aminotransferase (AST/SGOT): 24 U/L (03-27-24 @ 06:05)  Alanine Aminotransferase (ALT/SGPT): 27 U/L (03-27-24 @ 06:05)  Alkaline Phosphatase: 55 U/L (03-27-24 @ 06:05)      < from: CT Abdomen and Pelvis w/ IV Cont (03.27.24 @ 13:43) >    ACC: [id number] EXAM:  CT ABDOMEN AND PELVIS IC   ORDERED BY: ANNIE SAENZ     PROCEDURE DATE:  03/27/2024          INTERPRETATION:  CLINICAL INFORMATION: Fever. Evaluate for infectious   source.    COMPARISON: CT chest 3/23/2024.    CONTRAST/COMPLICATIONS:  IV Contrast: Omnipaque 350  95 cc administered   5 cc discarded  Oral Contrast: NONE  Complications: None reported at time of study completion    PROCEDURE:  CT of the Abdomen and Pelvis was performed.  Sagittal and coronal reformats were performed.    FINDINGS:  LOWER CHEST: Bilateral pleural effusions, right greater than left,   unchanged from prior chest CT. Partially visualized cardiac device leads.   Cardiomegaly.    LIVER: Indeterminate 1 cm hypervascular focus in the left hepatic lobe.  BILE DUCTS: Normal caliber.  GALLBLADDER: Within normal limits.  SPLEEN: Within normal limits.  PANCREAS: Scattered small pancreatic cysts measuring up to 1.0 cm. No   pancreatic ductal dilatation.  ADRENALS: Within normal limits.  KIDNEYS/URETERS: No hydronephrosis. Bilateral renal cysts and bilateral   subcentimeter hypodensities too small to characterize. Indeterminant 1.0   cm exophytic left lower pole lesion (2, 62).    BLADDER: Within normal limits.  REPRODUCTIVE ORGANS: Prostate is enlarged.    BOWEL: No bowel obstruction. Appendix is normal. Colonic diverticulosis   without evidence of acute diverticulitis. 1.1 cm polypoid   hyperattenuating focus in the mid transverse colon (2, 63)  PERITONEUM: No ascites.  VESSELS: Atherosclerotic changes.  RETROPERITONEUM/LYMPH NODES: No lymphadenopathy.  ABDOMINAL WALL: Tiny fat-containing umbilical hernia.  BONES: Degenerative changes. Right hip arthroplasty.    IMPRESSION:  No intra-abdominal source of infection identified.    Small indeterminate lesions in the liver and left kidney.    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    1.1 cm polypoid focus in the mid transverse colon.    --- End of Report ---            JUANI GALARZA MD; Attending Radiologist  This document has been electronically signed. Mar 27 2024  2:16PM    < end of copied text >

## 2024-03-31 NOTE — PROGRESS NOTE ADULT - ASSESSMENT
88M with h/o COPD (home O2) and Afib (on Xarelto) and PPM who presents with fever and SOB. Pt found to have severe sepsis and Strept lutetiensis bacteremia. Pt transferred to [facility] for lead extraction.

## 2024-04-01 LAB
BLD GP AB SCN SERPL QL: NEGATIVE — SIGNIFICANT CHANGE UP
HCT VFR BLD CALC: 30.4 % — LOW (ref 39–50)
HGB BLD-MCNC: 10 G/DL — LOW (ref 13–17)
MCHC RBC-ENTMCNC: 32.9 GM/DL — SIGNIFICANT CHANGE UP (ref 32–36)
MCHC RBC-ENTMCNC: 34 PG — SIGNIFICANT CHANGE UP (ref 27–34)
MCV RBC AUTO: 103.4 FL — HIGH (ref 80–100)
MRSA PCR RESULT.: SIGNIFICANT CHANGE UP
NRBC # BLD: 0 /100 WBCS — SIGNIFICANT CHANGE UP (ref 0–0)
PLATELET # BLD AUTO: 174 K/UL — SIGNIFICANT CHANGE UP (ref 150–400)
RBC # BLD: 2.94 M/UL — LOW (ref 4.2–5.8)
RBC # FLD: 13.3 % — SIGNIFICANT CHANGE UP (ref 10.3–14.5)
RH IG SCN BLD-IMP: NEGATIVE — SIGNIFICANT CHANGE UP
S AUREUS DNA NOSE QL NAA+PROBE: SIGNIFICANT CHANGE UP
WBC # BLD: 6.7 K/UL — SIGNIFICANT CHANGE UP (ref 3.8–10.5)
WBC # FLD AUTO: 6.7 K/UL — SIGNIFICANT CHANGE UP (ref 3.8–10.5)

## 2024-04-01 PROCEDURE — 99232 SBSQ HOSP IP/OBS MODERATE 35: CPT

## 2024-04-01 RX ORDER — ACETAMINOPHEN 500 MG
650 TABLET ORAL EVERY 6 HOURS
Refills: 0 | Status: DISCONTINUED | OUTPATIENT
Start: 2024-04-01 | End: 2024-04-02

## 2024-04-01 RX ADMIN — BUDESONIDE AND FORMOTEROL FUMARATE DIHYDRATE 2 PUFF(S): 160; 4.5 AEROSOL RESPIRATORY (INHALATION) at 17:15

## 2024-04-01 RX ADMIN — Medication 650 MILLIGRAM(S): at 19:30

## 2024-04-01 RX ADMIN — Medication 325 MILLIGRAM(S): at 13:52

## 2024-04-01 RX ADMIN — TIOTROPIUM BROMIDE 2 PUFF(S): 18 CAPSULE ORAL; RESPIRATORY (INHALATION) at 13:52

## 2024-04-01 RX ADMIN — Medication 650 MILLIGRAM(S): at 06:41

## 2024-04-01 RX ADMIN — CHLORHEXIDINE GLUCONATE 1 APPLICATION(S): 213 SOLUTION TOPICAL at 13:52

## 2024-04-01 RX ADMIN — TAMSULOSIN HYDROCHLORIDE 0.8 MILLIGRAM(S): 0.4 CAPSULE ORAL at 21:16

## 2024-04-01 RX ADMIN — Medication 650 MILLIGRAM(S): at 00:36

## 2024-04-01 RX ADMIN — Medication 75 MILLIGRAM(S): at 05:20

## 2024-04-01 RX ADMIN — Medication 3 MILLIGRAM(S): at 21:20

## 2024-04-01 RX ADMIN — Medication 650 MILLIGRAM(S): at 20:30

## 2024-04-01 RX ADMIN — CEFTRIAXONE 100 MILLIGRAM(S): 500 INJECTION, POWDER, FOR SOLUTION INTRAMUSCULAR; INTRAVENOUS at 13:48

## 2024-04-01 RX ADMIN — LOSARTAN POTASSIUM 100 MILLIGRAM(S): 100 TABLET, FILM COATED ORAL at 05:16

## 2024-04-01 RX ADMIN — BUDESONIDE AND FORMOTEROL FUMARATE DIHYDRATE 2 PUFF(S): 160; 4.5 AEROSOL RESPIRATORY (INHALATION) at 05:16

## 2024-04-01 RX ADMIN — Medication 650 MILLIGRAM(S): at 05:21

## 2024-04-01 RX ADMIN — Medication 650 MILLIGRAM(S): at 01:45

## 2024-04-01 RX ADMIN — AMLODIPINE BESYLATE 5 MILLIGRAM(S): 2.5 TABLET ORAL at 05:15

## 2024-04-01 NOTE — PROGRESS NOTE ADULT - TIME BILLING
- Ordering, reviewing, and/or interpreting labs, testing, and/or imaging  - Independently obtaining a review of systems and performing a physical exam  - Reviewing prior records and where necessary, outpatient records  - Counselling and educating patient and/or family regarding interpretation of aforementioned items and plan of care

## 2024-04-01 NOTE — PHYSICAL THERAPY INITIAL EVALUATION ADULT - GAIT DEVIATIONS NOTED, PT EVAL
decreased sailaja/increased time in double stance/decreased step length/decreased stride length/decreased weight-shifting ability

## 2024-04-01 NOTE — PROGRESS NOTE ADULT - SUBJECTIVE AND OBJECTIVE BOX
infectious diseases progress note:    Patient is a 88y old  Male who presents with a chief complaint of "88M with h/o COPD (home O2) and Afib (on Xarelto) and PPM who presents with fever and SOB. Pt found to have severe sepsis and Strept lutetiensis bacteremia. Pt transferred to [facility] for lead extraction"     (30 Mar 2024 12:08)        Encounter for checking or testing of cardiac pacemaker pulse generator               Allergies    No Known Allergies    Intolerances        ANTIBIOTICS/RELEVANT:  antimicrobials  cefTRIAXone   IVPB 2000 milliGRAM(s) IV Intermittent every 24 hours    immunologic:    OTHER:  acetaminophen     Tablet .. 650 milliGRAM(s) Oral every 6 hours PRN  albuterol    90 MICROgram(s) HFA Inhaler 2 Puff(s) Inhalation every 6 hours PRN  aluminum hydroxide/magnesium hydroxide/simethicone Suspension 30 milliLiter(s) Oral every 4 hours PRN  amLODIPine   Tablet 5 milliGRAM(s) Oral daily  budesonide  80 MICROgram(s)/formoterol 4.5 MICROgram(s) Inhaler 2 Puff(s) Inhalation two times a day  chlorhexidine 2% Cloths 1 Application(s) Topical daily  ferrous    sulfate 325 milliGRAM(s) Oral daily  losartan 100 milliGRAM(s) Oral daily  melatonin 3 milliGRAM(s) Oral at bedtime PRN  metoprolol succinate ER 75 milliGRAM(s) Oral daily  ondansetron Injectable 4 milliGRAM(s) IV Push every 8 hours PRN  tamsulosin 0.8 milliGRAM(s) Oral at bedtime  tiotropium 2.5 MICROgram(s) Inhaler 2 Puff(s) Inhalation daily      Objective:  Vital Signs Last 24 Hrs  T(C): 36.7 (01 Apr 2024 05:21), Max: 36.8 (31 Mar 2024 11:23)  T(F): 98.1 (01 Apr 2024 05:21), Max: 98.2 (31 Mar 2024 11:23)  HR: 72 (01 Apr 2024 05:21) (69 - 72)  BP: 116/70 (01 Apr 2024 05:21) (116/70 - 126/70)  BP(mean): --  RR: 18 (01 Apr 2024 05:21) (18 - 18)  SpO2: 96% (01 Apr 2024 05:21) (96% - 97%)    Parameters below as of 01 Apr 2024 05:21  Patient On (Oxygen Delivery Method): nasal cannula  O2 Flow (L/min): 4         Eyes:JOSE E, EOMI  Ear/Nose/Throat: no oral lesion, no sinus tenderness on percussion	  Neck:no JVD, no lymphadenopathy, supple  Respiratory: CTA amy  Cardiovascular: S1S2 RRR, no murmurs  Gastrointestinal:soft, (+) BS, no HSM  Extremities:no e/e/c        LABS:                        10.0   6.70  )-----------( 174      ( 01 Apr 2024 07:24 )             30.4     03-31    135  |  102  |  16  ----------------------------<  93  3.8   |  19<L>  |  0.98    Ca    8.6      31 Mar 2024 07:32        Urinalysis Basic - ( 31 Mar 2024 07:32 )    Color: x / Appearance: x / SG: x / pH: x  Gluc: 93 mg/dL / Ketone: x  / Bili: x / Urobili: x   Blood: x / Protein: x / Nitrite: x   Leuk Esterase: x / RBC: x / WBC x   Sq Epi: x / Non Sq Epi: x / Bacteria: x          MICROBIOLOGY:    RECENT CULTURES:  03-25 @ 18:22 .Sputum Sputum       Rare polymorphonuclear leukocytes per low power field  Rare Squamous epithelial cells per low power field  No organisms seen per oil power field           Normal Respiratory Merlyn present          RESPIRATORY CULTURES:              RADIOLOGY & ADDITIONAL STUDIES:        Pager [phone]  After 5 pm/weekends or if no response :[phone]

## 2024-04-01 NOTE — PROGRESS NOTE ADULT - ASSESSMENT
88M PMH longstanding persistent Afib on Eliquis s/p SJM PPM, COPD on 3-3.5L NC at home admitted to [facility] for fever/chills/SOB; found to have streptococcus lutetiensis (4/4 bottles) bacteremia iso dental procedure. Evaluated by EP for pacemaker lead extraction. Currently patient asymptomatic. TTE showing mildly decreased EF 45-50%. Pt transferred to [facility] for lead extraction with Micra placement. Patient DID received rivaroxaban at 17:00 on 3/28 prior to transfer.     Abbott  Assurity MRI 2272  (10/13/2022)  RA Lead Tendril SDX- 1688T/46cm (5/312005)  RV Lead  5092 (5/31/2005)    1. Strep lutetiensis bacteremia 4/4 bottles  2. Longstanding persistent AF w/ HB s/p PPM (2005)  3. COPD on home O2 (3.5L)    - AF with  70s, pacemaker dependent  - Remains on 4L O2  - NPO after MN for PPM extraction/leadless PPM implant tomorrow  - Maintain active T&S  - On Eliquis at home but switched to Xarelto d/t insurance issues, received Xarelto 3/28/23 PM - continue to hold, EP will guide when to resume post procedure  - Continue antibiotics per ID, currently on Ceftriaxone  - Close telemetry monitoring  - Maintain K>4 and Mg>2

## 2024-04-01 NOTE — PROGRESS NOTE ADULT - ASSESSMENT
87 y/o M w/ hx of Afib on Eliquis w/ PPM, COPD on 3-3.5L NC (on pred?) daily presenting to ED with chills and shortness of breath that started suddenly. He was feeling well until 3/23. He recently went for deep dental cleaning within the past month and has partial dentures as well. Patient was found to meet sepsis criteria on admission with fever and leukocytosis. His blood cultures grew Streptococcus lutetiensis in 4/4 bottles. Patient has PPM and also with known R hip replacement, has been able to ambulate with cane as usual. UA with pyuria and UCx with Enterobacter cloacae, however patient without urinary symptoms. TTE was unable to exclude IE.    Micro:  BCx (3/22): Streptococcus lutetiensis (4/4 bottles)  BCx (3/25): no growth  UCx (3/22): E. cloacae    Abx:  Cefepime (3/22)  Zosyn (3/23-3/25)  Ceftriaxone (3/23, 3/25-)  on - IV ceftriaxone 2g qD  Overall, high grade Streptococcus bacteremia, recent dental work, known PPM, R hip replacement, asymptomatic bacteruria also recently had exploration of PPM pocket at [facility]     Suggest:    TTE reviewed  no definitive endocarditis but not definitive   Follow up BC are negative   no symptoms of UTI   will need line to complete at least  4 weeks of ab   CAREN on hold for now     needs picc or midline   This organism is associated with colon pathology and he has a hs of polyp- needs GI input

## 2024-04-01 NOTE — PHYSICAL THERAPY INITIAL EVALUATION ADULT - PERTINENT HX OF CURRENT PROBLEM, REHAB EVAL
88M with h/o COPD (home Ox) and Afib (on Xarelto) and PPM who presents to OSH with fever and SOB. Pt found to have severe sepsis w/temp 104F rectal, tachycardia, elevated lactate with BCx +. 4/4 +strept lutetiensis - currently receiving ceftriaxone and pending CAREN and surveillance cultures. Pt transferred to [facility] for lead extraction with Micra placement.

## 2024-04-01 NOTE — PROGRESS NOTE ADULT - SUBJECTIVE AND OBJECTIVE BOX
[facility] Division of Hospital Medicine  Trudy Humphries MD  AVailable on TEAMS 8a-8p    Patient is a 88y old  Male who presents with a chief complaint of bc (01 Apr 2024 09:20)      SUBJECTIVE / OVERNIGHT EVENTS: No acute events  ADDITIONAL REVIEW OF SYSTEMS: negative    MEDICATIONS  (STANDING):  amLODIPine   Tablet 5 milliGRAM(s) Oral daily  budesonide  80 MICROgram(s)/formoterol 4.5 MICROgram(s) Inhaler 2 Puff(s) Inhalation two times a day  cefTRIAXone   IVPB 2000 milliGRAM(s) IV Intermittent every 24 hours  chlorhexidine 2% Cloths 1 Application(s) Topical daily  ferrous    sulfate 325 milliGRAM(s) Oral daily  losartan 100 milliGRAM(s) Oral daily  metoprolol succinate ER 75 milliGRAM(s) Oral daily  tamsulosin 0.8 milliGRAM(s) Oral at bedtime  tiotropium 2.5 MICROgram(s) Inhaler 2 Puff(s) Inhalation daily    MEDICATIONS  (PRN):  acetaminophen     Tablet .. 650 milliGRAM(s) Oral every 6 hours PRN Temp greater or equal to 38C (100.4F), Mild Pain (1 - 3), Moderate Pain (4 - 6), Severe Pain (7 - 10)  albuterol    90 MICROgram(s) HFA Inhaler 2 Puff(s) Inhalation every 6 hours PRN Shortness of Breath and/or Wheezing  aluminum hydroxide/magnesium hydroxide/simethicone Suspension 30 milliLiter(s) Oral every 4 hours PRN Dyspepsia  melatonin 3 milliGRAM(s) Oral at bedtime PRN Insomnia  ondansetron Injectable 4 milliGRAM(s) IV Push every 8 hours PRN Nausea and/or Vomiting      CAPILLARY BLOOD GLUCOSE        I&O's Summary    31 Mar 2024 07:01  -  01 Apr 2024 07:00  --------------------------------------------------------  IN: 960 mL / OUT: 300 mL / NET: 660 mL    01 Apr 2024 07:01  -  01 Apr 2024 15:54  --------------------------------------------------------  IN: 480 mL / OUT: 300 mL / NET: 180 mL        PHYSICAL EXAM:  Vital Signs Last 24 Hrs  T(C): 36.7 (01 Apr 2024 05:21), Max: 36.7 (31 Mar 2024 20:32)  T(F): 98.1 (01 Apr 2024 05:21), Max: 98.1 (01 Apr 2024 05:21)  HR: 70 (01 Apr 2024 11:56) (69 - 72)  BP: 120/75 (01 Apr 2024 11:56) (116/70 - 123/72)  BP(mean): --  RR: 18 (01 Apr 2024 05:21) (18 - 18)  SpO2: 98% (01 Apr 2024 11:56) (96% - 98%)    Parameters below as of 01 Apr 2024 11:56  Patient On (Oxygen Delivery Method): nasal cannula  O2 Flow (L/min): 4    CONSTITUTIONAL: NAD, well-developed, well-groomed  EYES: PERRLA; conjunctiva and sclera clear  ENMT: Moist oral mucosa, no pharyngeal injection or exudates  NECK: Supple, no palpable masses; no thyromegaly  RESPIRATORY: Normal respiratory effort; lungs are clear to auscultation bilaterally  CARDIOVASCULAR: Regular rate and rhythm, normal S1 and S2, no murmur/rub/gallop; No lower extremity edema  ABDOMEN: Nontender to palpation, normoactive bowel sounds, no rebound/guarding; No hepatosplenomegaly  MUSCULOSKELETAL:  No clubbing or cyanosis of digits; no joint swelling or tenderness to palpation  PSYCH: A+O to person, place, and time; affect appropriate  NEUROLOGY: CN 2-12 are intact and symmetric; no gross sensory deficits   SKIN: No rashes; no palpable lesions    LABS: reviewed                        10.0   6.70  )-----------( 174      ( 01 Apr 2024 07:24 )             30.4     03-31    135  |  102  |  16  ----------------------------<  93  3.8   |  19<L>  |  0.98    Ca    8.6      31 Mar 2024 07:32            Urinalysis Basic - ( 31 Mar 2024 07:32 )    Color: x / Appearance: x / SG: x / pH: x  Gluc: 93 mg/dL / Ketone: x  / Bili: x / Urobili: x   Blood: x / Protein: x / Nitrite: x   Leuk Esterase: x / RBC: x / WBC x   Sq Epi: x / Non Sq Epi: x / Bacteria: x

## 2024-04-01 NOTE — PROGRESS NOTE ADULT - PROBLEM SELECTOR PLAN 1
- presented with tachycardia, fever, +lactate 4, +leukocytosis  - RVP covid, neg; legionella neg, mrsa neg, strep detected  -bld cx with strep lutetiensis, repeat blood cx drawn 3/25, no growth 24 hrs  - currently on Ceftriaxone (3/25-)  - CT a/p 3/27 w/o abdominal source of infection  - EP planning for PPM extraction 4/2. Hold xarelto on Sunday > SCDs  - seen by ID at [facility] ; recc'd  CAREN to r/o endocarditis  - f/u ID recs. Plan for CTX 2g x6 weeks pending above workup, however CTX not covered by pt's insurance so will need alternative.

## 2024-04-01 NOTE — PROGRESS NOTE ADULT - SUBJECTIVE AND OBJECTIVE BOX
24H hour events: No acute events    MEDICATIONS:  amLODIPine   Tablet 5 milliGRAM(s) Oral daily  losartan 100 milliGRAM(s) Oral daily  metoprolol succinate ER 75 milliGRAM(s) Oral daily  cefTRIAXone   IVPB 2000 milliGRAM(s) IV Intermittent every 24 hours  albuterol    90 MICROgram(s) HFA Inhaler 2 Puff(s) Inhalation every 6 hours PRN  budesonide  80 MICROgram(s)/formoterol 4.5 MICROgram(s) Inhaler 2 Puff(s) Inhalation two times a day  tiotropium 2.5 MICROgram(s) Inhaler 2 Puff(s) Inhalation daily  acetaminophen     Tablet .. 650 milliGRAM(s) Oral every 6 hours PRN  melatonin 3 milliGRAM(s) Oral at bedtime PRN  ondansetron Injectable 4 milliGRAM(s) IV Push every 8 hours PRN  aluminum hydroxide/magnesium hydroxide/simethicone Suspension 30 milliLiter(s) Oral every 4 hours PRN  chlorhexidine 2% Cloths 1 Application(s) Topical daily  ferrous    sulfate 325 milliGRAM(s) Oral daily  tamsulosin 0.8 milliGRAM(s) Oral at bedtime      REVIEW OF SYSTEMS:  Complete 12 point ROS negative.    PHYSICAL EXAM:  T(C): 36.7 (04-01-24 @ 05:21), Max: 36.8 (03-31-24 @ 11:23)  HR: 72 (04-01-24 @ 05:21) (69 - 72)  BP: 116/70 (04-01-24 @ 05:21) (116/70 - 126/70)  RR: 18 (04-01-24 @ 05:21) (18 - 18)  SpO2: 96% (04-01-24 @ 05:21) (96% - 97%)  Wt(kg): --  I&O's Summary    31 Mar 2024 07:01  -  01 Apr 2024 07:00  --------------------------------------------------------  IN: 960 mL / OUT: 300 mL / NET: 660 mL        Appearance: Normal	  HEENT:  PERRL, EOMI	  Cardiovascular: Normal S1 S2, No JVD, No murmurs, No edema  Respiratory: Lungs clear to auscultation	  Psychiatry: A & O x 3, Mood & affect appropriate  Gastrointestinal:  Soft, Non-tender, + BS	  Skin: No rashes, No ecchymoses, No cyanosis	  Neurologic: Non-focal  Extremities: No clubbing, cyanosis or edema  Vascular: Peripheral pulses palpable 2+ bilaterally        LABS:	 	    CBC Full  -  ( 31 Mar 2024 07:34 )  WBC Count : 6.90 K/uL  Hemoglobin : 10.1 g/dL  Hematocrit : 31.9 %  Platelet Count - Automated : 186 K/uL  Mean Cell Volume : 103.2 fl  Mean Cell Hemoglobin : 32.7 pg  Mean Cell Hemoglobin Concentration : 31.7 gm/dL  Auto Neutrophil # : x  Auto Lymphocyte # : x  Auto Monocyte # : x  Auto Eosinophil # : x  Auto Basophil # : x  Auto Neutrophil % : x  Auto Lymphocyte % : x  Auto Monocyte % : x  Auto Eosinophil % : x  Auto Basophil % : x    03-31    135  |  102  |  16  ----------------------------<  93  3.8   |  19<L>  |  0.98    Ca    8.6      31 Mar 2024 07:32      TELEMETRY: AF/ 70s	      < from: TTE W or WO Ultrasound Enhancing Agent (03.25.24 @ 14:41) >  TRANSTHORACIC ECHOCARDIOGRAM REPORT  ________________________________________________________________________________                                      _______       Pt. Name:       ASHLEY KENDALL Study Date:    3/25/2024  MRN:            [id number]          YOB: 1935  Accession #:    [id number]         Age:           88 years  Account#:       [id number]          Gender:        M  Heart Rate:     62 bpm            Height:        73.00 in (185.42 cm)  Rhythm:     Weight:        190.00 lb (86.18 kg)  Blood Pressure: 162/75 mmHg       BSA/BMI:       2.11 m² / 25.07 kg/m²  ________________________________________________________________________________________  Referring Physician:    [id number] Terrie Skinner  Interpreting Physician: Binu Dash M.D.  Primary Sonographer:    Ida Jones RDCS    CPT:               ECHO TTE WO CON COMP W DOPP - 35949.m  Indication(s):     Dyspnea, unspecified - R06.00  Procedure:         Transthoracic echocardiogram with 2-D, M-mode and complete                     spectral and color flow Doppler.  Ordering Location: [facility]  Admission Status:  Inpatient  Study Information: Image quality for this study is fair.    _______________________________________________________________________________________     CONCLUSIONS:      1. The left ventricular cavity is normal in size. Left ventricular wall thickness is normal. Left ventricular systolic function is mildly decreased with an ejection fraction visually estimated at 45 to 50%. There is global left ventricular hypokinesis.   2. The right ventricular cavity is enlarged in size and reduced systolic function. A device lead is visualized in the right heart.   3. Structurally normal mitral valve with normal leaflet excursion. There is calcification of the mitral valve annulus. There is mild mitral regurgitation.   4. The aortic valve anatomy cannot be determined with reduced systolic excursion. There is calcification of the aortic valve leaflets. The peak transaortic velocity is 2.41 m/s, peak transaortic gradient is 23.2 mmHg and mean transaortic gradient is 12.0 mmHg with an LVOT/aortic valve VTI ratio of 0.45. Probable mild aortic stenosis. There is trace aortic regurgitation.   5. The left atrium is moderately dilated with an indexed volume of 47 ml/m².   6. The right atrium is severely dilated with an indexed volume of 57.95 ml/m² and an indexed area of 14.72 cm²/m².    ________________________________________________________________________________________  FINDINGS:     Left Ventricle:  The left ventricular cavity is normal in size. Left ventricular wall thickness is normal. Left ventricular systolic function is mildly decreased with an ejection fraction visually estimated at 45 to 50%. There is global left ventricular hypokinesis.     Right Ventricle:  The right ventricular cavity is enlarged in size and reduced systolic function. A device lead is visualized in the right heart.     Left Atrium:  The left atrium is moderately dilated with an indexed volume of 47 ml/m².     Right Atrium:  The right atrium is severely dilated with an indexed volume of 57.95 ml/m² and an indexed area of 14.72 cm²/m².     Aortic Valve:  The aortic valve anatomy cannot be determined with reduced systolic excursion. There is calcification of the aortic valve leaflets. The peak transaortic velocity is 2.41 m/s, peak transaortic gradient is 23.2 mmHg and mean transaortic gradient is 12.0 mmHg with an LVOT/aortic valve VTI ratio of 0.45. Probable mild aortic stenosis. There is trace aortic regurgitation.     Mitral Valve:  Structurally normal mitral valve with normal leaflet excursion. There is calcification of the mitral valve annulus. There is mild mitral regurgitation.     Tricuspid Valve:  Structurally normal tricuspid valve with normal leaflet excursion. There is mild tricuspid regurgitation.     Pulmonic Valve:  Structurally normal pulmonic valve with normal leaflet excursion. There is trace pulmonic regurgitation.     Aorta:  The aortic root appears normal in size. The aortic root at the sinuses of Valsalva is normal in size, measuring 3.35 cm (indexed 1.59 cm/m²).     Pericardium:  No pericardial effusion seen.     Systemic Veins:  The inferior vena cava is dilated measuring 2.70 cm in diameter, (dilated >2.1cm) with abnormal inspiratory collapse (abnormal <50%) consistent with elevated right atrial pressure (~15, range 10-20mmHg).  ____________________________________________________________________  QUANTITATIVE DATA:  Left Ventricle Measurements: (Indexed to BSA)     IVSd (2D):   1.1 cm  LVPWd (2D):  1.0 cm  LVIDd (2D):  5.3 cm  LVIDs (2D):  3.6 cm  LV Mass:     202 g  96.1 g/m²  Visualized LV EF%: 45 to 50%     MV E Vmax:    1.15 m/s  MV A Vmax:    0.34 m/s  MV E/A:       3.34  e' lateral:   18.40 cm/s  e' medial:    9.25 cm/s  E/e' lateral: 6.25  E/e' medial:  12.43  E/e' Average: 8.32  MV DT:        165 msec    Aorta Measurements: (Normal range) (Indexed to BSA)     Sinuses of Valsalva: 3.35 cm (3.1 - 3.7 cm)       LeftAtrium Measurements: (Indexed to BSA)  LA Diam 2D:        4.50 cm  LA Vol s, MOD A4C: 98.20 ml.  LA Vol s, MOD A2C: 74.40 ml.  LA Vol s, MOD BP:  87.60 ml  41.61 ml/m²    Right Ventricle Measurements: Right Atrial Measurements:     TV Ramon. S':      9.79 cm/s    RA Vol:       122.00 ml  RV Base (RVID1): 5.1 cm       RA Vol Index: 57.95 ml/m²  RV Mid (RVID2):  3.6 cm       LVOT / RVOT/ Qp/Qs Data: (Indexed to BSA)  LVOT Vmax: 1.10 m/s  LVOT VTI:  18.90 cm    Aortic Valve Measurements:  AV Vmax:           2.4 m/s  AV Peak Gradient:       23.2 mmHg  AV Mean Gradient:       12.0 mmHg  AV VTI:                 42.1 cm  AV VTI Ratio:           0.45  AoV Dimensionless Index 0.45    Mitral Valve Measurements:     MV Vmax:      1.46 m/s  MV VTI, ramon   0.337 m  MV Mean Grad: 2.0 mmHg  MV Peak Grad: 8.5 mmHg  MV E Vmax:    1.2 m/s  MV A Vmax:    0.3 m/s  MV E/A:       3.3       Tricuspid Valve Measurements:     RA Pressure: 15 mmHg    < end of copied text >

## 2024-04-01 NOTE — PROGRESS NOTE ADULT - ASSESSMENT
88M with h/o COPD (home O2) and Afib (on Xarelto) and PPM who presents with fever and SOB. Pt found to have severe sepsis and Strept lutetiensis bacteremia. Pt transferred to [facility] for lead extraction.

## 2024-04-01 NOTE — PHYSICAL THERAPY INITIAL EVALUATION ADULT - ADDITIONAL COMMENTS
Pt lives in a PH alone +HHA x6 hours a day. +1 step to enter, chair lift once inside. PTA Pt was ambulating (I) with SC and was (I) with ADLs. HHA mainly a /assists with IADLs (cleaning and cooking)

## 2024-04-02 LAB
ANION GAP SERPL CALC-SCNC: 14 MMOL/L — SIGNIFICANT CHANGE UP (ref 5–17)
APTT BLD: 29.8 SEC — SIGNIFICANT CHANGE UP (ref 24.5–35.6)
BUN SERPL-MCNC: 15 MG/DL — SIGNIFICANT CHANGE UP (ref 7–23)
CALCIUM SERPL-MCNC: 9 MG/DL — SIGNIFICANT CHANGE UP (ref 8.4–10.5)
CHLORIDE SERPL-SCNC: 100 MMOL/L — SIGNIFICANT CHANGE UP (ref 96–108)
CO2 SERPL-SCNC: 21 MMOL/L — LOW (ref 22–31)
CREAT SERPL-MCNC: 0.86 MG/DL — SIGNIFICANT CHANGE UP (ref 0.5–1.3)
EGFR: 84 ML/MIN/1.73M2 — SIGNIFICANT CHANGE UP
GLUCOSE SERPL-MCNC: 85 MG/DL — SIGNIFICANT CHANGE UP (ref 70–99)
HCT VFR BLD CALC: 32.3 % — LOW (ref 39–50)
HGB BLD-MCNC: 10.5 G/DL — LOW (ref 13–17)
INR BLD: 1.23 RATIO — HIGH (ref 0.85–1.18)
MCHC RBC-ENTMCNC: 32.5 GM/DL — SIGNIFICANT CHANGE UP (ref 32–36)
MCHC RBC-ENTMCNC: 33.2 PG — SIGNIFICANT CHANGE UP (ref 27–34)
MCV RBC AUTO: 102.2 FL — HIGH (ref 80–100)
NRBC # BLD: 0 /100 WBCS — SIGNIFICANT CHANGE UP (ref 0–0)
PLATELET # BLD AUTO: 186 K/UL — SIGNIFICANT CHANGE UP (ref 150–400)
POTASSIUM SERPL-MCNC: 4.2 MMOL/L — SIGNIFICANT CHANGE UP (ref 3.5–5.3)
POTASSIUM SERPL-SCNC: 4.2 MMOL/L — SIGNIFICANT CHANGE UP (ref 3.5–5.3)
PROTHROM AB SERPL-ACNC: 12.8 SEC — SIGNIFICANT CHANGE UP (ref 9.5–13)
RBC # BLD: 3.16 M/UL — LOW (ref 4.2–5.8)
RBC # FLD: 13.2 % — SIGNIFICANT CHANGE UP (ref 10.3–14.5)
SODIUM SERPL-SCNC: 135 MMOL/L — SIGNIFICANT CHANGE UP (ref 135–145)
WBC # BLD: 5.35 K/UL — SIGNIFICANT CHANGE UP (ref 3.8–10.5)
WBC # FLD AUTO: 5.35 K/UL — SIGNIFICANT CHANGE UP (ref 3.8–10.5)

## 2024-04-02 PROCEDURE — 71045 X-RAY EXAM CHEST 1 VIEW: CPT | Mod: 26

## 2024-04-02 PROCEDURE — 33274 TCAT INSJ/RPL PERM LDLS PM: CPT | Mod: Q0

## 2024-04-02 PROCEDURE — 99232 SBSQ HOSP IP/OBS MODERATE 35: CPT | Mod: 25

## 2024-04-02 PROCEDURE — 93010 ELECTROCARDIOGRAM REPORT: CPT

## 2024-04-02 PROCEDURE — 33235 REMOVAL PACEMAKER ELECTRODE: CPT

## 2024-04-02 PROCEDURE — 93286 PERI-PX EVAL PM/LDLS PM IP: CPT | Mod: 26,59

## 2024-04-02 PROCEDURE — 99232 SBSQ HOSP IP/OBS MODERATE 35: CPT

## 2024-04-02 DEVICE — SHEATH GLIDELIGHT LASER 16FR
Type: IMPLANTABLE DEVICE | Site: LEFT | Status: NON-FUNCTIONAL
Removed: 2024-04-02

## 2024-04-02 DEVICE — SYS PACEMAKER TRANSCATH MICRA VR
Type: IMPLANTABLE DEVICE | Site: LEFT | Status: NON-FUNCTIONAL
Removed: 2024-04-02

## 2024-04-02 DEVICE — SHEATH FAST-CATH 6FR
Type: IMPLANTABLE DEVICE | Site: LEFT | Status: NON-FUNCTIONAL
Removed: 2024-04-02

## 2024-04-02 DEVICE — KIT BRIDGE ACESSORY
Type: IMPLANTABLE DEVICE | Site: LEFT | Status: NON-FUNCTIONAL
Removed: 2024-04-02

## 2024-04-02 DEVICE — KIT A-LINE 1LUM 20G X 12CM SAFE KIT
Type: IMPLANTABLE DEVICE | Site: LEFT | Status: NON-FUNCTIONAL
Removed: 2024-04-02

## 2024-04-02 DEVICE — GUIDEWIRE AMPLATZ SUPER-STIFF 3MM J .035" X 145CM
Type: IMPLANTABLE DEVICE | Site: LEFT | Status: NON-FUNCTIONAL
Removed: 2024-04-02

## 2024-04-02 DEVICE — SHEATH TIGHTRAIL SUB C 13FR
Type: IMPLANTABLE DEVICE | Site: LEFT | Status: NON-FUNCTIONAL
Removed: 2024-04-02

## 2024-04-02 DEVICE — DEVICE LOCKING LOCKING LLD EZ CLEARS
Type: IMPLANTABLE DEVICE | Site: LEFT | Status: NON-FUNCTIONAL
Removed: 2024-04-02

## 2024-04-02 RX ORDER — TAMSULOSIN HYDROCHLORIDE 0.4 MG/1
0.8 CAPSULE ORAL AT BEDTIME
Refills: 0 | Status: DISCONTINUED | OUTPATIENT
Start: 2024-04-02 | End: 2024-04-04

## 2024-04-02 RX ORDER — BUDESONIDE AND FORMOTEROL FUMARATE DIHYDRATE 160; 4.5 UG/1; UG/1
2 AEROSOL RESPIRATORY (INHALATION)
Refills: 0 | Status: DISCONTINUED | OUTPATIENT
Start: 2024-04-02 | End: 2024-04-04

## 2024-04-02 RX ORDER — ACETAMINOPHEN 500 MG
650 TABLET ORAL EVERY 6 HOURS
Refills: 0 | Status: DISCONTINUED | OUTPATIENT
Start: 2024-04-02 | End: 2024-04-04

## 2024-04-02 RX ORDER — CEFTRIAXONE 500 MG/1
2 INJECTION, POWDER, FOR SOLUTION INTRAMUSCULAR; INTRAVENOUS
Qty: 60 | Refills: 0
Start: 2024-04-02 | End: 2024-05-01

## 2024-04-02 RX ORDER — METOPROLOL TARTRATE 50 MG
75 TABLET ORAL DAILY
Refills: 0 | Status: DISCONTINUED | OUTPATIENT
Start: 2024-04-02 | End: 2024-04-04

## 2024-04-02 RX ORDER — CEFTRIAXONE 500 MG/1
2000 INJECTION, POWDER, FOR SOLUTION INTRAMUSCULAR; INTRAVENOUS EVERY 24 HOURS
Refills: 0 | Status: DISCONTINUED | OUTPATIENT
Start: 2024-04-02 | End: 2024-04-04

## 2024-04-02 RX ORDER — LOSARTAN POTASSIUM 100 MG/1
100 TABLET, FILM COATED ORAL DAILY
Refills: 0 | Status: DISCONTINUED | OUTPATIENT
Start: 2024-04-02 | End: 2024-04-04

## 2024-04-02 RX ORDER — LANOLIN ALCOHOL/MO/W.PET/CERES
3 CREAM (GRAM) TOPICAL AT BEDTIME
Refills: 0 | Status: DISCONTINUED | OUTPATIENT
Start: 2024-04-02 | End: 2024-04-04

## 2024-04-02 RX ORDER — TIOTROPIUM BROMIDE 18 UG/1
2 CAPSULE ORAL; RESPIRATORY (INHALATION) DAILY
Refills: 0 | Status: DISCONTINUED | OUTPATIENT
Start: 2024-04-02 | End: 2024-04-04

## 2024-04-02 RX ORDER — ALBUTEROL 90 UG/1
2 AEROSOL, METERED ORAL EVERY 6 HOURS
Refills: 0 | Status: DISCONTINUED | OUTPATIENT
Start: 2024-04-02 | End: 2024-04-04

## 2024-04-02 RX ORDER — ONDANSETRON 8 MG/1
4 TABLET, FILM COATED ORAL ONCE
Refills: 0 | Status: DISCONTINUED | OUTPATIENT
Start: 2024-04-02 | End: 2024-04-02

## 2024-04-02 RX ORDER — FENTANYL CITRATE 50 UG/ML
25 INJECTION INTRAVENOUS
Refills: 0 | Status: DISCONTINUED | OUTPATIENT
Start: 2024-04-02 | End: 2024-04-02

## 2024-04-02 RX ORDER — AMLODIPINE BESYLATE 2.5 MG/1
5 TABLET ORAL DAILY
Refills: 0 | Status: DISCONTINUED | OUTPATIENT
Start: 2024-04-02 | End: 2024-04-04

## 2024-04-02 RX ORDER — ACETAMINOPHEN 500 MG
1000 TABLET ORAL ONCE
Refills: 0 | Status: COMPLETED | OUTPATIENT
Start: 2024-04-02 | End: 2024-04-02

## 2024-04-02 RX ORDER — FERROUS SULFATE 325(65) MG
325 TABLET ORAL DAILY
Refills: 0 | Status: DISCONTINUED | OUTPATIENT
Start: 2024-04-02 | End: 2024-04-04

## 2024-04-02 RX ORDER — CHLORHEXIDINE GLUCONATE 213 G/1000ML
1 SOLUTION TOPICAL DAILY
Refills: 0 | Status: DISCONTINUED | OUTPATIENT
Start: 2024-04-02 | End: 2024-04-04

## 2024-04-02 RX ADMIN — CHLORHEXIDINE GLUCONATE 1 APPLICATION(S): 213 SOLUTION TOPICAL at 11:50

## 2024-04-02 RX ADMIN — CEFTRIAXONE 100 MILLIGRAM(S): 500 INJECTION, POWDER, FOR SOLUTION INTRAMUSCULAR; INTRAVENOUS at 15:02

## 2024-04-02 RX ADMIN — Medication 400 MILLIGRAM(S): at 23:37

## 2024-04-02 RX ADMIN — AMLODIPINE BESYLATE 5 MILLIGRAM(S): 2.5 TABLET ORAL at 05:53

## 2024-04-02 RX ADMIN — BUDESONIDE AND FORMOTEROL FUMARATE DIHYDRATE 2 PUFF(S): 160; 4.5 AEROSOL RESPIRATORY (INHALATION) at 05:53

## 2024-04-02 RX ADMIN — TAMSULOSIN HYDROCHLORIDE 0.8 MILLIGRAM(S): 0.4 CAPSULE ORAL at 21:55

## 2024-04-02 RX ADMIN — LOSARTAN POTASSIUM 100 MILLIGRAM(S): 100 TABLET, FILM COATED ORAL at 05:53

## 2024-04-02 RX ADMIN — TIOTROPIUM BROMIDE 2 PUFF(S): 18 CAPSULE ORAL; RESPIRATORY (INHALATION) at 11:51

## 2024-04-02 RX ADMIN — Medication 325 MILLIGRAM(S): at 11:51

## 2024-04-02 RX ADMIN — Medication 75 MILLIGRAM(S): at 05:53

## 2024-04-02 NOTE — PROGRESS NOTE ADULT - SUBJECTIVE AND OBJECTIVE BOX
24H hour events: No acute events    MEDICATIONS:  amLODIPine   Tablet 5 milliGRAM(s) Oral daily  losartan 100 milliGRAM(s) Oral daily  metoprolol succinate ER 75 milliGRAM(s) Oral daily  cefTRIAXone   IVPB 2000 milliGRAM(s) IV Intermittent every 24 hours  albuterol    90 MICROgram(s) HFA Inhaler 2 Puff(s) Inhalation every 6 hours PRN  budesonide  80 MICROgram(s)/formoterol 4.5 MICROgram(s) Inhaler 2 Puff(s) Inhalation two times a day  tiotropium 2.5 MICROgram(s) Inhaler 2 Puff(s) Inhalation daily  acetaminophen     Tablet .. 650 milliGRAM(s) Oral every 6 hours PRN  melatonin 3 milliGRAM(s) Oral at bedtime PRN  ondansetron Injectable 4 milliGRAM(s) IV Push every 8 hours PRN  aluminum hydroxide/magnesium hydroxide/simethicone Suspension 30 milliLiter(s) Oral every 4 hours PRN  chlorhexidine 2% Cloths 1 Application(s) Topical daily  ferrous    sulfate 325 milliGRAM(s) Oral daily  tamsulosin 0.8 milliGRAM(s) Oral at bedtime      REVIEW OF SYSTEMS:  Complete 12 point ROS negative.    PHYSICAL EXAM:  T(C): 36.4 (04-02-24 @ 04:35), Max: 36.8 (04-01-24 @ 20:40)  HR: 70 (04-02-24 @ 04:35) (70 - 71)  BP: 116/69 (04-02-24 @ 04:35) (116/69 - 132/80)  RR: 18 (04-02-24 @ 04:35) (18 - 18)  SpO2: 97% (04-02-24 @ 04:35) (97% - 99%)  Wt(kg): --  I&O's Summary    01 Apr 2024 07:01  -  02 Apr 2024 07:00  --------------------------------------------------------  IN: 720 mL / OUT: 1050 mL / NET: -330 mL        Appearance: Normal	  HEENT:  PERRL, EOMI	  Cardiovascular: Normal S1 S2, No JVD, No murmurs, No edema  Respiratory: Lungs clear to auscultation	  Psychiatry: A & O x 3, Mood & affect appropriate  Gastrointestinal:  Soft, Non-tender, + BS	  Skin: No rashes, No ecchymoses, No cyanosis	  Neurologic: Non-focal  Extremities: No clubbing, cyanosis or edema  Vascular: Peripheral pulses palpable 2+ bilaterally        LABS:	 	    CBC Full  -  ( 02 Apr 2024 06:51 )  WBC Count : 5.35 K/uL  Hemoglobin : 10.5 g/dL  Hematocrit : 32.3 %  Platelet Count - Automated : 186 K/uL  Mean Cell Volume : 102.2 fl  Mean Cell Hemoglobin : 33.2 pg  Mean Cell Hemoglobin Concentration : 32.5 gm/dL  Auto Neutrophil # : x  Auto Lymphocyte # : x  Auto Monocyte # : x  Auto Eosinophil # : x  Auto Basophil # : x  Auto Neutrophil % : x  Auto Lymphocyte % : x  Auto Monocyte % : x  Auto Eosinophil % : x  Auto Basophil % : x    04-02    135  |  100  |  15  ----------------------------<  85  4.2   |  21<L>  |  0.86    Ca    9.0      02 Apr 2024 06:56      TELEMETRY: AF/ 70s	      < from: TTE W or WO Ultrasound Enhancing Agent (03.25.24 @ 14:41) >  TRANSTHORACIC ECHOCARDIOGRAM REPORT  ________________________________________________________________________________                                      _______       Pt. Name:       ASHLEY KENDALL Study Date:    3/25/2024  MRN:            [id number]          YOB: 1935  Accession #:    [id number]         Age:           88 years  Account#:       [id number]          Gender:        M  Heart Rate:     62 bpm            Height:        73.00 in (185.42 cm)  Rhythm:     Weight:        190.00 lb (86.18 kg)  Blood Pressure: 162/75 mmHg       BSA/BMI:       2.11 m² / 25.07 kg/m²  ________________________________________________________________________________________  Referring Physician:    [id number] Terrie Skinner  Interpreting Physician: Binu Dash M.D.  Primary Sonographer:    Ida Jones RDCS    CPT:               ECHO TTE WO CON COMP W DOPP - 00562.m  Indication(s):     Dyspnea, unspecified - R06.00  Procedure:         Transthoracic echocardiogram with 2-D, M-mode and complete                     spectral and color flow Doppler.  Ordering Location: [facility]  Admission Status:  Inpatient  Study Information: Image quality for this study is fair.    _______________________________________________________________________________________     CONCLUSIONS:      1. The left ventricular cavity is normal in size. Left ventricular wall thickness is normal. Left ventricular systolic function is mildly decreased with an ejection fraction visually estimated at 45 to 50%. There is global left ventricular hypokinesis.   2. The right ventricular cavity is enlarged in size and reduced systolic function. A device lead is visualized in the right heart.   3. Structurally normal mitral valve with normal leaflet excursion. There is calcification of the mitral valve annulus. There is mild mitral regurgitation.   4. The aortic valve anatomy cannot be determined with reduced systolic excursion. There is calcification of the aortic valve leaflets. The peak transaortic velocity is 2.41 m/s, peak transaortic gradient is 23.2 mmHg and mean transaortic gradient is 12.0 mmHg with an LVOT/aortic valve VTI ratio of 0.45. Probable mild aortic stenosis. There is trace aortic regurgitation.   5. The left atrium is moderately dilated with an indexed volume of 47 ml/m².   6. The right atrium is severely dilated with an indexed volume of 57.95 ml/m² and an indexed area of 14.72 cm²/m².    ________________________________________________________________________________________  FINDINGS:     Left Ventricle:  The left ventricular cavity is normal in size. Left ventricular wall thickness is normal. Left ventricular systolic function is mildly decreased with an ejection fraction visually estimated at 45 to 50%. There is global left ventricular hypokinesis.     Right Ventricle:  The right ventricular cavity is enlarged in size and reduced systolic function. A device lead is visualized in the right heart.     Left Atrium:  The left atrium is moderately dilated with an indexed volume of 47 ml/m².     Right Atrium:  The right atrium is severely dilated with an indexed volume of 57.95 ml/m² and an indexed area of 14.72 cm²/m².     Aortic Valve:  The aortic valve anatomy cannot be determined with reduced systolic excursion. There is calcification of the aortic valve leaflets. The peak transaortic velocity is 2.41 m/s, peak transaortic gradient is 23.2 mmHg and mean transaortic gradient is 12.0 mmHg with an LVOT/aortic valve VTI ratio of 0.45. Probable mild aortic stenosis. There is trace aortic regurgitation.     Mitral Valve:  Structurally normal mitral valve with normal leaflet excursion. There is calcification of the mitral valve annulus. There is mild mitral regurgitation.     Tricuspid Valve:  Structurally normal tricuspid valve with normal leaflet excursion. There is mild tricuspid regurgitation.     Pulmonic Valve:  Structurally normal pulmonic valve with normal leaflet excursion. There is trace pulmonic regurgitation.     Aorta:  The aortic root appears normal in size. The aortic root at the sinuses of Valsalva is normal in size, measuring 3.35 cm (indexed 1.59 cm/m²).     Pericardium:  No pericardial effusion seen.     Systemic Veins:  The inferior vena cava is dilated measuring 2.70 cm in diameter, (dilated >2.1cm) with abnormal inspiratory collapse (abnormal <50%) consistent with elevated right atrial pressure (~15, range 10-20mmHg).  ____________________________________________________________________  QUANTITATIVE DATA:  Left Ventricle Measurements: (Indexed to BSA)     IVSd (2D):   1.1 cm  LVPWd (2D):  1.0 cm  LVIDd (2D):  5.3 cm  LVIDs (2D):  3.6 cm  LV Mass:     202 g  96.1 g/m²  Visualized LV EF%: 45 to 50%     MV E Vmax:    1.15 m/s  MV A Vmax:    0.34 m/s  MV E/A:       3.34  e' lateral:   18.40 cm/s  e' medial:    9.25 cm/s  E/e' lateral: 6.25  E/e' medial:  12.43  E/e' Average: 8.32  MV DT:        165 msec    Aorta Measurements: (Normal range) (Indexed to BSA)     Sinuses of Valsalva: 3.35 cm (3.1 - 3.7 cm)       LeftAtrium Measurements: (Indexed to BSA)  LA Diam 2D:        4.50 cm  LA Vol s, MOD A4C: 98.20 ml.  LA Vol s, MOD A2C: 74.40 ml.  LA Vol s, MOD BP:  87.60 ml  41.61 ml/m²    Right Ventricle Measurements: Right Atrial Measurements:     TV Ramon. S':      9.79 cm/s    RA Vol:       122.00 ml  RV Base (RVID1): 5.1 cm       RA Vol Index: 57.95 ml/m²  RV Mid (RVID2):  3.6 cm       LVOT / RVOT/ Qp/Qs Data: (Indexed to BSA)  LVOT Vmax: 1.10 m/s  LVOT VTI:  18.90 cm    Aortic Valve Measurements:  AV Vmax:           2.4 m/s  AV Peak Gradient:       23.2 mmHg  AV Mean Gradient:       12.0 mmHg  AV VTI:                 42.1 cm  AV VTI Ratio:           0.45  AoV Dimensionless Index 0.45    Mitral Valve Measurements:     MV Vmax:      1.46 m/s  MV VTI, ramon   0.337 m  MV Mean Grad: 2.0 mmHg  MV Peak Grad: 8.5 mmHg  MV E Vmax:    1.2 m/s  MV A Vmax:    0.3 m/s  MV E/A:       3.3       Tricuspid Valve Measurements:     RA Pressure: 15 mmHg    < end of copied text >

## 2024-04-02 NOTE — PROGRESS NOTE ADULT - ASSESSMENT
88M PMH longstanding persistent Afib on Eliquis s/p SJM PPM, COPD on 3-3.5L NC at home admitted to [facility] for fever/chills/SOB; found to have streptococcus lutetiensis (4/4 bottles) bacteremia iso dental procedure. Evaluated by EP for pacemaker lead extraction. Currently patient asymptomatic. TTE showing mildly decreased EF 45-50%. Pt transferred to [facility] for lead extraction with Micra placement. Patient DID received rivaroxaban at 17:00 on 3/28 prior to transfer.     Abbott  Assurity MRI 2272  (10/13/2022)  RA Lead Tendril SDX- 1688T/46cm (5/312005)  RV Lead  5092 (5/31/2005)    1. Strep lutetiensis bacteremia 4/4 bottles  2. Longstanding persistent AF w/ HB s/p PPM (2005)  3. COPD on home O2 (3.5L)    - AF with  70s, pacemaker dependent  - Remains on 4L O2  - NPO for PPM extraction/leadless PPM implant today  - Maintain active T&S  - On Eliquis at home but switched to Xarelto d/t insurance issues, received Xarelto 3/28/23 PM - continue to hold, EP will guide when to resume post procedure  - Continue antibiotics per ID, currently on Ceftriaxone - will need PICC  - Consent obtained and placed in chart - DNR/DNI rescinded for procedure  - Close telemetry monitoring  - Maintain K>4 and Mg>2

## 2024-04-02 NOTE — PROGRESS NOTE ADULT - PROBLEM SELECTOR PLAN 1
- presented with tachycardia, fever, +lactate 4, +leukocytosis  - RVP covid, neg; legionella neg, mrsa neg, strep detected  -bld cx with strep lutetiensis, repeat blood cx drawn 3/25, no growth 24 hrs  - currently on Ceftriaxone (3/25-)  - CT a/p 3/27 w/o abdominal source of infection  - EP planning for PPM extraction 4/2. Hold xarelto on Sunday > SCDs  - seen by ID at [facility] ; recc'd  CAREN to r/o endocarditis  - f/u ID recs. Plan for CTX 2g x6 weeks pending above workup, however CTX not covered by pt's insurance so will need alternative.  used

## 2024-04-02 NOTE — PROGRESS NOTE ADULT - SUBJECTIVE AND OBJECTIVE BOX
[facility] Division of Hospital Medicine  Trudy Humphries MD  Available on TEAMS 8a-8p    Patient is a 88y old  Male who presents with a chief complaint of bc      SUBJECTIVE / OVERNIGHT EVENTS: No acute events  ADDITIONAL REVIEW OF SYSTEMS: negative    MEDICATIONS  (STANDING):  amLODIPine   Tablet 5 milliGRAM(s) Oral daily  budesonide  80 MICROgram(s)/formoterol 4.5 MICROgram(s) Inhaler 2 Puff(s) Inhalation two times a day  cefTRIAXone   IVPB 2000 milliGRAM(s) IV Intermittent every 24 hours  chlorhexidine 2% Cloths 1 Application(s) Topical daily  ferrous    sulfate 325 milliGRAM(s) Oral daily  losartan 100 milliGRAM(s) Oral daily  metoprolol succinate ER 75 milliGRAM(s) Oral daily  tamsulosin 0.8 milliGRAM(s) Oral at bedtime  tiotropium 2.5 MICROgram(s) Inhaler 2 Puff(s) Inhalation daily    MEDICATIONS  (PRN):  acetaminophen     Tablet .. 650 milliGRAM(s) Oral every 6 hours PRN Temp greater or equal to 38C (100.4F), Mild Pain (1 - 3), Moderate Pain (4 - 6), Severe Pain (7 - 10)  albuterol    90 MICROgram(s) HFA Inhaler 2 Puff(s) Inhalation every 6 hours PRN Shortness of Breath and/or Wheezing  aluminum hydroxide/magnesium hydroxide/simethicone Suspension 30 milliLiter(s) Oral every 4 hours PRN Dyspepsia  melatonin 3 milliGRAM(s) Oral at bedtime PRN Insomnia  ondansetron Injectable 4 milliGRAM(s) IV Push every 8 hours PRN Nausea and/or Vomiting    PHYSICAL EXAM:  Vital Signs Last 24 Hrs  T(C): 36.6 (02 Apr 2024 11:15), Max: 36.8 (01 Apr 2024 20:40)  T(F): 97.8 (02 Apr 2024 11:15), Max: 98.2 (01 Apr 2024 20:40)  HR: 70 (02 Apr 2024 11:15) (70 - 71)  BP: 133/66 (02 Apr 2024 11:15) (116/69 - 133/66)  BP(mean): --  RR: 18 (02 Apr 2024 11:15) (18 - 18)  SpO2: 94% (02 Apr 2024 11:15) (94% - 99%)    Parameters below as of 02 Apr 2024 11:15  Patient On (Oxygen Delivery Method): nasal cannula  O2 Flow (L/min): 4      CONSTITUTIONAL: NAD, well-developed, well-groomed  EYES: PERRLA; conjunctiva and sclera clear  ENMT: Moist oral mucosa, no pharyngeal injection or exudates  NECK: Supple, no palpable masses; no thyromegaly  RESPIRATORY: Normal respiratory effort; lungs are clear to auscultation bilaterally  CARDIOVASCULAR: Regular rate and rhythm, normal S1 and S2, no murmur/rub/gallop; No lower extremity edema  ABDOMEN: Nontender to palpation, normoactive bowel sounds, no rebound/guarding; No hepatosplenomegaly  MUSCULOSKELETAL:  No clubbing or cyanosis of digits; no joint swelling or tenderness to palpation  PSYCH: A+O to person, place, and time; affect appropriate  NEUROLOGY: CN 2-12 are intact and symmetric; no gross sensory deficits   SKIN: No rashes; no palpable lesions    LABS: reviewed                                        10.5   5.35  )-----------( 186      ( 02 Apr 2024 06:51 )             32.3       04-02    135  |  100  |  15  ----------------------------<  85  4.2   |  21<L>  |  0.86    Ca    9.0      02 Apr 2024 06:56                Urinalysis Basic - ( 02 Apr 2024 06:56 )    Color: x / Appearance: x / SG: x / pH: x  Gluc: 85 mg/dL / Ketone: x  / Bili: x / Urobili: x   Blood: x / Protein: x / Nitrite: x   Leuk Esterase: x / RBC: x / WBC x   Sq Epi: x / Non Sq Epi: x / Bacteria: x        PT/INR - ( 02 Apr 2024 06:51 )   PT: 12.8 sec;   INR: 1.23 ratio         PTT - ( 02 Apr 2024 06:51 )  PTT:29.8 sec          CAPILLARY BLOOD GLUCOSE

## 2024-04-02 NOTE — PROGRESS NOTE ADULT - ASSESSMENT
88M with h/o COPD (home O2) and Afib (on Xarelto) and PPM who presents with fever and SOB. Pt found to have severe sepsis and Strept lutetiensis bacteremia. Pt transferred to [facility] for lead extraction.

## 2024-04-03 ENCOUNTER — TRANSCRIPTION ENCOUNTER (OUTPATIENT)
Age: 89
End: 2024-04-03

## 2024-04-03 ENCOUNTER — APPOINTMENT (OUTPATIENT)
Dept: PULMONOLOGY | Facility: CLINIC | Age: 89
End: 2024-04-03

## 2024-04-03 LAB
GRAM STN FLD: SIGNIFICANT CHANGE UP
NIGHT BLUE STAIN TISS: SIGNIFICANT CHANGE UP
SPECIMEN SOURCE: SIGNIFICANT CHANGE UP
SPECIMEN SOURCE: SIGNIFICANT CHANGE UP

## 2024-04-03 PROCEDURE — 99232 SBSQ HOSP IP/OBS MODERATE 35: CPT

## 2024-04-03 PROCEDURE — 99233 SBSQ HOSP IP/OBS HIGH 50: CPT

## 2024-04-03 PROCEDURE — 71046 X-RAY EXAM CHEST 2 VIEWS: CPT | Mod: 26

## 2024-04-03 RX ADMIN — BUDESONIDE AND FORMOTEROL FUMARATE DIHYDRATE 2 PUFF(S): 160; 4.5 AEROSOL RESPIRATORY (INHALATION) at 18:51

## 2024-04-03 RX ADMIN — CHLORHEXIDINE GLUCONATE 1 APPLICATION(S): 213 SOLUTION TOPICAL at 13:11

## 2024-04-03 RX ADMIN — Medication 650 MILLIGRAM(S): at 22:16

## 2024-04-03 RX ADMIN — TIOTROPIUM BROMIDE 2 PUFF(S): 18 CAPSULE ORAL; RESPIRATORY (INHALATION) at 11:29

## 2024-04-03 RX ADMIN — AMLODIPINE BESYLATE 5 MILLIGRAM(S): 2.5 TABLET ORAL at 05:27

## 2024-04-03 RX ADMIN — BUDESONIDE AND FORMOTEROL FUMARATE DIHYDRATE 2 PUFF(S): 160; 4.5 AEROSOL RESPIRATORY (INHALATION) at 05:27

## 2024-04-03 RX ADMIN — CEFTRIAXONE 100 MILLIGRAM(S): 500 INJECTION, POWDER, FOR SOLUTION INTRAMUSCULAR; INTRAVENOUS at 00:06

## 2024-04-03 RX ADMIN — Medication 650 MILLIGRAM(S): at 21:46

## 2024-04-03 RX ADMIN — CEFTRIAXONE 100 MILLIGRAM(S): 500 INJECTION, POWDER, FOR SOLUTION INTRAMUSCULAR; INTRAVENOUS at 20:42

## 2024-04-03 RX ADMIN — Medication 75 MILLIGRAM(S): at 06:30

## 2024-04-03 RX ADMIN — TAMSULOSIN HYDROCHLORIDE 0.8 MILLIGRAM(S): 0.4 CAPSULE ORAL at 21:47

## 2024-04-03 RX ADMIN — Medication 3 MILLIGRAM(S): at 21:46

## 2024-04-03 RX ADMIN — LOSARTAN POTASSIUM 100 MILLIGRAM(S): 100 TABLET, FILM COATED ORAL at 06:30

## 2024-04-03 RX ADMIN — Medication 1000 MILLIGRAM(S): at 00:00

## 2024-04-03 RX ADMIN — Medication 325 MILLIGRAM(S): at 11:28

## 2024-04-03 NOTE — PROGRESS NOTE ADULT - PROBLEM SELECTOR PLAN 10
VTE ppx: home xarelto on hold pending procedure. scd for now
VTE ppx: hold xarelto for PPM extraction scheduled Tuesday 4/2. SCDs
VTE ppx: hold xarelto for now
VTE ppx: hold xarelto after Saturday's evening dose for PPM extraction scheduled Tuesday 4/2. SCDs

## 2024-04-03 NOTE — PROGRESS NOTE ADULT - ASSESSMENT
88M with h/o COPD (home O2) and Afib (on Xarelto) and PPM who presents with fever and SOB. Pt found to have severe sepsis and Strept lutetiensis bacteremia. Pt transferred to [facility] for lead extraction.

## 2024-04-03 NOTE — PROGRESS NOTE ADULT - PROBLEM SELECTOR PROBLEM 4
Abnormal chest CT

## 2024-04-03 NOTE — PROGRESS NOTE ADULT - PROBLEM SELECTOR PLAN 6
- PPM ~2013,  - home med Xarelto 20 mg once a day ; hold after Saturday's evening dose for PPM extraction scheduled Tuesday 4/2
- PPM ~2013,  - home med Xarelto 20 mg once a day ; on hold for PPM extraction now scheduled for Monday 4/1
- PPM ~2013,  - home med Xarelto 20 mg once a day ; hold after Saturday's evening dose for PPM extraction scheduled Tuesday 4/2

## 2024-04-03 NOTE — DISCHARGE NOTE PROVIDER - CARE PROVIDERS DIRECT ADDRESSES
,[email or web address]_Unknown,[email or web address]

## 2024-04-03 NOTE — DISCHARGE NOTE PROVIDER - HOSPITAL COURSE
HPI: 88M with h/o COPD (home Ox) and Afib (on Xarelto) and PPM who presents with fever and SOB. Pt found to have severe sepsis w/temp 104F rectal, tachycardia, elevated lactate with BCx +. 4/4 +strept lutetiensis - currently receiving ceftriaxone.  Pt transferred to [facility] for lead extraction.     Hospital Course:  Patient admitted for bacteremia, blood cultures from 3/22/2024 with strep lutetiensis. Repeat blood cultures with no growth to date.   Patient on Ceftriaxone with infectious disease following. Patient is status post pacemaker extraction on 4/2/2024. EP signed off, with plan to resume Xarelto on evening of 4/4/2024.    Important Medication Changes and Reason:  Continue Ceftriaxone 2gm Q24 hour per infectious disease team until 4/22/2024.     Active or Pending Issues Requiring Follow-up:  Follow-up with PCP within one week.  Follow-up with cardiologist.  Follow-up with infectious disease at end of treatment- antibiotic completion date: 4/22/2024.   Follow-up with gastroenterology outpatient for outpatient colonoscopy.     Advanced Directives:   [X] Full code  [ ] DNR  [ ] Hospice    Discharge Diagnoses:  Bacteremia        HPI: 88M with h/o COPD (home Ox) and Afib (on Xarelto) and PPM who presents with fever and SOB. Pt found to have severe sepsis w/temp 104F rectal, tachycardia, elevated lactate with BCx +. 4/4 +strept lutetiensis - currently receiving ceftriaxone.  Pt transferred to [facility] for lead extraction.     Hospital Course:  Patient admitted for bacteremia, blood cultures from 3/22/2024 with strep lutetiensis. Repeat blood cultures with no growth to date.   Patient on Ceftriaxone with infectious disease following. Patient is status post pacemaker extraction on 4/2/2024. EP signed off, with plan to resume Xarelto on evening of 4/4/2024.    Important Medication Changes and Reason:  Continue Ceftriaxone 2gm Q24 hour per infectious disease team until 4/25/2024.     Active or Pending Issues Requiring Follow-up:  Follow-up with PCP within one week.  Follow-up with cardiologist.  Follow-up with infectious disease doctor as patient is getting intravenous antibiotics.   Follow-up with gastroenterology outpatient for outpatient colonoscopy.     Advanced Directives:   [X] Full code  [ ] DNR  [ ] Hospice    Discharge Diagnoses:  Bacteremia       Request from Dr. Humphries to facilitate patient discharge. Medication reconciliation reviewed, revised and resolved with Dr. Humphries, who has medically cleared patient for discharge with follow-up as advised.

## 2024-04-03 NOTE — PROGRESS NOTE ADULT - PROBLEM SELECTOR PLAN 7
-home o2 3-4L at baseline.   -albuterol prn
MD at bedside for reevaluation.
-home o2 3-4L at baseline.   -albuterol prn

## 2024-04-03 NOTE — PROGRESS NOTE ADULT - ASSESSMENT
88M PMH longstanding persistent Afib on Eliquis s/p SJM PPM, COPD on 3-3.5L NC at home admitted to [facility] for fever/chills/SOB; found to have streptococcus lutetiensis (4/4 bottles) bacteremia iso dental procedure. Evaluated by EP for pacemaker lead extraction. Currently patient asymptomatic. TTE showing mildly decreased EF 45-50%. Pt transferred to [facility] for lead extraction with Micra placement. Patient DID received rivaroxaban at 17:00 on 3/28 prior to transfer.     Abbott  Assurity MRI 2272  (10/13/2022)  RA Lead Tendril SDX- 1688T/46cm (5/312005)  RV Lead  5092 (5/31/2005)    1. Strep lutetiensis bacteremia 4/4 bottles  2. Longstanding persistent AF w/ HB s/p PPM (2005)  3. COPD on home O2 (3.5L)    - POD #1 s/p PPM extraction/[employer] Micra implant  - Interrogation performed by device rep with normal PPM function  - Await PA/Lat CXR this AM  - Post PPM teaching enforced with patient   - Resume Xarelto Thursday evening, 4/4  - No Heparin or Lovenox   - Patient to follow up in EP clinic for wound check as scheduled - to be called with appt  - Awaiting PICC placement  - Continue antibiotics per ID, currently on Ceftriaxone   - Await CXR, but EP to sign off - reconsult PRN

## 2024-04-03 NOTE — DISCHARGE NOTE PROVIDER - CONDITIONS AT DISCHARGE
Request from Dr. Humphries to facilitate patient discharge. Medication reconciliation reviewed, revised and resolved with Dr. Humphries, who has medically cleared patient for discharge with follow-up as advised.

## 2024-04-03 NOTE — PROGRESS NOTE ADULT - PROBLEM SELECTOR PLAN 5
c/w tamsulosin 0.8 mg at bedtime.

## 2024-04-03 NOTE — PROGRESS NOTE ADULT - SUBJECTIVE AND OBJECTIVE BOX
[facility] Division of Hospital Medicine  Trudy Humphries MD  Available on TEAMS 8a-8p    Patient is a 88y old  Male who presents with a chief complaint of bc      SUBJECTIVE / OVERNIGHT EVENTS: No acute events[facility]  ADDITIONAL REVIEW OF SYSTEMS: negative    MEDICATIONS  (STANDING):  amLODIPine   Tablet 5 milliGRAM(s) Oral daily  budesonide  80 MICROgram(s)/formoterol 4.5 MICROgram(s) Inhaler 2 Puff(s) Inhalation two times a day  cefTRIAXone   IVPB 2000 milliGRAM(s) IV Intermittent every 24 hours  chlorhexidine 2% Cloths 1 Application(s) Topical daily  ferrous    sulfate 325 milliGRAM(s) Oral daily  losartan 100 milliGRAM(s) Oral daily  metoprolol succinate ER 75 milliGRAM(s) Oral daily  tamsulosin 0.8 milliGRAM(s) Oral at bedtime  tiotropium 2.5 MICROgram(s) Inhaler 2 Puff(s) Inhalation daily    MEDICATIONS  (PRN):  acetaminophen     Tablet .. 650 milliGRAM(s) Oral every 6 hours PRN Temp greater or equal to 38C (100.4F), Mild Pain (1 - 3), Moderate Pain (4 - 6), Severe Pain (7 - 10)  albuterol    90 MICROgram(s) HFA Inhaler 2 Puff(s) Inhalation every 6 hours PRN Shortness of Breath and/or Wheezing  aluminum hydroxide/magnesium hydroxide/simethicone Suspension 30 milliLiter(s) Oral every 4 hours PRN Dyspepsia  melatonin 3 milliGRAM(s) Oral at bedtime PRN Insomnia  ondansetron Injectable 4 milliGRAM(s) IV Push every 8 hours PRN Nausea and/or Vomiting    PHYSICAL EXAM:  Vital Signs Last 24 Hrs  T(C): 36.6 (03 Apr 2024 11:16), Max: 37 (02 Apr 2024 17:02)  T(F): 97.9 (03 Apr 2024 11:16), Max: 98.6 (02 Apr 2024 17:02)  HR: 70 (03 Apr 2024 11:16) (69 - 72)  BP: 104/71 (03 Apr 2024 11:16) (97/53 - 139/74)  BP(mean): 82 (02 Apr 2024 22:00) (70 - 82)  RR: 18 (03 Apr 2024 11:16) (17 - 18)  SpO2: 97% (03 Apr 2024 11:16) (91% - 98%)    Parameters below as of 03 Apr 2024 11:16  Patient On (Oxygen Delivery Method): nasal cannula  O2 Flow (L/min): 3.5        CONSTITUTIONAL: NAD, well-developed, well-groomed  EYES: PERRLA; conjunctiva and sclera clear  ENMT: Moist oral mucosa, no pharyngeal injection or exudates  NECK: Supple, no palpable masses; no thyromegaly  RESPIRATORY: Normal respiratory effort; lungs are clear to auscultation bilaterally  CARDIOVASCULAR: Regular rate and rhythm, normal S1 and S2, no murmur/rub/gallop; No lower extremity edema  L chest wall wound intact  ABDOMEN: Nontender to palpation, normoactive bowel sounds, no rebound/guarding; No hepatosplenomegaly  MUSCULOSKELETAL:  No clubbing or cyanosis of digits; no joint swelling or tenderness to palpation  PSYCH: A+O to person, place, and time; affect appropriate  NEUROLOGY: CN 2-12 are intact and symmetric; no gross sensory deficits   SKIN: No rashes; no palpable lesions    LABS: reviewed                                    10.5   5.35  )-----------( 186      ( 02 Apr 2024 06:51 )             32.3       04-02    135  |  100  |  15  ----------------------------<  85  4.2   |  21<L>  |  0.86    Ca    9.0      02 Apr 2024 06:56                Urinalysis Basic - ( 02 Apr 2024 06:56 )    Color: x / Appearance: x / SG: x / pH: x  Gluc: 85 mg/dL / Ketone: x  / Bili: x / Urobili: x   Blood: x / Protein: x / Nitrite: x   Leuk Esterase: x / RBC: x / WBC x   Sq Epi: x / Non Sq Epi: x / Bacteria: x        PT/INR - ( 02 Apr 2024 06:51 )   PT: 12.8 sec;   INR: 1.23 ratio         PTT - ( 02 Apr 2024 06:51 )  PTT:29.8 sec          CAPILLARY BLOOD GLUCOSE

## 2024-04-03 NOTE — DISCHARGE NOTE PROVIDER - NSDCMRMEDTOKEN_GEN_ALL_CORE_FT
albuterol 90 mcg/inh inhalation aerosol: 2 puff(s) inhaled 4 times a day, As Needed  ascorbic acid 500 mg oral tablet: 1 tab(s) orally once a day  CBC and CMP weekly while on IV antibiotic therapy, to be faxed to [phone].: CBC and CMP weekly while on IV antibiotic therapy. Fax to [phone].  cefTRIAXone 2 g injection: 2 gram(s) intravenously once a day End Date: 04/25/2024, follow-up with infectious disease provider Dr. Garcia.  docusate sodium 100 mg oral capsule: 1 cap(s) orally 3 times a day  ferrous sulfate 325 mg (65 mg elemental iron) oral tablet: 1 tab(s) orally once a day  Flomax 0.4 mg oral capsule: 2 cap(s) orally once a day (at bedtime)  ipratropium-albuterol 0.5 mg-2.5 mg/3 mL inhalation solution: 3 milliliter(s) inhaled every 6 hours as needed for SOB/wheezing  losartan 50 mg oral tablet: 2 tab(s) orally once a day losartan 100 mg once a day in the morning  mupirocin 2% topical ointment: Apply topically to affected area 2 times a day  Norvasc 5 mg oral tablet: 1 tab(s) orally once a day  Toprol-XL 25 mg oral tablet, extended release: 1 tab(s) orally once a day (at bedtime)  Toprol-XL 50 mg oral tablet, extended release: 1 tab(s) orally once a day  Trelegy Ellipta 100 mcg-62.5 mcg-25 mcg/inh inhalation powder: 1 puff(s) inhaled once a day  Xarelto 20 mg oral tablet: 1 tab(s) orally once a day   acetaminophen 325 mg oral tablet: 2 tab(s) orally every 6 hours As needed Temp greater or equal to 38C (100.4F), Mild Pain (1 - 3), Moderate Pain (4 - 6), Severe Pain (7 - 10)  albuterol 90 mcg/inh inhalation aerosol: 2 puff(s) inhaled 4 times a day, As Needed  ascorbic acid 500 mg oral tablet: 1 tab(s) orally once a day  cefTRIAXone 2 g injection: 2 gram(s) intravenously once a day End Date: 04/25/2024, follow-up with infectious disease provider Dr. Garcia.  ferrous sulfate 325 mg (65 mg elemental iron) oral tablet: 1 tab(s) orally once a day  Flomax 0.4 mg oral capsule: 2 cap(s) orally once a day (at bedtime)  ipratropium-albuterol 0.5 mg-2.5 mg/3 mL inhalation solution: 3 milliliter(s) inhaled every 6 hours as needed for SOB/wheezing  losartan 50 mg oral tablet: 2 tab(s) orally once a day losartan 100 mg once a day in the morning  Norvasc 5 mg oral tablet: 1 tab(s) orally once a day  Toprol-XL 25 mg oral tablet, extended release: 1 tab(s) orally once a day (at bedtime)  Toprol-XL 50 mg oral tablet, extended release: 1 tab(s) orally once a day  Trelegy Ellipta 100 mcg-62.5 mcg-25 mcg/inh inhalation powder: 1 puff(s) inhaled once a day  Xarelto 20 mg oral tablet: 1 tab(s) orally once a day

## 2024-04-03 NOTE — PROGRESS NOTE ADULT - SUBJECTIVE AND OBJECTIVE BOX
24H hour events: No acute events    MEDICATIONS:  amLODIPine   Tablet 5 milliGRAM(s) Oral daily  losartan 100 milliGRAM(s) Oral daily  metoprolol succinate ER 75 milliGRAM(s) Oral daily  cefTRIAXone   IVPB 2000 milliGRAM(s) IV Intermittent every 24 hours  albuterol    90 MICROgram(s) HFA Inhaler 2 Puff(s) Inhalation every 6 hours PRN  budesonide  80 MICROgram(s)/formoterol 4.5 MICROgram(s) Inhaler 2 Puff(s) Inhalation two times a day  tiotropium 2.5 MICROgram(s) Inhaler 2 Puff(s) Inhalation daily  acetaminophen     Tablet .. 650 milliGRAM(s) Oral every 6 hours PRN  melatonin 3 milliGRAM(s) Oral at bedtime PRN  aluminum hydroxide/magnesium hydroxide/simethicone Suspension 30 milliLiter(s) Oral every 4 hours PRN  chlorhexidine 2% Cloths 1 Application(s) Topical daily  ferrous    sulfate 325 milliGRAM(s) Oral daily  tamsulosin 0.8 milliGRAM(s) Oral at bedtime      REVIEW OF SYSTEMS:  Complete 12 point ROS negative.    PHYSICAL EXAM:  T(C): 36.3 (04-03-24 @ 04:16), Max: 37 (04-02-24 @ 17:02)  HR: 70 (04-03-24 @ 06:25) (69 - 72)  BP: 109/64 (04-03-24 @ 06:25) (97/53 - 139/74)  RR: 18 (04-03-24 @ 06:25) (17 - 18)  SpO2: 97% (04-03-24 @ 06:25) (91% - 98%)  Wt(kg): --  I&O's Summary    02 Apr 2024 07:01  -  03 Apr 2024 07:00  --------------------------------------------------------  IN: 290 mL / OUT: 1200 mL / NET: -910 mL        Appearance: Normal	  HEENT:  PERRL, EOMI	  Cardiovascular: Normal S1 S2, No JVD, No murmurs, No edema  Respiratory: Lungs clear to auscultation	  Psychiatry: A & O x 3, Mood & affect appropriate  Gastrointestinal:  Soft, Non-tender, + BS	  Skin: Left chest incision C/D/I, No rashes, No ecchymoses, No hematoma	  Neurologic: Non-focal  Extremities: Right groin suture removed, No hematoma  Vascular: Peripheral pulses palpable 2+ bilaterally        LABS:	 	    CBC Full  -  ( 02 Apr 2024 06:51 )  WBC Count : 5.35 K/uL  Hemoglobin : 10.5 g/dL  Hematocrit : 32.3 %  Platelet Count - Automated : 186 K/uL  Mean Cell Volume : 102.2 fl  Mean Cell Hemoglobin : 33.2 pg  Mean Cell Hemoglobin Concentration : 32.5 gm/dL  Auto Neutrophil # : x  Auto Lymphocyte # : x  Auto Monocyte # : x  Auto Eosinophil # : x  Auto Basophil # : x  Auto Neutrophil % : x  Auto Lymphocyte % : x  Auto Monocyte % : x  Auto Eosinophil % : x  Auto Basophil % : x    04-02    135  |  100  |  15  ----------------------------<  85  4.2   |  21<L>  |  0.86    Ca    9.0      02 Apr 2024 06:56      TELEMETRY: AF/ 70s	      < from: TTE W or WO Ultrasound Enhancing Agent (03.25.24 @ 14:41) >  TRANSTHORACIC ECHOCARDIOGRAM REPORT  ________________________________________________________________________________                                      _______       Pt. Name:       ASHLEY KENDALL Study Date:    3/25/2024  MRN:            [id number]          YOB: 1935  Accession #:    [id number]         Age:           88 years  Account#:       [id number]          Gender:        M  Heart Rate:     62 bpm            Height:        73.00 in (185.42 cm)  Rhythm:     Weight:        190.00 lb (86.18 kg)  Blood Pressure: 162/75 mmHg       BSA/BMI:       2.11 m² / 25.07 kg/m²  ________________________________________________________________________________________  Referring Physician:    [id number] Terrie Skinner  Interpreting Physician: Binu Dahs M.D.  Primary Sonographer:    Ida Jones RALPH    CPT:               ECHO TTE WO CON COMP W DOPP - 91829.m  Indication(s):     Dyspnea, unspecified - R06.00  Procedure:         Transthoracic echocardiogram with 2-D, M-mode and complete                     spectral and color flow Doppler.  Ordering Location: [facility]  Admission Status:  Inpatient  Study Information: Image quality for this study is fair.    _______________________________________________________________________________________     CONCLUSIONS:      1. The left ventricular cavity is normal in size. Left ventricular wall thickness is normal. Left ventricular systolic function is mildly decreased with an ejection fraction visually estimated at 45 to 50%. There is global left ventricular hypokinesis.   2. The right ventricular cavity is enlarged in size and reduced systolic function. A device lead is visualized in the right heart.   3. Structurally normal mitral valve with normal leaflet excursion. There is calcification of the mitral valve annulus. There is mild mitral regurgitation.   4. The aortic valve anatomy cannot be determined with reduced systolic excursion. There is calcification of the aortic valve leaflets. The peak transaortic velocity is 2.41 m/s, peak transaortic gradient is 23.2 mmHg and mean transaortic gradient is 12.0 mmHg with an LVOT/aortic valve VTI ratio of 0.45. Probable mild aortic stenosis. There is trace aortic regurgitation.   5. The left atrium is moderately dilated with an indexed volume of 47 ml/m².   6. The right atrium is severely dilated with an indexed volume of 57.95 ml/m² and an indexed area of 14.72 cm²/m².    ________________________________________________________________________________________  FINDINGS:     Left Ventricle:  The left ventricular cavity is normal in size. Left ventricular wall thickness is normal. Left ventricular systolic function is mildly decreased with an ejection fraction visually estimated at 45 to 50%. There is global left ventricular hypokinesis.     Right Ventricle:  The right ventricular cavity is enlarged in size and reduced systolic function. A device lead is visualized in the right heart.     Left Atrium:  The left atrium is moderately dilated with an indexed volume of 47 ml/m².     Right Atrium:  The right atrium is severely dilated with an indexed volume of 57.95 ml/m² and an indexed area of 14.72 cm²/m².     Aortic Valve:  The aortic valve anatomy cannot be determined with reduced systolic excursion. There is calcification of the aortic valve leaflets. The peak transaortic velocity is 2.41 m/s, peak transaortic gradient is 23.2 mmHg and mean transaortic gradient is 12.0 mmHg with an LVOT/aortic valve VTI ratio of 0.45. Probable mild aortic stenosis. There is trace aortic regurgitation.     Mitral Valve:  Structurally normal mitral valve with normal leaflet excursion. There is calcification of the mitral valve annulus. There is mild mitral regurgitation.     Tricuspid Valve:  Structurally normal tricuspid valve with normal leaflet excursion. There is mild tricuspid regurgitation.     Pulmonic Valve:  Structurally normal pulmonic valve with normal leaflet excursion. There is trace pulmonic regurgitation.     Aorta:  The aortic root appears normal in size. The aortic root at the sinuses of Valsalva is normal in size, measuring 3.35 cm (indexed 1.59 cm/m²).     Pericardium:  No pericardial effusion seen.     Systemic Veins:  The inferior vena cava is dilated measuring 2.70 cm in diameter, (dilated >2.1cm) with abnormal inspiratory collapse (abnormal <50%) consistent with elevated right atrial pressure (~15, range 10-20mmHg).  ____________________________________________________________________  QUANTITATIVE DATA:  Left Ventricle Measurements: (Indexed to BSA)     IVSd (2D):   1.1 cm  LVPWd (2D):  1.0 cm  LVIDd (2D):  5.3 cm  LVIDs (2D):  3.6 cm  LV Mass:     202 g  96.1 g/m²  Visualized LV EF%: 45 to 50%     MV E Vmax:    1.15 m/s  MV A Vmax:    0.34 m/s  MV E/A:       3.34  e' lateral:   18.40 cm/s  e' medial:    9.25 cm/s  E/e' lateral: 6.25  E/e' medial:  12.43  E/e' Average: 8.32  MV DT:        165 msec    Aorta Measurements: (Normal range) (Indexed to BSA)     Sinuses of Valsalva: 3.35 cm (3.1 - 3.7 cm)       LeftAtrium Measurements: (Indexed to BSA)  LA Diam 2D:        4.50 cm  LA Vol s, MOD A4C: 98.20 ml.  LA Vol s, MOD A2C: 74.40 ml.  LA Vol s, MOD BP:  87.60 ml  41.61 ml/m²    Right Ventricle Measurements: Right Atrial Measurements:     TV Ramon. S':      9.79 cm/s    RA Vol:       122.00 ml  RV Base (RVID1): 5.1 cm       RA Vol Index: 57.95 ml/m²  RV Mid (RVID2):  3.6 cm       LVOT / RVOT/ Qp/Qs Data: (Indexed to BSA)  LVOT Vmax: 1.10 m/s  LVOT VTI:  18.90 cm    Aortic Valve Measurements:  AV Vmax:           2.4 m/s  AV Peak Gradient:       23.2 mmHg  AV Mean Gradient:       12.0 mmHg  AV VTI:                 42.1 cm  AV VTI Ratio:           0.45  AoV Dimensionless Index 0.45    Mitral Valve Measurements:     MV Vmax:      1.46 m/s  MV VTI, ramon   0.337 m  MV Mean Grad: 2.0 mmHg  MV Peak Grad: 8.5 mmHg  MV E Vmax:    1.2 m/s  MV A Vmax:    0.3 m/s  MV E/A:       3.3       Tricuspid Valve Measurements:     RA Pressure: 15 mmHg    < end of copied text >  	     You can access the [facility] Patient Portal, offered by [facility], by registering with the following website: [email or web address]

## 2024-04-03 NOTE — PROGRESS NOTE ADULT - PROBLEM SELECTOR PLAN 1
- presented with tachycardia, fever, +lactate 4, +leukocytosis  - RVP covid, neg; legionella neg, mrsa neg, strep detected  -bld cx with strep lutetiensis, repeat blood cx drawn 3/25, no growth 24 hrs  - currently on Ceftriaxone (3/25-)  - CT a/p 3/27 w/o abdominal source of infection  - EP planning for PPM extraction 4/2, CAREN to r/o endocarditis  - f/u ID recs. Plan for CTX 2g ->4/22; midline ordered - presented with tachycardia, fever, +lactate 4, +leukocytosis  - RVP covid, neg; legionella neg, mrsa neg, strep detected  -bld cx with strep lutetiensis, repeat blood cx drawn 3/25, no growth 24 hrs  - currently on Ceftriaxone (3/25-)  - CT a/p 3/27 w/o abdominal source of infection  - EP planning for PPM extraction 4/2, CAREN to r/o endocarditis  - f/u ID recs. Plan for CTX 2g ->4/22; midline ordered  - GI consult placed per ID recs

## 2024-04-03 NOTE — PROGRESS NOTE ADULT - NSPROGADDITIONALINFOA_GEN_ALL_CORE
41 minutes spent on total encounter. The necessity of the time spent during the encounter on this date of service was due to:     - preparing to see the patient (eg, review of tests, documents)  - performing a medically appropriate examination and/or evaluation  - referring and communicating with other health care professionals  - documenting clinical information in the electronic or other health record  - care coordination.
42 minutes spent on total encounter. The necessity of the time spent during the encounter on this date of service was due to:     - preparing to see the patient (eg, review of tests, documents)  - performing a medically appropriate examination and/or evaluation  - referring and communicating with other health care professionals  - documenting clinical information in the electronic or other health record  - care coordination.
time spent reviewing prior charts, meds, discussing plan with patient= 51 minutes    d/w ACP Nichol
D/W pts daughter at bedside  DCP to home tomorrow    52 minutes spent on total encounter. The necessity of the time spent during the encounter on this date of service was due to:     - preparing to see the patient (eg, review of tests, documents)  - performing a medically appropriate examination and/or evaluation  - referring and communicating with other health care professionals  - documenting clinical information in the electronic or other health record  - care coordination.

## 2024-04-03 NOTE — DISCHARGE NOTE PROVIDER - NSDCFUADDAPPT_GEN_ALL_CORE_FT
APPTS ARE READY TO BE MADE: [ ] YES    Best Family or Patient Contact (if needed):    Additional Information about above appointments (if needed):    1: Follow-up with PCP within one week.  2. Follow-up with electrophysiologist and cardiologist.   3. Follow-up with infectious disease at end of treatment- antibiotic completion date: 4/22/2024.   4. Follow-up with gastroenterology outpatient for outpatient colonoscopy.     Other comments or requests:    APPTS ARE READY TO BE MADE: [X] YES    Best Family or Patient Contact (if needed):    Additional Information about above appointments (if needed):    1: Follow-up with PCP within one week.  2. Follow-up with electrophysiologist and cardiologist.   3. Follow-up with infectious disease doctor, as patient is on IV antibiotics.   4. Follow-up with gastroenterology outpatient for outpatient colonoscopy.     Other comments or requests:    APPTS ARE READY TO BE MADE: [X] YES    Best Family or Patient Contact (if needed):    Additional Information about above appointments (if needed):    1: Follow-up with PCP within one week.  2. Follow-up with electrophysiologist and cardiologist.   3. Follow-up with infectious disease doctor, as patient is on IV antibiotics.   4. Follow-up with gastroenterology outpatient for outpatient colonoscopy.     Other comments or requests:     Prior to outreaching the patient, it was visible that the patient has secured a follow up appointment which was not scheduled by our team. Patient is scheduled with Dr. Garcia 5/20/24 10:30am   APPTS ARE READY TO BE MADE: [X] YES    Best Family or Patient Contact (if needed):    Additional Information about above appointments (if needed):    1: Follow-up with PCP within one week.  2. Follow-up with electrophysiologist and cardiologist.   3. Follow-up with infectious disease doctor, as patient is on IV antibiotics.   4. Follow-up with gastroenterology outpatient for outpatient colonoscopy.     Other comments or requests:     Prior to outreaching the patient, it was visible that the patient has secured a follow up appointment which was not scheduled by our team. Patient is scheduled with Dr. Garcia 5/20/24 10:30am    Provided patient with provider referral information, however patient prefers to schedule the appointments on their own.

## 2024-04-03 NOTE — PROGRESS NOTE ADULT - ASSESSMENT
87 y/o M w/ hx of Afib on Eliquis w/ PPM, COPD on 3-3.5L NC (on pred?) daily presenting to ED with chills and shortness of breath that started suddenly. He was feeling well until 3/23. He recently went for deep dental cleaning within the past month and has partial dentures as well. Patient was found to meet sepsis criteria on admission with fever and leukocytosis. His blood cultures grew Streptococcus lutetiensis in 4/4 bottles. Patient has PPM and also with known R hip replacement, has been able to ambulate with cane as usual. UA with pyuria and UCx with Enterobacter cloacae, however patient without urinary symptoms. TTE was unable to exclude IE.    Micro:  BCx (3/22): Streptococcus lutetiensis (4/4 bottles)  BCx (3/25): no growth  UCx (3/22): E. cloacae    Abx:  Cefepime (3/22)  Zosyn (3/23-3/25)  Ceftriaxone (3/23, 3/25-)  on - IV ceftriaxone 2g qD  Overall, high grade Streptococcus bacteremia, recent dental work, known PPM, R hip replacement, asymptomatic bacteruria also recently had exploration of PPM pocket at [facility]     Suggest:    TTE reviewed  no definitive endocarditis but not definitive   Follow up BC are negative   no symptoms of UTI   will need line to complete at least  4 weeks of ab   CAREN on hold for now     needs picc or midline   This organism is associated with colon pathology and he has a hs of polyp- needs GI input

## 2024-04-03 NOTE — PROGRESS NOTE ADULT - PROBLEM SELECTOR PLAN 2
-stable. B12 and B9 wnl.

## 2024-04-03 NOTE — DISCHARGE NOTE PROVIDER - NSDCCPCAREPLAN_GEN_ALL_CORE_FT
PRINCIPAL DISCHARGE DIAGNOSIS  Diagnosis: Severe sepsis  Assessment and Plan of Treatment: Patient admitted for bacteremia, blood cultures from 3/22/2024 with strep lutetiensis. Repeat blood cultures with no growth to date.   Patient on Ceftriaxone with infectious disease following. Patient is status post pacemaker extraction on 4/2/2024. EP signed off, with plan to resume Xarelto on evening of 4/4/2024.  Important Medication Changes and Reason:  Continue Ceftriaxone 2gm Q24 hour per infectious disease team until 4/22/2024.   Active or Pending Issues Requiring Follow-up:  Follow-up with PCP within one week.  Follow-up with cardiologist.  Follow-up with infectious disease at end of treatment- antibiotic completion date: 4/22/2024.   Follow-up with gastroenterology outpatient for outpatient colonoscopy.        PRINCIPAL DISCHARGE DIAGNOSIS  Diagnosis: Severe sepsis  Assessment and Plan of Treatment: Patient admitted for bacteremia, blood cultures from 3/22/2024 with strep lutetiensis. Repeat blood cultures with no growth to date.   Patient on Ceftriaxone with infectious disease following. Patient is status post pacemaker extraction on 4/2/2024. EP signed off, with plan to resume Xarelto on evening of 4/4/2024.  Important Medication Changes and Reason:  Continue Ceftriaxone 2gm Q24 hour per infectious disease team until 4/25/2024.   Active or Pending Issues Requiring Follow-up:  Follow-up with PCP within one week.  Follow-up with cardiologist.  Follow-up with infectious disease docotor as patient is getting intravenous antibiotics.   Follow-up with gastroenterology outpatient for outpatient colonoscopy.

## 2024-04-03 NOTE — DISCHARGE NOTE NURSING/CASE MANAGEMENT/SOCIAL WORK - NSDCVIVACCINE_GEN_ALL_CORE_FT
influenza, injectable, quadrivalent, preservative free; 03-Oct-2018 10:13; Bony Gamino (MARCO); [place]; T57EA (Exp. Date: 30-Jun-2019); IntraMuscular; Deltoid Right.; 0.5 milliLiter(s); VIS (VIS Published: 07-Aug-2015, VIS Presented: 03-Oct-2018);

## 2024-04-03 NOTE — ADVANCED PRACTICE NURSE CONSULT - REASON FOR CONSULT
Vascular Access Team    Evaluation for: Bedside SL PICC placement  Requested by name: Ida Mccoy  Date/Time: 4/3 @08:57    Indication: CTX for 6 weeks  Allergy to CHG or Heparin or Lidocaine: nka    Platelets(>20): 186  INR(<3): 1.23  eGFR(>40): 84  Blood cultures sent: none  Blood culture negative in 48hrs: n/a  Anticoagulants/ antipletelets: no  Arms DVT/ Mastectomy/ Fistula/ PPM/ Defib: micra ppm  IR or Nephrology or ID clearance needed: no    Consent obtained: no    Pending: consent    Plan: Bedside picc order evaluated. Please obtain PICC placement consent and then call [phone] for the VAT RN to place the bedside picc.  
Bedside midline order placed.   Indication: long term antibiotics  
Requested by staff to assess skin status: sacrum and b/l heels. PMH is noted:  88M with h/o COPD (home Ox) and Afib (on Xarelto) and PPM who presents with fever and SOB. Pt found to have severe sepsis w/temp 104F rectal, tachycardia, elevated lactate with BCx +. 4/4 +strept lutetiensis - currently receiving ceftriaxone and pending CAREN and surveillance cultures.  Pt transferred to [facility] for lead extraction.

## 2024-04-03 NOTE — PROGRESS NOTE ADULT - PROBLEM SELECTOR PROBLEM 3
Demand ischemia of myocardium

## 2024-04-03 NOTE — DISCHARGE NOTE PROVIDER - NSDCFUSCHEDAPPT_GEN_ALL_CORE_FT
[facility] Physician [facility]  ELECTROPH [address]  Scheduled Appointment: 04/18/2024     [place] Physician [facility]  ELECTROPH [address]  Scheduled Appointment: 04/18/2024    Haroldo Garcia  [place] Physician [facility]  INFDISEASE [address]  Scheduled Appointment: 05/20/2024

## 2024-04-03 NOTE — DISCHARGE NOTE PROVIDER - ATTENDING DISCHARGE PHYSICAL EXAMINATION:
Vital Signs Last 24 Hrs  T(C): 36.6 (04 Apr 2024 11:04), Max: 36.6 (04 Apr 2024 11:04)  T(F): 97.9 (04 Apr 2024 11:04), Max: 97.9 (04 Apr 2024 11:04)  HR: 73 (04 Apr 2024 11:04) (71 - 73)  BP: 101/58 (04 Apr 2024 11:04) (94/57 - 130/71)  BP(mean): --  RR: 18 (04 Apr 2024 11:04) (18 - 18)  SpO2: 96% (04 Apr 2024 11:04) (96% - 97%)    Parameters below as of 04 Apr 2024 11:04  Patient On (Oxygen Delivery Method): nasal cannula  O2 Flow (L/min): 3.5  CONSTITUTIONAL: NAD, well-appearing elderly man  EYES: PERRLA; conjunctiva and sclera clear  ENMT: Moist oral mucosa, no pharyngeal injection or exudates  NECK: Supple, no palpable masses; no thyromegaly  RESPIRATORY: Normal respiratory effort; lungs are clear to auscultation bilaterally, chronic NC  CARDIOVASCULAR: Regular rate and rhythm, normal S1 and S2, no murmur/rub/gallop; No lower extremity edema  L chest wall wound intact  ABDOMEN: Nontender to palpation, normoactive bowel sounds, no rebound/guarding  MUSCULOSKELETAL:  No clubbing or cyanosis of digits; no joint swelling or tenderness to palpation  PSYCH: A+O to person, place, and time; affect appropriate  NEUROLOGY: CN 2-12 are intact and symmetric; no gross sensory deficits   SKIN: No rashes; no palpable lesions

## 2024-04-03 NOTE — CONSULT NOTE ADULT - SUBJECTIVE AND OBJECTIVE BOX
[facility]  Bubba Martinez PA-C  [address] [place] [address]  [phone]      Chief Complaint:  Patient is a 88y old  Male who presents with a chief complaint of sbe (03 Apr 2024 08:37)      HPI:88M with h/o COPD (home O2) and Afib (on Xarelto) and PPM who presents with fever and SOB. Pt found to have severe sepsis and Strept lutetiensis bacteremia. Pt transferred to [facility] for lead extraction.     Allergies:  No Known Allergies      Medications:  acetaminophen     Tablet .. 650 milliGRAM(s) Oral every 6 hours PRN  albuterol    90 MICROgram(s) HFA Inhaler 2 Puff(s) Inhalation every 6 hours PRN  aluminum hydroxide/magnesium hydroxide/simethicone Suspension 30 milliLiter(s) Oral every 4 hours PRN  amLODIPine   Tablet 5 milliGRAM(s) Oral daily  budesonide  80 MICROgram(s)/formoterol 4.5 MICROgram(s) Inhaler 2 Puff(s) Inhalation two times a day  cefTRIAXone   IVPB 2000 milliGRAM(s) IV Intermittent every 24 hours  chlorhexidine 2% Cloths 1 Application(s) Topical daily  ferrous    sulfate 325 milliGRAM(s) Oral daily  losartan 100 milliGRAM(s) Oral daily  melatonin 3 milliGRAM(s) Oral at bedtime PRN  metoprolol succinate ER 75 milliGRAM(s) Oral daily  tamsulosin 0.8 milliGRAM(s) Oral at bedtime  tiotropium 2.5 MICROgram(s) Inhaler 2 Puff(s) Inhalation daily      PMHX/PSHX:  Osteoarthritis of multiple joints, unspecified osteoarthritis type    Former smoker, stopped smoking many years ago    Chronic obstructive pulmonary disease, unspecified COPD type    ETOH abuse    Persistent atrial fibrillation    Pacemaker    Idiopathic pulmonary fibrosis    Polyp of colon, unspecified part of colon, unspecified type    Benign prostatic hyperplasia with nocturia    Dyspnea on exertion    Oxygen desaturation during sleep    Essential hypertension    H/O amputation    H/O arthroscopic knee surgery    H/O cardiac pacemaker    H/O repair of rotator cuff        Family history:  FH: colon cancer    FH: ovarian cancer        Social History:     ROS:     General:  no fevers, chills, night sweats, fatigue,   Eyes:  Good vision, no reported pain  ENT:  No sore throat, pain, runny nose, dysphagia  CV:  No pain, palpitations, hypo/hypertension  Resp:  No dyspnea, cough, tachypnea, wheezing  GI:  No pain, No nausea, No vomiting, No diarrhea, No constipation, No weight loss, No fever, No pruritis, No rectal bleeding, No tarry stools, No dysphagia,  :  No pain, bleeding, incontinence, nocturia  Muscle:  No pain, weakness  Neuro:  No weakness, tingling, memory problems  Psych:  No fatigue, insomnia, mood problems, depression  Endocrine:  No polyuria, polydipsia, cold/heat intolerance  Heme:  No petechiae, ecchymosis, easy bruisability  Skin:  No rash, tattoos, scars, edema      PHYSICAL EXAM:   Vital Signs:  Vital Signs Last 24 Hrs  T(C): 36.6 (03 Apr 2024 11:16), Max: 37 (02 Apr 2024 17:02)  T(F): 97.9 (03 Apr 2024 11:16), Max: 98.6 (02 Apr 2024 17:02)  HR: 70 (03 Apr 2024 11:16) (69 - 72)  BP: 104/71 (03 Apr 2024 11:16) (97/53 - 139/74)  BP(mean): 82 (02 Apr 2024 22:00) (70 - 82)  RR: 18 (03 Apr 2024 11:16) (17 - 18)  SpO2: 97% (03 Apr 2024 11:16) (91% - 98%)    Parameters below as of 03 Apr 2024 11:16  Patient On (Oxygen Delivery Method): nasal cannula  O2 Flow (L/min): 3.5    Daily     Daily     GENERAL:  Appears stated age,   HEENT:  NC/AT,    CHEST:  Full & symmetric excursion,   HEART:  Regular rhythm  ABDOMEN:  Soft, non-tender, non-distended,   EXTEREMITIES:  no cyanosis,clubbing or edema  SKIN:  No rash  NEURO:  Alert,    LABS:                        10.5   5.35  )-----------( 186      ( 02 Apr 2024 06:51 )             32.3     04-02    135  |  100  |  15  ----------------------------<  85  4.2   |  21<L>  |  0.86    Ca    9.0      02 Apr 2024 06:56        PT/INR - ( 02 Apr 2024 06:51 )   PT: 12.8 sec;   INR: 1.23 ratio         PTT - ( 02 Apr 2024 06:51 )  PTT:29.8 sec  Urinalysis Basic - ( 02 Apr 2024 06:56 )    Color: x / Appearance: x / SG: x / pH: x  Gluc: 85 mg/dL / Ketone: x  / Bili: x / Urobili: x   Blood: x / Protein: x / Nitrite: x   Leuk Esterase: x / RBC: x / WBC x   Sq Epi: x / Non Sq Epi: x / Bacteria: x          Imaging:          
CARDIOLOGY FELLOW CONSULT NOTE    HPI:  88M PMH Afib on Eliquis s/p SJM PPM, COPD on 3-3.5L NC admitted to [facility] for bacteremia iso dental procedure. Evaluated by EP for pacemaker lead extraction. Blood cultures revealed Streptococcus lutetiensis (4/4 bottles). He is on IV antibiotics. Patient denies any chest pain, SOB, palpitations or dizziness at this time. He is resting in bed. On O2 4L NC. Echocardiogram left ventricular systolic function is mildly decreased with an ejection fraction visually estimated at 45 to 50%. There is global left ventricular hypokinesis.      PMHx:   Osteoarthritis of multiple joints, unspecified osteoarthritis type    Former smoker, stopped smoking many years ago    Chronic obstructive pulmonary disease, unspecified COPD type    ETOH abuse    Persistent atrial fibrillation    Pacemaker    Idiopathic pulmonary fibrosis    Polyp of colon, unspecified part of colon, unspecified type    Benign prostatic hyperplasia with nocturia    Dyspnea on exertion    Oxygen desaturation during sleep    Essential hypertension        PSHx:   H/O amputation    H/O arthroscopic knee surgery    H/O cardiac pacemaker    H/O repair of rotator cuff        Allergies:  No Known Allergies      Home Meds:  · 	apixaban 5 mg oral tablet: 1 tab(s) orally 2 times a day MDD:2  · 	acetaminophen 325 mg oral tablet: 3 tab(s) orally every 8 hours, As Needed  · 	albuterol 90 mcg/inh inhalation aerosol: 2 puff(s) inhaled 4 times a day, As Needed  · 	Flomax 0.4 mg oral capsule: 1 cap(s) orally once a day  · 	Advair  mcg-21 mcg/inh inhalation aerosol: 2 puff(s) inhaled 2 times a day  · 	Incruse Ellipta 62.5 mcg/inh inhalation powder:     Current Medications:   acetaminophen     Tablet .. 650 milliGRAM(s) Oral every 6 hours PRN  albuterol    90 MICROgram(s) HFA Inhaler 2 Puff(s) Inhalation every 6 hours PRN  aluminum hydroxide/magnesium hydroxide/simethicone Suspension 30 milliLiter(s) Oral every 4 hours PRN  amLODIPine   Tablet 5 milliGRAM(s) Oral daily  budesonide  80 MICROgram(s)/formoterol 4.5 MICROgram(s) Inhaler 2 Puff(s) Inhalation two times a day  ferrous    sulfate 325 milliGRAM(s) Oral daily  losartan 100 milliGRAM(s) Oral daily  melatonin 3 milliGRAM(s) Oral at bedtime PRN  metoprolol succinate ER 75 milliGRAM(s) Oral daily  ondansetron Injectable 4 milliGRAM(s) IV Push every 8 hours PRN  rivaroxaban 20 milliGRAM(s) Oral daily  tamsulosin 0.8 milliGRAM(s) Oral at bedtime  tiotropium 2.5 MICROgram(s) Inhaler 2 Puff(s) Inhalation daily      FAMILY HISTORY:  FH: colon cancer  mother    FH: ovarian cancer  father        Social History:  Smoking History: former  Alcohol Use: social  Drug Use: denies    REVIEW OF SYSTEMS:  CONSTITUTIONAL: No weakness, fevers or chills  EYES/ENT: No visual changes;  No dysphagia  NECK: No pain or stiffness  RESPIRATORY: No cough, wheezing, hemoptysis; No shortness of breath  CARDIOVASCULAR: No chest pain or palpitations; No lower extremity edema  GASTROINTESTINAL: No abdominal or epigastric pain. No nausea, vomiting, or hematemesis; No diarrhea or constipation. No melena or hematochezia.  BACK: No back pain  GENITOURINARY: No dysuria, frequency or hematuria  NEUROLOGICAL: No numbness or weakness  SKIN: No itching, burning, rashes, or lesions   All other review of systems is negative unless indicated above.    Physical Exam:  T(F): 97.7 (03-28), Max: 98.2 (03-28)  HR: 92 (03-28) (68 - 92)  BP: 162/70 (03-28) (139/74 - 162/70)  RR: 18 (03-28)  SpO2: 93% (03-28)  GEN: comfortable appearing, lying in bed in NAD  HEENT: NCAT, MMM  CV: Regular S1, S2, no m/r/g  RESP: CTAB  ABD: Soft, NTND, +BS  EXT: No LE edema, WWP, pulses palpable throughout  NEURO: No focal deficits, AOx3  SKIN:  No rashes    Labs: Personally reviewed                        10.4   8.55  )-----------( 135      ( 28 Mar 2024 07:42 )             31.1     03-28    138  |  104  |  15  ----------------------------<  100<H>  3.9   |  22  |  0.87    Ca    8.9      28 Mar 2024 07:42  Phos  2.6     03-28  Mg     1.90     03-28    TPro  5.6<L>  /  Alb  3.4  /  TBili  1.0  /  DBili  x   /  AST  19  /  ALT  24  /  AlkPhos  55  03-28    PT/INR - ( 28 Mar 2024 07:42 )   PT: 22.9 sec;   INR: 2.09 ratio         PTT - ( 28 Mar 2024 07:42 )  PTT:34.2 sec    CARDIAC MARKERS ( 24 Mar 2024 05:47 )  68 ng/L / x     / x     / 870 U/L / x     / 13.8 ng/mL  CARDIAC MARKERS ( 23 Mar 2024 01:38 )  104 ng/L / x     / x     / x     / x     / x      CARDIAC MARKERS ( 22 Mar 2024 22:45 )  72 ng/L / x     / x     / x     / x     / x                      
Patient is a 88y old  Male who presents with a chief complaint of   HPI:  88M with h/o COPD (home Ox) and Afib (on Xarelto) and PPM who presents with fever and SOB. Pt found to have severe sepssi w/temp 104F rectal, tachycardia, elevated lactate with BCx +. 4/4 +strept lutetiensis - currently receiving ceftriaxone and pending CAREN and surveillance cultures.  Pt transferred to [facility] for lead extraction.  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      Persistent atrial fibrillation      Pacemaker  [employer] Model [id number]  Serial [id number]      Idiopathic pulmonary fibrosis      Polyp of colon, unspecified part of colon, unspecified type      Benign prostatic hyperplasia with nocturia      Dyspnea on exertion      Oxygen desaturation during sleep  Use oxygen at night, nasal cannula      Essential hypertension      H/O amputation  age 19, R great toe, due to growth "tumor"      H/O arthroscopic knee surgery  Both knees, 2013      H/O cardiac pacemaker  2005      H/O repair of rotator cuff  2012          Social history:    FAMILY HISTORY:  FH: colon cancer  mother    FH: ovarian cancer  father            Allergic/Immunologic:	No hives or rash   Allergies    No Known Allergies    Intolerances        Antimicrobials:    cefTRIAXone   IVPB 2000 milliGRAM(s) IV Intermittent every 24 hours        Vital Signs Last 24 Hrs  T(C): 36.6 (29 Mar 2024 11:08), Max: 36.8 (28 Mar 2024 15:15)  T(F): 97.8 (29 Mar 2024 11:08), Max: 98.2 (28 Mar 2024 15:15)  HR: 70 (29 Mar 2024 11:08) (68 - 92)  BP: 130/77 (29 Mar 2024 11:08) (130/77 - 162/70)  BP(mean): --  RR: 18 (29 Mar 2024 11:08) (18 - 18)  SpO2: 95% (29 Mar 2024 11:08) (93% - 98%)    Parameters below as of 29 Mar 2024 11:08  Patient On (Oxygen Delivery Method): nasal cannula  O2 Flow (L/min): 4           Eyes:PERRL EOMI.NO discharge or conjunctival injection    ENMT:No sinus tenderness.No thrush.No pharyngeal exudate or erythema.Fair dental hygiene    Neck:Supple,No LN,no JVD      Respiratory:Good air entry bilaterally,CTA    Cardiovascular:S1 S2 wnl, No murmurs,rub or gallops    Gastrointestinal:Soft BS(+) no tenderness no masses ,No rebound or guarding    Genitourinary:No CVA tendereness     Rectal:    Extremities:No cyanosis,clubbing or edema.    Vascular:peripheral pulses felt                                        10.6   8.69  )-----------( 163      ( 29 Mar 2024 07:03 )             32.8         03-29    136  |  103  |  14  ----------------------------<  96  3.8   |  22  |  0.81    Ca    8.9      29 Mar 2024 07:03  Phos  2.5     03-29  Mg     2.0     03-29    TPro  6.0  /  Alb  3.4  /  TBili  0.8  /  DBili  x   /  AST  21  /  ALT  27  /  AlkPhos  63  03-29      RECENT CULTURES:  03-25 @ 18:22  .Sputum Sputum  --  --  --    Normal Respiratory Merlyn present  --  03-25 @ 05:46  .Blood Blood-Peripheral  --  --  --    No growth at 4 days  --  03-25 @ 05:30  .Blood Blood-Venous  --  --  --    No growth at 4 days  --  03-23 @ 06:55  Clean Catch Clean Catch (Midstream)  Enterobacter cloacae complex  Enterobacter cloacae complex  DASHA    50,000 - 99,000 CFU/mL Enterobacter cloacae complex  --  03-22 @ 23:00  .Blood Blood-Peripheral  Blood Culture PCR  Streptococcus lutetiensis  Blood Culture PCR  PCR    Growth in aerobic and anaerobic bottles: Streptococcus lutetiensis  Direct identification is available within approximately 3-5  hours either by Blood Panel Multiplexed PCR or Direct  MALDI-TOF. Details: [email or web address]  --  03-22 @ 22:45  .Blood Blood-Peripheral  --  --  --    Growth in aerobic and anaerobic bottles: Streptococcus lutetiensis See  previous culture [id number]  --      MICROBIOLOGY:  Culture Results:   Normal Respiratory Merlyn present (03-25 @ 18:22)  Culture Results:   No growth at 4 days (03-25 @ 05:46)  Culture Results:   No growth at 4 days (03-25 @ 05:30)  Culture Results:   50,000 - 99,000 CFU/mL Enterobacter cloacae complex (03-23 @ 06:55)  Culture Results:   Growth in aerobic and anaerobic bottles: Streptococcus lutetiensis  Direct identification is available within approximately 3-5  hours either by Blood Panel Multiplexed PCR or Direct  MALDI-TOF. Details: [email or web address] (03-22 @ 23:00)  Culture Results:   Growth in aerobic and anaerobic bottles: Streptococcus lutetiensis See  previous culture [id number] (03-22 @ 22:45)          Radiology:      Assessment:        Recommendations and Plan:    Pager [phone]  After 5 pm/weekends or if no response :[phone]      
Patient is a 88y old  Male who presents with a chief complaint of sob (29 Mar 2024 15:11)      HPI:  88M with h/o COPD (home Ox) and Afib (on Xarelto) and PPM who presents with fever and SOB. Pt found to have severe sepssi w/temp 104F rectal, tachycardia, elevated lactate with BCx +. 4/4 +strept lutetiensis - currently receiving ceftriaxone and pending CAREN and surveillance cultures.  Pt transferred to [facility] for lead extraction.  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      Persistent atrial fibrillation      Pacemaker  [employer] Model [id number]  Serial [id number]      Idiopathic pulmonary fibrosis      Polyp of colon, unspecified part of colon, unspecified type      Benign prostatic hyperplasia with nocturia      Dyspnea on exertion      Oxygen desaturation during sleep  Use oxygen at night, nasal cannula      Essential hypertension      H/O amputation  age 19, R great toe, due to growth "tumor"      H/O arthroscopic knee surgery  Both knees, 2013      H/O cardiac pacemaker  2005      H/O repair of rotator cuff  2012          MEDICATIONS  (STANDING):  amLODIPine   Tablet 5 milliGRAM(s) Oral daily  budesonide  80 MICROgram(s)/formoterol 4.5 MICROgram(s) Inhaler 2 Puff(s) Inhalation two times a day  cefTRIAXone   IVPB 2000 milliGRAM(s) IV Intermittent every 24 hours  chlorhexidine 2% Cloths 1 Application(s) Topical daily  ferrous    sulfate 325 milliGRAM(s) Oral daily  losartan 100 milliGRAM(s) Oral daily  metoprolol succinate ER 75 milliGRAM(s) Oral daily  tamsulosin 0.8 milliGRAM(s) Oral at bedtime  tiotropium 2.5 MICROgram(s) Inhaler 2 Puff(s) Inhalation daily    MEDICATIONS  (PRN):  acetaminophen     Tablet .. 650 milliGRAM(s) Oral every 6 hours PRN Temp greater or equal to 38C (100.4F), Mild Pain (1 - 3)  albuterol    90 MICROgram(s) HFA Inhaler 2 Puff(s) Inhalation every 6 hours PRN Shortness of Breath and/or Wheezing  aluminum hydroxide/magnesium hydroxide/simethicone Suspension 30 milliLiter(s) Oral every 4 hours PRN Dyspepsia  melatonin 3 milliGRAM(s) Oral at bedtime PRN Insomnia  ondansetron Injectable 4 milliGRAM(s) IV Push every 8 hours PRN Nausea and/or Vomiting      Allergies    No Known Allergies    Intolerances        VITALS:    Vital Signs Last 24 Hrs  T(C): 36.6 (29 Mar 2024 11:08), Max: 36.6 (29 Mar 2024 04:04)  T(F): 97.8 (29 Mar 2024 11:08), Max: 97.9 (29 Mar 2024 04:04)  HR: 70 (29 Mar 2024 16:49) (69 - 92)  BP: 126/76 (29 Mar 2024 16:49) (126/76 - 162/70)  BP(mean): --  RR: 18 (29 Mar 2024 11:08) (18 - 18)  SpO2: 95% (29 Mar 2024 11:08) (93% - 97%)    Parameters below as of 29 Mar 2024 11:08  Patient On (Oxygen Delivery Method): nasal cannula  O2 Flow (L/min): 4      LABS:                          10.6   8.69  )-----------( 163      ( 29 Mar 2024 07:03 )             32.8       03-29    136  |  103  |  14  ----------------------------<  96  3.8   |  22  |  0.81    Ca    8.9      29 Mar 2024 07:03  Phos  2.5     03-29  Mg     2.0     03-29    TPro  6.0  /  Alb  3.4  /  TBili  0.8  /  DBili  x   /  AST  21  /  ALT  27  /  AlkPhos  63  03-29      CAPILLARY BLOOD GLUCOSE          PT/INR - ( 28 Mar 2024 07:42 )   PT: 22.9 sec;   INR: 2.09 ratio         PTT - ( 28 Mar 2024 07:42 )  PTT:34.2 sec    LOWER EXTREMITY PHYSICAL EXAM:    Vascular: DP/PT 2/4, B/L, CFT <3 seconds B/L, Temperature gradient WNL, B/L, varicose veins bilat LE, edema bilat LE  Neuro: Epicritic sensation intact to the level of foot, B/L.  Musculoskeletal/Ortho: no gross deformities  Skin: bilat heel blanching erythema c/w with possible DTIs secondary to pressure present on admission, no open lesions, no signs of infection noted

## 2024-04-03 NOTE — DISCHARGE NOTE NURSING/CASE MANAGEMENT/SOCIAL WORK - NSDCPEFALRISK_GEN_ALL_CORE
For information on Fall & Injury Prevention, visit: [email or web address] OR  [email or web address] OR  https://www.cdc.gov/steadi/patient.html

## 2024-04-03 NOTE — PROGRESS NOTE ADULT - SUBJECTIVE AND OBJECTIVE BOX
infectious diseases progress note:    Patient is a 88y old  Male who presents with a chief complaint of bc (01 Apr 2024 09:20)        Encounter for checking or testing of cardiac pacemaker pulse generator             Allergies    No Known Allergies    Intolerances        ANTIBIOTICS/RELEVANT:  antimicrobials  cefTRIAXone   IVPB 2000 milliGRAM(s) IV Intermittent every 24 hours    immunologic:    OTHER:  acetaminophen     Tablet .. 650 milliGRAM(s) Oral every 6 hours PRN  albuterol    90 MICROgram(s) HFA Inhaler 2 Puff(s) Inhalation every 6 hours PRN  aluminum hydroxide/magnesium hydroxide/simethicone Suspension 30 milliLiter(s) Oral every 4 hours PRN  amLODIPine   Tablet 5 milliGRAM(s) Oral daily  budesonide  80 MICROgram(s)/formoterol 4.5 MICROgram(s) Inhaler 2 Puff(s) Inhalation two times a day  chlorhexidine 2% Cloths 1 Application(s) Topical daily  ferrous    sulfate 325 milliGRAM(s) Oral daily  losartan 100 milliGRAM(s) Oral daily  melatonin 3 milliGRAM(s) Oral at bedtime PRN  metoprolol succinate ER 75 milliGRAM(s) Oral daily  tamsulosin 0.8 milliGRAM(s) Oral at bedtime  tiotropium 2.5 MICROgram(s) Inhaler 2 Puff(s) Inhalation daily      Objective:  Vital Signs Last 24 Hrs  T(C): 36.3 (03 Apr 2024 04:16), Max: 37 (02 Apr 2024 17:02)  T(F): 97.4 (03 Apr 2024 04:16), Max: 98.6 (02 Apr 2024 17:02)  HR: 70 (03 Apr 2024 06:25) (69 - 72)  BP: 109/64 (03 Apr 2024 06:25) (97/53 - 139/74)  BP(mean): 82 (02 Apr 2024 22:00) (70 - 82)  RR: 18 (03 Apr 2024 06:25) (17 - 18)  SpO2: 97% (03 Apr 2024 06:25) (91% - 98%)    Parameters below as of 03 Apr 2024 06:25  Patient On (Oxygen Delivery Method): nasal cannula  O2 Flow (L/min): 3.5         Eyes:JOSE E, EOMI  Ear/Nose/Throat: no oral lesion, no sinus tenderness on percussion	  Neck:no JVD, no lymphadenopathy, supple  Respiratory: CTA amy  Cardiovascular: S1S2 RRR, no murmurs  Gastrointestinal:soft, (+) BS, no HSM  Extremities:no e/e/c        LABS:                        10.5   5.35  )-----------( 186      ( 02 Apr 2024 06:51 )             32.3     04-02    135  |  100  |  15  ----------------------------<  85  4.2   |  21<L>  |  0.86    Ca    9.0      02 Apr 2024 06:56      PT/INR - ( 02 Apr 2024 06:51 )   PT: 12.8 sec;   INR: 1.23 ratio         PTT - ( 02 Apr 2024 06:51 )  PTT:29.8 sec  Urinalysis Basic - ( 02 Apr 2024 06:56 )    Color: x / Appearance: x / SG: x / pH: x  Gluc: 85 mg/dL / Ketone: x  / Bili: x / Urobili: x   Blood: x / Protein: x / Nitrite: x   Leuk Esterase: x / RBC: x / WBC x   Sq Epi: x / Non Sq Epi: x / Bacteria: x          MICROBIOLOGY:    RECENT CULTURES:  04-02 @ 22:16 .Tissue pacemaker lead tips       No polymorphonuclear cells seen per low power field  No organisms seen per oil power field           Testing in progress          RESPIRATORY CULTURES:              RADIOLOGY & ADDITIONAL STUDIES:        Pager [phone]  After 5 pm/weekends or if no response :[phone]

## 2024-04-03 NOTE — PROGRESS NOTE ADULT - PROBLEM SELECTOR PROBLEM 2
Macrocytic anemia
91
Macrocytic anemia
Macrocytic anemia

## 2024-04-03 NOTE — DISCHARGE NOTE NURSING/CASE MANAGEMENT/SOCIAL WORK - PATIENT PORTAL LINK FT
You can access the FollowMyHealth Patient Portal offered by [facility] by registering at the following website: [email or web address]. By joining [facility]’s FollowMyHealth portal, you will also be able to view your health information using other applications (apps) compatible with our system.

## 2024-04-03 NOTE — PROGRESS NOTE ADULT - PROBLEM SELECTOR PLAN 8
-c/w all home meds

## 2024-04-03 NOTE — PROGRESS NOTE ADULT - PROBLEM/PLAN-9
DISPLAY PLAN FREE TEXT
declines
DISPLAY PLAN FREE TEXT

## 2024-04-03 NOTE — PROGRESS NOTE ADULT - PROBLEM SELECTOR PLAN 4
- new incidental pulm nodules  - f/u CT in 12 months.

## 2024-04-03 NOTE — CONSULT NOTE ADULT - ASSESSMENT
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 Adalberto degroot updated   cont reg diet  antibiotics per ID  will follow

## 2024-04-03 NOTE — DISCHARGE NOTE PROVIDER - NSDCCAREPROVSEEN_GEN_ALL_CORE_FT
Radha, Haroldo Salcido, Gris Licona, Nicanor Andres, Dioni Bland, Miguel Feliz, Asif Humphries, Trudy Hazel, Merlin

## 2024-04-03 NOTE — DISCHARGE NOTE NURSING/CASE MANAGEMENT/SOCIAL WORK - NSSCTYPOFSERV_GEN_ALL_CORE
visiting nurse for IV Abx until 04/22/24 visiting nurse for IV Abx until 04/22/24, Home physical therapy

## 2024-04-03 NOTE — ADVANCED PRACTICE NURSE CONSULT - RECOMMEDATIONS
Post procedure verbal instructions given to the patient or patient representative. Patient or patient representative  instructed regarding signs and symptoms of infection. Patient instructed to keep dressing dry and clean. Care for catheter as per hospital protocols  
Will recommend the followin. Sacrum, b/l buttocks: continue to monitor, routine pericare with Miriam  2. b/l heels: off-load with boots, sween 24 cream to dry skin daily  3. continue to encourage, mobility, T&P, using the walker to ambulate  4. Seat cushion when OOB to chair  5. Nutrition support as pt condition allows  Tx plan discussed with pt/RN

## 2024-04-03 NOTE — DISCHARGE NOTE NURSING/CASE MANAGEMENT/SOCIAL WORK - NSSCNAMETXT_GEN_ALL_CORE
[facility]  and [facility] Certified special services

## 2024-04-03 NOTE — ADVANCED PRACTICE NURSE CONSULT - ASSESSMENT
The pt was encountered on [facility]- Mr Hutchison was awake and alert and engaging in conversation. He was sitting at the edge of the bed and requesting to use the BR. We assisted him there with him using the walker. He was instructed to call when he was done.  On the sacrum and b/l buttocks there was blanchable erythema of the skin; there was also a dusky tone noted. This may be secondary to IAD but cannot r/o a component of deep tissue injury present on admission. the pt reports leakage and was wearing a brief.   the b/l heels were slightly dusky with dry flaking skin - this may be an early sign of deep tissue injury as well.  Education was provided regarding condition of the skin, tx to be used and interventions for prevention of PI  As the pt was a/w skin changes a nutrition consult is pending for further evaluation
Midline Catheter Insertion Note    Catheter type: 4F  : Bard  Power injectable: Yes  LOT# [id number]                                                                                                                                                                                                                     Time out was preformed with Dot Larson RN, confirming the patient's first and last name, date of birth, procedure, and correct site prior to state of procedure.    Patient was placed with HOB 30 degrees. Patient placement site was prepped with chlorhexidine solution, then draped using maximum sterile barrier protection. Using the Bard Site Rite 8, the catheter was placed using the Modified Seldinger Technique. Strict adherence to outline aseptic technique including handwashing, glove and gown, utilizing mask and cap, plus draping the patient with a sterile drape was observed. Upon completion of line placement, the insertion site was covered with a sterile occlusive CHG dressing. Pt tolerated procedure well.     All materials used for catheter insertion, including the intact guide wires, were accounted for at the end of the procedure.  Number of attempts: 1  Complications/Comments: None  Emergency Placement: No    Site: New  Anatomical Site of insertion: Right Basilic vein  Catheter size/length: 4F, 20cm  US guided Bard single lumen power midline placed

## 2024-04-03 NOTE — PROGRESS NOTE ADULT - PROBLEM SELECTOR PLAN 9
-not on meds  -f/u lipid profile
-not on meds  -f/u lipid profile
-not on meds  - f/up lipid profile
-not on meds  -f/u lipid profile

## 2024-04-03 NOTE — DISCHARGE NOTE PROVIDER - CARE PROVIDER_API CALL
Torsten Glover  Surgery  [address] [facility], [address]  [place] [address]  Phone: [phone]  Fax: [phone]  Follow Up Time: 1 month    Haroldo Garcia  Infectious Disease  [address] [place] [address]  Phone: [phone]  Fax: [phone]  Follow Up Time:

## 2024-04-04 VITALS
SYSTOLIC BLOOD PRESSURE: 101 MMHG | RESPIRATION RATE: 18 BRPM | OXYGEN SATURATION: 96 % | DIASTOLIC BLOOD PRESSURE: 58 MMHG | HEART RATE: 73 BPM | TEMPERATURE: 98 F

## 2024-04-04 PROCEDURE — 86900 BLOOD TYPING SEROLOGIC ABO: CPT

## 2024-04-04 PROCEDURE — 87116 MYCOBACTERIA CULTURE: CPT

## 2024-04-04 PROCEDURE — C2629: CPT

## 2024-04-04 PROCEDURE — 85027 COMPLETE CBC AUTOMATED: CPT

## 2024-04-04 PROCEDURE — 85730 THROMBOPLASTIN TIME PARTIAL: CPT

## 2024-04-04 PROCEDURE — 86923 COMPATIBILITY TEST ELECTRIC: CPT

## 2024-04-04 PROCEDURE — 93005 ELECTROCARDIOGRAM TRACING: CPT

## 2024-04-04 PROCEDURE — 80048 BASIC METABOLIC PNL TOTAL CA: CPT

## 2024-04-04 PROCEDURE — C1773: CPT

## 2024-04-04 PROCEDURE — 80061 LIPID PANEL: CPT

## 2024-04-04 PROCEDURE — 87070 CULTURE OTHR SPECIMN AEROBIC: CPT

## 2024-04-04 PROCEDURE — C1894: CPT

## 2024-04-04 PROCEDURE — C1786: CPT

## 2024-04-04 PROCEDURE — 71045 X-RAY EXAM CHEST 1 VIEW: CPT

## 2024-04-04 PROCEDURE — 36569 INSJ PICC 5 YR+ W/O IMAGING: CPT

## 2024-04-04 PROCEDURE — C1769: CPT

## 2024-04-04 PROCEDURE — 87640 STAPH A DNA AMP PROBE: CPT

## 2024-04-04 PROCEDURE — 87206 SMEAR FLUORESCENT/ACID STAI: CPT

## 2024-04-04 PROCEDURE — 85025 COMPLETE CBC W/AUTO DIFF WBC: CPT

## 2024-04-04 PROCEDURE — C9399: CPT

## 2024-04-04 PROCEDURE — 97110 THERAPEUTIC EXERCISES: CPT

## 2024-04-04 PROCEDURE — 87015 SPECIMEN INFECT AGNT CONCNTJ: CPT

## 2024-04-04 PROCEDURE — 85610 PROTHROMBIN TIME: CPT

## 2024-04-04 PROCEDURE — 87102 FUNGUS ISOLATION CULTURE: CPT

## 2024-04-04 PROCEDURE — 94640 AIRWAY INHALATION TREATMENT: CPT

## 2024-04-04 PROCEDURE — 87641 MR-STAPH DNA AMP PROBE: CPT

## 2024-04-04 PROCEDURE — 86901 BLOOD TYPING SEROLOGIC RH(D): CPT

## 2024-04-04 PROCEDURE — 99239 HOSP IP/OBS DSCHRG MGMT >30: CPT

## 2024-04-04 PROCEDURE — 36415 COLL VENOUS BLD VENIPUNCTURE: CPT

## 2024-04-04 PROCEDURE — 87075 CULTR BACTERIA EXCEPT BLOOD: CPT

## 2024-04-04 PROCEDURE — 99232 SBSQ HOSP IP/OBS MODERATE 35: CPT

## 2024-04-04 PROCEDURE — 86850 RBC ANTIBODY SCREEN: CPT

## 2024-04-04 PROCEDURE — 71046 X-RAY EXAM CHEST 2 VIEWS: CPT

## 2024-04-04 PROCEDURE — 83735 ASSAY OF MAGNESIUM: CPT

## 2024-04-04 PROCEDURE — 76000 FLUOROSCOPY <1 HR PHYS/QHP: CPT

## 2024-04-04 PROCEDURE — 97161 PT EVAL LOW COMPLEX 20 MIN: CPT

## 2024-04-04 PROCEDURE — C1751: CPT

## 2024-04-04 PROCEDURE — 80053 COMPREHEN METABOLIC PANEL: CPT

## 2024-04-04 PROCEDURE — 84100 ASSAY OF PHOSPHORUS: CPT

## 2024-04-04 PROCEDURE — 97116 GAIT TRAINING THERAPY: CPT

## 2024-04-04 RX ORDER — CEFTRIAXONE 500 MG/1
2 INJECTION, POWDER, FOR SOLUTION INTRAMUSCULAR; INTRAVENOUS
Qty: 60 | Refills: 0
Start: 2024-04-04 | End: 2024-05-03

## 2024-04-04 RX ORDER — RIVAROXABAN 15 MG-20MG
20 KIT ORAL
Refills: 0 | Status: DISCONTINUED | OUTPATIENT
Start: 2024-04-04 | End: 2024-04-04

## 2024-04-04 RX ORDER — ACETAMINOPHEN 500 MG
2 TABLET ORAL
Qty: 0 | Refills: 0 | DISCHARGE
Start: 2024-04-04

## 2024-04-04 RX ADMIN — TIOTROPIUM BROMIDE 2 PUFF(S): 18 CAPSULE ORAL; RESPIRATORY (INHALATION) at 11:06

## 2024-04-04 RX ADMIN — BUDESONIDE AND FORMOTEROL FUMARATE DIHYDRATE 2 PUFF(S): 160; 4.5 AEROSOL RESPIRATORY (INHALATION) at 17:07

## 2024-04-04 RX ADMIN — AMLODIPINE BESYLATE 5 MILLIGRAM(S): 2.5 TABLET ORAL at 05:47

## 2024-04-04 RX ADMIN — Medication 325 MILLIGRAM(S): at 11:05

## 2024-04-04 RX ADMIN — Medication 75 MILLIGRAM(S): at 05:47

## 2024-04-04 RX ADMIN — CHLORHEXIDINE GLUCONATE 1 APPLICATION(S): 213 SOLUTION TOPICAL at 11:05

## 2024-04-04 RX ADMIN — BUDESONIDE AND FORMOTEROL FUMARATE DIHYDRATE 2 PUFF(S): 160; 4.5 AEROSOL RESPIRATORY (INHALATION) at 05:58

## 2024-04-04 RX ADMIN — RIVAROXABAN 20 MILLIGRAM(S): KIT at 17:08

## 2024-04-04 RX ADMIN — LOSARTAN POTASSIUM 100 MILLIGRAM(S): 100 TABLET, FILM COATED ORAL at 05:47

## 2024-04-04 RX ADMIN — CEFTRIAXONE 100 MILLIGRAM(S): 500 INJECTION, POWDER, FOR SOLUTION INTRAMUSCULAR; INTRAVENOUS at 17:09

## 2024-04-04 NOTE — PROGRESS NOTE ADULT - PROVIDER SPECIALTY LIST ADULT
Electrophysiology
Infectious Disease
Electrophysiology
Infectious Disease
Infectious Disease
Electrophysiology
Gastroenterology
Infectious Disease
Hospitalist

## 2024-04-04 NOTE — PROGRESS NOTE ADULT - SUBJECTIVE AND OBJECTIVE BOX
infectious diseases progress note:    Patient is a 88y old  Male who presents with a chief complaint of sbe (03 Apr 2024 08:37)        Encounter for checking or testing of cardiac pacemaker pulse generator             Allergies    No Known Allergies    Intolerances        ANTIBIOTICS/RELEVANT:  antimicrobials  cefTRIAXone   IVPB 2000 milliGRAM(s) IV Intermittent every 24 hours    immunologic:    OTHER:  acetaminophen     Tablet .. 650 milliGRAM(s) Oral every 6 hours PRN  albuterol    90 MICROgram(s) HFA Inhaler 2 Puff(s) Inhalation every 6 hours PRN  aluminum hydroxide/magnesium hydroxide/simethicone Suspension 30 milliLiter(s) Oral every 4 hours PRN  amLODIPine   Tablet 5 milliGRAM(s) Oral daily  budesonide  80 MICROgram(s)/formoterol 4.5 MICROgram(s) Inhaler 2 Puff(s) Inhalation two times a day  chlorhexidine 2% Cloths 1 Application(s) Topical daily  ferrous    sulfate 325 milliGRAM(s) Oral daily  losartan 100 milliGRAM(s) Oral daily  melatonin 3 milliGRAM(s) Oral at bedtime PRN  metoprolol succinate ER 75 milliGRAM(s) Oral daily  tamsulosin 0.8 milliGRAM(s) Oral at bedtime  tiotropium 2.5 MICROgram(s) Inhaler 2 Puff(s) Inhalation daily      Objective:  Vital Signs Last 24 Hrs  T(C): 36.4 (04 Apr 2024 04:27), Max: 36.6 (03 Apr 2024 11:16)  T(F): 97.6 (04 Apr 2024 04:27), Max: 97.9 (03 Apr 2024 11:16)  HR: 71 (04 Apr 2024 04:27) (70 - 72)  BP: 130/71 (04 Apr 2024 04:35) (94/57 - 130/71)  BP(mean): --  RR: 18 (04 Apr 2024 04:27) (18 - 18)  SpO2: 97% (04 Apr 2024 04:27) (97% - 97%)    Parameters below as of 04 Apr 2024 04:27  Patient On (Oxygen Delivery Method): nasal cannula  O2 Flow (L/min): 3.5       PHYSICAL EXAM:     Ear/Nose/Throat: no oral lesion, no sinus tenderness on percussion	  Neck:no JVD, no lymphadenopathy, supple  Respiratory: CTA amy  Cardiovascular: S1S2 RRR, no murmurs  Gastrointestinal:soft, (+) BS, no HSM  Extremities:no e/e/c        LABS:                  MICROBIOLOGY:    RECENT CULTURES:  04-02 @ 22:16 .Tissue pacemaker lead tips       No polymorphonuclear cells seen per low power field  No organisms seen per oil power field           No growth          RESPIRATORY CULTURES:              RADIOLOGY & ADDITIONAL STUDIES:        Pager [phone]  After 5 pm/weekends or if no response :[phone]

## 2024-04-04 NOTE — PROGRESS NOTE ADULT - ASSESSMENT
87 y/o M w/ hx of Afib on Eliquis w/ PPM, COPD on 3-3.5L NC (on pred?) daily presenting to ED with chills and shortness of breath that started suddenly. He was feeling well until 3/23. He recently went for deep dental cleaning within the past month and has partial dentures as well. Patient was found to meet sepsis criteria on admission with fever and leukocytosis. His blood cultures grew Streptococcus lutetiensis in 4/4 bottles. Patient has PPM and also with known R hip replacement, has been able to ambulate with cane as usual. UA with pyuria and UCx with Enterobacter cloacae, however patient without urinary symptoms. TTE was unable to exclude IE.    Micro:  BCx (3/22): Streptococcus lutetiensis (4/4 bottles)  BCx (3/25): no growth  UCx (3/22): E. cloacae    Abx:  Cefepime (3/22)  Zosyn (3/23-3/25)  Ceftriaxone (3/23, 3/25-)  on - IV ceftriaxone 2g qD  Overall, high grade Streptococcus bacteremia, recent dental work, known PPM, R hip replacement, asymptomatic bacteruria also recently had exploration of PPM pocket [facility]     Suggest:    TTE reviewed  no definitive endocarditis but not definitive   Follow up BC are negative   no symptoms of UTI   will need line to complete at least  4 weeks of ab   CAREN on hold for now        This organism is associated with colon pathology and he has a hs of polyp- needs GI input  dc on ceftriaxone until about 4/25

## 2024-04-04 NOTE — PROGRESS NOTE ADULT - SUBJECTIVE AND OBJECTIVE BOX
[facility]  Bubba Martinez PA-C  [address] [place] [address]  [phone]      INTERVAL HPI/OVERNIGHT EVENTS:  pt seen and examined, no new events  no GI complaints      MEDICATIONS  (STANDING):  amLODIPine   Tablet 5 milliGRAM(s) Oral daily  budesonide  80 MICROgram(s)/formoterol 4.5 MICROgram(s) Inhaler 2 Puff(s) Inhalation two times a day  cefTRIAXone   IVPB 2000 milliGRAM(s) IV Intermittent every 24 hours  chlorhexidine 2% Cloths 1 Application(s) Topical daily  ferrous    sulfate 325 milliGRAM(s) Oral daily  losartan 100 milliGRAM(s) Oral daily  metoprolol succinate ER 75 milliGRAM(s) Oral daily  rivaroxaban 20 milliGRAM(s) Oral with dinner  tamsulosin 0.8 milliGRAM(s) Oral at bedtime  tiotropium 2.5 MICROgram(s) Inhaler 2 Puff(s) Inhalation daily    MEDICATIONS  (PRN):  acetaminophen     Tablet .. 650 milliGRAM(s) Oral every 6 hours PRN Temp greater or equal to 38C (100.4F), Mild Pain (1 - 3), Moderate Pain (4 - 6), Severe Pain (7 - 10)  albuterol    90 MICROgram(s) HFA Inhaler 2 Puff(s) Inhalation every 6 hours PRN Shortness of Breath and/or Wheezing  aluminum hydroxide/magnesium hydroxide/simethicone Suspension 30 milliLiter(s) Oral every 4 hours PRN Dyspepsia  melatonin 3 milliGRAM(s) Oral at bedtime PRN Insomnia      Allergies    No Known Allergies    Intolerances        ROS:   General:  No wt loss, fevers, chills, night sweats, fatigue,   Eyes:  Good vision, no reported pain  ENT:  No sore throat, pain, runny nose, dysphagia  CV:  No pain, palpitations, hypo/hypertension  Resp:  No dyspnea, cough, tachypnea, wheezing  GI:  No pain, No nausea, No vomiting, No diarrhea, No constipation, No weight loss, No fever, No pruritis, No rectal bleeding, No tarry stools, No dysphagia,  :  No pain, bleeding, incontinence, nocturia  Muscle:  No pain, weakness  Neuro:  No weakness, tingling, memory problems  Psych:  No fatigue, insomnia, mood problems, depression  Endocrine:  No polyuria, polydipsia, cold/heat intolerance  Heme:  No petechiae, ecchymosis, easy bruisability  Skin:  No rash, tattoos, scars, edema      PHYSICAL EXAM:   Vital Signs:  Vital Signs Last 24 Hrs  T(C): 36.6 (04 Apr 2024 11:04), Max: 36.6 (04 Apr 2024 11:04)  T(F): 97.9 (04 Apr 2024 11:04), Max: 97.9 (04 Apr 2024 11:04)  HR: 73 (04 Apr 2024 11:04) (71 - 73)  BP: 101/58 (04 Apr 2024 11:04) (94/57 - 130/71)  BP(mean): --  RR: 18 (04 Apr 2024 11:04) (18 - 18)  SpO2: 96% (04 Apr 2024 11:04) (96% - 97%)    Parameters below as of 04 Apr 2024 11:04  Patient On (Oxygen Delivery Method): nasal cannula  O2 Flow (L/min): 3.5    Daily     Daily     GENERAL:  Appears stated age,   HEENT:  NC/AT,    CHEST:  Full & symmetric excursion,   HEART:  Regular rhythm,  ABDOMEN:  Soft, non-tender, non-distended,  EXTEREMITIES:  no cyanosis  SKIN:  No rash  NEURO:  Alert,       LABS:                RADIOLOGY & ADDITIONAL TESTS:

## 2024-04-05 ENCOUNTER — NON-APPOINTMENT (OUTPATIENT)
Age: 89
End: 2024-04-05

## 2024-04-07 LAB
CULTURE RESULTS: SIGNIFICANT CHANGE UP
SPECIMEN SOURCE: SIGNIFICANT CHANGE UP

## 2024-04-18 ENCOUNTER — NON-APPOINTMENT (OUTPATIENT)
Age: 89
End: 2024-04-18

## 2024-04-18 ENCOUNTER — APPOINTMENT (OUTPATIENT)
Dept: ELECTROPHYSIOLOGY | Facility: CLINIC | Age: 89
End: 2024-04-18
Payer: MEDICARE

## 2024-04-18 VITALS — HEART RATE: 89 BPM | DIASTOLIC BLOOD PRESSURE: 71 MMHG | SYSTOLIC BLOOD PRESSURE: 123 MMHG

## 2024-04-18 PROCEDURE — 93000 ELECTROCARDIOGRAM COMPLETE: CPT | Mod: 59

## 2024-04-18 PROCEDURE — 93279 PRGRMG DEV EVAL PM/LDLS PM: CPT

## 2024-04-18 PROCEDURE — 99024 POSTOP FOLLOW-UP VISIT: CPT

## 2024-04-22 NOTE — DISCHARGE NOTE ADULT - FUNCTIONAL SCREEN CURRENT LEVEL: DRESSING, MLM
From: Federica Mora  To: Kailey Rina  Sent: 4/21/2024 8:36 AM CDT  Subject: increase in dosage    Good morning, Kailey,  I had a follow up appointment with Dr. Trent for my neck steroid injection. After that procedure I was able to decrease my dosage (as told) of gabapentin. I loved that because that drug make me feel dull and unmotivated. Unfortunately, the nerve thing in my arm (connected to my neck) began coming back. Dr. Trent said instead of increasing the gabapentin, I could go up to 90 milligrams of duloxitine. He said to request that from you and that it would be in his notes. Can you please take a look and increase that dosage?  Thank you   (2) assistive person

## 2024-05-01 LAB
CULTURE RESULTS: SIGNIFICANT CHANGE UP
SPECIMEN SOURCE: SIGNIFICANT CHANGE UP

## 2024-05-18 LAB
CULTURE RESULTS: SIGNIFICANT CHANGE UP
SPECIMEN SOURCE: SIGNIFICANT CHANGE UP

## 2024-05-20 ENCOUNTER — APPOINTMENT (OUTPATIENT)
Dept: INFECTIOUS DISEASE | Facility: CLINIC | Age: 89
End: 2024-05-20
Payer: MEDICARE

## 2024-05-20 VITALS
OXYGEN SATURATION: 94 % | HEART RATE: 99 BPM | DIASTOLIC BLOOD PRESSURE: 71 MMHG | TEMPERATURE: 97.6 F | HEIGHT: 72 IN | SYSTOLIC BLOOD PRESSURE: 114 MMHG | WEIGHT: 180 LBS | BODY MASS INDEX: 24.38 KG/M2

## 2024-05-20 DIAGNOSIS — I33.0 ACUTE AND SUBACUTE INFECTIVE ENDOCARDITIS: ICD-10-CM

## 2024-05-20 PROCEDURE — 99214 OFFICE O/P EST MOD 30 MIN: CPT

## 2024-05-20 RX ORDER — AMOXICILLIN 500 MG/1
500 TABLET, FILM COATED ORAL
Qty: 20 | Refills: 2 | Status: ACTIVE | COMMUNITY
Start: 2024-05-20 | End: 1900-01-01

## 2024-05-20 NOTE — PHYSICAL EXAM
[General Appearance - Alert] : alert [General Appearance - In No Acute Distress] : in no acute distress [Sclera] : the sclera and conjunctiva were normal [PERRL With Normal Accommodation] : pupils were equal in size, round, reactive to light [Extraocular Movements] : extraocular movements were intact [Outer Ear] : the ears and nose were normal in appearance [Oropharynx] : the oropharynx was normal with no thrush

## 2024-05-20 NOTE — REASON FOR VISIT
[Follow-Up: _____] : a [unfilled] follow-up visit [Family Member] : family member [FreeTextEntry1] : elizabeth good, to achieve stated therapy goals

## 2024-05-20 NOTE — ASSESSMENT
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

## 2024-05-20 NOTE — HISTORY OF PRESENT ILLNESS
[FreeTextEntry1] : com-pelted ceftriaxone several weeks ago  doing well .  saw GI and decided not to pursue colonoscopy as pt is now oxygen dependent  no fever ; doing well no symptoms

## 2024-05-28 LAB
BACTERIA BLD CULT: NORMAL
BACTERIA BLD CULT: NORMAL

## 2024-06-05 NOTE — PATIENT PROFILE ADULT - IS THERE A SUSPICION OF ABUSE/NEGLIGENCE?
[employer] requesting medical clarification of Sinemet 25-100mg dosing ordered on 6/04/24 noting that dosing exceeds maximum recommended daily dosing.   New script being sent with notation acknowledging warning including Order #[id number] reference information and MD wish to continue current treatment as prescribed.     no

## 2024-06-25 ENCOUNTER — APPOINTMENT (OUTPATIENT)
Dept: DERMATOLOGY | Facility: CLINIC | Age: 89
End: 2024-06-25
Payer: MEDICARE

## 2024-06-25 DIAGNOSIS — L57.0 ACTINIC KERATOSIS: ICD-10-CM

## 2024-06-25 DIAGNOSIS — D48.5 NEOPLASM OF UNCERTAIN BEHAVIOR OF SKIN: ICD-10-CM

## 2024-06-25 DIAGNOSIS — L72.9 FOLLICULAR CYST OF THE SKIN AND SUBCUTANEOUS TISSUE, UNSPECIFIED: ICD-10-CM

## 2024-06-25 PROCEDURE — 99213 OFFICE O/P EST LOW 20 MIN: CPT | Mod: 25

## 2024-06-25 PROCEDURE — 11102 TANGNTL BX SKIN SINGLE LES: CPT

## 2024-06-25 PROCEDURE — 11103 TANGNTL BX SKIN EA SEP/ADDL: CPT

## 2024-06-25 PROCEDURE — 17003 DESTRUCT PREMALG LES 2-14: CPT | Mod: 59

## 2024-06-25 PROCEDURE — 17000 DESTRUCT PREMALG LESION: CPT | Mod: 59

## 2024-06-26 ENCOUNTER — TRANSCRIPTION ENCOUNTER (OUTPATIENT)
Age: 89
End: 2024-06-26

## 2024-07-02 LAB — DERMATOLOGY BIOPSY: NORMAL

## 2024-07-03 ENCOUNTER — NON-APPOINTMENT (OUTPATIENT)
Age: 89
End: 2024-07-03

## 2024-07-22 ENCOUNTER — APPOINTMENT (OUTPATIENT)
Dept: DERMATOLOGY | Facility: CLINIC | Age: 89
End: 2024-07-22
Payer: MEDICARE

## 2024-07-22 DIAGNOSIS — L81.4 OTHER MELANIN HYPERPIGMENTATION: ICD-10-CM

## 2024-07-22 DIAGNOSIS — L57.0 ACTINIC KERATOSIS: ICD-10-CM

## 2024-07-22 DIAGNOSIS — R22.9 LOCALIZED SWELLING, MASS AND LUMP, UNSPECIFIED: ICD-10-CM

## 2024-07-22 DIAGNOSIS — L72.0 EPIDERMAL CYST: ICD-10-CM

## 2024-07-22 DIAGNOSIS — L82.1 OTHER SEBORRHEIC KERATOSIS: ICD-10-CM

## 2024-07-22 DIAGNOSIS — D22.9 MELANOCYTIC NEVI, UNSPECIFIED: ICD-10-CM

## 2024-07-22 DIAGNOSIS — Z12.83 ENCOUNTER FOR SCREENING FOR MALIGNANT NEOPLASM OF SKIN: ICD-10-CM

## 2024-07-22 DIAGNOSIS — D18.01 HEMANGIOMA OF SKIN AND SUBCUTANEOUS TISSUE: ICD-10-CM

## 2024-07-22 PROCEDURE — 17000 DESTRUCT PREMALG LESION: CPT | Mod: 59

## 2024-07-22 PROCEDURE — 99213 OFFICE O/P EST LOW 20 MIN: CPT | Mod: 25

## 2024-07-22 PROCEDURE — 17110 DESTRUCTION B9 LES UP TO 14: CPT

## 2024-07-22 PROCEDURE — 17003 DESTRUCT PREMALG LES 2-14: CPT | Mod: 59

## 2024-07-24 PROBLEM — R22.9 SKIN NODULE: Status: ACTIVE | Noted: 2024-07-24

## 2024-07-24 NOTE — HISTORY OF PRESENT ILLNESS
[FreeTextEntry1] : f/u bumps [de-identified] : Mr. ASHLEY KENDALL is a 88 year-old male who presents for:  1) moles - Recently with biopsy-proven BCC x2 (R cheek, L arm) - has appt for Mohs surgery with Dr. Dykes on 7/30 - reports biopsy scars healing well - HAK s/p LN2 x1 at  (6/25) - reports that lesion got slightly smaller but is

## 2024-07-24 NOTE — PHYSICAL EXAM
[FreeTextEntry3] : Full body skin exam was performed including oropharynx, scalp and nails, with the exception of: fingernails and toenails (due to gel nail polish)   - Scattered waxy, stuck-on tan-brown papules and plaques on face, trunk and extremities - Scattered tan to light brown macules on trunk and extremities with no concerning features on dermoscopy - Cherry-red papules scattered across trunk and extremities  - Gritty erythematous papules on forehead (x2) and on L dorsal hand - Erythematous papulonodule with central exophytic keratotic portion on L ear helix  - Large subcutaneous nodule with central punctum on central back

## 2024-07-24 NOTE — HISTORY OF PRESENT ILLNESS
[FreeTextEntry1] : f/u bumps [de-identified] : Mr. ASHLEY KENDALL is a 88 year-old male who presents for:  1) moles - Recently with biopsy-proven BCC x2 (R cheek, L arm) - has appt for Mohs surgery with Dr. Dykes on 7/30 - reports biopsy scars healing well - HAK s/p LN2 x1 at  (6/25) - reports that lesion got slightly smaller but is

## 2024-07-24 NOTE — ASSESSMENT
[FreeTextEntry1] : Nevi/cherry angiomas - Counseled about clinically benign lesions - Discussed regular OTC sunscreen use, SPF 30 or higher   #Skin nodule, L ear helix, chronic, not at treatment goal Ddx includes CNH vs. HAK vs. SCCIS - Given inflammatory nature of lesions above, will treat with cryosurgery - patient was verbally consented. 1 L ear helix lesion was treated with liquid nitrogen f/t/f x2 cycles. Patient tolerated procedure well. - Side effects include blister formation, hypopigmentation, and scarring - Wound care with vaseline. - Consider biopsy of L ear helix lesion (total of 2 cryo sessions including today) at next visit if not adequately improving   #Actinic keratoses, forehead, L dorsal hand - Treated 3 lesions w/ LN2 X 2 cycles The risks/benefits/alternatives of cryo-destruction was explained to the patient which include but are not limited to redness, pain, blistering, scar, discoloration of skin, and recurrence. The patient expressed understanding of these risks and agreed to the procedure. The procedure was well tolerated, without complication. We have discussed related skin care.   #Epidermal inclusion cyst, central back - Counseled about clinically benign lesion - Discussed excision for treatment/diagnosis - Reviewed risks (as well as mitigation strategies for adverse drug events), benefits, and alternatives of therapy  - Pt declines treatment at this time  Skin cancer Screening - No lesions clinically concerning for malignancy today - Discussed regular self skin checks and ABCDEs of skin cancer screening - Discussed regular sunscreen use - Pt instructed to report any new or changing lesions  RTC 1 yr for FBSE or sooner if any concerns  RTC 4 weeks for f/u of the ear lesion

## 2024-07-30 ENCOUNTER — APPOINTMENT (OUTPATIENT)
Dept: DERMATOLOGY | Facility: CLINIC | Age: 89
End: 2024-07-30

## 2024-07-30 ENCOUNTER — NON-APPOINTMENT (OUTPATIENT)
Age: 89
End: 2024-07-30

## 2024-07-30 DIAGNOSIS — C44.319 BASAL CELL CARCINOMA OF SKIN OF OTHER PARTS OF FACE: ICD-10-CM

## 2024-07-30 PROCEDURE — 12052 INTMD RPR FACE/MM 2.6-5.0 CM: CPT | Mod: 79

## 2024-07-30 PROCEDURE — 17311 MOHS 1 STAGE H/N/HF/G: CPT | Mod: 79

## 2024-07-31 PROBLEM — C44.319 BASAL CELL CARCINOMA (BCC) OF RIGHT CHEEK: Status: ACTIVE | Noted: 2024-07-30

## 2024-09-03 NOTE — H&P ADULT - PROBLEM SELECTOR PROBLEM 3
Decrease Lipitor to 40mg nightly.   Stop Plavix  Sending referral to neurosurgery    Demand ischemia of myocardium

## 2024-09-05 ENCOUNTER — APPOINTMENT (OUTPATIENT)
Dept: DERMATOLOGY | Facility: CLINIC | Age: 89
End: 2024-09-05
Payer: MEDICARE

## 2024-09-05 DIAGNOSIS — C44.619 BASAL CELL CARCINOMA OF SKIN OF LEFT UPPER LIMB, INCLUDING SHOULDER: ICD-10-CM

## 2024-09-05 PROCEDURE — 17003 DESTRUCT PREMALG LES 2-14: CPT

## 2024-09-05 PROCEDURE — 17000 DESTRUCT PREMALG LESION: CPT

## 2024-09-09 PROBLEM — C44.619 PRIMARY BASAL CELL CARCINOMA OF LEFT UPPER EXTREMITY: Status: ACTIVE | Noted: 2024-09-05

## 2024-09-09 NOTE — HISTORY OF PRESENT ILLNESS
[FreeTextEntry1] : f/u bumps [de-identified] : Mr. ASHLEY KENDALL is a 89 year-old male with hx of BCC x 2 (R cheek, L arm), s/p Mohs surgery for R cheek on 7/30 with Dr. Dykes and has upcoming surgery for L arm lesion 9/16, here with daughter and aide for   # F/u lesion on L helix, s/p LN2 at --ddx included CNH, HAK, SCC--since LV has significantly improved and no longer bothers him

## 2024-09-09 NOTE — ASSESSMENT
[FreeTextEntry1] : #Skin nodule, L ear helix, favor remnant of HAK given improvement with LN2 at LV Repeat cryo performed today to remainder of lesion --Cryotherapy performed:     Risks: erythema, blistering, dyspigmentation (hypo/hyper), scar, need for multiple     treatment, persistence/recurrence.     Lesion number: 1     #freeze-thaw cycles to each lesion: 2     Thaw time: 5s     Wound care discussed  #Actinic keratoses, scalp - Treated 5 lesions w/ LN2 X 2 cycles The risks/benefits/alternatives of cryo-destruction was explained to the patient which include but are not limited to redness, pain, blistering, scar, discoloration of skin, and recurrence. The patient expressed understanding of these risks and agreed to the procedure. The procedure was well tolerated, without complication. We have discussed related skin care.   Last fbse July 22 2024  RTC Jan 2025 or sooner prn

## 2024-09-09 NOTE — HISTORY OF PRESENT ILLNESS
[FreeTextEntry1] : f/u bumps [de-identified] : Mr. ASHLEY KENDALL is a 89 year-old male with hx of BCC x 2 (R cheek, L arm), s/p Mohs surgery for R cheek on 7/30 with Dr. Dykes and has upcoming surgery for L arm lesion 9/16, here with daughter and aide for   # F/u lesion on L helix, s/p LN2 at --ddx included CNH, HAK, SCC--since LV has significantly improved and no longer bothers him

## 2024-09-12 ENCOUNTER — LABORATORY RESULT (OUTPATIENT)
Age: 89
End: 2024-09-12

## 2024-09-12 ENCOUNTER — APPOINTMENT (OUTPATIENT)
Dept: GERIATRICS | Facility: CLINIC | Age: 89
End: 2024-09-12
Payer: MEDICARE

## 2024-09-12 ENCOUNTER — RESULT CHARGE (OUTPATIENT)
Age: 89
End: 2024-09-12

## 2024-09-12 VITALS
WEIGHT: 182.5 LBS | OXYGEN SATURATION: 86 % | HEIGHT: 72 IN | BODY MASS INDEX: 24.72 KG/M2 | HEART RATE: 96 BPM | DIASTOLIC BLOOD PRESSURE: 76 MMHG | SYSTOLIC BLOOD PRESSURE: 143 MMHG | TEMPERATURE: 97.3 F

## 2024-09-12 VITALS — OXYGEN SATURATION: 93 %

## 2024-09-12 DIAGNOSIS — J44.9 CHRONIC OBSTRUCTIVE PULMONARY DISEASE, UNSPECIFIED: ICD-10-CM

## 2024-09-12 DIAGNOSIS — I48.91 UNSPECIFIED ATRIAL FIBRILLATION: ICD-10-CM

## 2024-09-12 DIAGNOSIS — L57.0 ACTINIC KERATOSIS: ICD-10-CM

## 2024-09-12 DIAGNOSIS — E78.5 HYPERLIPIDEMIA, UNSPECIFIED: ICD-10-CM

## 2024-09-12 DIAGNOSIS — Z86.19 PERSONAL HISTORY OF OTHER INFECTIOUS AND PARASITIC DISEASES: ICD-10-CM

## 2024-09-12 DIAGNOSIS — I10 ESSENTIAL (PRIMARY) HYPERTENSION: ICD-10-CM

## 2024-09-12 DIAGNOSIS — D64.9 ANEMIA, UNSPECIFIED: ICD-10-CM

## 2024-09-12 PROCEDURE — 99215 OFFICE O/P EST HI 40 MIN: CPT

## 2024-09-13 ENCOUNTER — NON-APPOINTMENT (OUTPATIENT)
Age: 89
End: 2024-09-13

## 2024-09-13 ENCOUNTER — APPOINTMENT (OUTPATIENT)
Dept: ELECTROPHYSIOLOGY | Facility: CLINIC | Age: 89
End: 2024-09-13
Payer: MEDICARE

## 2024-09-13 VITALS
SYSTOLIC BLOOD PRESSURE: 142 MMHG | HEART RATE: 89 BPM | DIASTOLIC BLOOD PRESSURE: 83 MMHG | BODY MASS INDEX: 24.95 KG/M2 | WEIGHT: 184 LBS | OXYGEN SATURATION: 93 %

## 2024-09-13 DIAGNOSIS — I44.2 ATRIOVENTRICULAR BLOCK, COMPLETE: ICD-10-CM

## 2024-09-13 LAB
ALBUMIN SERPL ELPH-MCNC: 4.3 G/DL
ALP BLD-CCNC: 69 U/L
ALT SERPL-CCNC: 10 U/L
ANION GAP SERPL CALC-SCNC: 15 MMOL/L
AST SERPL-CCNC: 16 U/L
BASOPHILS # BLD AUTO: 0.08 K/UL
BASOPHILS NFR BLD AUTO: 0.9 %
BILIRUB SERPL-MCNC: 1 MG/DL
BUN SERPL-MCNC: 23 MG/DL
CALCIUM SERPL-MCNC: 9.4 MG/DL
CHLORIDE SERPL-SCNC: 105 MMOL/L
CHOLEST SERPL-MCNC: 163 MG/DL
CO2 SERPL-SCNC: 22 MMOL/L
CREAT SERPL-MCNC: 1.1 MG/DL
EGFR: 65 ML/MIN/1.73M2
EOSINOPHIL # BLD AUTO: 0 K/UL
EOSINOPHIL NFR BLD AUTO: 0 %
GLUCOSE SERPL-MCNC: 107 MG/DL
HCT VFR BLD CALC: 38.7 %
HDLC SERPL-MCNC: 62 MG/DL
HGB BLD-MCNC: 12.5 G/DL
LDLC SERPL CALC-MCNC: 87 MG/DL
LYMPHOCYTES # BLD AUTO: 0.49 K/UL
LYMPHOCYTES NFR BLD AUTO: 5.2 %
MAN DIFF?: NORMAL
MCHC RBC-ENTMCNC: 32.3 GM/DL
MCHC RBC-ENTMCNC: 33.5 PG
MCV RBC AUTO: 103.8 FL
MONOCYTES # BLD AUTO: 0.4 K/UL
MONOCYTES NFR BLD AUTO: 4.3 %
NEUTROPHILS # BLD AUTO: 8.38 K/UL
NEUTROPHILS NFR BLD AUTO: 89.6 %
NONHDLC SERPL-MCNC: 101 MG/DL
PLATELET # BLD AUTO: 159 K/UL
POTASSIUM SERPL-SCNC: 4.7 MMOL/L
PROT SERPL-MCNC: 6.7 G/DL
RBC # BLD: 3.73 M/UL
RBC # FLD: 13.4 %
SODIUM SERPL-SCNC: 142 MMOL/L
TRIGL SERPL-MCNC: 73 MG/DL
TSH SERPL-ACNC: 0.78 UIU/ML
WBC # FLD AUTO: 9.35 K/UL

## 2024-09-13 PROCEDURE — 93000 ELECTROCARDIOGRAM COMPLETE: CPT | Mod: 59

## 2024-09-13 PROCEDURE — 93279 PRGRMG DEV EVAL PM/LDLS PM: CPT

## 2024-09-14 PROBLEM — Z86.19 HISTORY OF SEPSIS: Status: ACTIVE | Noted: 2024-09-14

## 2024-09-14 RX ORDER — METOPROLOL TARTRATE 50 MG/1
50 TABLET, FILM COATED ORAL
Refills: 0 | Status: ACTIVE | COMMUNITY
Start: 2024-09-14

## 2024-09-14 NOTE — HISTORY OF PRESENT ILLNESS
[Any fall with injury in past year] : Patient reported fall with injury in the past year [Patient is independent with] : bathing [Independent] : managing finances [] : Assistance needed with traveling/transport [Full assistance needed] : Assistance needed managing medications [Cane] : cane [0] : 2) Feeling down, depressed, or hopeless: Not at all (0) [PHQ-2 Negative - No further assessment needed] : PHQ-2 Negative - No further assessment needed [With Patient/Caregiver] : , with patient/caregiver [Reviewed no changes] : Reviewed, no changes [Designated Healthcare Proxy] : Designated healthcare proxy [Name: ___] : Health Care Proxy's Name: [unfilled]  [Relationship: ___] : Relationship: [unfilled] [DNR] : DNR [DNI] : DNI [I will adhere to the patient's wishes.] : I will adhere to the patient's wishes. 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 He is accompanied by his son and HHA, Ana.  [facility]- [facility]- [facility]- [facility] - [facility] Mccarty- Dr. Conner  Since last visit, patient's son passed away suddenly from GIB. A few days later patient was hospitalized for severe sepsis. States "it was a horrible time". Does feel well supported by his other son and daughter.  [facility] (3/28/24-4/4/24): -Pt found to have severe sepsis w/temp 104F rectal, tachycardia, elevated lactate with BCx +. 4/4 +strept lutetiensis  -followed by ID- treated with ceftriaxone -possibly from PPM so had PPM exchange on 4/2/24, also possibility that this came from dental work, also had abdominal CT which showed 1cm polyp in colon- followed up with outpatient GI who recommended against colonoscopy  States that he has generalized arthritic pain. Takes tylenol 1000mg qhs with some relief. Saw ortho (Dr. Conner) on 11/30/23 who recommended conservative measures for left hip arthritis.  Following closely with derm for management of several cancerous skin lesions.  Chronic Venous Insufficiency- He was seen by vascular surgery in February - patient reports that echocardiogram and ultrasound of lower extremities were obtained with no acute findings. He does not use compression stockings consistently.   Atrial Fibrillation- Hospitalized at [facility] in 2019 for GI bleeding deemed secondary to Coumadin toxicity - transitioned to Eliquis. Currently on Xarelto due to cost of medication.   HTN- Currently on amlodipine, Losartan, Toprol XL.   HLD- Currently on Simvastatin. Lipid Panel (01/2023): Cholesterol 144, HDL 71, , LDL 51.   BPH- Follows with urology, Dr. Plummer (last seen 9/2023) with PSA of 12.6 from 14.2. Currently on Flomax.  COPD/IPF- Patient reports that he uses continuous O2 (3L). He reports being on chronic steroids (methylprednisolone) as needed by his pulmonologist. On Trelegy. He uses albuterol PRN. Saw pulm on Monday. No changes made.  s/p PPM (replaced 4/2024) [employer] MRI [id number]  SHx: Former smoker (quit 35 years ago). 2-3 beers/week. No illicit drug use. . He has 3 children (2 sons, 1 daughter). Retired [employer]. He lives alone, hired [employer] 6 hrs/day 6 days/week.  Immunizations: Flu- UTD Covid- UTD Pneumococcal- unclear, thinks he may have has one pna vaccine- does not know when Shingles: Due   [facility]- HCP - Inez Toro ([phone]) [BoneDensityDate] : 2022 [ColonoscopyDate] : 2022 [Mammogramdate] : 2016 [Grab Bars] : no grab bars [Shower Chair] : no shower chair [Driving Concerns] : not driving or driving without noted concerns [FreeTextEntry3] : Ambulates with cane  [FreeTextEntry8] : Wears diapers [de-identified] : No longer drives [RIT0Npvia] : 0 [AdvancecareDate] : 09/24 [FreeTextEntry4] : MOLST in place: DNR/DNI/trial of feeding tube/trial of IVF/limited medical interventions/send to the hospital

## 2024-09-14 NOTE — REASON FOR VISIT
[Follow-Up] : a follow-up visit [Family Member] : family member [FreeTextEntry3] : lauren and DENZEL

## 2024-09-14 NOTE — REVIEW OF SYSTEMS
[As Noted in HPI] : as noted in HPI [Feeling Poorly] : not feeling poorly [Feeling Tired] : not feeling tired [Discharge From Eyes] : no purulent discharge from the eyes [Dry Eyes] : no dryness of the eyes [Nasal Discharge] : no nasal discharge [Sore Throat] : no sore throat [Chest Pain] : no chest pain [Palpitations] : no palpitations [Shortness Of Breath] : no shortness of breath [Wheezing] : no wheezing [Cough] : no cough [SOB on Exertion] : no shortness of breath during exertion [Abdominal Pain] : no abdominal pain [Vomiting] : no vomiting [Constipation] : no constipation [Diarrhea] : no diarrhea [Dysuria] : no dysuria [Skin Wound] : no skin wound [Dizziness] : no dizziness [Anxiety] : no anxiety [Depression] : no depression [Easy Bruising] : no tendency for easy bruising

## 2024-09-14 NOTE — PHYSICAL EXAM
BP running high with SBP ranging between 160-170  · Continue home dose of lisinopril 20 mg p o  Daily and Toprol  mg daily  · Monitor BP per nursing unit  · Appreciate Cardiology recommendations  · Will add metoprolol 5 mg IV p r n  SBP greater than 170  [Alert] : alert [Sclera] : the sclera and conjunctiva were normal [EOMI] : extraocular movements were intact [Normal Oral Mucosa] : normal oral mucosa [Normal Outer Ear/Nose] : the ears and nose were normal in appearance [Both Tympanic Membranes Were Examined] : both tympanic membranes were normal [Supple] : the neck was supple [No Respiratory Distress] : no respiratory distress [No Acc Muscle Use] : no accessory muscle use [Respiration, Rhythm And Depth] : normal respiratory rhythm and effort [Auscultation Breath Sounds / Voice Sounds] : lungs were clear to auscultation bilaterally [Normal S1, S2] : normal S1 and S2 [Heart Rate And Rhythm] : heart rate was normal and rhythm regular [Pedal Pulses Normal] : the pedal pulses are present [Normal Appearance] : normal in appearance [Bowel Sounds] : normal bowel sounds [Abdomen Tenderness] : non-tender [Abdomen Soft] : soft [Cervical Lymph Nodes Enlarged Posterior Bilaterally] : posterior cervical [Supraclavicular Lymph Nodes Enlarged Bilaterally] : supraclavicular [Cervical Lymph Nodes Enlarged Anterior Bilaterally] : anterior cervical, supraclavicular [No Spinal Tenderness] : no spinal tenderness [Normal Gait] : normal gait [Involuntary Movements] : no involuntary movements were seen [Motor Tone] : muscle strength and tone were normal [No Focal Deficits] : no focal deficits [Oriented To Time, Place, And Person] : oriented to person, place, and time [Normal Affect] : the affect was normal [Normal Insight/Judgment] : insight and judgment were intact [Normal Mood] : the mood was normal [de-identified] : Bilateral lower extremity edema +1 [de-identified] : Skin tags and seborrheic keratosis on back. Chronic hyperpigmentation changes in BL LE

## 2024-09-14 NOTE — PHYSICAL EXAM
[Alert] : alert [Sclera] : the sclera and conjunctiva were normal [EOMI] : extraocular movements were intact [Normal Oral Mucosa] : normal oral mucosa [Normal Outer Ear/Nose] : the ears and nose were normal in appearance [Both Tympanic Membranes Were Examined] : both tympanic membranes were normal [Supple] : the neck was supple [No Respiratory Distress] : no respiratory distress [No Acc Muscle Use] : no accessory muscle use [Respiration, Rhythm And Depth] : normal respiratory rhythm and effort [Auscultation Breath Sounds / Voice Sounds] : lungs were clear to auscultation bilaterally [Normal S1, S2] : normal S1 and S2 [Heart Rate And Rhythm] : heart rate was normal and rhythm regular [Pedal Pulses Normal] : the pedal pulses are present [Normal Appearance] : normal in appearance [Bowel Sounds] : normal bowel sounds [Abdomen Tenderness] : non-tender [Abdomen Soft] : soft [Cervical Lymph Nodes Enlarged Posterior Bilaterally] : posterior cervical [Supraclavicular Lymph Nodes Enlarged Bilaterally] : supraclavicular [Cervical Lymph Nodes Enlarged Anterior Bilaterally] : anterior cervical, supraclavicular [No Spinal Tenderness] : no spinal tenderness [Normal Gait] : normal gait [Involuntary Movements] : no involuntary movements were seen [Motor Tone] : muscle strength and tone were normal [No Focal Deficits] : no focal deficits [Oriented To Time, Place, And Person] : oriented to person, place, and time [Normal Affect] : the affect was normal [Normal Insight/Judgment] : insight and judgment were intact [Normal Mood] : the mood was normal [de-identified] : Bilateral lower extremity edema +1 [de-identified] : Skin tags and seborrheic keratosis on back. Chronic hyperpigmentation changes in BL LE

## 2024-09-14 NOTE — HISTORY OF PRESENT ILLNESS
[Any fall with injury in past year] : Patient reported fall with injury in the past year [Patient is independent with] : bathing [Independent] : managing finances [] : Assistance needed with traveling/transport [Full assistance needed] : Assistance needed managing medications [Cane] : cane [0] : 2) Feeling down, depressed, or hopeless: Not at all (0) [PHQ-2 Negative - No further assessment needed] : PHQ-2 Negative - No further assessment needed [With Patient/Caregiver] : , with patient/caregiver [Reviewed no changes] : Reviewed, no changes [Designated Healthcare Proxy] : Designated healthcare proxy [Name: ___] : Health Care Proxy's Name: [unfilled]  [Relationship: ___] : Relationship: [unfilled] [DNR] : DNR [DNI] : DNI [I will adhere to the patient's wishes.] : I will adhere to the patient's wishes. 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 He is accompanied by his son and HHA, Ana.  [facility]- [facility]- [facility]- [facility] - [facility]- Dr. Conner  Since last visit, patient's son passed away suddenly from GIB. A few days later patient was hospitalized for severe sepsis. States "it was a horrible time". Does feel well supported by his other son and daughter.  [facility] (3/28/24-4/4/24): -Pt found to have severe sepsis w/temp 104F rectal, tachycardia, elevated lactate with BCx +. 4/4 +strept lutetiensis  -followed by ID- treated with ceftriaxone -possibly from PPM so had PPM exchange on 4/2/24, also possibility that this came from dental work, also had abdominal CT which showed 1cm polyp in colon- followed up with outpatient GI who recommended against colonoscopy  States that he has generalized arthritic pain. Takes tylenol 1000mg qhs with some relief. Saw ortho (Dr. Conner) on 11/30/23 who recommended conservative measures for left hip arthritis.  Following closely with derm for management of several cancerous skin lesions.  Chronic Venous Insufficiency- He was seen by vascular surgery in February - patient reports that echocardiogram and ultrasound of lower extremities were obtained with no acute findings. He does not use compression stockings consistently.   Atrial Fibrillation- Hospitalized at [facility] in 2019 for GI bleeding deemed secondary to Coumadin toxicity - transitioned to Eliquis. Currently on Xarelto due to cost of medication.   HTN- Currently on amlodipine, Losartan, Toprol XL.   HLD- Currently on Simvastatin. Lipid Panel (01/2023): Cholesterol 144, HDL 71, , LDL 51.   BPH- Follows with urology, Dr. Plummer (last seen 9/2023) with PSA of 12.6 from 14.2. Currently on Flomax.  COPD/IPF- Patient reports that he uses continuous O2 (3L). He reports being on chronic steroids (methylprednisolone) as needed by his pulmonologist. On Trelegy. He uses albuterol PRN. Saw pulm on Monday. No changes made.  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 2-3 beers/week. No illicit drug use. . He has 3 children (2 sons, 1 daughter). Retired [employer]. He lives alone, hired [employer] 6 hrs/day 6 days/week.  Immunizations: Flu- UTD Covid- UTD Pneumococcal- unclear, thinks he may have has one pna vaccine- does not know when Shingles: Due   [facility]- HCP - Inez Toro ([phone]) [BoneDensityDate] : 2022 [ColonoscopyDate] : 2022 [Mammogramdate] : 2016 [Grab Bars] : no grab bars [Shower Chair] : no shower chair [Driving Concerns] : not driving or driving without noted concerns [FreeTextEntry3] : Ambulates with cane  [FreeTextEntry8] : Wears diapers [de-identified] : No longer drives [[id number]] : 0 [AdvancecareDate] : 09/24 [FreeTextEntry4] : MOLST in place: DNR/DNI/trial of feeding tube/trial of IVF/limited medical interventions/send to the hospital

## 2024-09-16 ENCOUNTER — APPOINTMENT (OUTPATIENT)
Dept: DERMATOLOGY | Facility: CLINIC | Age: 89
End: 2024-09-16
Payer: MEDICARE

## 2024-09-16 DIAGNOSIS — C44.319 BASAL CELL CARCINOMA OF SKIN OF OTHER PARTS OF FACE: ICD-10-CM

## 2024-09-16 PROBLEM — C44.619 PRIMARY BASAL CELL CARCINOMA (BCC) OF LEFT UPPER EXTREMITY: Status: ACTIVE | Noted: 2024-09-16

## 2024-09-16 PROCEDURE — 12032 INTMD RPR S/A/T/EXT 2.6-7.5: CPT

## 2024-09-16 PROCEDURE — 11604 EXC TR-EXT MAL+MARG 3.1-4 CM: CPT

## 2024-09-16 RX ORDER — MUPIROCIN 20 MG/G
2 OINTMENT TOPICAL
Qty: 22 | Refills: 1 | Status: ACTIVE | COMMUNITY
Start: 2024-09-16 | End: 1900-01-01

## 2024-09-18 NOTE — HISTORY OF PRESENT ILLNESS
[FreeTextEntry1] : Excision of a BCC on the left forearm [de-identified] : 09/16/2024   Referred by: Dr. Donnelly  Mr. ASHLEY KENDALL is an 89 year old M who presents for Excision of a BCC on the left forearm. The lesion has been noted as a growing pink nodular plaque. They opt for excision rather than Mohs surgery. He is most recently s/p Mohs surgery for a nodular BCC on the right cheek.   He wears oxygen for COPD but is able to go without for 1-2 hours at a time if needed - plugs in.   Social History: Lives alone in [place]. Has a /aid with him 6 hours per day - Ana who was present for Mohs consult who lives in [place]. Seen with his daughter, Inez who lives in [place].   Blood thinners: Xarelto for afib Pacemaker s/p R THR 2018 Controlled HTN Former tobacco Allergies: NKDA

## 2024-09-18 NOTE — HISTORY OF PRESENT ILLNESS
[FreeTextEntry1] : Excision of a BCC on the left forearm [de-identified] : 09/16/2024   Referred by: Dr. Donnelly  Mr. ASHLEY KENDALL is an 89 year old M who presents for Excision of a BCC on the left forearm. The lesion has been noted as a growing pink nodular plaque. They opt for excision rather than Mohs surgery. He is most recently s/p Mohs surgery for a nodular BCC on the right cheek.   He wears oxygen for COPD but is able to go without for 1-2 hours at a time if needed - plugs in.   Social History: Lives alone in [place]. Has a /aid with him 6 hours per day - Ana who was present for Mohs consult who lives in [place]. Seen with his daughter, Inez who lives in [place].   Blood thinners: Xarelto for afib Pacemaker s/p R THR 2018 Controlled HTN Former tobacco Allergies: NKDA

## 2024-09-18 NOTE — PHYSICAL EXAM
[Alert] : alert [Oriented x 3] : ~L oriented x 3 [Well Nourished] : well nourished [Conjunctiva Non-injected] : conjunctiva non-injected [No Visual Lymphadenopathy] : no visual  lymphadenopathy [No Clubbing] : no clubbing [No Edema] : no edema [No Bromhidrosis] : no bromhidrosis [No Chromhidrosis] : no chromhidrosis [FreeTextEntry3] : -- Left forearm with a 2x1.5cm nodular pink plaque

## 2024-09-24 ENCOUNTER — APPOINTMENT (OUTPATIENT)
Dept: DERMATOLOGY | Facility: CLINIC | Age: 89
End: 2024-09-24
Payer: MEDICARE

## 2024-09-24 DIAGNOSIS — C44.619 BASAL CELL CARCINOMA OF SKIN OF LEFT UPPER LIMB, INCLUDING SHOULDER: ICD-10-CM

## 2024-09-24 PROCEDURE — 99024 POSTOP FOLLOW-UP VISIT: CPT

## 2024-09-24 NOTE — ASSESSMENT
[FreeTextEntry1] : The patient returns for wound check and suture removal post-operatively. Wound is healing well. Sutures were removed. Vaseline and a bandage were applied. The patient was counseled to continue vaseline application for at least 1 additional week or until healed.  We have counseled the patient to follow up with their regular dermatologist for routine skin screening visits as previously recommended.

## 2024-10-02 ENCOUNTER — NON-APPOINTMENT (OUTPATIENT)
Age: 89
End: 2024-10-02

## 2024-12-02 ENCOUNTER — INPATIENT (INPATIENT)
Facility: HOSPITAL | Age: 89
LOS: 7 days | Discharge: HOME HEALTH | DRG: 192 | End: 2024-12-10
Attending: STUDENT IN AN ORGANIZED HEALTH CARE EDUCATION/TRAINING PROGRAM | Admitting: STUDENT IN AN ORGANIZED HEALTH CARE EDUCATION/TRAINING PROGRAM
Payer: MEDICARE

## 2024-12-02 VITALS
TEMPERATURE: 100 F | DIASTOLIC BLOOD PRESSURE: 78 MMHG | OXYGEN SATURATION: 96 % | SYSTOLIC BLOOD PRESSURE: 148 MMHG | HEIGHT: 72 IN | HEART RATE: 105 BPM | WEIGHT: 220.02 LBS | RESPIRATION RATE: 24 BRPM

## 2024-12-02 DIAGNOSIS — Z98.890 OTHER SPECIFIED POSTPROCEDURAL STATES: Chronic | ICD-10-CM

## 2024-12-02 DIAGNOSIS — Z95.0 PRESENCE OF CARDIAC PACEMAKER: Chronic | ICD-10-CM

## 2024-12-02 DIAGNOSIS — Z89.9 ACQUIRED ABSENCE OF LIMB, UNSPECIFIED: Chronic | ICD-10-CM

## 2024-12-02 PROCEDURE — 99291 CRITICAL CARE FIRST HOUR: CPT | Mod: GC

## 2024-12-03 ENCOUNTER — RESULT REVIEW (OUTPATIENT)
Age: 89
End: 2024-12-03

## 2024-12-03 DIAGNOSIS — R06.02 SHORTNESS OF BREATH: ICD-10-CM

## 2024-12-03 DIAGNOSIS — J96.01 ACUTE RESPIRATORY FAILURE WITH HYPOXIA: ICD-10-CM

## 2024-12-03 DIAGNOSIS — J44.9 CHRONIC OBSTRUCTIVE PULMONARY DISEASE, UNSPECIFIED: ICD-10-CM

## 2024-12-03 DIAGNOSIS — Z29.9 ENCOUNTER FOR PROPHYLACTIC MEASURES, UNSPECIFIED: ICD-10-CM

## 2024-12-03 DIAGNOSIS — A41.81 SEPSIS DUE TO ENTEROCOCCUS: ICD-10-CM

## 2024-12-03 DIAGNOSIS — J44.1 CHRONIC OBSTRUCTIVE PULMONARY DISEASE WITH (ACUTE) EXACERBATION: ICD-10-CM

## 2024-12-03 DIAGNOSIS — E87.70 FLUID OVERLOAD, UNSPECIFIED: ICD-10-CM

## 2024-12-03 DIAGNOSIS — I48.20 CHRONIC ATRIAL FIBRILLATION, UNSPECIFIED: ICD-10-CM

## 2024-12-03 DIAGNOSIS — R79.89 OTHER SPECIFIED ABNORMAL FINDINGS OF BLOOD CHEMISTRY: ICD-10-CM

## 2024-12-03 LAB
APPEARANCE UR: CLEAR — SIGNIFICANT CHANGE UP
APTT BLD: 36.3 SEC — HIGH (ref 24.5–35.6)
BACTERIA # UR AUTO: NEGATIVE /HPF — SIGNIFICANT CHANGE UP
BASOPHILS # BLD AUTO: 0.03 K/UL — SIGNIFICANT CHANGE UP (ref 0–0.2)
BASOPHILS NFR BLD AUTO: 0.2 % — SIGNIFICANT CHANGE UP (ref 0–2)
BILIRUB UR-MCNC: NEGATIVE — SIGNIFICANT CHANGE UP
CAST: 6 /LPF — HIGH (ref 0–4)
COLOR SPEC: SIGNIFICANT CHANGE UP
DIFF PNL FLD: ABNORMAL
E FAECALIS DNA BLD POS QL NAA+NON-PROBE: SIGNIFICANT CHANGE UP
EOSINOPHIL # BLD AUTO: 0.01 K/UL — SIGNIFICANT CHANGE UP (ref 0–0.5)
EOSINOPHIL NFR BLD AUTO: 0.1 % — SIGNIFICANT CHANGE UP (ref 0–6)
FLUAV AG NPH QL: SIGNIFICANT CHANGE UP
FLUBV AG NPH QL: SIGNIFICANT CHANGE UP
GAS PNL BLDV: SIGNIFICANT CHANGE UP
GAS PNL BLDV: SIGNIFICANT CHANGE UP
GLUCOSE UR QL: NEGATIVE MG/DL — SIGNIFICANT CHANGE UP
GRAM STN FLD: ABNORMAL
HCT VFR BLD CALC: 41.9 % — SIGNIFICANT CHANGE UP (ref 39–50)
HGB BLD-MCNC: 13.3 G/DL — SIGNIFICANT CHANGE UP (ref 13–17)
IMM GRANULOCYTES NFR BLD AUTO: 0.6 % — SIGNIFICANT CHANGE UP (ref 0–0.9)
INR BLD: 2.68 RATIO — HIGH (ref 0.85–1.16)
KETONES UR-MCNC: ABNORMAL MG/DL
LEUKOCYTE ESTERASE UR-ACNC: ABNORMAL
LIDOCAIN IGE QN: 12 U/L — SIGNIFICANT CHANGE UP (ref 7–60)
LYMPHOCYTES # BLD AUTO: 0.63 K/UL — LOW (ref 1–3.3)
LYMPHOCYTES # BLD AUTO: 4.5 % — LOW (ref 13–44)
MAGNESIUM SERPL-MCNC: 1.8 MG/DL — SIGNIFICANT CHANGE UP (ref 1.6–2.6)
MCHC RBC-ENTMCNC: 31.7 G/DL — LOW (ref 32–36)
MCHC RBC-ENTMCNC: 32.9 PG — SIGNIFICANT CHANGE UP (ref 27–34)
MCV RBC AUTO: 103.7 FL — HIGH (ref 80–100)
METHOD TYPE: SIGNIFICANT CHANGE UP
MONOCYTES # BLD AUTO: 0.64 K/UL — SIGNIFICANT CHANGE UP (ref 0–0.9)
MONOCYTES NFR BLD AUTO: 4.6 % — SIGNIFICANT CHANGE UP (ref 2–14)
NEUTROPHILS # BLD AUTO: 12.62 K/UL — HIGH (ref 1.8–7.4)
NEUTROPHILS NFR BLD AUTO: 90 % — HIGH (ref 43–77)
NITRITE UR-MCNC: NEGATIVE — SIGNIFICANT CHANGE UP
NRBC # BLD: 0 /100 WBCS — SIGNIFICANT CHANGE UP (ref 0–0)
NT-PROBNP SERPL-SCNC: 7751 PG/ML — HIGH (ref 0–300)
PH UR: 5 — SIGNIFICANT CHANGE UP (ref 5–8)
PLATELET # BLD AUTO: 139 K/UL — LOW (ref 150–400)
PROT UR-MCNC: 100 MG/DL
PROTHROM AB SERPL-ACNC: 30.2 SEC — HIGH (ref 9.9–13.4)
RBC # BLD: 4.04 M/UL — LOW (ref 4.2–5.8)
RBC # FLD: 13.3 % — SIGNIFICANT CHANGE UP (ref 10.3–14.5)
RBC CASTS # UR COMP ASSIST: 3 /HPF — SIGNIFICANT CHANGE UP (ref 0–4)
REVIEW: SIGNIFICANT CHANGE UP
RSV RNA NPH QL NAA+NON-PROBE: SIGNIFICANT CHANGE UP
SARS-COV-2 RNA SPEC QL NAA+PROBE: SIGNIFICANT CHANGE UP
SP GR SPEC: 1.03 — SIGNIFICANT CHANGE UP (ref 1–1.03)
SPECIMEN SOURCE: SIGNIFICANT CHANGE UP
SPECIMEN SOURCE: SIGNIFICANT CHANGE UP
SQUAMOUS # UR AUTO: 7 /HPF — HIGH (ref 0–5)
TROPONIN T, HIGH SENSITIVITY RESULT: 54 NG/L — HIGH (ref 0–51)
TROPONIN T, HIGH SENSITIVITY RESULT: 68 NG/L — HIGH (ref 0–51)
TSH SERPL-MCNC: 0.56 UIU/ML — SIGNIFICANT CHANGE UP (ref 0.27–4.2)
UROBILINOGEN FLD QL: 1 MG/DL — SIGNIFICANT CHANGE UP (ref 0.2–1)
WBC # BLD: 14.01 K/UL — HIGH (ref 3.8–10.5)
WBC # FLD AUTO: 14.01 K/UL — HIGH (ref 3.8–10.5)
WBC UR QL: 14 /HPF — HIGH (ref 0–5)

## 2024-12-03 PROCEDURE — 71275 CT ANGIOGRAPHY CHEST: CPT | Mod: 26,MC

## 2024-12-03 PROCEDURE — 71045 X-RAY EXAM CHEST 1 VIEW: CPT | Mod: 26

## 2024-12-03 PROCEDURE — 99222 1ST HOSP IP/OBS MODERATE 55: CPT | Mod: GC,AI

## 2024-12-03 PROCEDURE — 93306 TTE W/DOPPLER COMPLETE: CPT | Mod: 26

## 2024-12-03 PROCEDURE — 99223 1ST HOSP IP/OBS HIGH 75: CPT | Mod: GC

## 2024-12-03 RX ORDER — FERROUS SULFATE 325(65) MG
325 TABLET ORAL DAILY
Refills: 0 | Status: DISCONTINUED | OUTPATIENT
Start: 2024-12-03 | End: 2024-12-10

## 2024-12-03 RX ORDER — AMPICILLIN TRIHYDRATE 250 MG/5ML
2 SUSPENSION, RECONSTITUTED, ORAL (ML) ORAL ONCE
Refills: 0 | Status: COMPLETED | OUTPATIENT
Start: 2024-12-03 | End: 2024-12-03

## 2024-12-03 RX ORDER — METOPROLOL TARTRATE 100 MG/1
50 TABLET, FILM COATED ORAL ONCE
Refills: 0 | Status: COMPLETED | OUTPATIENT
Start: 2024-12-03 | End: 2024-12-03

## 2024-12-03 RX ORDER — FLUTICASONE PROPIONATE AND SALMETEROL XINAFOATE 45; 21 UG/1; UG/1
1 AEROSOL, METERED RESPIRATORY (INHALATION)
Refills: 0 | Status: DISCONTINUED | OUTPATIENT
Start: 2024-12-03 | End: 2024-12-10

## 2024-12-03 RX ORDER — METHYLPREDNISOLONE SOD SUCC 125 MG
125 VIAL (EA) INJECTION ONCE
Refills: 0 | Status: COMPLETED | OUTPATIENT
Start: 2024-12-03 | End: 2024-12-03

## 2024-12-03 RX ORDER — ACETAMINOPHEN 500MG 500 MG/1
1000 TABLET, COATED ORAL ONCE
Refills: 0 | Status: COMPLETED | OUTPATIENT
Start: 2024-12-03 | End: 2024-12-03

## 2024-12-03 RX ORDER — FLUTICASONE PROPIONATE AND SALMETEROL XINAFOATE 45; 21 UG/1; UG/1
1 AEROSOL, METERED RESPIRATORY (INHALATION)
Refills: 0 | Status: DISCONTINUED | OUTPATIENT
Start: 2024-12-03 | End: 2024-12-03

## 2024-12-03 RX ORDER — METOPROLOL TARTRATE 100 MG/1
50 TABLET, FILM COATED ORAL DAILY
Refills: 0 | Status: DISCONTINUED | OUTPATIENT
Start: 2024-12-03 | End: 2024-12-10

## 2024-12-03 RX ORDER — IPRATROPIUM BROMIDE AND ALBUTEROL SULFATE 2.5; .5 MG/3ML; MG/3ML
3 SOLUTION RESPIRATORY (INHALATION)
Refills: 0 | Status: COMPLETED | OUTPATIENT
Start: 2024-12-03 | End: 2024-12-03

## 2024-12-03 RX ORDER — RIVAROXABAN 10 MG/1
20 TABLET, FILM COATED ORAL
Refills: 0 | Status: DISCONTINUED | OUTPATIENT
Start: 2024-12-03 | End: 2024-12-10

## 2024-12-03 RX ORDER — LOSARTAN POTASSIUM 100 MG/1
50 TABLET, FILM COATED ORAL DAILY
Refills: 0 | Status: DISCONTINUED | OUTPATIENT
Start: 2024-12-03 | End: 2024-12-10

## 2024-12-03 RX ORDER — ALBUTEROL 90 MCG
2 AEROSOL (GRAM) INHALATION EVERY 6 HOURS
Refills: 0 | Status: DISCONTINUED | OUTPATIENT
Start: 2024-12-03 | End: 2024-12-03

## 2024-12-03 RX ORDER — FUROSEMIDE 40 MG/1
20 TABLET ORAL DAILY
Refills: 0 | Status: DISCONTINUED | OUTPATIENT
Start: 2024-12-03 | End: 2024-12-03

## 2024-12-03 RX ORDER — AMPICILLIN TRIHYDRATE 250 MG/5ML
2 SUSPENSION, RECONSTITUTED, ORAL (ML) ORAL EVERY 6 HOURS
Refills: 0 | Status: DISCONTINUED | OUTPATIENT
Start: 2024-12-04 | End: 2024-12-06

## 2024-12-03 RX ORDER — LOSARTAN POTASSIUM 100 MG/1
50 TABLET, FILM COATED ORAL ONCE
Refills: 0 | Status: COMPLETED | OUTPATIENT
Start: 2024-12-03 | End: 2024-12-03

## 2024-12-03 RX ORDER — AMPICILLIN TRIHYDRATE 250 MG/5ML
SUSPENSION, RECONSTITUTED, ORAL (ML) ORAL
Refills: 0 | Status: DISCONTINUED | OUTPATIENT
Start: 2024-12-03 | End: 2024-12-06

## 2024-12-03 RX ORDER — FUROSEMIDE 40 MG/1
20 TABLET ORAL ONCE
Refills: 0 | Status: COMPLETED | OUTPATIENT
Start: 2024-12-03 | End: 2024-12-03

## 2024-12-03 RX ORDER — PIPERACILLIN SODIUM AND TAZOBACTAM SODIUM 4; .5 G/20ML; G/20ML
3.38 INJECTION, POWDER, LYOPHILIZED, FOR SOLUTION INTRAVENOUS ONCE
Refills: 0 | Status: COMPLETED | OUTPATIENT
Start: 2024-12-03 | End: 2024-12-03

## 2024-12-03 RX ORDER — IPRATROPIUM BROMIDE AND ALBUTEROL SULFATE 2.5; .5 MG/3ML; MG/3ML
3 SOLUTION RESPIRATORY (INHALATION) EVERY 6 HOURS
Refills: 0 | Status: DISCONTINUED | OUTPATIENT
Start: 2024-12-03 | End: 2024-12-10

## 2024-12-03 RX ORDER — TAMSULOSIN HYDROCHLORIDE 0.4 MG/1
0.8 CAPSULE ORAL AT BEDTIME
Refills: 0 | Status: DISCONTINUED | OUTPATIENT
Start: 2024-12-03 | End: 2024-12-10

## 2024-12-03 RX ORDER — FUROSEMIDE 40 MG/1
20 TABLET ORAL ONCE
Refills: 0 | Status: COMPLETED | OUTPATIENT
Start: 2024-12-03 | End: 2024-12-04

## 2024-12-03 RX ORDER — VANCOMYCIN HCL 900 MCG/MG
1250 POWDER (GRAM) MISCELLANEOUS EVERY 12 HOURS
Refills: 0 | Status: DISCONTINUED | OUTPATIENT
Start: 2024-12-03 | End: 2024-12-03

## 2024-12-03 RX ADMIN — Medication 325 MILLIGRAM(S): at 21:43

## 2024-12-03 RX ADMIN — ACETAMINOPHEN 500MG 400 MILLIGRAM(S): 500 TABLET, COATED ORAL at 01:00

## 2024-12-03 RX ADMIN — LOSARTAN POTASSIUM 50 MILLIGRAM(S): 100 TABLET, FILM COATED ORAL at 18:04

## 2024-12-03 RX ADMIN — Medication 150 GRAM(S): at 00:59

## 2024-12-03 RX ADMIN — IPRATROPIUM BROMIDE AND ALBUTEROL SULFATE 3 MILLILITER(S): 2.5; .5 SOLUTION RESPIRATORY (INHALATION) at 07:00

## 2024-12-03 RX ADMIN — ACETAMINOPHEN 500MG 1000 MILLIGRAM(S): 500 TABLET, COATED ORAL at 07:41

## 2024-12-03 RX ADMIN — Medication 125 MILLIGRAM(S): at 01:02

## 2024-12-03 RX ADMIN — Medication 2 PUFF(S): at 22:15

## 2024-12-03 RX ADMIN — TAMSULOSIN HYDROCHLORIDE 0.8 MILLIGRAM(S): 0.4 CAPSULE ORAL at 21:28

## 2024-12-03 RX ADMIN — PIPERACILLIN SODIUM AND TAZOBACTAM SODIUM 200 GRAM(S): 4; .5 INJECTION, POWDER, LYOPHILIZED, FOR SOLUTION INTRAVENOUS at 01:09

## 2024-12-03 RX ADMIN — FUROSEMIDE 20 MILLIGRAM(S): 40 TABLET ORAL at 06:18

## 2024-12-03 RX ADMIN — IPRATROPIUM BROMIDE AND ALBUTEROL SULFATE 3 MILLILITER(S): 2.5; .5 SOLUTION RESPIRATORY (INHALATION) at 01:01

## 2024-12-03 RX ADMIN — Medication 200 GRAM(S): at 23:43

## 2024-12-03 RX ADMIN — METOPROLOL TARTRATE 50 MILLIGRAM(S): 100 TABLET, FILM COATED ORAL at 18:05

## 2024-12-03 RX ADMIN — PIPERACILLIN SODIUM AND TAZOBACTAM SODIUM 3.38 GRAM(S): 4; .5 INJECTION, POWDER, LYOPHILIZED, FOR SOLUTION INTRAVENOUS at 07:52

## 2024-12-03 RX ADMIN — FLUTICASONE PROPIONATE AND SALMETEROL XINAFOATE 1 DOSE(S): 45; 21 AEROSOL, METERED RESPIRATORY (INHALATION) at 18:39

## 2024-12-03 RX ADMIN — Medication 200 GRAM(S): at 18:05

## 2024-12-03 RX ADMIN — IPRATROPIUM BROMIDE AND ALBUTEROL SULFATE 3 MILLILITER(S): 2.5; .5 SOLUTION RESPIRATORY (INHALATION) at 01:03

## 2024-12-03 RX ADMIN — RIVAROXABAN 20 MILLIGRAM(S): 10 TABLET, FILM COATED ORAL at 18:05

## 2024-12-03 RX ADMIN — Medication 2 GRAM(S): at 07:51

## 2024-12-03 NOTE — PATIENT PROFILE ADULT - FALL HARM RISK - HARM RISK INTERVENTIONS

## 2024-12-03 NOTE — PHYSICAL THERAPY INITIAL EVALUATION ADULT - ADDITIONAL COMMENTS
Pt lives alone in [place] +1 step to enter. Pt has a chairlift inside to second floor. Pt has a cane, RW and rollator, typically uses the RW. Pt has a tub shower +grab bars. Pt has a HHA 6 hours x 6 days week.

## 2024-12-03 NOTE — ED PROVIDER NOTE - PROGRESS NOTE DETAILS
Le PGY3: Patient has significant improvement in symptoms on BiPAP.  Patient now on 4L NC.Labs remarkable for WBC 14.01 and BNP 7751.  CT angio chest PE protocol shows no evidence of PE, but does shows mild pulmonary edema or volume overload with small to moderate right-sided pleural effusion with trace left pleural effusion.  Patient also has scattered pulmonary nodules measuring 0.3 cm.  Plan for medicine admission for COPD exacerbation as well as possible CHF.  Patient given furosemide 20 mg IV.

## 2024-12-03 NOTE — CONSULT NOTE ADULT - SUBJECTIVE AND OBJECTIVE BOX
Patient is an 89 yo M w/ PMHx of COPD on 3.5 L nasal cannula on albuterol inhalers, Afib (on Xarelto) a right hip replacement. presents for acute respiratory distress.  Patient was hypoxic (79% on room air) on arrival to the ED and was placed on nonrebreather.  Family reports the patient was found to have UTI on at-home test and had a fever (Tmax 101F).  Patient started becoming short of breath despite taking albuterol inhalers.   Per patient the past two days he has had increased SOB both at rest and with activity and was prompted to come to the ED when his at home O2 sat was bellow 88%. He also says an at home urine test showed he had a UTI but he denies any increase urinary frequency of dyuria. Denies any associated pain. Albuterol inhalers did not relieve his SOB (03 Dec 2024 07:57)    Of note, patient was hospitalized in April of this year with strept lutetiensis bacteremia in setting of recent dental work and PPM in place. TTE without vegetations during that admission. Patient was treated with 4 weeks of Ceftriaxone through 4/25. PPM was extracted on 4/2 with micra implant.     Vitals notable for hypoxia initially requiring bipap and then transitioned to NC.   Labs: CBC with leukocytosis to 14.01. CMP with creatinine 1.27, total bilirubin 1.5. Probnp 7,751. U/A with 14 WBCs. Partial RVP negative. Blood cultures x 2 with gram positive cocci in pairs.   CXR: No consolidation.   CT angio PE: No pulmonary embolism. Suggestion of mild pulmonary edema or volume overload. Small-to-moderate   right pleural effusion. Trace left pleural effusion. Stable scattered pulmonary nodules measuring up to 0.3 cm. Pulmonary   imaging follow-up in one year if the patient is at increased risk for neoplasm.  TTE:  1. Left ventricular cavity is normal in size. Left ventricular wall thickness is mildly increased. Left ventricular systolic function is mildly decreased with an ejection fraction of 50 % by Ray's method of disks. 2. The left ventricular diastolic function is indeterminate, with elevated left ventricular filling pressure. 3. Mildly enlarged right ventricular cavity size and mildly reduced right ventricular systolic function. 4. Severe biatrial dilatation.5. No significant valvular disease. 6. Estimated pulmonary artery systolic pressure is 58 mmHg, consistent with moderate pulmonary hypertension. 7. No pericardial effusion seen. 8. Compared to the transthoracic echocardiogram performed on 3/25/2024, there have been no significant interval changes.    Abx:   Vancomycin         REVIEW OF SYSTEMS  pending full examination    prior hospital charts reviewed [V]  primary team notes reviewed [V]  other consultant notes reviewed [V]    PAST MEDICAL & SURGICAL HISTORY:  Osteoarthritis of multiple joints, unspecified osteoarthritis type      Former smoker, stopped smoking many years ago  2PPD X 50yrs      Chronic obstructive pulmonary disease, unspecified COPD type      Persistent atrial fibrillation      Pacemaker  [employer] Model [id number]  Serial [id number]      Idiopathic pulmonary fibrosis      Benign prostatic hyperplasia with nocturia      Essential hypertension      H/O amputation  age 19, R great toe, due to growth "tumor"      H/O arthroscopic knee surgery  Both knees, 2013      H/O cardiac pacemaker  2005      H/O repair of rotator cuff  2012          SOCIAL HISTORY:  Denied smoking/vaping/alcohol/recreational drug use    FAMILY HISTORY:  FH: colon cancer  mother    FH: ovarian cancer  father        Allergies  No Known Allergies        ANTIMICROBIALS:  vancomycin  IVPB 1250 every 12 hours      ANTIMICROBIALS (past 90 days):  MEDICATIONS  (STANDING):  piperacillin/tazobactam IVPB...   200 mL/Hr IV Intermittent (12-03-24 @ 01:09)        OTHER MEDS:   MEDICATIONS  (STANDING):  albuterol/ipratropium for Nebulization 3 every 6 hours PRN  fluticasone propionate/ salmeterol 100-50 MICROgram(s) Diskus 1 two times a day  furosemide   Injectable 20 once  losartan 50 daily  metoprolol succinate ER 50 daily  rivaroxaban 20 with dinner  tamsulosin 0.8 at bedtime  tiotropium 2.5 MICROgram(s) Inhaler 2 daily      VITALS:  Vital Signs Last 24 Hrs  T(F): 97.5 (12-03-24 @ 14:05), Max: 100.2 (12-02-24 @ 23:55)    Vital Signs Last 24 Hrs  HR: 80 (12-03-24 @ 15:05) (71 - 105)  BP: 129/79 (12-03-24 @ 14:05) (91/55 - 148/78)  RR: 16 (12-03-24 @ 14:05)  SpO2: 96% (12-03-24 @ 15:05) (90% - 99%)  Wt(kg): --    EXAM:  pending full examination      Labs:                        13.3   14.01 )-----------( 139      ( 03 Dec 2024 00:26 )             41.9     12-03    140  |  104  |  23  ----------------------------<  164[H]  3.9   |  19[L]  |  1.27    Ca    9.7      03 Dec 2024 00:26  Mg     1.8     12-03    TPro  7.2  /  Alb  4.3  /  TBili  1.5[H]  /  DBili  x   /  AST  18  /  ALT  15  /  AlkPhos  72  12-03      WBC Trend:  WBC Count: 14.01 (12-03-24 @ 00:26)      Auto Neutrophil #: 12.62 K/uL (12-03-24 @ 00:26)  Auto Neutrophil #: 7.20 K/uL (03-29-24 @ 07:03)  Auto Neutrophil #: 7.09 K/uL (03-28-24 @ 07:42)  Auto Neutrophil #: 6.53 K/uL (03-27-24 @ 06:05)  Auto Neutrophil #: 14.31 K/uL (03-24-24 @ 05:47)      Creatine Trend:  Creatinine: 1.27 (12-03)      Liver Biochemical Testing Trend:  Alanine Aminotransferase (ALT/SGPT): 15 (12-03)  Alanine Aminotransferase (ALT/SGPT): 27 (03-29)  Alanine Aminotransferase (ALT/SGPT): 24 (03-28)  Alanine Aminotransferase (ALT/SGPT): 27 (03-27)  Alanine Aminotransferase (ALT/SGPT): 27 (03-26)  Aspartate Aminotransferase (AST/SGOT): 18 (12-03-24 @ 00:26)  Aspartate Aminotransferase (AST/SGOT): 21 (03-29-24 @ 07:03)  Aspartate Aminotransferase (AST/SGOT): 19 (03-28-24 @ 07:42)  Aspartate Aminotransferase (AST/SGOT): 24 (03-27-24 @ 06:05)  Aspartate Aminotransferase (AST/SGOT): 29 (03-26-24 @ 05:05)  Bilirubin Total: 1.5 (12-03)  Bilirubin Total: 0.8 (03-29)  Bilirubin Total: 1.0 (03-28)  Bilirubin Total: 1.2 (03-27)  Bilirubin Total: 1.3 (03-26)      Trend LDH  03-24-24 @ 05:47  325[H]  03-23-24 @ 01:38  250[H]      Auto Eosinophil %: 0.1 % (12-03-24 @ 00:26)      MICROBIOLOGY:    MRSA PCR Result.: NotDetec (03-31-24 @ 21:44)  MRSA PCR Result.: NotDetec (03-30-24 @ 06:20)  MRSA PCR Result.: NotDetec (03-23-24 @ 11:12)      Culture - Blood (collected 03 Dec 2024 00:17)  Source: .Blood BLOOD  Preliminary Report:    Growth in aerobic bottle: Gram positive cocci in pairs    Culture - Blood (collected 03 Dec 2024 00:16)  Source: .Blood BLOOD  Preliminary Report:    Growth in aerobic bottle: Gram positive cocci in pairs    Growth in anaerobic bottle: Gram positive cocci in pairs    Direct identification is available within approximately 3-5    hours either by Blood Panel Multiplexed PCR or Direct    MALDI-TOF. Details: [email or web address]    Culture - Acid Fast - Tissue w/Smear (collected 02 Apr 2024 22:16)  Source: .Tissue pacemaker lead tips  Final Report:    No acid-fast bacilli isolated after 6 weeks.    Culture - Fungal, Tissue (collected 02 Apr 2024 22:16)  Source: .Tissue pacemaker lead tips  Final Report:    No fungus isolated at 4 weeks.    Culture - Tissue with Gram Stain (collected 02 Apr 2024 22:16)  Source: .Tissue pacemaker lead tips  Final Report:    No growth at 5 days    Culture - Sputum (collected 25 Mar 2024 18:22)  Source: .Sputum Sputum  Final Report:    Normal Respiratory Merlyn present    Culture - Blood (collected 25 Mar 2024 05:46)  Source: .Blood Blood-Peripheral  Final Report:    No growth at 5 days    Culture - Blood (collected 25 Mar 2024 05:30)  Source: .Blood Blood-Venous  Final Report:    No growth at 5 days    Culture - Urine (collected 23 Mar 2024 06:55)  Source: Clean Catch Clean Catch (Midstream)  Final Report:    50,000 - 99,000 CFU/mL Enterobacter cloacae complex  Organism: Enterobacter cloacae complex  Organism: Enterobacter cloacae complex    Sensitivities:      Method Type: DASHA      -  Amoxicillin/Clavulanic Acid: R >16/8      -  Ampicillin: R >16 These ampicillin results predict results for amoxicillin      -  Ampicillin/Sulbactam: R 8/4      -  Aztreonam: S <=4      -  Cefazolin: R >16      -  Cefepime: S <=2      -  Cefoxitin: R >16      -  Ceftriaxone: S <=1 Enterobacter cloacae, Klebsiella aerogenes, and Citrobacter freundii may develop resistance during prolonged therapy.      -  Ciprofloxacin: S <=0.25      -  Ertapenem: S <=0.5      -  Gentamicin: S <=2      -  Imipenem: S <=1      -  Levofloxacin: S <=0.5      -  Meropenem: S <=1      -  Nitrofurantoin: S <=32 Should not be used to treat pyelonephritis      -  Piperacillin/Tazobactam: S <=8      -  Tobramycin: S <=2      -  Trimethoprim/Sulfamethoxazole: S <=0.5/9.5    Culture - Blood (collected 22 Mar 2024 23:00)  Source: .Blood Blood-Peripheral  Final Report:    Growth in aerobic and anaerobic bottles: Streptococcus lutetiensis    Direct identification is available within approximately 3-5    hours either by Blood Panel Multiplexed PCR or Direct    MALDI-TOF. Details: [email or web address]  Organism: Blood Culture PCR  Streptococcus lutetiensis  Organism: Streptococcus lutetiensis    Sensitivities:      Method Type: DASHA      -  Ceftriaxone: S <=0.25      -  Penicillin: S 0.12      -  Vancomycin: S 0.5  Organism: Blood Culture PCR    Sensitivities:      Method Type: PCR      -  Streptococcus sp. (Not Grp A, B or S pneumoniae): Detec                                            Troponin T, High Sensitivity Result: 54 (12-03)  Troponin T, High Sensitivity Result: 68 (12-03)  Troponin T, High Sensitivity Result: 58 (12-03)    Blood Gas Venous - Lactate: 1.8 (12-03 @ 03:09)  Blood Gas Venous - Lactate: 3.5 (12-03 @ 00:26)        RADIOLOGY:  imaging below personally reviewed   Patient is an 88 yo M w/ PMHx of COPD on 3.5 L nasal cannula on albuterol inhalers, Afib (on Xarelto), who presents for shortness of breath. Patient states he woke up yesterday with fevers, chills, and trouble breathing which prompted him to come to the hospital. Denies chest pain, palpitations, GI sx, urinary sx.     Of note, patient was hospitalized in April of this year with strept lutetiensis bacteremia in setting of recent dental work and PPM in place. TTE without vegetations during that admission. Patient was treated with 4 weeks of Ceftriaxone through 4/25. PPM was extracted on 4/2 with micra implant. Intraop cultures from leads negative. Patient states they did not pursue colonoscopy with GI as they had discussed with their GI doctor and they felt the risks outweighed the benefits in the setting of his oxygen requirements.     Patient states he had dental scraping/cleaning done within the past month but took Amoxicillin the morning of.     Patient endorses a right hip replacement in 2018 after which he has had chronic right hip pain but nothing worse than usual. Denies swelling or warmth of the knee.       Vitals notable for hypoxia initially requiring bipap and then transitioned to NC.   Labs: CBC with leukocytosis to 14.01. CMP with creatinine 1.27, total bilirubin 1.5. Probnp 7,751. U/A with 14 WBCs. Partial RVP negative. Blood cultures x 2 with gram positive cocci in pairs.   CXR: No consolidation.   CT angio PE: No pulmonary embolism. Suggestion of mild pulmonary edema or volume overload. Small-to-moderate   right pleural effusion. Trace left pleural effusion. Stable scattered pulmonary nodules measuring up to 0.3 cm. Pulmonary   imaging follow-up in one year if the patient is at increased risk for neoplasm.  TTE:  1. Left ventricular cavity is normal in size. Left ventricular wall thickness is mildly increased. Left ventricular systolic function is mildly decreased with an ejection fraction of 50 % by Ray's method of disks. 2. The left ventricular diastolic function is indeterminate, with elevated left ventricular filling pressure. 3. Mildly enlarged right ventricular cavity size and mildly reduced right ventricular systolic function. 4. Severe biatrial dilatation.5. No significant valvular disease. 6. Estimated pulmonary artery systolic pressure is 58 mmHg, consistent with moderate pulmonary hypertension. 7. No pericardial effusion seen. 8. Compared to the transthoracic echocardiogram performed on 3/25/2024, there have been no significant interval changes.    REVIEW OF SYSTEMS  Constitutional: + fevers, + chills, No weight loss, No fatigue   Skin: No rash, no phlebitis	  Eyes: No discharge, No change in vision	  ENMT: No sore throat, No ulcers  Respiratory: No cough, + SOB  Cardiovascular:  No chest pain, No palpitations   Gastrointestinal: No pain, No nausea, No vomiting, No diarrhea, No constipation	  Genitourinary: No dysuria, No frequency, No hesitancy, No flank pain  MSK: No Joint pain, No back pain, No edema  Neurological: No HA, no weakness, no seizures, no AMS     prior hospital charts reviewed [V]  primary team notes reviewed [V]  other consultant notes reviewed [V]    PAST MEDICAL & SURGICAL HISTORY:  Osteoarthritis of multiple joints, unspecified osteoarthritis type      Former smoker, stopped smoking many years ago  2PPD X 50yrs      Chronic obstructive pulmonary disease, unspecified COPD type      Persistent atrial fibrillation      Pacemaker  [employer] Model [id number]  Serial [id number]      Idiopathic pulmonary fibrosis      Benign prostatic hyperplasia with nocturia      Essential hypertension      H/O amputation  age 19, R great toe, due to growth "tumor"      H/O arthroscopic knee surgery  Both knees, 2013      H/O cardiac pacemaker  2005      H/O repair of rotator cuff  2012          SOCIAL HISTORY:  +fomer smoking  -denies vaping/alcohol/recreational drug use    FAMILY HISTORY:  FH: colon cancer  mother    FH: ovarian cancer  father        Allergies  No Known Allergies        ANTIMICROBIALS:  vancomycin  IVPB 1250 every 12 hours      ANTIMICROBIALS (past 90 days):  MEDICATIONS  (STANDING):  piperacillin/tazobactam IVPB...   200 mL/Hr IV Intermittent (12-03-24 @ 01:09)        OTHER MEDS:   MEDICATIONS  (STANDING):  albuterol/ipratropium for Nebulization 3 every 6 hours PRN  fluticasone propionate/ salmeterol 100-50 MICROgram(s) Diskus 1 two times a day  furosemide   Injectable 20 once  losartan 50 daily  metoprolol succinate ER 50 daily  rivaroxaban 20 with dinner  tamsulosin 0.8 at bedtime  tiotropium 2.5 MICROgram(s) Inhaler 2 daily      VITALS:  Vital Signs Last 24 Hrs  T(F): 97.5 (12-03-24 @ 14:05), Max: 100.2 (12-02-24 @ 23:55)    Vital Signs Last 24 Hrs  HR: 80 (12-03-24 @ 15:05) (71 - 105)  BP: 129/79 (12-03-24 @ 14:05) (91/55 - 148/78)  RR: 16 (12-03-24 @ 14:05)  SpO2: 96% (12-03-24 @ 15:05) (90% - 99%)  Wt(kg): --    EXAM:  General: Patient appears comfortable, no acute distress  HEENT: NCAT, PERRL, anicteric sclera, mucous membranes moist and intact  CV: +S1/S2, RRR, no M/R/G  Lungs: No respiratory distress, CTA b/l, no wheezing, rales or rhonchi  Abd:  BS4+, Soft, NTND, no guarding  : No suprapubic tenderness  Neuro: AAOx3. No focal deficits noted.   Ext: No cyanosis, no edema  Msk: freely moving upper and lower extremities  Skin: No rash, no phlebitis, No erythema. RLE: hip with well healed surgical scar without tenderness to palpation at the joint. +ROM intact at the joint. + venous stasis changes of the bilateral lower extremities       Labs:                        13.3   14.01 )-----------( 139      ( 03 Dec 2024 00:26 )             41.9     12-03    140  |  104  |  23  ----------------------------<  164[H]  3.9   |  19[L]  |  1.27    Ca    9.7      03 Dec 2024 00:26  Mg     1.8     12-03    TPro  7.2  /  Alb  4.3  /  TBili  1.5[H]  /  DBili  x   /  AST  18  /  ALT  15  /  AlkPhos  72  12-03      WBC Trend:  WBC Count: 14.01 (12-03-24 @ 00:26)      Auto Neutrophil #: 12.62 K/uL (12-03-24 @ 00:26)  Auto Neutrophil #: 7.20 K/uL (03-29-24 @ 07:03)  Auto Neutrophil #: 7.09 K/uL (03-28-24 @ 07:42)  Auto Neutrophil #: 6.53 K/uL (03-27-24 @ 06:05)  Auto Neutrophil #: 14.31 K/uL (03-24-24 @ 05:47)      Creatine Trend:  Creatinine: 1.27 (12-03)      Liver Biochemical Testing Trend:  Alanine Aminotransferase (ALT/SGPT): 15 (12-03)  Alanine Aminotransferase (ALT/SGPT): 27 (03-29)  Alanine Aminotransferase (ALT/SGPT): 24 (03-28)  Alanine Aminotransferase (ALT/SGPT): 27 (03-27)  Alanine Aminotransferase (ALT/SGPT): 27 (03-26)  Aspartate Aminotransferase (AST/SGOT): 18 (12-03-24 @ 00:26)  Aspartate Aminotransferase (AST/SGOT): 21 (03-29-24 @ 07:03)  Aspartate Aminotransferase (AST/SGOT): 19 (03-28-24 @ 07:42)  Aspartate Aminotransferase (AST/SGOT): 24 (03-27-24 @ 06:05)  Aspartate Aminotransferase (AST/SGOT): 29 (03-26-24 @ 05:05)  Bilirubin Total: 1.5 (12-03)  Bilirubin Total: 0.8 (03-29)  Bilirubin Total: 1.0 (03-28)  Bilirubin Total: 1.2 (03-27)  Bilirubin Total: 1.3 (03-26)      Trend LDH  03-24-24 @ 05:47  325[H]  03-23-24 @ 01:38  250[H]      Auto Eosinophil %: 0.1 % (12-03-24 @ 00:26)      MICROBIOLOGY:    MRSA PCR Result.: NotDetec (03-31-24 @ 21:44)  MRSA PCR Result.: NotDetec (03-30-24 @ 06:20)  MRSA PCR Result.: NotDetec (03-23-24 @ 11:12)      Culture - Blood (collected 03 Dec 2024 00:17)  Source: .Blood BLOOD  Preliminary Report:    Growth in aerobic bottle: Gram positive cocci in pairs    Culture - Blood (collected 03 Dec 2024 00:16)  Source: .Blood BLOOD  Preliminary Report:    Growth in aerobic bottle: Gram positive cocci in pairs    Growth in anaerobic bottle: Gram positive cocci in pairs    Direct identification is available within approximately 3-5    hours either by Blood Panel Multiplexed PCR or Direct    MALDI-TOF. Details: [email or web address]    Culture - Acid Fast - Tissue w/Smear (collected 02 Apr 2024 22:16)  Source: .Tissue pacemaker lead tips  Final Report:    No acid-fast bacilli isolated after 6 weeks.    Culture - Fungal, Tissue (collected 02 Apr 2024 22:16)  Source: .Tissue pacemaker lead tips  Final Report:    No fungus isolated at 4 weeks.    Culture - Tissue with Gram Stain (collected 02 Apr 2024 22:16)  Source: .Tissue pacemaker lead tips  Final Report:    No growth at 5 days    Culture - Sputum (collected 25 Mar 2024 18:22)  Source: .Sputum Sputum  Final Report:    Normal Respiratory Merlyn present    Culture - Blood (collected 25 Mar 2024 05:46)  Source: .Blood Blood-Peripheral  Final Report:    No growth at 5 days    Culture - Blood (collected 25 Mar 2024 05:30)  Source: .Blood Blood-Venous  Final Report:    No growth at 5 days    Culture - Urine (collected 23 Mar 2024 06:55)  Source: Clean Catch Clean Catch (Midstream)  Final Report:    50,000 - 99,000 CFU/mL Enterobacter cloacae complex  Organism: Enterobacter cloacae complex  Organism: Enterobacter cloacae complex    Sensitivities:      Method Type: DASHA      -  Amoxicillin/Clavulanic Acid: R >16/8      -  Ampicillin: R >16 These ampicillin results predict results for amoxicillin      -  Ampicillin/Sulbactam: R 8/4      -  Aztreonam: S <=4      -  Cefazolin: R >16      -  Cefepime: S <=2      -  Cefoxitin: R >16      -  Ceftriaxone: S <=1 Enterobacter cloacae, Klebsiella aerogenes, and Citrobacter freundii may develop resistance during prolonged therapy.      -  Ciprofloxacin: S <=0.25      -  Ertapenem: S <=0.5      -  Gentamicin: S <=2      -  Imipenem: S <=1      -  Levofloxacin: S <=0.5      -  Meropenem: S <=1      -  Nitrofurantoin: S <=32 Should not be used to treat pyelonephritis      -  Piperacillin/Tazobactam: S <=8      -  Tobramycin: S <=2      -  Trimethoprim/Sulfamethoxazole: S <=0.5/9.5    Culture - Blood (collected 22 Mar 2024 23:00)  Source: .Blood Blood-Peripheral  Final Report:    Growth in aerobic and anaerobic bottles: Streptococcus lutetiensis    Direct identification is available within approximately 3-5    hours either by Blood Panel Multiplexed PCR or Direct    MALDI-TOF. Details: [email or web address]  Organism: Blood Culture PCR  Streptococcus lutetiensis  Organism: Streptococcus lutetiensis    Sensitivities:      Method Type: DASHA      -  Ceftriaxone: S <=0.25      -  Penicillin: S 0.12      -  Vancomycin: S 0.5  Organism: Blood Culture PCR    Sensitivities:      Method Type: PCR      -  Streptococcus sp. (Not Grp A, B or S pneumoniae): Detec  Troponin T, High Sensitivity Result: 54 (12-03)  Troponin T, High Sensitivity Result: 68 (12-03)  Troponin T, High Sensitivity Result: 58 (12-03)    Blood Gas Venous - Lactate: 1.8 (12-03 @ 03:09)  Blood Gas Venous - Lactate: 3.5 (12-03 @ 00:26)        RADIOLOGY:       Patient is an 88 yo M w/ PMHx of COPD on 3.5 L nasal cannula on albuterol inhalers, Afib (on Xarelto), who presents for shortness of breath. Patient states he woke up yesterday with fevers, chills, and trouble breathing which prompted him to come to the hospital. Denies chest pain, palpitations, GI sx, urinary sx.     Of note, patient was hospitalized in April of this year with strept lutetiensis bacteremia in setting of recent dental work and PPM in place. TTE without vegetations during that admission. Patient was treated with 4 weeks of Ceftriaxone through 4/25. PPM was extracted on 4/2 with micra implant. Intraop cultures from leads negative. Patient states they did not pursue colonoscopy with GI as they had discussed with their GI doctor and they felt the risks outweighed the benefits in the setting of his oxygen requirements.     Patient states he had dental scraping/cleaning done within the past month but took Amoxicillin the morning of.     Patient endorses a right hip replacement in 2018 after which he has had chronic right hip pain but nothing worse than usual. Denies swelling or warmth of the knee.     Vitals notable for hypoxia initially requiring bipap and then transitioned to NC.   Labs: CBC with leukocytosis to 14.01. CMP with creatinine 1.27, total bilirubin 1.5. Probnp 7,751. U/A with 14 WBCs. Partial RVP negative. Blood cultures x 2 with e. fecalis.   CXR: No consolidation.   CT angio PE: No pulmonary embolism. Suggestion of mild pulmonary edema or volume overload. Small-to-moderate   right pleural effusion. Trace left pleural effusion. Stable scattered pulmonary nodules measuring up to 0.3 cm. Pulmonary   imaging follow-up in one year if the patient is at increased risk for neoplasm.  TTE:  1. Left ventricular cavity is normal in size. Left ventricular wall thickness is mildly increased. Left ventricular systolic function is mildly decreased with an ejection fraction of 50 % by Ray's method of disks. 2. The left ventricular diastolic function is indeterminate, with elevated left ventricular filling pressure. 3. Mildly enlarged right ventricular cavity size and mildly reduced right ventricular systolic function. 4. Severe biatrial dilatation.5. No significant valvular disease. 6. Estimated pulmonary artery systolic pressure is 58 mmHg, consistent with moderate pulmonary hypertension. 7. No pericardial effusion seen. 8. Compared to the transthoracic echocardiogram performed on 3/25/2024, there have been no significant interval changes.    REVIEW OF SYSTEMS  Constitutional: + fevers, + chills, No weight loss, No fatigue   Skin: No rash, no phlebitis	  Eyes: No discharge, No change in vision	  ENMT: No sore throat, No ulcers  Respiratory: No cough, + SOB  Cardiovascular:  No chest pain, No palpitations   Gastrointestinal: No pain, No nausea, No vomiting, No diarrhea, No constipation	  Genitourinary: No dysuria, No frequency, No hesitancy, No flank pain  MSK: No Joint pain, No back pain, No edema  Neurological: No HA, no weakness, no seizures, no AMS     prior hospital charts reviewed [V]  primary team notes reviewed [V]  other consultant notes reviewed [V]    PAST MEDICAL & SURGICAL HISTORY:  Osteoarthritis of multiple joints, unspecified osteoarthritis type      Former smoker, stopped smoking many years ago  2PPD X 50yrs      Chronic obstructive pulmonary disease, unspecified COPD type      Persistent atrial fibrillation      Pacemaker  [employer] Model [id number]  Serial [id number]      Idiopathic pulmonary fibrosis      Benign prostatic hyperplasia with nocturia      Essential hypertension      H/O amputation  age 19, R great toe, due to growth "tumor"      H/O arthroscopic knee surgery  Both knees, 2013      H/O cardiac pacemaker  2005      H/O repair of rotator cuff  2012          SOCIAL HISTORY:  +fomer smoking  -denies vaping/alcohol/recreational drug use    FAMILY HISTORY:  FH: colon cancer  mother    FH: ovarian cancer  father        Allergies  No Known Allergies        ANTIMICROBIALS:  vancomycin  IVPB 1250 every 12 hours      ANTIMICROBIALS (past 90 days):  MEDICATIONS  (STANDING):  piperacillin/tazobactam IVPB...   200 mL/Hr IV Intermittent (12-03-24 @ 01:09)        OTHER MEDS:   MEDICATIONS  (STANDING):  albuterol/ipratropium for Nebulization 3 every 6 hours PRN  fluticasone propionate/ salmeterol 100-50 MICROgram(s) Diskus 1 two times a day  furosemide   Injectable 20 once  losartan 50 daily  metoprolol succinate ER 50 daily  rivaroxaban 20 with dinner  tamsulosin 0.8 at bedtime  tiotropium 2.5 MICROgram(s) Inhaler 2 daily      VITALS:  Vital Signs Last 24 Hrs  T(F): 97.5 (12-03-24 @ 14:05), Max: 100.2 (12-02-24 @ 23:55)    Vital Signs Last 24 Hrs  HR: 80 (12-03-24 @ 15:05) (71 - 105)  BP: 129/79 (12-03-24 @ 14:05) (91/55 - 148/78)  RR: 16 (12-03-24 @ 14:05)  SpO2: 96% (12-03-24 @ 15:05) (90% - 99%)  Wt(kg): --    EXAM:  General: Patient appears comfortable, no acute distress  HEENT: NCAT, PERRL, anicteric sclera, mucous membranes moist and intact  CV: +S1/S2, RRR, no M/R/G  Lungs: No respiratory distress, CTA b/l, no wheezing, rales or rhonchi  Abd:  BS4+, Soft, NTND, no guarding  : No suprapubic tenderness  Neuro: AAOx3. No focal deficits noted.   Ext: No cyanosis, no edema  Msk: freely moving upper and lower extremities  Skin: No rash, no phlebitis, No erythema. RLE: hip with well healed surgical scar without tenderness to palpation at the joint. +ROM intact at the joint. + venous stasis changes of the bilateral lower extremities       Labs:                        13.3   14.01 )-----------( 139      ( 03 Dec 2024 00:26 )             41.9     12-03    140  |  104  |  23  ----------------------------<  164[H]  3.9   |  19[L]  |  1.27    Ca    9.7      03 Dec 2024 00:26  Mg     1.8     12-03    TPro  7.2  /  Alb  4.3  /  TBili  1.5[H]  /  DBili  x   /  AST  18  /  ALT  15  /  AlkPhos  72  12-03      WBC Trend:  WBC Count: 14.01 (12-03-24 @ 00:26)      Auto Neutrophil #: 12.62 K/uL (12-03-24 @ 00:26)  Auto Neutrophil #: 7.20 K/uL (03-29-24 @ 07:03)  Auto Neutrophil #: 7.09 K/uL (03-28-24 @ 07:42)  Auto Neutrophil #: 6.53 K/uL (03-27-24 @ 06:05)  Auto Neutrophil #: 14.31 K/uL (03-24-24 @ 05:47)      Creatine Trend:  Creatinine: 1.27 (12-03)      Liver Biochemical Testing Trend:  Alanine Aminotransferase (ALT/SGPT): 15 (12-03)  Alanine Aminotransferase (ALT/SGPT): 27 (03-29)  Alanine Aminotransferase (ALT/SGPT): 24 (03-28)  Alanine Aminotransferase (ALT/SGPT): 27 (03-27)  Alanine Aminotransferase (ALT/SGPT): 27 (03-26)  Aspartate Aminotransferase (AST/SGOT): 18 (12-03-24 @ 00:26)  Aspartate Aminotransferase (AST/SGOT): 21 (03-29-24 @ 07:03)  Aspartate Aminotransferase (AST/SGOT): 19 (03-28-24 @ 07:42)  Aspartate Aminotransferase (AST/SGOT): 24 (03-27-24 @ 06:05)  Aspartate Aminotransferase (AST/SGOT): 29 (03-26-24 @ 05:05)  Bilirubin Total: 1.5 (12-03)  Bilirubin Total: 0.8 (03-29)  Bilirubin Total: 1.0 (03-28)  Bilirubin Total: 1.2 (03-27)  Bilirubin Total: 1.3 (03-26)      Trend LDH  03-24-24 @ 05:47  325[H]  03-23-24 @ 01:38  250[H]      Auto Eosinophil %: 0.1 % (12-03-24 @ 00:26)      MICROBIOLOGY:    MRSA PCR Result.: NotDetec (03-31-24 @ 21:44)  MRSA PCR Result.: NotDetec (03-30-24 @ 06:20)  MRSA PCR Result.: NotDetec (03-23-24 @ 11:12)      Culture - Blood (collected 03 Dec 2024 00:17)  Source: .Blood BLOOD  Preliminary Report:    Growth in aerobic bottle: Gram positive cocci in pairs    Culture - Blood (collected 03 Dec 2024 00:16)  Source: .Blood BLOOD  Preliminary Report:    Growth in aerobic bottle: Gram positive cocci in pairs    Growth in anaerobic bottle: Gram positive cocci in pairs    Direct identification is available within approximately 3-5    hours either by Blood Panel Multiplexed PCR or Direct    MALDI-TOF. Details: [email or web address]    Culture - Acid Fast - Tissue w/Smear (collected 02 Apr 2024 22:16)  Source: .Tissue pacemaker lead tips  Final Report:    No acid-fast bacilli isolated after 6 weeks.    Culture - Fungal, Tissue (collected 02 Apr 2024 22:16)  Source: .Tissue pacemaker lead tips  Final Report:    No fungus isolated at 4 weeks.    Culture - Tissue with Gram Stain (collected 02 Apr 2024 22:16)  Source: .Tissue pacemaker lead tips  Final Report:    No growth at 5 days    Culture - Sputum (collected 25 Mar 2024 18:22)  Source: .Sputum Sputum  Final Report:    Normal Respiratory Merlyn present    Culture - Blood (collected 25 Mar 2024 05:46)  Source: .Blood Blood-Peripheral  Final Report:    No growth at 5 days    Culture - Blood (collected 25 Mar 2024 05:30)  Source: .Blood Blood-Venous  Final Report:    No growth at 5 days    Culture - Urine (collected 23 Mar 2024 06:55)  Source: Clean Catch Clean Catch (Midstream)  Final Report:    50,000 - 99,000 CFU/mL Enterobacter cloacae complex  Organism: Enterobacter cloacae complex  Organism: Enterobacter cloacae complex    Sensitivities:      Method Type: DASHA      -  Amoxicillin/Clavulanic Acid: R >16/8      -  Ampicillin: R >16 These ampicillin results predict results for amoxicillin      -  Ampicillin/Sulbactam: R 8/4      -  Aztreonam: S <=4      -  Cefazolin: R >16      -  Cefepime: S <=2      -  Cefoxitin: R >16      -  Ceftriaxone: S <=1 Enterobacter cloacae, Klebsiella aerogenes, and Citrobacter freundii may develop resistance during prolonged therapy.      -  Ciprofloxacin: S <=0.25      -  Ertapenem: S <=0.5      -  Gentamicin: S <=2      -  Imipenem: S <=1      -  Levofloxacin: S <=0.5      -  Meropenem: S <=1      -  Nitrofurantoin: S <=32 Should not be used to treat pyelonephritis      -  Piperacillin/Tazobactam: S <=8      -  Tobramycin: S <=2      -  Trimethoprim/Sulfamethoxazole: S <=0.5/9.5    Culture - Blood (collected 22 Mar 2024 23:00)  Source: .Blood Blood-Peripheral  Final Report:    Growth in aerobic and anaerobic bottles: Streptococcus lutetiensis    Direct identification is available within approximately 3-5    hours either by Blood Panel Multiplexed PCR or Direct    MALDI-TOF. Details: [email or web address]  Organism: Blood Culture PCR  Streptococcus lutetiensis  Organism: Streptococcus lutetiensis    Sensitivities:      Method Type: DASHA      -  Ceftriaxone: S <=0.25      -  Penicillin: S 0.12      -  Vancomycin: S 0.5  Organism: Blood Culture PCR    Sensitivities:      Method Type: PCR      -  Streptococcus sp. (Not Grp A, B or S pneumoniae): Detec  Troponin T, High Sensitivity Result: 54 (12-03)  Troponin T, High Sensitivity Result: 68 (12-03)  Troponin T, High Sensitivity Result: 58 (12-03)    Blood Gas Venous - Lactate: 1.8 (12-03 @ 03:09)  Blood Gas Venous - Lactate: 3.5 (12-03 @ 00:26)        RADIOLOGY:

## 2024-12-03 NOTE — ED PROVIDER NOTE - CARE PLAN
1 Principal Discharge DX:	COPD exacerbation   Principal Discharge DX:	Gram positive bacterial infection  Secondary Diagnosis:	Shortness of breath  Secondary Diagnosis:	COPD exacerbation  Secondary Diagnosis:	Acute pulmonary edema

## 2024-12-03 NOTE — H&P ADULT - NSHPSOCIALHISTORY_GEN_ALL_CORE
Former smoker but has not smoked in 35 years   Social ETOH use   Denies IVDU or other substances   Lives home alone with home health aid who is there everyday for 6 hours a day   Both adult children are very involved in ongoing care

## 2024-12-03 NOTE — ED PROVIDER NOTE - CLINICAL SUMMARY MEDICAL DECISION MAKING FREE TEXT BOX
89 yo M w/ PMHx of COPD on 3.5 L nasal cannula on albuterol inhalers and PPM presents for acute respiratory distress.  Patient was hypoxic (79% on room air) on arrival to the ED and was placed on nonrebreather.  Family reports the patient was found to have UTI on at-home test and had a fever (Tmax 101F).  Patient started becoming short of breath despite taking albuterol inhalers.  He denies any CP, abdominal pain, GI symptoms, vomiting, fall/trauma.    Vital signs remarkable for hypoxia and tachycardia.  Physical exam is remarkable for increased work of breathing with belly breathing and slight wheezing (diffuse).  On bedside ultrasound, patient has right-sided pleural effusion, but does not have any B-lines on any of the lung field.  Concern for COPD exacerbation with lower concern for CHF or ACS.  Plan for DuoNebs x 3, methylprednisolone, BiPAP, and labs.

## 2024-12-03 NOTE — H&P ADULT - CONVERSATION DETAILS
Spoke with patient regarding MOLST. He informed the team that he has lived a long life, and is no longer able to do activities independently like he once enjoyed, such as going out and fishing. He says he does not want to suffer in his final moment. If his heart were to stop, he would not want chest compressions. He is also not agreeable to intubation if it becomes necessary.    At this time, his code status is DNR/DNI. MUNA signed and charted on 12/3.

## 2024-12-03 NOTE — H&P ADULT - HISTORY OF PRESENT ILLNESS
89 yo M w/ PMHx of COPD on 3.5 L nasal cannula on albuterol inhalers and PPM presents for acute respiratory distress.  Patient was hypoxic (79% on room air) on arrival to the ED and was placed on nonrebreather.  Family reports the patient was found to have UTI on at-home test and had a fever (Tmax 101F).  Patient started becoming short of breath despite taking albuterol inhalers.   Per patient the past two days he has had increased SOB both at rest and with activity and was prompted to come to the ED when his at home O2 sat was bellow 88%. He also says an at home urine test showed he had a UTI but he denies any increase urinary frequency of dyuria. Denies any associated pain. Albuterol inhalers did not relieve his SOB

## 2024-12-03 NOTE — PHYSICAL THERAPY INITIAL EVALUATION ADULT - PERTINENT HX OF CURRENT PROBLEM, REHAB EVAL
Pt is an 88 y/o male admitted with SOB. PMH: COPD on 3.5 L nasal cannula on albuterol inhalers and PPM presents for acute respiratory distress.    CT Chest demonstrates No pulmonary embolism. Suggestion of mild pulmonary edema or volume overload. Small-to-moderate right pleural effusion. Trace left pleural effusion. Stable scattered pulmonary nodules measuring up to 0.3 cm. Pulmonary imaging follow-up in one year if the patient is at increased risk for neoplasm. CXR (-).     Plan for medicine admission for COPD exacerbation as well as possible CHF.  Patient given furosemide 20 mg IV.

## 2024-12-03 NOTE — H&P ADULT - NSHPREVIEWOFSYSTEMS_GEN_ALL_CORE
REVIEW OF SYSTEMS:  CONSTITUTIONAL: No weakness. No fevers. No chills.   EYES: No blurry vision. No eye pain.  ENT/NECK:No dysphagia. No sore throat. No Sinusitis/rhinorrhea.  CARDIAC: No chest pain. No palpitations. No lightheadedness.   RESPIRATORY: No cough. No SOB.  GASTROINTESTINAL: No abdominal pain. No nausea. No vomiting. No diarrhea. No constipation  GENITOURINARY: No dysuria. No frequency.   NEUROLOGICAL: No numbness/tingling. No focal weakness. No headache.  BACK: No back pain. No flank pain.  EXTREMITIES: No lower extremity edema. Full ROM. No joint pain.  SKIN: No rashes. No other lesions.  HEMATOLOGIC/LYMPHATIC: No easy bruising. No swollen or tender lymph nodes.  PSYCHIATRIC: No depression. No anxiety. No SI/HI.  ALLERGIC: No lip swelling. No hives.  All other systems reviewed and are negative unless indicated above. REVIEW OF SYSTEMS:  CONSTITUTIONAL: No weakness. No fevers. No chills.   EYES: No blurry vision. No eye pain.  ENT/NECK:No POSITIVE  sore throat. No Sinusitis/rhinorrhea.  CARDIAC: No chest pain. No palpitations. No lightheadedness.   RESPIRATORY: Improved  SOB.  GASTROINTESTINAL: No abdominal pain. No nausea. No vomiting. No diarrhea. No constipation  GENITOURINARY: No dysuria. No frequency.   NEUROLOGICAL: No numbness/tingling. No focal weakness. No headache.  BACK: No back pain. No flank pain.  EXTREMITIES: No lower extremity edema. Full ROM. No joint pain.    ALLERGIC: No lip swelling. No hives.  All other systems reviewed and are negative unless indicated above.

## 2024-12-03 NOTE — H&P ADULT - PROBLEM SELECTOR PLAN 1
Volume overload on exam concern for ADHF vs less likely COPD exacerbation   TTE in March 2024 EF 45%,  global left ventricular hypokinesis,  RV enlarged,  reduced systolic function., left atrium is moderately dilated,  right atrium is severely dilated  On some GDMT at home   Lasix 20 in ED    PLAN   - Additional 20 Lasic now   - Additional Diuresis as needed  - Toprol-xl 50 ( at home 50 in the day 25 at night   - Losartan 50 QD ( At home BID)   - hold home amlodipine   - follow up TTE

## 2024-12-03 NOTE — ED ADULT NURSE NOTE - SUICIDE SCREENING QUESTION 2
5 = Markedly ill - intrusive symptoms that distinctly impair social/occupational function or cause intrusive levels of distress No 5 = Markedly ill - intrusive symptoms that distinctly impair social/occupational function or cause intrusive levels of distress 5 = Markedly ill - intrusive symptoms that distinctly impair social/occupational function or cause intrusive levels of distress

## 2024-12-03 NOTE — H&P ADULT - NSICDXPASTMEDICALHX_GEN_ALL_CORE_FT
PAST MEDICAL HISTORY:  Benign prostatic hyperplasia with nocturia     Chronic obstructive pulmonary disease, unspecified COPD type     Essential hypertension     Former smoker, stopped smoking many years ago 2PPD X 50yrs    Idiopathic pulmonary fibrosis     Osteoarthritis of multiple joints, unspecified osteoarthritis type     Pacemaker [employer] Model [id number]  Serial [id number]    Persistent atrial fibrillation

## 2024-12-03 NOTE — H&P ADULT - ASSESSMENT
89 yo M w/ PMHx of COPD on 3.5 L nasal cannula on albuterol inhalers and PPM presents for acute respiratory distress concerning for volume overload in the setting of likely volume overload potentially from HFrEF

## 2024-12-03 NOTE — ED PROVIDER NOTE - NSICDXPASTMEDICALHX_GEN_ALL_CORE_FT
PAST MEDICAL HISTORY:  Benign prostatic hyperplasia with nocturia     Chronic obstructive pulmonary disease, unspecified COPD type     Essential hypertension     Former smoker, stopped smoking many years ago 2PPD X 50yrs    Idiopathic pulmonary fibrosis     Osteoarthritis of multiple joints, unspecified osteoarthritis type     Pacemaker [employer] Model [id number]  Serial [id number]    Persistent atrial fibrillation

## 2024-12-03 NOTE — ED ADULT NURSE NOTE - NSFALLHARMRISKINTERV_ED_ALL_ED

## 2024-12-03 NOTE — H&P ADULT - PROBLEM SELECTOR PLAN 3
Trop peaked at 68 - last was 58  BNP [id number]  No chest pain, no concerning EKG chnages     PLAN   - Follow up TTE   - Monitor for chest pain  - EKG and stat cardiac enzymes if new chest pain

## 2024-12-03 NOTE — H&P ADULT - PROBLEM SELECTOR PLAN 2
TTE in March 2024 EF 45%,  global left ventricular hypokinesis,  RV enlarged,  reduced systolic function., left atrium is moderately dilated,  right atrium is severely dilated  On some GDMT at home   Lasix 20 in ED    PLAN   - Additional 20 Lasic now   - Additional Diuresis as needed  - Toprol-xl 50 ( at home 50 in the day 25 at night   - Losartan 50 QD ( At home BID)   - hold home amlodipine   - follow up TTE

## 2024-12-03 NOTE — CHART NOTE - NSCHARTNOTEFT_GEN_A_CORE
Electrophysiology:     Patient is s/p pacemaker and lead extraction on 4/1/24 by Dr. Quan with Micra pacemaker implantation for Gram positive sepsis.  Patient returns to ER today with fever, leukocytosis and gram positive bacteremia.  Requested by ID for EP input regarding Micra leadless pacemaker in place.  Current data suggests that leadless pacemaker infection is very low, mechanism being the absence of a subcutaneous pocket and transvenous leads as well as the Micra device coating which is made of parylene.  Parylene has inherent bacterial properties which resist bacterial adherence.  Therefore, no further EP intervention is required.  For more information or to view a study by [employer] called "Leadless pacemakers reduce risk of device-related infection: Review of the potential mechanisms" please see link below.     [email or web address].com/[email or web address]/fulltext    Above reviewed with Dr. Kadeem Rivera [facility]  [phone]

## 2024-12-03 NOTE — ED PROVIDER NOTE - ATTENDING CONTRIBUTION TO CARE
Dio Head MD (Attending Physician):    I performed a history and physical exam of the patient and discussed their management with the resident/fellow/ACP/student. I have reviewed the resident/fellow/ACP/student note and agree with the documented findings and plan of care, except as noted. I have personally performed a substantive portion of the visit including all aspects of the medical decision making. My medical decision making and observations are found below. Please refer to any progress notes for updates on clinical course.    HPI:  89 yo M w/ PMHx of COPD on 3.5 L nasal cannula on albuterol inhalers and PPM presents for acute respiratory distress.  Patient was hypoxic (79% on room air) on arrival to the ED and was placed on nonrebreather.  Family reports the patient was found to have UTI on at-home test and had a fever (Tmax 101F).  Patient started becoming short of breath despite taking albuterol inhalers.  He denies any CP, abdominal pain, GI symptoms, vomiting, fall/trauma.    PE:  GEN - +In moderate discomfort, A&Ox3  HEAD - NC/AT  EYES - PERRL, EOMI  ENT - Airway patent, mucous membranes moist  NECK - Supple, non-tender without lymphadenopathy, no masses  PULMONARY - +Diffuse wheezing, +increased wob, +satting 88% on RA  CARDIAC - +S1S2, +paced rhythm (tachy to 100s), no M/G/R, no JVD  ABDOMEN - +BS, ND, NT, soft, no guarding, no rebound, no masses, no rigidity   - No CVA TTP b/l  EXTREMITIES - FROM, symmetric pulses, no edema. B/l calves NT.  SKIN - No rash or bruising  NEUROLOGIC - Alert, speech clear, moving all extremities spontaneously, no focal deficits  PSYCH - Normal mood/affect, normal insight    MDM:  DDx includes, but not limited to: COPD exacerbation, CHF exacerbation, PE, PNA, PTX, ACS, viral syndrome. ekg, cxr, CTA chest, labs, keep on cardiac monitor, BIPAP, duonebs, solu-medrol, empiric abx, possible lasix, possible MICU consult. Will require admission.

## 2024-12-03 NOTE — H&P ADULT - PROBLEM SELECTOR PLAN 5
At home on Trelegy and albuterol as needed    PLAN  - Trelegy 200/62.5  therapeutic inpatient conversion   - Dounebs as needed  -

## 2024-12-03 NOTE — CONSULT NOTE ADULT - ATTENDING COMMENTS
Patient seen and examined. Chart with pertinent labs and imaging reviewed.  E. faecalis bacteremia.  With 2 bacteremias within a relatively short period of time would repeat CT A/P and presuming no other findings pursue colonoscopy given prior blood isolate and abnormality on CT scan  Ampicillin 2q6  F/U repeat cx  D/W patient and his daughter All questions answered to the best of my ability.  Thank you for the courtesy of this referral.  Nolan Lake MD  Attending Physician  [facility]  Division of Infectious Diseases  [phone]

## 2024-12-03 NOTE — CONSULT NOTE ADULT - SUBJECTIVE AND OBJECTIVE BOX
Patient is an 89 year old M w/ PMHx of COPD on 3.5 L nasal cannula on albuterol inhalers, PPM, hx of right hip placement, presents for acute respiratory distress.  Patient was hypoxic (79% on room air) on arrival to the ED and was placed on nonrebreather.  Family reports the patient was found to have UTI on at-home test and had a fever (Tmax 101F).  Patient started becoming short of breath despite taking albuterol inhalers.   Per patient the past two days he has had increased SOB both at rest and with activity and was prompted to come to the ED when his at home O2 sat was bellow 88%. He also says an at home urine test showed he had a UTI but he denies any increase urinary frequency of dyuria. Denies any associated pain. Albuterol inhalers did not relieve his SOB (03 Dec 2024 07:57)    In the ER:   Vitals notable for hypoxia initially requiring bipap and then transitioned to NC.   Labs: CBC with leukocytosis to 14.01. CMP with creatinine of 1.27, total bilirubin 1.5,   INR 2.68. U/A with 14 WBCs. Partial RVP negative. Blood cultures x 2 with gram positive cocci in pairs.   CXR: No consolidation.  CT angio PE: No pulmonary embolism.    Suggestion of mild pulmonary edema or volume overload. Small-to-moderate   right pleural effusion. Trace left pleural effusion.    Stable scattered pulmonary nodules measuring up to 0.3 cm. Pulmonary   imaging follow-up in one year if the patient is at increased risk for   neoplasm.       REVIEW OF SYSTEMS  pending full examination    prior hospital charts reviewed [V]  primary team notes reviewed [V]  other consultant notes reviewed [V]    PAST MEDICAL & SURGICAL HISTORY:  Osteoarthritis of multiple joints, unspecified osteoarthritis type      Former smoker, stopped smoking many years ago  2PPD X 50yrs      Chronic obstructive pulmonary disease, unspecified COPD type      Persistent atrial fibrillation      Pacemaker  [employer] Model [id number]  Serial [id number]      Idiopathic pulmonary fibrosis      Benign prostatic hyperplasia with nocturia      Essential hypertension      H/O amputation  age 19, R great toe, due to growth "tumor"      H/O arthroscopic knee surgery  Both knees, 2013      H/O cardiac pacemaker  2005      H/O repair of rotator cuff  2012          SOCIAL HISTORY:  Denied smoking/vaping/alcohol/recreational drug use    FAMILY HISTORY:  FH: colon cancer  mother    FH: ovarian cancer  father        Allergies  No Known Allergies        ANTIMICROBIALS:  vancomycin  IVPB 1250 every 12 hours      ANTIMICROBIALS (past 90 days):  MEDICATIONS  (STANDING):  piperacillin/tazobactam IVPB...   200 mL/Hr IV Intermittent (12-03-24 @ 01:09)        OTHER MEDS:   MEDICATIONS  (STANDING):  albuterol/ipratropium for Nebulization 3 every 6 hours PRN  fluticasone propionate/ salmeterol 100-50 MICROgram(s) Diskus 1 two times a day  furosemide   Injectable 20 once  losartan 50 daily  metoprolol succinate ER 50 daily  rivaroxaban 20 with dinner  tamsulosin 0.8 at bedtime  tiotropium 2.5 MICROgram(s) Inhaler 2 daily      VITALS:  Vital Signs Last 24 Hrs  T(F): 97.5 (12-03-24 @ 14:05), Max: 100.2 (12-02-24 @ 23:55)    Vital Signs Last 24 Hrs  HR: 80 (12-03-24 @ 15:05) (71 - 105)  BP: 129/79 (12-03-24 @ 14:05) (91/55 - 148/78)  RR: 16 (12-03-24 @ 14:05)  SpO2: 96% (12-03-24 @ 15:05) (90% - 99%)  Wt(kg): --    EXAM:  pending full examination      Labs:                        13.3   14.01 )-----------( 139      ( 03 Dec 2024 00:26 )             41.9     12-03    140  |  104  |  23  ----------------------------<  164[H]  3.9   |  19[L]  |  1.27    Ca    9.7      03 Dec 2024 00:26  Mg     1.8     12-03    TPro  7.2  /  Alb  4.3  /  TBili  1.5[H]  /  DBili  x   /  AST  18  /  ALT  15  /  AlkPhos  72  12-03      WBC Trend:  WBC Count: 14.01 (12-03-24 @ 00:26)      Auto Neutrophil #: 12.62 K/uL (12-03-24 @ 00:26)  Auto Neutrophil #: 7.20 K/uL (03-29-24 @ 07:03)  Auto Neutrophil #: 7.09 K/uL (03-28-24 @ 07:42)  Auto Neutrophil #: 6.53 K/uL (03-27-24 @ 06:05)  Auto Neutrophil #: 14.31 K/uL (03-24-24 @ 05:47)      Creatine Trend:  Creatinine: 1.27 (12-03)      Liver Biochemical Testing Trend:  Alanine Aminotransferase (ALT/SGPT): 15 (12-03)  Alanine Aminotransferase (ALT/SGPT): 27 (03-29)  Alanine Aminotransferase (ALT/SGPT): 24 (03-28)  Alanine Aminotransferase (ALT/SGPT): 27 (03-27)  Alanine Aminotransferase (ALT/SGPT): 27 (03-26)  Aspartate Aminotransferase (AST/SGOT): 18 (12-03-24 @ 00:26)  Aspartate Aminotransferase (AST/SGOT): 21 (03-29-24 @ 07:03)  Aspartate Aminotransferase (AST/SGOT): 19 (03-28-24 @ 07:42)  Aspartate Aminotransferase (AST/SGOT): 24 (03-27-24 @ 06:05)  Aspartate Aminotransferase (AST/SGOT): 29 (03-26-24 @ 05:05)  Bilirubin Total: 1.5 (12-03)  Bilirubin Total: 0.8 (03-29)  Bilirubin Total: 1.0 (03-28)  Bilirubin Total: 1.2 (03-27)  Bilirubin Total: 1.3 (03-26)      Trend LDH  03-24-24 @ 05:47  325[H]  03-23-24 @ 01:38  250[H]      Auto Eosinophil %: 0.1 % (12-03-24 @ 00:26)      MICROBIOLOGY:    MRSA PCR Result.: NotDetec (03-31-24 @ 21:44)  MRSA PCR Result.: NotDetec (03-30-24 @ 06:20)  MRSA PCR Result.: NotDetec (03-23-24 @ 11:12)      Culture - Blood (collected 03 Dec 2024 00:17)  Source: .Blood BLOOD  Preliminary Report:    Growth in aerobic bottle: Gram positive cocci in pairs    Culture - Blood (collected 03 Dec 2024 00:16)  Source: .Blood BLOOD  Preliminary Report:    Growth in aerobic bottle: Gram positive cocci in pairs    Growth in anaerobic bottle: Gram positive cocci in pairs    Direct identification is available within approximately 3-5    hours either by Blood Panel Multiplexed PCR or Direct    MALDI-TOF. Details: [email or web address]    Culture - Acid Fast - Tissue w/Smear (collected 02 Apr 2024 22:16)  Source: .Tissue pacemaker lead tips  Final Report:    No acid-fast bacilli isolated after 6 weeks.    Culture - Fungal, Tissue (collected 02 Apr 2024 22:16)  Source: .Tissue pacemaker lead tips  Final Report:    No fungus isolated at 4 weeks.    Culture - Tissue with Gram Stain (collected 02 Apr 2024 22:16)  Source: .Tissue pacemaker lead tips  Final Report:    No growth at 5 days    Culture - Sputum (collected 25 Mar 2024 18:22)  Source: .Sputum Sputum  Final Report:    Normal Respiratory Merlyn present    Culture - Blood (collected 25 Mar 2024 05:46)  Source: .Blood Blood-Peripheral  Final Report:    No growth at 5 days    Culture - Blood (collected 25 Mar 2024 05:30)  Source: .Blood Blood-Venous  Final Report:    No growth at 5 days    Culture - Urine (collected 23 Mar 2024 06:55)  Source: Clean Catch Clean Catch (Midstream)  Final Report:    50,000 - 99,000 CFU/mL Enterobacter cloacae complex  Organism: Enterobacter cloacae complex  Organism: Enterobacter cloacae complex    Sensitivities:      Method Type: DASHA      -  Amoxicillin/Clavulanic Acid: R >16/8      -  Ampicillin: R >16 These ampicillin results predict results for amoxicillin      -  Ampicillin/Sulbactam: R 8/4      -  Aztreonam: S <=4      -  Cefazolin: R >16      -  Cefepime: S <=2      -  Cefoxitin: R >16      -  Ceftriaxone: S <=1 Enterobacter cloacae, Klebsiella aerogenes, and Citrobacter freundii may develop resistance during prolonged therapy.      -  Ciprofloxacin: S <=0.25      -  Ertapenem: S <=0.5      -  Gentamicin: S <=2      -  Imipenem: S <=1      -  Levofloxacin: S <=0.5      -  Meropenem: S <=1      -  Nitrofurantoin: S <=32 Should not be used to treat pyelonephritis      -  Piperacillin/Tazobactam: S <=8      -  Tobramycin: S <=2      -  Trimethoprim/Sulfamethoxazole: S <=0.5/9.5    Culture - Blood (collected 22 Mar 2024 23:00)  Source: .Blood Blood-Peripheral  Final Report:    Growth in aerobic and anaerobic bottles: Streptococcus lutetiensis    Direct identification is available within approximately 3-5    hours either by Blood Panel Multiplexed PCR or Direct    MALDI-TOF. Details: [email or web address]  Organism: Blood Culture PCR  Streptococcus lutetiensis  Organism: Streptococcus lutetiensis    Sensitivities:      Method Type: DASHA      -  Ceftriaxone: S <=0.25      -  Penicillin: S 0.12      -  Vancomycin: S 0.5  Organism: Blood Culture PCR    Sensitivities:      Method Type: PCR      -  Streptococcus sp. (Not Grp A, B or S pneumoniae): Detec                                            Troponin T, High Sensitivity Result: 54 (12-03)  Troponin T, High Sensitivity Result: 68 (12-03)  Troponin T, High Sensitivity Result: 58 (12-03)    Blood Gas Venous - Lactate: 1.8 (12-03 @ 03:09)  Blood Gas Venous - Lactate: 3.5 (12-03 @ 00:26)        RADIOLOGY:  imaging below personally reviewed

## 2024-12-03 NOTE — H&P ADULT - NSHPPHYSICALEXAM_GEN_ALL_CORE
PHYSICAL EXAM:   GENERAL: Alert. Not confused. No acute distress. Not thin. Not cachectic. Not obese.  HEAD:  Atraumatic. Normocephalic.  EYES: EOMI. PERRLA. Normal conjunctiva/sclera.  ENT: Neck supple. No JVD. Moist oral mucosa. Not edentulous. No thrush.  CARDIAC: Regular rate. Regular rhythm. S1. S2. No murmur. No rub. No distant heart sounds.  LUNG/CHEST: CTAB. BS equal bilaterally. No wheezes. No rales. No rhonchi.  ABDOMEN: Soft. No tenderness. No distension. Normal bowel sounds.  BACK: No midline/vertebral tenderness. No flank tenderness.  VASCULAR: +2 b/l radial or ulnar pulses. Palpable DP pulses.  EXTREMITIES:  No clubbing. No cyanosis. No edema. Moving all 4.  NEUROLOGY: A&Ox3. Non-focal exam. Cranial nerves intact. Normal speech. Sensation intact.  PSYCH: Normal behavior. Normal affect.  SKIN: No jaundice. No erythema. No rash/lesion.  ICU Vital Signs Last 24 Hrs  T(C): 36.4 (03 Dec 2024 07:36), Max: 37.9 (02 Dec 2024 23:55)  T(F): 97.5 (03 Dec 2024 07:36), Max: 100.2 (02 Dec 2024 23:55)  HR: 71 (03 Dec 2024 07:36) (71 - 105)  BP: 114/69 (03 Dec 2024 07:36) (94/62 - 148/78)  BP(mean): 72 (03 Dec 2024 05:54) (72 - 72)  ABP: --  ABP(mean): --  RR: 16 (03 Dec 2024 07:36) (16 - 28)  SpO2: 95% (03 Dec 2024 07:36) (95% - 99%)    O2 Parameters below as of 03 Dec 2024 07:36  Patient On (Oxygen Delivery Method): nasal cannula  O2 Flow (L/min): 4        I&O's Summary

## 2024-12-03 NOTE — CONSULT NOTE ADULT - ASSESSMENT
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 No urinary sx.  Vitals notable for hypoxia on presentation. Labs with leukocytosis to 14.01. Blood cultures x 2 with e. fecalis by PCR. TTE without vegetations. Of note, patient with known colonic polyp; has not pursued colonoscopy as previously recommended for strep lutetiensis bacteremia given concern for risk of procedure. Clinical exam unremarkable at this time for foci of infection.     #e. fecalis bacteremia  -stop Vancomycin and start Ampicillin 2 g IVPB q6h  -obtain CT abdomen pelvis with IV contrast for further evaluation of GI source of bacteremia in setting of known colonic polyp   -obtain 2 sets of repeat blood cultures 48 hours from initial blood cultures   -f/u all culture data  -monitor WBC and fever curve   -rest of workup pending results of CT abdomen pelvis     Case seen and discussed with Dr. Lake who agrees with assessment and plan. Note not final until attending addendum.

## 2024-12-03 NOTE — ED ADULT NURSE NOTE - OBJECTIVE STATEMENT
89YOM hx HTN/Afib on xarelto/COPD/Pacemaker/BPH BIBEMS from home c/o SOB. 89YOM hx HTN/Afib on xarelto/COPD/Pacemaker/BPH BIBEMS from home c/o SOB x3 hours.

## 2024-12-03 NOTE — PHYSICAL THERAPY INITIAL EVALUATION ADULT - SITTING BALANCE: DYNAMIC
PHYSICAL THERAPY DAILY NOTE    DIAGNOSIS:    ED Diagnosis   1. Closed avulsion fracture of lateral malleolus of left fibula with routine healing, subsequent encounter     2. Acute left ankle pain     3. Sprain of left ankle, unspecified ligament, subsequent encounter     4. Gait difficulty     5. Muscle weakness         INSURANCE BENEFITS: [facility]    PHYSICIAN RECOMMENDATIONS: Evaluate and Treat     ATTENDANCE: Patient has been seen for 5 visits between 3/22/2019 and 3/22/2019.  Progress Summary due by 2/22/19.    SUBJECTIVE:  She had no adverse reaction to the workout of two days ago.  She continues to feel stronger.      OBJECTIVE:   Short Term Goals (to be achieved in 2 weeks)  Patient will be able to walk 2-3 laps of the perimeter of the clinic gym interior - with or without the brace donned.  (Met goal - walking on treadmill x 5 minutes - 4/3/19).    Long Term Goals (to be achieved in 4 weeks)  *Patient will be able to walk 6-8 laps of the perimeter of the clinic gym interior OR ambulate on the treadmill for 5-6 minutes.  *The patient will experience an increase in strength to 5/5 for all involved muscle groups so that she can more easily go up and down stairs, stand, walk, squat, etc.  (Making gains toward this goal as a result of therapeutic exercise - as of 4/1/19).  *Patient will have a complete resolution of soft tissue tenderness and/or tightness in the affected muscles, tendons so she can continue to exercise, complete daily tasks.  *Patient's gait pattern will be characterized by absence of or only very minimal asymmetry.  *Patient will demonstate complete return of all active range of motion, so that right is equal to left, so that she can squat, walk with efficiency, transition to stand.  *Patient will experience an even further decrease in pain to a level of 0, so she can complete all daily tasks.    Observation: minimal to absence of left lateral ankle swelling.    Gait:  Moderate  decrease in left stance time and speed.    Stairs: 6 inch clinic stairs:  Unable to go up and down reciprocally.  Thus, at least moderate deficit.    Unilateral static balance:  Moderate deficit on the left  Dynamic static balance:  Not yet tested.    Unilateral squat test:  Unable to test.  Bilateral squat test:  Moderate deficit due to the left ankle restrictions.    Active ankle range of motion (*=pain):   Right  3/22/19   Left  3/22/19    Ankle dorsiflexion 9 3   Ankle plantarflexion 51 46   Ankle inversion 28 24   Ankle eversion 10 4     Neurological:  Strength per manual muscle test (*=pain):   Left  3/22/2019   Quadriceps 5-/5   Hamstrings 5/5   Hip flexors 5-/5   Hip abductors 5-/5   Hip extensors 4+/5   Hip external rotators 5/5   Ankle dorsiflexors 5-/5   Ankle plantarflexors Unable to test   Ankle invertors 4+/5   Ankle evertors 4/5      Palpation:  Minimal to moderate tenderness at left lateral malleolus.     TREATMENT TODAY:   Time in: 6:55 am (therapist running slightly late)    Time out:  7:56 am    Modalities - Patient declined today.  Electrical stimulation / Interferential current - 39961 Unattended non-Medicare - applied to left lateral ankle. 0 unit x 0 minutes.  Cold Pack - 55728 - applied to the left lateral ankle. 0 minutes.    Manual Therapy to increase joint mobility, increase passive range of motion, decrease edema, decrease myofascial tightness and/or tenderness and increase flexibility 97140 x 1 units - 20 minutes  Soft tissue mobilization - left lateral ankle.  Passive stretching - to increase left ankle motion.  Passive stretching - left gastrocnemius.    Therapeutic Exercise to increase range of motion, improve flexibility, increase strength, instruct in a home exercise program, normalize proprioception / kinesthesia and improve balance:  [id number] x 3 units -  41 minutes.  Clinic Exercises:  Repeat sit to stands with 1 airex on chair: 3 x 10 reps.  Standing tilt board: side to side - both  feet: 3 x 10 reps.  Standing tilt board: front to back - left unilateral: 3 x 10 reps.  Side steps with peach band to introduce lateral motion. 2 laps today.  Tandem walking: forward and retro, and cross-overs x 2 laps each.  Treadmill walking at 1.0 mph x 5 minutes.  Tandem balance with rotation - left leg: 2 x 10 reps. Left unilateral today.  Recumbent bicycle x 5 minutes at level one.  Hip theraband exercises with left stance and theraband around right ankle: moving right lower extremity into flexion, abduction, and extension x 20 repetitions each movement. Use peach theraband.    Precautions: Must wear ankle brace for exercising.    Home exercise program (last updated (3/22/2019): Patient issued written home exercise program.  Patient demonstrated exercises correctly and was instructed to call with questions.   Seated calf raises  Seated toe raises  Active ankle inversion and eversion  Active ankle dorsiflexion and plantarflexion  Hip abductor pull aparts with band.  Straight leg raises  Home cold packs.      ASSESSMENT/TREATMENT RESPONSE:  Patient's signs and symptoms are consistent with the left ankle avulsion, chip fracture.      Patient's response to treatment:     See subjective section for today.    Functional improvement noted:   She is allowed to begin weaning from the ankle brace on 4/7/19.      Prognosis for meeting goals: excellent.       Treatment plan was discussed with the patient and verbal consent was obtained.    Remaining Impairment Requiring Continued Treatment: decreased range of motion, decreased flexibility, decreased strength, decreased balance, pain, swelling, impairment of functional performance and decreased soft tissue mobility.    PLAN: Provide rehab, work toward goals.  Patient will be seen 2 times per week for 4 weeks - as of 3/22/2019.     Therapist Signature:   Stefan Moreau PT, DPT 4/3/2019        good balance

## 2024-12-03 NOTE — H&P ADULT - ATTENDING COMMENTS
89M w/pmh COPD on 3.5L NC baseline, HTN, BPH, presents to [facility] for acute on chronic hypoxic respiratory failure likely secondary to a heart failure exacerbation. He says he had a recent LHC with his outpatient cardiologist (Dr. Isiah Blancas) which was normal. Check TTE. Continue diuresis with IV lasix. WIll reach out to cardiologist's office to get more info.

## 2024-12-04 DIAGNOSIS — J96.01 ACUTE RESPIRATORY FAILURE WITH HYPOXIA: ICD-10-CM

## 2024-12-04 DIAGNOSIS — R79.89 OTHER SPECIFIED ABNORMAL FINDINGS OF BLOOD CHEMISTRY: ICD-10-CM

## 2024-12-04 DIAGNOSIS — J44.9 CHRONIC OBSTRUCTIVE PULMONARY DISEASE, UNSPECIFIED: ICD-10-CM

## 2024-12-04 DIAGNOSIS — A49.9 BACTERIAL INFECTION, UNSPECIFIED: ICD-10-CM

## 2024-12-04 DIAGNOSIS — I48.20 CHRONIC ATRIAL FIBRILLATION, UNSPECIFIED: ICD-10-CM

## 2024-12-04 DIAGNOSIS — Z29.9 ENCOUNTER FOR PROPHYLACTIC MEASURES, UNSPECIFIED: ICD-10-CM

## 2024-12-04 DIAGNOSIS — E87.70 FLUID OVERLOAD, UNSPECIFIED: ICD-10-CM

## 2024-12-04 PROBLEM — K63.5 POLYP OF COLON: Chronic | Status: INACTIVE | Noted: 2018-09-17 | Resolved: 2024-12-03

## 2024-12-04 PROBLEM — G47.34 IDIOPATHIC SLEEP RELATED NONOBSTRUCTIVE ALVEOLAR HYPOVENTILATION: Chronic | Status: INACTIVE | Noted: 2018-09-17 | Resolved: 2024-12-03

## 2024-12-04 PROBLEM — R06.09 OTHER FORMS OF DYSPNEA: Chronic | Status: INACTIVE | Noted: 2018-09-17 | Resolved: 2024-12-03

## 2024-12-04 PROBLEM — F10.10 ALCOHOL ABUSE, UNCOMPLICATED: Chronic | Status: INACTIVE | Noted: 2018-09-17 | Resolved: 2024-12-03

## 2024-12-04 LAB
ALBUMIN SERPL ELPH-MCNC: 3.9 G/DL — SIGNIFICANT CHANGE UP (ref 3.3–5)
ALP SERPL-CCNC: 58 U/L — SIGNIFICANT CHANGE UP (ref 40–120)
ALT FLD-CCNC: 14 U/L — SIGNIFICANT CHANGE UP (ref 10–45)
ANION GAP SERPL CALC-SCNC: 15 MMOL/L — SIGNIFICANT CHANGE UP (ref 5–17)
AST SERPL-CCNC: 17 U/L — SIGNIFICANT CHANGE UP (ref 10–40)
BILIRUB SERPL-MCNC: 1 MG/DL — SIGNIFICANT CHANGE UP (ref 0.2–1.2)
BUN SERPL-MCNC: 41 MG/DL — HIGH (ref 7–23)
CALCIUM SERPL-MCNC: 9 MG/DL — SIGNIFICANT CHANGE UP (ref 8.4–10.5)
CHLORIDE SERPL-SCNC: 102 MMOL/L — SIGNIFICANT CHANGE UP (ref 96–108)
CO2 SERPL-SCNC: 19 MMOL/L — LOW (ref 22–31)
CREAT SERPL-MCNC: 1.22 MG/DL — SIGNIFICANT CHANGE UP (ref 0.5–1.3)
CULTURE RESULTS: NO GROWTH — SIGNIFICANT CHANGE UP
EGFR: 57 ML/MIN/1.73M2 — LOW
GLUCOSE SERPL-MCNC: 151 MG/DL — HIGH (ref 70–99)
HCT VFR BLD CALC: 38.8 % — LOW (ref 39–50)
HGB BLD-MCNC: 12.5 G/DL — LOW (ref 13–17)
MAGNESIUM SERPL-MCNC: 2.4 MG/DL — SIGNIFICANT CHANGE UP (ref 1.6–2.6)
MCHC RBC-ENTMCNC: 32.2 G/DL — SIGNIFICANT CHANGE UP (ref 32–36)
MCHC RBC-ENTMCNC: 33 PG — SIGNIFICANT CHANGE UP (ref 27–34)
MCV RBC AUTO: 102.4 FL — HIGH (ref 80–100)
MRSA PCR RESULT.: SIGNIFICANT CHANGE UP
NRBC # BLD: 0 /100 WBCS — SIGNIFICANT CHANGE UP (ref 0–0)
PHOSPHATE SERPL-MCNC: 3.5 MG/DL — SIGNIFICANT CHANGE UP (ref 2.5–4.5)
PLATELET # BLD AUTO: 123 K/UL — LOW (ref 150–400)
POTASSIUM SERPL-MCNC: 4 MMOL/L — SIGNIFICANT CHANGE UP (ref 3.5–5.3)
POTASSIUM SERPL-SCNC: 4 MMOL/L — SIGNIFICANT CHANGE UP (ref 3.5–5.3)
PROT SERPL-MCNC: 6.7 G/DL — SIGNIFICANT CHANGE UP (ref 6–8.3)
RBC # BLD: 3.79 M/UL — LOW (ref 4.2–5.8)
RBC # FLD: 13.2 % — SIGNIFICANT CHANGE UP (ref 10.3–14.5)
S AUREUS DNA NOSE QL NAA+PROBE: SIGNIFICANT CHANGE UP
SODIUM SERPL-SCNC: 136 MMOL/L — SIGNIFICANT CHANGE UP (ref 135–145)
SPECIMEN SOURCE: SIGNIFICANT CHANGE UP
WBC # BLD: 10.63 K/UL — HIGH (ref 3.8–10.5)
WBC # FLD AUTO: 10.63 K/UL — HIGH (ref 3.8–10.5)

## 2024-12-04 PROCEDURE — 74177 CT ABD & PELVIS W/CONTRAST: CPT | Mod: 26

## 2024-12-04 PROCEDURE — 99232 SBSQ HOSP IP/OBS MODERATE 35: CPT | Mod: GC

## 2024-12-04 PROCEDURE — 99232 SBSQ HOSP IP/OBS MODERATE 35: CPT

## 2024-12-04 RX ORDER — CEFTRIAXONE SODIUM 1 G
VIAL (EA) INJECTION
Refills: 0 | Status: DISCONTINUED | OUTPATIENT
Start: 2024-12-04 | End: 2024-12-10

## 2024-12-04 RX ORDER — CEFTRIAXONE SODIUM 1 G
2000 VIAL (EA) INJECTION EVERY 12 HOURS
Refills: 0 | Status: DISCONTINUED | OUTPATIENT
Start: 2024-12-05 | End: 2024-12-10

## 2024-12-04 RX ORDER — CEFTRIAXONE SODIUM 1 G
2000 VIAL (EA) INJECTION ONCE
Refills: 0 | Status: COMPLETED | OUTPATIENT
Start: 2024-12-04 | End: 2024-12-04

## 2024-12-04 RX ADMIN — Medication 200 GRAM(S): at 17:06

## 2024-12-04 RX ADMIN — Medication 200 GRAM(S): at 23:35

## 2024-12-04 RX ADMIN — RIVAROXABAN 20 MILLIGRAM(S): 10 TABLET, FILM COATED ORAL at 17:06

## 2024-12-04 RX ADMIN — Medication 200 GRAM(S): at 06:50

## 2024-12-04 RX ADMIN — FLUTICASONE PROPIONATE AND SALMETEROL XINAFOATE 1 DOSE(S): 45; 21 AEROSOL, METERED RESPIRATORY (INHALATION) at 17:06

## 2024-12-04 RX ADMIN — LOSARTAN POTASSIUM 50 MILLIGRAM(S): 100 TABLET, FILM COATED ORAL at 05:55

## 2024-12-04 RX ADMIN — Medication 325 MILLIGRAM(S): at 11:22

## 2024-12-04 RX ADMIN — TAMSULOSIN HYDROCHLORIDE 0.8 MILLIGRAM(S): 0.4 CAPSULE ORAL at 21:45

## 2024-12-04 RX ADMIN — METOPROLOL TARTRATE 50 MILLIGRAM(S): 100 TABLET, FILM COATED ORAL at 05:55

## 2024-12-04 RX ADMIN — Medication 100 MILLIGRAM(S): at 14:27

## 2024-12-04 RX ADMIN — FUROSEMIDE 20 MILLIGRAM(S): 40 TABLET ORAL at 06:50

## 2024-12-04 RX ADMIN — FLUTICASONE PROPIONATE AND SALMETEROL XINAFOATE 1 DOSE(S): 45; 21 AEROSOL, METERED RESPIRATORY (INHALATION) at 05:56

## 2024-12-04 RX ADMIN — Medication 2 PUFF(S): at 11:23

## 2024-12-04 RX ADMIN — Medication 200 GRAM(S): at 11:22

## 2024-12-04 NOTE — PROGRESS NOTE ADULT - PROBLEM SELECTOR PLAN 2
The Caprini score indicates that this patient is at high risk for a VTE event (score = 6).    Surgical patients in this group will benefit from both pharmacologic prophylaxis and intermittent compression devices.    The surgical team will determine the balance between VTE risk and bleeding risk, and other clinical considerations. Gram positive cocci in pairs and enterococcus bacteremia  No WBC, no fever, no signs of sepsis   PLAN  - Ampicillin 2g  - BCx tomorrow repeat   - ID following

## 2024-12-04 NOTE — PROGRESS NOTE ADULT - SUBJECTIVE AND OBJECTIVE BOX
infectious diseases progress note:    Patient is a 89y old  Male who presents with a chief complaint of Shortness of Breath (04 Dec 2024 07:41)        Chronic obstructive pulmonary disease with acute exacerbation           s    Allergies    No Known Allergies    Intolerances        ANTIBIOTICS/RELEVANT:  antimicrobials  ampicillin  IVPB      ampicillin  IVPB 2 Gram(s) IV Intermittent every 6 hours    immunologic:    OTHER:  albuterol/ipratropium for Nebulization 3 milliLiter(s) Nebulizer every 6 hours PRN  ferrous    sulfate 325 milliGRAM(s) Oral daily  fluticasone propionate/ salmeterol 100-50 MICROgram(s) Diskus 1 Dose(s) Inhalation two times a day  losartan 50 milliGRAM(s) Oral daily  metoprolol succinate ER 50 milliGRAM(s) Oral daily  rivaroxaban 20 milliGRAM(s) Oral with dinner  tamsulosin 0.8 milliGRAM(s) Oral at bedtime  tiotropium 2.5 MICROgram(s) Inhaler 2 Puff(s) Inhalation daily      Objective:  Vital Signs Last 24 Hrs  T(C): 36.3 (04 Dec 2024 06:00), Max: 36.4 (03 Dec 2024 14:05)  T(F): 97.4 (04 Dec 2024 06:00), Max: 97.5 (03 Dec 2024 14:05)  HR: 95 (04 Dec 2024 08:01) (70 - 95)  BP: 144/75 (04 Dec 2024 06:00) (91/55 - 144/75)  BP(mean): 100 (03 Dec 2024 12:21) (67 - 100)  RR: 18 (04 Dec 2024 06:00) (16 - 18)  SpO2: 92% (04 Dec 2024 08:01) (92% - 99%)    Parameters below as of 04 Dec 2024 06:00  Patient On (Oxygen Delivery Method): nasal cannula  O2 Flow (L/min): 4       n	  Neck:no JVD, no lymphadenopathy, supple  Respiratory: CTA amy  Cardiovascular: S1S2 RRR, no murmurs  Gastrointestinal:soft, (+) BS, no HSM  Extremities:no e/e/c        LABS:                        12.5   10.63 )-----------( 123      ( 04 Dec 2024 06:35 )             38.8     12-04    136  |  102  |  41[H]  ----------------------------<  151[H]  4.0   |  19[L]  |  1.22    Ca    9.0      04 Dec 2024 06:35  Phos  3.5     12-04  Mg     2.4     12-04    TPro  6.7  /  Alb  3.9  /  TBili  1.0  /  DBili  x   /  AST  17  /  ALT  14  /  AlkPhos  58  12-04    PT/INR - ( 03 Dec 2024 00:25 )   PT: 30.2 sec;   INR: 2.68 ratio         PTT - ( 03 Dec 2024 00:25 )  PTT:36.3 sec  Urinalysis Basic - ( 04 Dec 2024 06:35 )    Color: x / Appearance: x / SG: x / pH: x  Gluc: 151 mg/dL / Ketone: x  / Bili: x / Urobili: x   Blood: x / Protein: x / Nitrite: x   Leuk Esterase: x / RBC: x / WBC x   Sq Epi: x / Non Sq Epi: x / Bacteria: x          MICROBIOLOGY:    RECENT CULTURES:  12-03 @ 04:35 Clean Catch Clean Catch (Midstream)                No growth    12-03 @ 00:17 .Blood BLOOD       Growth in aerobic bottle: Gram positive cocci in pairs  Growth in anaerobic bottle: Gram positive cocci in pairs           Growth in aerobic bottle: Gram positive cocci in pairs  Growth in anaerobic bottle: Gram positive cocci in pairs    12-03 @ 00:16 .Blood BLOOD   PCR    Growth in aerobic bottle: Gram positive cocci in pairs  Growth in anaerobic bottle: Gram positive cocci in pairs    Blood Culture PCR  Blood Culture PCR     Growth in aerobic bottle: Gram positive cocci in pairs  Growth in anaerobic bottle: Gram positive cocci in pairs  Direct identification is available within approximately 3-5  hours either by Blood Panel Multiplexed PCR or Direct  MALDI-TOF. Details: [email or web address]          RESPIRATORY CULTURES:              RADIOLOGY & ADDITIONAL STUDIES:        Pager [phone]  After 5 pm/weekends or if no response :[phone]

## 2024-12-04 NOTE — PROGRESS NOTE ADULT - PROBLEM SELECTOR PLAN 4
Instructed to continue Losartan and Norvasc as prescribed and to take DOS with small sips of water Trop peaked at 68 - last was 58  BNP 7758  No chest pain, no concerning EKG chnages     PLAN   - Follow up TTE - unchanged   - Monitor for chest pain  - EKG and stat cardiac enzymes if new chest pain.

## 2024-12-04 NOTE — PROGRESS NOTE ADULT - ASSESSMENT
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 No urinary sx.  Vitals notable for hypoxia on presentation. Labs with leukocytosis to 14.01. Blood cultures x 2 with e. fecalis by PCR. TTE without vegetations. Of note, patient with known colonic polyp; has not pursued colonoscopy as previously recommended for strep lutetiensis bacteremia given concern for risk of procedure. Clinical exam unremarkable at this time for foci of infection.     #e. 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    dont think a CAREN is essential at present as he is a  poor surgical candidate but this can be reconsidered  either way , it would be good to wait several days before doing it

## 2024-12-04 NOTE — PROGRESS NOTE ADULT - PROBLEM SELECTOR PLAN 5
Call cardiology office for last interrogation, if not recent will need to have one done prior to surgery Chronic atrial fibrillation.   ·  Plan: - Continue home Xerelto   - Has pacemaker.

## 2024-12-04 NOTE — PROGRESS NOTE ADULT - SUBJECTIVE AND OBJECTIVE BOX
PROGRESS NOTE:   Authored by Dr. Sagar Thomas MD     Patient is a 89y old  Male who presents with a chief complaint of Shortness of Breath (03 Dec 2024 16:14)      SUBJECTIVE / OVERNIGHT EVENTS:  No acute events overnight.     MEDICATIONS  (STANDING):  ampicillin  IVPB      ampicillin  IVPB 2 Gram(s) IV Intermittent every 6 hours  ferrous    sulfate 325 milliGRAM(s) Oral daily  fluticasone propionate/ salmeterol 100-50 MICROgram(s) Diskus 1 Dose(s) Inhalation two times a day  losartan 50 milliGRAM(s) Oral daily  metoprolol succinate ER 50 milliGRAM(s) Oral daily  rivaroxaban 20 milliGRAM(s) Oral with dinner  tamsulosin 0.8 milliGRAM(s) Oral at bedtime  tiotropium 2.5 MICROgram(s) Inhaler 2 Puff(s) Inhalation daily    MEDICATIONS  (PRN):  albuterol/ipratropium for Nebulization 3 milliLiter(s) Nebulizer every 6 hours PRN Shortness of Breath and/or Wheezing      CAPILLARY BLOOD GLUCOSE        I&O's Summary    03 Dec 2024 07:01  -  04 Dec 2024 07:00  --------------------------------------------------------  IN: 0 mL / OUT: 700 mL / NET: -700 mL        PHYSICAL EXAM:  Vital Signs Last 24 Hrs  T(C): 36.3 (04 Dec 2024 06:00), Max: 36.4 (03 Dec 2024 14:05)  T(F): 97.4 (04 Dec 2024 06:00), Max: 97.5 (03 Dec 2024 14:05)  HR: 72 (04 Dec 2024 06:00) (70 - 98)  BP: 144/75 (04 Dec 2024 06:00) (91/55 - 144/75)  BP(mean): 100 (03 Dec 2024 12:21) (67 - 100)  RR: 18 (04 Dec 2024 06:00) (16 - 18)  SpO2: 98% (04 Dec 2024 06:00) (90% - 99%)    Parameters below as of 04 Dec 2024 06:00  Patient On (Oxygen Delivery Method): nasal cannula  O2 Flow (L/min): 4      CONSTITUTIONAL: NAD  HEET: MMM, EOMI, PERRLA  NECK: supple  RESPIRATORY: Normal respiratory effort; lungs are clear to auscultation bilaterally  CARDIOVASCULAR: Regular rate and rhythm, normal S1 and S2, no murmur/rub/gallop; No lower extremity edema; Peripheral pulses are 2+ bilaterally  ABDOMEN: Nontender to palpation, normoactive bowel sounds, no rebound/guarding; No hepatosplenomegaly  MUSCULOSKELETAL: no clubbing or cyanosis of digits; no joint swelling or tenderness to palpation  PSYCH: A+O to person, place, and time; affect appropriate  SKIN: No rash    LABS:                        12.5   10.63 )-----------( 123      ( 04 Dec 2024 06:35 )             38.8     12-04    136  |  102  |  41[H]  ----------------------------<  151[H]  4.0   |  19[L]  |  1.22    Ca    9.0      04 Dec 2024 06:35  Phos  3.5     12-04  Mg     2.4     12-04    TPro  6.7  /  Alb  3.9  /  TBili  1.0  /  DBili  x   /  AST  17  /  ALT  14  /  AlkPhos  58  12-04    PT/INR - ( 03 Dec 2024 00:25 )   PT: 30.2 sec;   INR: 2.68 ratio         PTT - ( 03 Dec 2024 00:25 )  PTT:36.3 sec      Urinalysis Basic - ( 04 Dec 2024 06:35 )    Color: x / Appearance: x / SG: x / pH: x  Gluc: 151 mg/dL / Ketone: x  / Bili: x / Urobili: x   Blood: x / Protein: x / Nitrite: x   Leuk Esterase: x / RBC: x / WBC x   Sq Epi: x / Non Sq Epi: x / Bacteria: x        Culture - Blood (collected 03 Dec 2024 00:17)  Source: .Blood BLOOD  Gram Stain (03 Dec 2024 17:01):    Growth in aerobic bottle: Gram positive cocci in pairs    Growth in anaerobic bottle: Gram positive cocci in pairs  Preliminary Report (03 Dec 2024 17:01):    Growth in aerobic bottle: Gram positive cocci in pairs    Growth in anaerobic bottle: Gram positive cocci in pairs    Culture - Blood (collected 03 Dec 2024 00:16)  Source: .Blood BLOOD  Gram Stain (03 Dec 2024 15:49):    Growth in aerobic bottle: Gram positive cocci in pairs    Growth in anaerobic bottle: Gram positive cocci in pairs  Preliminary Report (03 Dec 2024 15:49):    Growth in aerobic bottle: Gram positive cocci in pairs    Growth in anaerobic bottle: Gram positive cocci in pairs    Direct identification is available within approximately 3-5    hours either by Blood Panel Multiplexed PCR or Direct    MALDI-TOF. Details: [email or web address]  Organism: Blood Culture PCR (03 Dec 2024 17:06)  Organism: Blood Culture PCR (03 Dec 2024 17:06)        Tele Reviewed:    RADIOLOGY & ADDITIONAL TESTS:  Results Reviewed:   Imaging Personally Reviewed:  Electrocardiogram Personally Reviewed:

## 2024-12-04 NOTE — PROGRESS NOTE ADULT - PROBLEM SELECTOR PLAN 1
Scheduled for right total hip replacement.  MRSA nasal swab pending results  Labs pending.  Preop instructions given including skin prep  Instructed that patient may continue Norco for pain relief, may take DOS with small sips of water  Cardio evaluation done 2 weeks ago, pending results.  Pulm. evaluation scheduled for 9/22/18. Volume overload on exam concern for ADHF vs less likely COPD exacerbation   TTE in March 2024 EF 45%,  global left ventricular hypokinesis,  RV enlarged,  reduced systolic function., left atrium is moderately dilated,  right atrium is severely dilated  On some GDMT at home   Lasix 20 in ED    PLAN   - Additional 20 Lasic now   - Additional Diuresis as needed  - Toprol-xl 50 ( at home 50 in the day 25 at night   - Losartan 50 QD ( At home BID)   - hold home amlodipine   - follow up TTE.

## 2024-12-04 NOTE — PROGRESS NOTE ADULT - PROBLEM SELECTOR PLAN 7
Recommend oxygen supplementation at night as prescribed per pulmonary Diet - regular   DVT - Xerelto   DIspo - pending treatment.

## 2024-12-04 NOTE — PROGRESS NOTE ADULT - PROBLEM SELECTOR PLAN 6
Instructed to continue inhalers and to use the DOS prior to coming to hospital and to bring Pro-Air with him.  Pulm evaluation pending 9/22/18 At home on Trelegy and albuterol as needed    PLAN  - Trelegy 200/62.5  therapeutic inpatient conversion   - Dounebs as needed  - .

## 2024-12-04 NOTE — PROGRESS NOTE ADULT - PROBLEM SELECTOR PLAN 3
Instructed to stop Jantoven (coumadin) 5 days preop, last dose 9/26/18  As per MAPPP Sim Volume overload on exam concern for ADHF vs less likely COPD exacerbation   TTE in March 2024 EF 45%,  global left ventricular hypokinesis,  RV enlarged,  reduced systolic function., left atrium is moderately dilated,  right atrium is severely dilated  On some GDMT at home   Lasix 20 in ED    PLAN   - Holding Diuresis today   - Toprol-xl 50 ( at home 50 in the day 25 at night   - Losartan 50 QD ( At home BID)   - hold home amlodipine   - follow up TTE - inchanged from prior consider CAREN id concern for endocarditis

## 2024-12-05 LAB
-  AMPICILLIN: SIGNIFICANT CHANGE UP
-  GENTAMICIN SYNERGY: SIGNIFICANT CHANGE UP
-  STREPTOMYCIN SYNERGY: SIGNIFICANT CHANGE UP
-  VANCOMYCIN: SIGNIFICANT CHANGE UP
ANION GAP SERPL CALC-SCNC: 15 MMOL/L — SIGNIFICANT CHANGE UP (ref 5–17)
BUN SERPL-MCNC: 39 MG/DL — HIGH (ref 7–23)
CALCIUM SERPL-MCNC: 8.5 MG/DL — SIGNIFICANT CHANGE UP (ref 8.4–10.5)
CHLORIDE SERPL-SCNC: 104 MMOL/L — SIGNIFICANT CHANGE UP (ref 96–108)
CO2 SERPL-SCNC: 20 MMOL/L — LOW (ref 22–31)
CREAT SERPL-MCNC: 1.08 MG/DL — SIGNIFICANT CHANGE UP (ref 0.5–1.3)
CULTURE RESULTS: ABNORMAL
CULTURE RESULTS: ABNORMAL
EGFR: 66 ML/MIN/1.73M2 — SIGNIFICANT CHANGE UP
GLUCOSE SERPL-MCNC: 100 MG/DL — HIGH (ref 70–99)
HCT VFR BLD CALC: 33.6 % — LOW (ref 39–50)
HGB BLD-MCNC: 11.1 G/DL — LOW (ref 13–17)
MAGNESIUM SERPL-MCNC: 2.2 MG/DL — SIGNIFICANT CHANGE UP (ref 1.6–2.6)
MCHC RBC-ENTMCNC: 33 G/DL — SIGNIFICANT CHANGE UP (ref 32–36)
MCHC RBC-ENTMCNC: 33.8 PG — SIGNIFICANT CHANGE UP (ref 27–34)
MCV RBC AUTO: 102.4 FL — HIGH (ref 80–100)
METHOD TYPE: SIGNIFICANT CHANGE UP
NRBC # BLD: 0 /100 WBCS — SIGNIFICANT CHANGE UP (ref 0–0)
ORGANISM # SPEC MICROSCOPIC CNT: ABNORMAL
PHOSPHATE SERPL-MCNC: 2.1 MG/DL — LOW (ref 2.5–4.5)
PLATELET # BLD AUTO: 116 K/UL — LOW (ref 150–400)
POTASSIUM SERPL-MCNC: 3.7 MMOL/L — SIGNIFICANT CHANGE UP (ref 3.5–5.3)
POTASSIUM SERPL-SCNC: 3.7 MMOL/L — SIGNIFICANT CHANGE UP (ref 3.5–5.3)
RBC # BLD: 3.28 M/UL — LOW (ref 4.2–5.8)
RBC # FLD: 13.1 % — SIGNIFICANT CHANGE UP (ref 10.3–14.5)
SODIUM SERPL-SCNC: 139 MMOL/L — SIGNIFICANT CHANGE UP (ref 135–145)
SPECIMEN SOURCE: SIGNIFICANT CHANGE UP
SPECIMEN SOURCE: SIGNIFICANT CHANGE UP
WBC # BLD: 7.69 K/UL — SIGNIFICANT CHANGE UP (ref 3.8–10.5)
WBC # FLD AUTO: 7.69 K/UL — SIGNIFICANT CHANGE UP (ref 3.8–10.5)

## 2024-12-05 PROCEDURE — 99232 SBSQ HOSP IP/OBS MODERATE 35: CPT | Mod: GC

## 2024-12-05 PROCEDURE — 99232 SBSQ HOSP IP/OBS MODERATE 35: CPT

## 2024-12-05 RX ORDER — SODIUM,POTASSIUM PHOSPHATES 278-250MG
1 POWDER IN PACKET (EA) ORAL ONCE
Refills: 0 | Status: COMPLETED | OUTPATIENT
Start: 2024-12-05 | End: 2024-12-05

## 2024-12-05 RX ORDER — ACETAMINOPHEN 500MG 500 MG/1
650 TABLET, COATED ORAL EVERY 6 HOURS
Refills: 0 | Status: DISCONTINUED | OUTPATIENT
Start: 2024-12-05 | End: 2024-12-10

## 2024-12-05 RX ADMIN — Medication 100 MILLIGRAM(S): at 05:28

## 2024-12-05 RX ADMIN — LOSARTAN POTASSIUM 50 MILLIGRAM(S): 100 TABLET, FILM COATED ORAL at 05:29

## 2024-12-05 RX ADMIN — Medication 100 MILLIGRAM(S): at 17:18

## 2024-12-05 RX ADMIN — METOPROLOL TARTRATE 50 MILLIGRAM(S): 100 TABLET, FILM COATED ORAL at 05:29

## 2024-12-05 RX ADMIN — Medication 200 GRAM(S): at 05:24

## 2024-12-05 RX ADMIN — ACETAMINOPHEN 500MG 650 MILLIGRAM(S): 500 TABLET, COATED ORAL at 17:15

## 2024-12-05 RX ADMIN — RIVAROXABAN 20 MILLIGRAM(S): 10 TABLET, FILM COATED ORAL at 17:23

## 2024-12-05 RX ADMIN — Medication 1 PACKET(S): at 09:06

## 2024-12-05 RX ADMIN — FLUTICASONE PROPIONATE AND SALMETEROL XINAFOATE 1 DOSE(S): 45; 21 AEROSOL, METERED RESPIRATORY (INHALATION) at 17:20

## 2024-12-05 RX ADMIN — Medication 200 GRAM(S): at 18:18

## 2024-12-05 RX ADMIN — Medication 2 PUFF(S): at 09:07

## 2024-12-05 RX ADMIN — FLUTICASONE PROPIONATE AND SALMETEROL XINAFOATE 1 DOSE(S): 45; 21 AEROSOL, METERED RESPIRATORY (INHALATION) at 05:29

## 2024-12-05 RX ADMIN — Medication 325 MILLIGRAM(S): at 09:01

## 2024-12-05 RX ADMIN — TAMSULOSIN HYDROCHLORIDE 0.8 MILLIGRAM(S): 0.4 CAPSULE ORAL at 21:58

## 2024-12-05 RX ADMIN — Medication 200 GRAM(S): at 12:05

## 2024-12-05 RX ADMIN — ACETAMINOPHEN 500MG 650 MILLIGRAM(S): 500 TABLET, COATED ORAL at 16:41

## 2024-12-05 NOTE — PROGRESS NOTE ADULT - ASSESSMENT
89M COPD on 3.5 L nasal cannula, AF/AVB s/p PPM, previous gram positive bacteremia s/p PPM extraction exchanged with micra leadless pacemaker s/p 4 weeks of IV abx in April 2024, presenting with AHRF, now resolved, course c/b enterococcus faecalis bacteremia with unclear source of infection.

## 2024-12-05 NOTE — PROGRESS NOTE ADULT - PROBLEM SELECTOR PLAN 4
At home on Trelegy and albuterol as needed    PLAN  - Trelegy 200/62.5  therapeutic inpatient conversion   - Dounebs as needed  - .

## 2024-12-05 NOTE — PROGRESS NOTE ADULT - ASSESSMENT
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 No urinary sx.  Vitals notable for hypoxia on presentation. Labs with leukocytosis to 14.01. Blood cultures x 2 with e. fecalis by PCR. TTE without vegetations. Of note, patient with known colonic polyp; has not pursued colonoscopy as previously recommended for strep lutetiensis bacteremia given concern for risk of procedure. Clinical exam unremarkable at this time for foci of infection.     #e. 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    dont think a CAREN is essential at present as he is a  poor surgical candidate but this can be reconsidered  either way ,    Discussed with family in detail  to hold off on CAREN and she how does  needs repeat BC .

## 2024-12-05 NOTE — PROGRESS NOTE ADULT - PROBLEM SELECTOR PLAN 1
12/3 Bcx revealing pan-sensitive E. faecalis   CT A/P without GI source  Micra with extremely low risk of infection    PLAN  - Ampicillin 2g q6h (12/3- ) and CFX 2g q24h (12/4-  - will need 4 weeks IV abx after Bcx clearance. Will place consult for PICC line once cultures clear   - will need ID input to see if patient would benefit from colonoscopy vs. CAREN to look for source DISCHARGE

## 2024-12-05 NOTE — PROGRESS NOTE ADULT - SUBJECTIVE AND OBJECTIVE BOX
DATE OF SERVICE: 12-05-24 @ 07:42    Patient is a 89y old  Male who presents with a chief complaint of Shortness of Breath (04 Dec 2024 10:44)      SUBJECTIVE / OVERNIGHT EVENTS:  Overnight, no events.   Pt seen and examined at bedside. Denies any acute complains. Breathing feels comfortable on his baseline oxygen 3.5L. Slept well throughout the night, No fevers.     ROS negative except as above.    MEDICATIONS  (STANDING):  ampicillin  IVPB      ampicillin  IVPB 2 Gram(s) IV Intermittent every 6 hours  cefTRIAXone   IVPB      cefTRIAXone   IVPB 2000 milliGRAM(s) IV Intermittent every 12 hours  ferrous    sulfate 325 milliGRAM(s) Oral daily  fluticasone propionate/ salmeterol 100-50 MICROgram(s) Diskus 1 Dose(s) Inhalation two times a day  losartan 50 milliGRAM(s) Oral daily  metoprolol succinate ER 50 milliGRAM(s) Oral daily  potassium phosphate / sodium phosphate Powder (PHOS-NaK) 1 Packet(s) Oral once  rivaroxaban 20 milliGRAM(s) Oral with dinner  tamsulosin 0.8 milliGRAM(s) Oral at bedtime  tiotropium 2.5 MICROgram(s) Inhaler 2 Puff(s) Inhalation daily    MEDICATIONS  (PRN):  albuterol/ipratropium for Nebulization 3 milliLiter(s) Nebulizer every 6 hours PRN Shortness of Breath and/or Wheezing      Vital Signs Last 24 Hrs  T(C): 36.8 (05 Dec 2024 04:43), Max: 36.8 (04 Dec 2024 20:54)  T(F): 98.2 (05 Dec 2024 04:43), Max: 98.3 (04 Dec 2024 20:54)  HR: 71 (05 Dec 2024 04:43) (71 - 95)  BP: 117/71 (05 Dec 2024 04:43) (115/73 - 126/72)  BP(mean): --  RR: 18 (05 Dec 2024 04:43) (18 - 18)  SpO2: 98% (05 Dec 2024 04:43) (92% - 98%)    Parameters below as of 05 Dec 2024 04:43  Patient On (Oxygen Delivery Method): nasal cannula  O2 Flow (L/min): 4    CAPILLARY BLOOD GLUCOSE        I&O's Summary    04 Dec 2024 07:01  -  05 Dec 2024 07:00  --------------------------------------------------------  IN: 580 mL / OUT: 875 mL / NET: -295 mL        PHYSICAL EXAM:  GENERAL: NAD, well-developed  HEAD:  Atraumatic, Normocephalic  EYES: EOMI, conjunctiva and sclera clear  NECK: Supple, No JVD  CHEST/LUNG: Clear to auscultation bilaterally; No wheeze  HEART: Regular rate and rhythm; No murmurs, rubs, or gallops  ABDOMEN: Soft, Nontender, Nondistended; Bowel sounds present  EXTREMITIES:  2+ Peripheral Pulses, No clubbing, cyanosis, or edema  NEUROLOGY: AOx3, non-focal  SKIN: No rashes or lesions    LABS:                        11.1   7.69  )-----------( 116      ( 05 Dec 2024 06:34 )             33.6     12-05    139  |  104  |  39[H]  ----------------------------<  100[H]  3.7   |  20[L]  |  1.08    Ca    8.5      05 Dec 2024 06:34  Phos  2.1     12-05  Mg     2.2     12-05    TPro  6.7  /  Alb  3.9  /  TBili  1.0  /  DBili  x   /  AST  17  /  ALT  14  /  AlkPhos  58  12-04            MICRO:      RADIOLOGY & ADDITIONAL TESTS:

## 2024-12-05 NOTE — PROGRESS NOTE ADULT - SUBJECTIVE AND OBJECTIVE BOX
infectious diseases progress note:    Patient is a 89y old  Male who presents with a chief complaint of Shortness of Breath (05 Dec 2024 07:42)        Chronic obstructive pulmonary disease with acute exacerbation               Allergies    No Known Allergies    Intolerances        ANTIBIOTICS/RELEVANT:  antimicrobials  ampicillin  IVPB      ampicillin  IVPB 2 Gram(s) IV Intermittent every 6 hours  cefTRIAXone   IVPB      cefTRIAXone   IVPB 2000 milliGRAM(s) IV Intermittent every 12 hours    immunologic:    OTHER:  albuterol/ipratropium for Nebulization 3 milliLiter(s) Nebulizer every 6 hours PRN  ferrous    sulfate 325 milliGRAM(s) Oral daily  fluticasone propionate/ salmeterol 100-50 MICROgram(s) Diskus 1 Dose(s) Inhalation two times a day  losartan 50 milliGRAM(s) Oral daily  metoprolol succinate ER 50 milliGRAM(s) Oral daily  rivaroxaban 20 milliGRAM(s) Oral with dinner  tamsulosin 0.8 milliGRAM(s) Oral at bedtime  tiotropium 2.5 MICROgram(s) Inhaler 2 Puff(s) Inhalation daily      Objective:  Vital Signs Last 24 Hrs  T(C): 36.8 (05 Dec 2024 04:43), Max: 36.8 (04 Dec 2024 20:54)  T(F): 98.2 (05 Dec 2024 04:43), Max: 98.3 (04 Dec 2024 20:54)  HR: 71 (05 Dec 2024 04:43) (71 - 75)  BP: 117/71 (05 Dec 2024 04:43) (115/73 - 126/72)  BP(mean): --  RR: 18 (05 Dec 2024 04:43) (18 - 18)  SpO2: 98% (05 Dec 2024 04:43) (97% - 98%)    Parameters below as of 05 Dec 2024 04:43  Patient On (Oxygen Delivery Method): nasal cannula  O2 Flow (L/min): 4         Ear/Nose/Throat: no oral lesion, no sinus tenderness on percussion	  Neck:no JVD, no lymphadenopathy, supple  Respiratory: CTA amy  Cardiovascular: S1S2 RRR, no murmurs  Gastrointestinal:soft, (+) BS, no HSM  Extremities:no e/e/c        LABS:                        11.1   7.69  )-----------( 116      ( 05 Dec 2024 06:34 )             33.6     12-05    139  |  104  |  39[H]  ----------------------------<  100[H]  3.7   |  20[L]  |  1.08    Ca    8.5      05 Dec 2024 06:34  Phos  2.1     12-05  Mg     2.2     12-05    TPro  6.7  /  Alb  3.9  /  TBili  1.0  /  DBili  x   /  AST  17  /  ALT  14  /  AlkPhos  58  12-04      Urinalysis Basic - ( 05 Dec 2024 06:34 )    Color: x / Appearance: x / SG: x / pH: x  Gluc: 100 mg/dL / Ketone: x  / Bili: x / Urobili: x   Blood: x / Protein: x / Nitrite: x   Leuk Esterase: x / RBC: x / WBC x   Sq Epi: x / Non Sq Epi: x / Bacteria: x          MICROBIOLOGY:    RECENT CULTURES:  12-03 @ 04:35 Clean Catch Clean Catch (Midstream)                No growth    12-03 @ 00:17 .Blood BLOOD       Growth in aerobic bottle: Gram positive cocci in pairs  Growth in anaerobic bottle: Gram positive cocci in pairs           Growth in aerobic and anaerobic bottles: Enterococcus faecalis  See previous culture [id number]    12-03 @ 00:16 .Blood BLOOD   PCR    Growth in aerobic bottle: Gram positive cocci in pairs  Growth in anaerobic bottle: Gram positive cocci in pairs    Blood Culture PCR  Enterococcus faecalis  Blood Culture PCR     Growth in aerobic and anaerobic bottles: Enterococcus faecalis  Direct identification is available within approximately 3-5  hours either by Blood Panel Multiplexed PCR or Direct  MALDI-TOF. Details: [email or web address]          RESPIRATORY CULTURES:              RADIOLOGY & ADDITIONAL STUDIES:        Pager [phone]  After 5 pm/weekends or if no response :[phone]

## 2024-12-05 NOTE — PROGRESS NOTE ADULT - PROBLEM SELECTOR PLAN 2
Volume overload on exam concern for ADHF vs less likely COPD exacerbation   TTE in March 2024 EF 45%,  global left ventricular hypokinesis,  RV enlarged,  reduced systolic function., left atrium is moderately dilated,  right atrium is severely dilated.   Repeat TTE this admission grossly unchanged     PLAN   - asses volume status daily; diuresis prn   - Toprol 50 mg qd (at home on 50 mg in AM and 25 mg in PM)   - Losartan 50 QD ( At home BID)

## 2024-12-06 ENCOUNTER — TRANSCRIPTION ENCOUNTER (OUTPATIENT)
Age: 89
End: 2024-12-06

## 2024-12-06 LAB
ANION GAP SERPL CALC-SCNC: 13 MMOL/L — SIGNIFICANT CHANGE UP (ref 5–17)
BUN SERPL-MCNC: 26 MG/DL — HIGH (ref 7–23)
CALCIUM SERPL-MCNC: 8.5 MG/DL — SIGNIFICANT CHANGE UP (ref 8.4–10.5)
CHLORIDE SERPL-SCNC: 106 MMOL/L — SIGNIFICANT CHANGE UP (ref 96–108)
CO2 SERPL-SCNC: 21 MMOL/L — LOW (ref 22–31)
CREAT SERPL-MCNC: 1.12 MG/DL — SIGNIFICANT CHANGE UP (ref 0.5–1.3)
EGFR: 63 ML/MIN/1.73M2 — SIGNIFICANT CHANGE UP
GLUCOSE SERPL-MCNC: 89 MG/DL — SIGNIFICANT CHANGE UP (ref 70–99)
HCT VFR BLD CALC: 34.3 % — LOW (ref 39–50)
HGB BLD-MCNC: 11.1 G/DL — LOW (ref 13–17)
MAGNESIUM SERPL-MCNC: 2.2 MG/DL — SIGNIFICANT CHANGE UP (ref 1.6–2.6)
MCHC RBC-ENTMCNC: 32.4 G/DL — SIGNIFICANT CHANGE UP (ref 32–36)
MCHC RBC-ENTMCNC: 32.8 PG — SIGNIFICANT CHANGE UP (ref 27–34)
MCV RBC AUTO: 101.5 FL — HIGH (ref 80–100)
NRBC # BLD: 0 /100 WBCS — SIGNIFICANT CHANGE UP (ref 0–0)
PHOSPHATE SERPL-MCNC: 2.5 MG/DL — SIGNIFICANT CHANGE UP (ref 2.5–4.5)
PLATELET # BLD AUTO: 115 K/UL — LOW (ref 150–400)
POTASSIUM SERPL-MCNC: 3.7 MMOL/L — SIGNIFICANT CHANGE UP (ref 3.5–5.3)
POTASSIUM SERPL-SCNC: 3.7 MMOL/L — SIGNIFICANT CHANGE UP (ref 3.5–5.3)
RBC # BLD: 3.38 M/UL — LOW (ref 4.2–5.8)
RBC # FLD: 13.3 % — SIGNIFICANT CHANGE UP (ref 10.3–14.5)
SODIUM SERPL-SCNC: 140 MMOL/L — SIGNIFICANT CHANGE UP (ref 135–145)
WBC # BLD: 4.92 K/UL — SIGNIFICANT CHANGE UP (ref 3.8–10.5)
WBC # FLD AUTO: 4.92 K/UL — SIGNIFICANT CHANGE UP (ref 3.8–10.5)

## 2024-12-06 PROCEDURE — 99232 SBSQ HOSP IP/OBS MODERATE 35: CPT

## 2024-12-06 PROCEDURE — 99232 SBSQ HOSP IP/OBS MODERATE 35: CPT | Mod: GC

## 2024-12-06 RX ORDER — SODIUM CHLORIDE 9 MG/ML
10 INJECTION, SOLUTION INTRAMUSCULAR; INTRAVENOUS; SUBCUTANEOUS
Qty: 15 | Refills: 0
Start: 2024-12-06 | End: 2025-01-04

## 2024-12-06 RX ORDER — AMPICILLIN TRIHYDRATE 250 MG/5ML
2 SUSPENSION, RECONSTITUTED, ORAL (ML) ORAL EVERY 4 HOURS
Refills: 0 | Status: DISCONTINUED | OUTPATIENT
Start: 2024-12-06 | End: 2024-12-10

## 2024-12-06 RX ADMIN — Medication 100 MILLIGRAM(S): at 05:24

## 2024-12-06 RX ADMIN — FLUTICASONE PROPIONATE AND SALMETEROL XINAFOATE 1 DOSE(S): 45; 21 AEROSOL, METERED RESPIRATORY (INHALATION) at 18:28

## 2024-12-06 RX ADMIN — Medication 200 GRAM(S): at 05:24

## 2024-12-06 RX ADMIN — METOPROLOL TARTRATE 50 MILLIGRAM(S): 100 TABLET, FILM COATED ORAL at 05:28

## 2024-12-06 RX ADMIN — Medication 325 MILLIGRAM(S): at 10:11

## 2024-12-06 RX ADMIN — FLUTICASONE PROPIONATE AND SALMETEROL XINAFOATE 1 DOSE(S): 45; 21 AEROSOL, METERED RESPIRATORY (INHALATION) at 05:28

## 2024-12-06 RX ADMIN — TAMSULOSIN HYDROCHLORIDE 0.8 MILLIGRAM(S): 0.4 CAPSULE ORAL at 21:28

## 2024-12-06 RX ADMIN — Medication 200 GRAM(S): at 21:28

## 2024-12-06 RX ADMIN — Medication 200 GRAM(S): at 18:58

## 2024-12-06 RX ADMIN — Medication 200 GRAM(S): at 15:15

## 2024-12-06 RX ADMIN — Medication 200 GRAM(S): at 01:19

## 2024-12-06 RX ADMIN — Medication 2 PUFF(S): at 10:11

## 2024-12-06 RX ADMIN — RIVAROXABAN 20 MILLIGRAM(S): 10 TABLET, FILM COATED ORAL at 18:27

## 2024-12-06 RX ADMIN — LOSARTAN POTASSIUM 50 MILLIGRAM(S): 100 TABLET, FILM COATED ORAL at 05:28

## 2024-12-06 RX ADMIN — Medication 200 GRAM(S): at 11:23

## 2024-12-06 RX ADMIN — Medication 100 MILLIGRAM(S): at 18:30

## 2024-12-06 NOTE — PROGRESS NOTE ADULT - SUBJECTIVE AND OBJECTIVE BOX
PROGRESS NOTE:   Authored by Dr. Sagar Thomas MD     Patient is a 89y old  Male who presents with a chief complaint of Shortness of Breath (05 Dec 2024 09:24)      SUBJECTIVE / OVERNIGHT EVENTS:  No acute events overnight.     MEDICATIONS  (STANDING):  ampicillin  IVPB      ampicillin  IVPB 2 Gram(s) IV Intermittent every 6 hours  cefTRIAXone   IVPB      cefTRIAXone   IVPB 2000 milliGRAM(s) IV Intermittent every 12 hours  ferrous    sulfate 325 milliGRAM(s) Oral daily  fluticasone propionate/ salmeterol 100-50 MICROgram(s) Diskus 1 Dose(s) Inhalation two times a day  losartan 50 milliGRAM(s) Oral daily  metoprolol succinate ER 50 milliGRAM(s) Oral daily  rivaroxaban 20 milliGRAM(s) Oral with dinner  tamsulosin 0.8 milliGRAM(s) Oral at bedtime  tiotropium 2.5 MICROgram(s) Inhaler 2 Puff(s) Inhalation daily    MEDICATIONS  (PRN):  acetaminophen     Tablet .. 650 milliGRAM(s) Oral every 6 hours PRN Temp greater or equal to 38C (100.4F), Mild Pain (1 - 3), Moderate Pain (4 - 6)  albuterol/ipratropium for Nebulization 3 milliLiter(s) Nebulizer every 6 hours PRN Shortness of Breath and/or Wheezing      CAPILLARY BLOOD GLUCOSE        I&O's Summary    05 Dec 2024 07:01  -  06 Dec 2024 07:00  --------------------------------------------------------  IN: 480 mL / OUT: 450 mL / NET: 30 mL        PHYSICAL EXAM:  Vital Signs Last 24 Hrs  T(C): 36.8 (06 Dec 2024 04:22), Max: 36.8 (06 Dec 2024 04:22)  T(F): 98.2 (06 Dec 2024 04:22), Max: 98.2 (06 Dec 2024 04:22)  HR: 70 (06 Dec 2024 04:22) (70 - 76)  BP: 114/70 (06 Dec 2024 04:22) (114/70 - 133/72)  BP(mean): --  RR: 18 (06 Dec 2024 04:22) (18 - 18)  SpO2: 100% (06 Dec 2024 04:22) (95% - 100%)    Parameters below as of 06 Dec 2024 04:22  Patient On (Oxygen Delivery Method): nasal cannula        CONSTITUTIONAL: NAD  HEET: MMM, EOMI, PERRLA  NECK: supple  RESPIRATORY: Normal respiratory effort; lungs are clear to auscultation bilaterally  CARDIOVASCULAR: Regular rate and rhythm, normal S1 and S2, no murmur/rub/gallop; No lower extremity edema; Peripheral pulses are 2+ bilaterally  ABDOMEN: Nontender to palpation, normoactive bowel sounds, no rebound/guarding; No hepatosplenomegaly  MUSCULOSKELETAL: no clubbing or cyanosis of digits; no joint swelling or tenderness to palpation  PSYCH: A+O to person, place, and time; affect appropriate  SKIN: No rash    LABS:                        11.1   4.92  )-----------( 115      ( 06 Dec 2024 06:38 )             34.3     12-06    140  |  106  |  26[H]  ----------------------------<  89  3.7   |  21[L]  |  1.12    Ca    8.5      06 Dec 2024 06:40  Phos  2.5     12-06  Mg     2.2     12-06            Urinalysis Basic - ( 06 Dec 2024 06:40 )    Color: x / Appearance: x / SG: x / pH: x  Gluc: 89 mg/dL / Ketone: x  / Bili: x / Urobili: x   Blood: x / Protein: x / Nitrite: x   Leuk Esterase: x / RBC: x / WBC x   Sq Epi: x / Non Sq Epi: x / Bacteria: x          Tele Reviewed:    RADIOLOGY & ADDITIONAL TESTS:  Results Reviewed:   Imaging Personally Reviewed:  Electrocardiogram Personally Reviewed:     PROGRESS NOTE:   Authored by Dr. Sagar Thomas MD     Patient is a 89y old  Male who presents with a chief complaint of Shortness of Breath (05 Dec 2024 09:24)      SUBJECTIVE / OVERNIGHT EVENTS:  No acute events overnight.   Seen at bedside, feeling well, no pain, has some questions about dc but agreeable to overall plan     MEDICATIONS  (STANDING):  ampicillin  IVPB      ampicillin  IVPB 2 Gram(s) IV Intermittent every 6 hours  cefTRIAXone   IVPB      cefTRIAXone   IVPB 2000 milliGRAM(s) IV Intermittent every 12 hours  ferrous    sulfate 325 milliGRAM(s) Oral daily  fluticasone propionate/ salmeterol 100-50 MICROgram(s) Diskus 1 Dose(s) Inhalation two times a day  losartan 50 milliGRAM(s) Oral daily  metoprolol succinate ER 50 milliGRAM(s) Oral daily  rivaroxaban 20 milliGRAM(s) Oral with dinner  tamsulosin 0.8 milliGRAM(s) Oral at bedtime  tiotropium 2.5 MICROgram(s) Inhaler 2 Puff(s) Inhalation daily    MEDICATIONS  (PRN):  acetaminophen     Tablet .. 650 milliGRAM(s) Oral every 6 hours PRN Temp greater or equal to 38C (100.4F), Mild Pain (1 - 3), Moderate Pain (4 - 6)  albuterol/ipratropium for Nebulization 3 milliLiter(s) Nebulizer every 6 hours PRN Shortness of Breath and/or Wheezing      CAPILLARY BLOOD GLUCOSE        I&O's Summary    05 Dec 2024 07:01  -  06 Dec 2024 07:00  --------------------------------------------------------  IN: 480 mL / OUT: 450 mL / NET: 30 mL        PHYSICAL EXAM:  Vital Signs Last 24 Hrs  T(C): 36.8 (06 Dec 2024 04:22), Max: 36.8 (06 Dec 2024 04:22)  T(F): 98.2 (06 Dec 2024 04:22), Max: 98.2 (06 Dec 2024 04:22)  HR: 70 (06 Dec 2024 04:22) (70 - 76)  BP: 114/70 (06 Dec 2024 04:22) (114/70 - 133/72)  BP(mean): --  RR: 18 (06 Dec 2024 04:22) (18 - 18)  SpO2: 100% (06 Dec 2024 04:22) (95% - 100%)    Parameters below as of 06 Dec 2024 04:22  Patient On (Oxygen Delivery Method): nasal cannula        CONSTITUTIONAL: NAD  HEET: MMM, EOMI, PERRLA  NECK: supple  RESPIRATORY: Normal respiratory effort; lungs are clear to auscultation bilaterally  CARDIOVASCULAR: Regular rate and rhythm, normal S1 and S2, no murmur/rub/gallop; No lower extremity edema; Peripheral pulses are 2+ bilaterally  ABDOMEN: Nontender to palpation, normoactive bowel sounds, no rebound/guarding; No hepatosplenomegaly  MUSCULOSKELETAL: no clubbing or cyanosis of digits; no joint swelling or tenderness to palpation  PSYCH: A+O to person, place, and time; affect appropriate  SKIN: No rash    LABS:                        11.1   4.92  )-----------( 115      ( 06 Dec 2024 06:38 )             34.3     12-06    140  |  106  |  26[H]  ----------------------------<  89  3.7   |  21[L]  |  1.12    Ca    8.5      06 Dec 2024 06:40  Phos  2.5     12-06  Mg     2.2     12-06            Urinalysis Basic - ( 06 Dec 2024 06:40 )    Color: x / Appearance: x / SG: x / pH: x  Gluc: 89 mg/dL / Ketone: x  / Bili: x / Urobili: x   Blood: x / Protein: x / Nitrite: x   Leuk Esterase: x / RBC: x / WBC x   Sq Epi: x / Non Sq Epi: x / Bacteria: x          Tele Reviewed:    RADIOLOGY & ADDITIONAL TESTS:  Results Reviewed:   Imaging Personally Reviewed:  Electrocardiogram Personally Reviewed:

## 2024-12-06 NOTE — PROGRESS NOTE ADULT - PROBLEM SELECTOR PLAN 2
Volume overload on exam concern for ADHF vs less likely COPD exacerbation   TTE in March 2024 EF 45%,  global left ventricular hypokinesis,  RV enlarged,  reduced systolic function., left atrium is moderately dilated,  right atrium is severely dilated.   Repeat TTE this admission grossly unchanged     PLAN   - asses volume status daily; diuresis prn   - Toprol 50 mg qd (at home on 50 mg in AM and 25 mg in PM)   - Losartan 50 QD ( At home BID) Volume overload on exam concern for ADHF vs less likely COPD exacerbation   TTE in March 2024 EF 45%,  global left ventricular hypokinesis,  RV enlarged,  reduced systolic function., left atrium is moderately dilated,  right atrium is severely dilated.   Repeat TTE this admission grossly unchanged   Still on 3.5 L     PLAN   - asses volume status daily; diuresis prn   - Toprol 50 mg qd (at home on 50 mg in AM and 25 mg in PM)   - Losartan 50 QD ( At home BID)

## 2024-12-06 NOTE — PROGRESS NOTE ADULT - ASSESSMENT
89M COPD on 3.5 L nasal cannula, AF/AVB s/p PPM, previous gram positive bacteremia s/p PPM extraction exchanged with micra leadless pacemaker s/p 4 weeks of IV abx in April 2024, presenting with AHRF, now resolved, course c/b enterococcus faecalis bacteremia with unclear source of infection.  89M COPD on 3.5 L nasal cannula, AF/AVB s/p PPM, previous gram positive bacteremia s/p PPM extraction exchanged with micra leadless pacemaker s/p 4 weeks of IV abx in April 2024, presenting with AHRF, now resolved, course c/b enterococcus faecalis bacteremia with unclear source of infection, now pending PIC for 4 week course of home antibiotics

## 2024-12-06 NOTE — DISCHARGE NOTE PROVIDER - NSDCMRMEDTOKEN_GEN_ALL_CORE_FT
Name band; acetaminophen 325 mg oral tablet: 2 tab(s) orally every 6 hours As needed Temp greater or equal to 38C (100.4F), Mild Pain (1 - 3), Moderate Pain (4 - 6), Severe Pain (7 - 10)  albuterol 90 mcg/inh inhalation aerosol: 2 puff(s) inhaled 4 times a day, As Needed  ferrous sulfate 325 mg (65 mg elemental iron) oral tablet: 1 tab(s) orally once a day  Flomax 0.4 mg oral capsule: 2 cap(s) orally once a day (at bedtime)  losartan 50 mg oral tablet: 2 tab(s) orally once a day losartan 100 mg once a day in the morning  Norvasc 5 mg oral tablet: 1 tab(s) orally once a day  Toprol-XL 25 mg oral tablet, extended release: 1 tab(s) orally once a day (at bedtime)  Toprol-XL 50 mg oral tablet, extended release: 1 tab(s) orally once a day  Trelegy Ellipta 100 mcg-62.5 mcg-25 mcg/inh inhalation powder: 1 puff(s) inhaled once a day  Xarelto 20 mg oral tablet: 1 tab(s) orally once a day   acetaminophen 325 mg oral tablet: 2 tab(s) orally every 6 hours As needed Temp greater or equal to 38C (100.4F), Mild Pain (1 - 3), Moderate Pain (4 - 6), Severe Pain (7 - 10)  albuterol 90 mcg/inh inhalation aerosol: 2 puff(s) inhaled 4 times a day, As Needed  Ampicillin 2g Every 4 hours : Infuse through PICC line every 4 hours every day . LAST day of treatment will be January 2nd. STOP treatment on January 3rd. Total course of Treatment will be 4 weeks (12/5/24 - 1/2/25)  CBC and CMP: Please obtain a CBC and CMP once per week while bring treated wit IV antibiotics   Communicate or Fax Results to Dr Haroldo Garcia at the listed numbers below   Phone [phone]  Fax [phone]  CBC and CMP weekly while on IV antibiotic therapy, to be faxed to [phone].: CBC and CMP weekly while on IV antibiotic therapy. Fax to [phone].  Ceftriaxone 2g Every 12 hours: Infuse through PICC line every 12 hours every day . LAST day of treatment will be January 2nd. STOP treatment on January 3rd. Total course of Treatment will be 4 weeks (12/5/24 - 1/2/25)  ferrous sulfate 325 mg (65 mg elemental iron) oral tablet: 1 tab(s) orally once a day  Flomax 0.4 mg oral capsule: 2 cap(s) orally once a day (at bedtime)  losartan 50 mg oral tablet: 2 tab(s) orally once a day losartan 100 mg once a day in the morning  Norvasc 5 mg oral tablet: 1 tab(s) orally once a day  Toprol-XL 25 mg oral tablet, extended release: 1 tab(s) orally once a day (at bedtime)  Toprol-XL 50 mg oral tablet, extended release: 1 tab(s) orally once a day  Trelegy Ellipta 100 mcg-62.5 mcg-25 mcg/inh inhalation powder: 1 puff(s) inhaled once a day  Xarelto 20 mg oral tablet: 1 tab(s) orally once a day   acetaminophen 325 mg oral tablet: 2 tab(s) orally every 6 hours As needed Temp greater or equal to 38C (100.4F), Mild Pain (1 - 3), Moderate Pain (4 - 6), Severe Pain (7 - 10)  albuterol 90 mcg/inh inhalation aerosol: 2 puff(s) inhaled 4 times a day, As Needed  Ampicillin 2g Every 4 hours : Infuse through PICC line every 4 hours every day . LAST day of treatment will be January 2nd. STOP treatment on January 3rd. Total course of Treatment will be 4 weeks (12/5/24 - 1/2/25)  CBC and CMP: Please obtain a CBC and CMP once per week while bring treated wit IV antibiotics   Communicate or Fax Results to Dr Haroldo Garcia at the listed numbers below   Phone [phone]  Fax [phone]  CBC and CMP weekly while on IV antibiotic therapy, to be faxed to [phone].: CBC and CMP weekly while on IV antibiotic therapy. Fax to [phone].  Ceftriaxone 2g Every 12 hours: Infuse through PICC line every 12 hours every day . LAST day of treatment will be January 2nd. STOP treatment on January 3rd. Total course of Treatment will be 4 weeks (12/5/24 - 1/2/25)  ferrous sulfate 325 mg (65 mg elemental iron) oral tablet: 1 tab(s) orally once a day  Flomax 0.4 mg oral capsule: 2 cap(s) orally once a day (at bedtime)  Lasix 20 mg oral tablet: 1 tab(s) orally once a day Please take Lasix 1 tablet daily everyday until follow up with cardiologist.  losartan 50 mg oral tablet: 2 tab(s) orally once a day losartan 100 mg once a day in the morning  Norvasc 5 mg oral tablet: 1 tab(s) orally once a day  Toprol-XL 25 mg oral tablet, extended release: 1 tab(s) orally once a day (at bedtime)  Toprol-XL 50 mg oral tablet, extended release: 1 tab(s) orally once a day  Trelegy Ellipta 100 mcg-62.5 mcg-25 mcg/inh inhalation powder: 1 puff(s) inhaled once a day  Xarelto 20 mg oral tablet: 1 tab(s) orally once a day   acetaminophen 325 mg oral tablet: 2 tab(s) orally every 6 hours As needed Temp greater or equal to 38C (100.4F), Mild Pain (1 - 3), Moderate Pain (4 - 6), Severe Pain (7 - 10)  albuterol 90 mcg/inh inhalation aerosol: 2 puff(s) inhaled 4 times a day, As Needed  Ampicillin 2g Every 4 hours : Infuse through PICC line every 4 hours every day . LAST day of treatment will be January 2nd. STOP treatment on January 3rd. Total course of Treatment will be 4 weeks (12/5/24 - 1/2/25)  CBC and CMP: Please obtain a CBC and CMP once per week while bring treated wit IV antibiotics   Communicate or Fax Results to Dr Haroldo Garcia at the listed numbers below   Phone [phone]  Fax [phone]  CBC and CMP weekly while on IV antibiotic therapy, to be faxed to [phone].: CBC and CMP weekly while on IV antibiotic therapy. Fax to [phone].  Ceftriaxone 2g Every 12 hours: Infuse through PICC line every 12 hours every day . LAST day of treatment will be January 2nd. STOP treatment on January 3rd. Total course of Treatment will be 4 weeks (12/5/24 - 1/2/25)  ferrous sulfate 325 mg (65 mg elemental iron) oral tablet: 1 tab(s) orally once a day  Flomax 0.4 mg oral capsule: 2 cap(s) orally once a day (at bedtime)  Lasix 20 mg oral tablet: 1 tab(s) orally once a day Please take Lasix 1 tablet daily everyday until follow up with cardiologist.  losartan 50 mg oral tablet: 2 tab(s) orally once a day losartan 100 mg once a day in the morning  Norvasc 5 mg oral tablet: 1 tab(s) orally once a day  Toprol-XL 25 mg oral tablet, extended release: 1 tab(s) orally once a day (at bedtime)  Toprol-XL 50 mg oral tablet, extended release: 1 tab(s) orally once a day Please take 50 mg once a day in the morning. (25 mg at night).  Trelegy Ellipta 100 mcg-62.5 mcg-25 mcg/inh inhalation powder: 1 puff(s) inhaled once a day  Xarelto 20 mg oral tablet: 1 tab(s) orally once a day

## 2024-12-06 NOTE — DISCHARGE NOTE PROVIDER - NSDCCPCAREPLAN_GEN_ALL_CORE_FT
PRINCIPAL DISCHARGE DIAGNOSIS  Diagnosis: Gram positive bacterial infection  Assessment and Plan of Treatment: In the hospital you were found to have a new infection bacterial infection in your blood. We started you on intravenous antibiotcs for a presumed infection in your heart. We could not perform a test to see if you had an infection in your heart because due to your COPD you were too high risk for the procedure. Intead we decided to treat you with 4 weeks of IV antibiotics at home. For this, we placed a PICC line so that you could continue these treatments at home. Please complete the entire course of antibiotics as detailed below.   - IV ampicillin 2g every 4 hours 12/5-1/2/25  - IV ceftriaxone 2g every 2 hours 12/5 - 1/2/25  - please get routine blood work once per week while being treated with IV antibiotics   - please follow up at [facility] for removal of the PICC ine   - please return from to the hospital if you have fevers, chills, increased fatigue, or feel again short of breath      SECONDARY DISCHARGE DIAGNOSES  Diagnosis: Shortness of breath  Assessment and Plan of Treatment: You initially came to the hospital because you had increased shortness of breath and low oxygen saturations at home. [place] were found to have a little fluid in your lungs as well.  We gave you some diuretics to help you get fluid off of your body and lungs. You responded well to this tretaemnt and your shortness of breath improved   - please follow up with your cardilogist after discharge   - please return to the hospital if you again feel short of breath or again see you have low oxygen saturations     PRINCIPAL DISCHARGE DIAGNOSIS  Diagnosis: Gram positive bacterial infection  Assessment and Plan of Treatment: In the hospital you were found to have a new infection bacterial infection in your blood. We started you on intravenous antibiotcs for a presumed infection in your heart. We could not perform a test to see if you had an infection in your heart because due to your COPD you were too high risk for the procedure. Intead we decided to treat you with 4 weeks of IV antibiotics at home. For this, we placed a PICC line so that you could continue these treatments at home. A nurse will meet you at home and explain how to use the pump. Please complete the entire course of antibiotics as detailed below.   - IV ampicillin 2g every 4 hours 12/5-1/2/25  - IV ceftriaxone 2g every 12 hours 12/5 - 1/2/25  - please get routine blood work once per week while being treated with IV antibiotics   - please follow up with your visiting nurse for removal of the PICC ine   - please return from to the hospital if you have fevers, chills, increased fatigue, or feel again short of breath      SECONDARY DISCHARGE DIAGNOSES  Diagnosis: Shortness of breath  Assessment and Plan of Treatment: You initially came to the hospital because you had increased shortness of breath and low oxygen saturations at home. [place] were found to have a little fluid in your lungs as well.  We gave you some diuretics to help you get fluid off of your body and lungs. You responded well to this tretaemnt and your shortness of breath improved   - please follow up with your cardilogist after discharge within 1 week of discharge   - Please take Lasix 20 mg once daily. Please notify cardiologist of this change.    - please return to the hospital if you again feel short of breath or again see you have low oxygen saturations     PRINCIPAL DISCHARGE DIAGNOSIS  Diagnosis: Gram positive bacterial infection  Assessment and Plan of Treatment: In the hospital you were found to have a new infection bacterial infection in your blood. We started you on intravenous antibiotcs for a presumed infection in your heart. We could not perform a test to see if you had an infection in your heart because due to your COPD you were too high risk for the procedure. Intead we decided to treat you with 4 weeks of IV antibiotics at home. For this, we placed a PICC line so that you could continue these treatments at home. A nurse will meet you at home and explain how to use the pump. Please complete the entire course of antibiotics as detailed below.   - IV ampicillin 2g every 4 hours 12/5-1/2/25  - IV ceftriaxone 2g every 12 hours 12/5 - 1/2/25  - please get routine blood work once per week while being treated with IV antibiotics   - please follow up with Dr. Garcia or your PCP about removing PICC line once you have completed antibiotics.   - please return from to the hospital if you have fevers, chills, increased fatigue, or feel again short of breath      SECONDARY DISCHARGE DIAGNOSES  Diagnosis: Shortness of breath  Assessment and Plan of Treatment: You initially came to the hospital because you had increased shortness of breath and low oxygen saturations at home. [place] were found to have a little fluid in your lungs as well.  We gave you some diuretics to help you get fluid off of your body and lungs. You responded well to this tretaemnt and your shortness of breath improved   - please follow up with your cardilogist after discharge within 1 week of discharge   - Please take Lasix 20 mg once daily. Please notify cardiologist of this change.    - please return to the hospital if you again feel short of breath or again see you have low oxygen saturations     PRINCIPAL DISCHARGE DIAGNOSIS  Diagnosis: Gram positive bacterial infection  Assessment and Plan of Treatment: In the hospital you were found to have a new infection bacterial infection in your blood. We started you on intravenous antibiotcs for a presumed infection in your heart. We could not perform a test to see if you had an infection in your heart because due to your COPD you were too high risk for the procedure. Intead we decided to treat you with 4 weeks of IV antibiotics at home. For this, we placed a PICC line so that you could continue these treatments at home. A nurse will meet you at home and explain how to use the pump. Please complete the entire course of antibiotics as detailed below.   - IV ampicillin 2g every 4 hours 12/5-1/2/25  - IV ceftriaxone 2g every 12 hours 12/5 - 1/2/25  - please get routine blood work once per week while being treated with IV antibiotics   - please follow up with Dr. Haroldo Garcia or your PCP about removing PICC line once you have completed antibiotics.   - please return from to the hospital if you have fevers, chills, increased fatigue, or feel again short of breath      SECONDARY DISCHARGE DIAGNOSES  Diagnosis: Shortness of breath  Assessment and Plan of Treatment: You initially came to the hospital because you had increased shortness of breath and low oxygen saturations at home. [place] were found to have a little fluid in your lungs as well.  We gave you some diuretics to help you get fluid off of your body and lungs. You responded well to this tretaemnt and your shortness of breath improved   - please follow up with your cardilogist after discharge within 1 week of discharge   - Please take Lasix 20 mg once daily. Please notify cardiologist of this change.    - please return to the hospital if you again feel short of breath or again see you have low oxygen saturations

## 2024-12-06 NOTE — DISCHARGE NOTE PROVIDER - PROVIDER TOKENS
PROVIDER:[TOKEN:[[id number]]] PROVIDER:[TOKEN:[[id number]]],PROVIDER:[TOKEN:[[id number]],FOLLOWUP:[2 weeks],ESTABLISHEDPATIENT:[T]] PROVIDER:[TOKEN:[[id number]],SCHEDULEDAPPT:[12/17/2024],SCHEDULEDAPPTTIME:[02:15 PM],ESTABLISHEDPATIENT:[T]],PROVIDER:[TOKEN:[[id number]],FOLLOWUP:[2 weeks],ESTABLISHEDPATIENT:[T]]

## 2024-12-06 NOTE — PROGRESS NOTE ADULT - SUBJECTIVE AND OBJECTIVE BOX
infectious diseases progress note:    Patient is a 89y old  Male who presents with a chief complaint of Shortness of Breath (06 Dec 2024 07:47)        Chronic obstructive pulmonary disease with acute exacerbation               Allergies    No Known Allergies    Intolerances        ANTIBIOTICS/RELEVANT:  antimicrobials  ampicillin  IVPB      ampicillin  IVPB 2 Gram(s) IV Intermittent every 6 hours  cefTRIAXone   IVPB      cefTRIAXone   IVPB 2000 milliGRAM(s) IV Intermittent every 12 hours    immunologic:    OTHER:  acetaminophen     Tablet .. 650 milliGRAM(s) Oral every 6 hours PRN  albuterol/ipratropium for Nebulization 3 milliLiter(s) Nebulizer every 6 hours PRN  ferrous    sulfate 325 milliGRAM(s) Oral daily  fluticasone propionate/ salmeterol 100-50 MICROgram(s) Diskus 1 Dose(s) Inhalation two times a day  losartan 50 milliGRAM(s) Oral daily  metoprolol succinate ER 50 milliGRAM(s) Oral daily  rivaroxaban 20 milliGRAM(s) Oral with dinner  tamsulosin 0.8 milliGRAM(s) Oral at bedtime  tiotropium 2.5 MICROgram(s) Inhaler 2 Puff(s) Inhalation daily      Objective:  Vital Signs Last 24 Hrs  T(C): 36.8 (06 Dec 2024 04:22), Max: 36.8 (06 Dec 2024 04:22)  T(F): 98.2 (06 Dec 2024 04:22), Max: 98.2 (06 Dec 2024 04:22)  HR: 70 (06 Dec 2024 04:22) (70 - 76)  BP: 114/70 (06 Dec 2024 04:22) (114/70 - 133/72)  BP(mean): --  RR: 18 (06 Dec 2024 04:22) (18 - 18)  SpO2: 100% (06 Dec 2024 04:22) (95% - 100%)    Parameters below as of 06 Dec 2024 04:22  Patient On (Oxygen Delivery Method): nasal cannula           Ear/Nose/Throat: no oral lesion, no sinus tenderness on percussion	  Neck:no JVD, no lymphadenopathy, supple  Respiratory: CTA amy  Cardiovascular: S1S2 RRR, no murmurs  Gastrointestinal:soft, (+) BS, no HSM  Extremities:no e/e/c        LABS:                        11.1   4.92  )-----------( 115      ( 06 Dec 2024 06:38 )             34.3     12-06    140  |  106  |  26[H]  ----------------------------<  89  3.7   |  21[L]  |  1.12    Ca    8.5      06 Dec 2024 06:40  Phos  2.5     12-06  Mg     2.2     12-06        Urinalysis Basic - ( 06 Dec 2024 06:40 )    Color: x / Appearance: x / SG: x / pH: x  Gluc: 89 mg/dL / Ketone: x  / Bili: x / Urobili: x   Blood: x / Protein: x / Nitrite: x   Leuk Esterase: x / RBC: x / WBC x   Sq Epi: x / Non Sq Epi: x / Bacteria: x          MICROBIOLOGY:    RECENT CULTURES:  12-03 @ 04:35 Clean Catch Clean Catch (Midstream)                No growth    12-03 @ 00:17 .Blood BLOOD       Growth in aerobic bottle: Gram positive cocci in pairs  Growth in anaerobic bottle: Gram positive cocci in pairs           Growth in aerobic and anaerobic bottles: Enterococcus faecalis  See previous culture [id number]    12-03 @ 00:16 .Blood BLOOD   PCR    Growth in aerobic bottle: Gram positive cocci in pairs  Growth in anaerobic bottle: Gram positive cocci in pairs    Blood Culture PCR  Enterococcus faecalis  Blood Culture PCR     Growth in aerobic and anaerobic bottles: Enterococcus faecalis  Direct identification is available within approximately 3-5  hours either by Blood Panel Multiplexed PCR or Direct  MALDI-TOF. Details: [email or web address]          RESPIRATORY CULTURES:              RADIOLOGY & ADDITIONAL STUDIES:        Pager [phone]  After 5 pm/weekends or if no response :[phone]

## 2024-12-06 NOTE — DISCHARGE NOTE PROVIDER - NSDCCPTREATMENT_GEN_ALL_CORE_FT
PRINCIPAL PROCEDURE  Procedure: CT chest wo/w con  Findings and Treatment: FINDINGS:  LUNGS AND LARGE AIRWAYS: Patent central airways. Emphysema. Intralobular   septal thickening. Compressive atelectasis. Stable scattered pulmonary   nodules measuring up to 0.3 cm, examples are in the right upper lung   (301:82, 301:61) Scattered calcified granulomas.  PLEURA: Small-to-moderate right and trace left pleural effusions.  VESSELS: No pulmonary embolism. Main pulmonary artery dilated to 3.9 cm.   Finding may be seen in pulmonary arterial hypertension. Contrast reflux   into the IVC and hepatic veins. Aorta and coronary artery calcifications.  HEART: Leadless pacing device in the right ventricle. Cardiomegaly. No   pericardial effusion.  MEDIASTINUM AND BLAZE: Numerous subcentimeter mediastinal lymph nodes.  CHEST WALL AND LOWER NECK:Within normal limits.  VISUALIZED UPPER ABDOMEN: Bilateral renal cysts. Nonspecific left   perinephric fat stranding.  BONES: Degenerative changes. Flowing syndesmophytes.  IMPRESSION:  No pulmonary embolism.  Suggestion of mild pulmonary edemaor volume overload. Small-to-moderate   right pleural effusion. Trace left pleural effusion.  Stable scattered pulmonary nodules measuring up to 0.3 cm. Pulmonary   imaging follow-up in one year if the patient is at increased risk for   neoplasm.

## 2024-12-06 NOTE — PROGRESS NOTE ADULT - PROBLEM SELECTOR PLAN 1
12/3 Bcx revealing pan-sensitive E. faecalis   CT A/P without GI source  Micra with extremely low risk of infection    PLAN  - Ampicillin 2g q6h (12/3- ) and CFX 2g q24h (12/4-  - will need 4 weeks IV abx after Bcx clearance. Will place consult for PICC line once cultures clear   - will need ID input to see if patient would benefit from colonoscopy vs. CAREN to look for source 12/3 Bcx revealing pan-sensitive E. faecalis   CT A/P without GI source  Micra with extremely low risk of infection    PLAN  - Ampicillin 2g q6h (12/3- 1/2/24 ) and CFX 2g q24h (12/4-1/2/25)  - will need 4 weeks IV abx after Bcx clearance.   - Will place consult for PICC line once cultures clear, per ID 48 hours clear (12/7/24)  - Per ID, no need for CAREN to look for source

## 2024-12-06 NOTE — DISCHARGE NOTE PROVIDER - NSDCFUADDAPPT_GEN_ALL_CORE_FT
APPTS ARE READY TO BE MADE: [X] YES    Best Family or Patient Contact (if needed):    Additional Information about above appointments (if needed):    1: cardiology - Dr. Joyce Handler [phone]  2:   3:     Other comments or requests:    APPTS ARE READY TO BE MADE: [X] YES    Best Family or Patient Contact (if needed):    Additional Information about above appointments (if needed):    1: cardiology - Dr. Joyce Handler [phone]  2: PCP  3:     Other comments or requests:    APPTS ARE READY TO BE MADE: [X] YES    Best Family or Patient Contact (if needed):    Additional Information about above appointments (if needed):    1: cardiology - Dr. Joyce Handler [phone]  2: PCP  3: NEIDA Garcia     Other comments or requests:    APPTS ARE READY TO BE MADE: [X] YES    Best Family or Patient Contact (if needed):    Additional Information about above appointments (if needed):    1: cardiology - Dr. Joyce Handler [phone]  2: PCP  3: NEIDA Villalobos     Other comments or requests:    APPTS ARE READY TO BE MADE: [X] YES    Best Family or Patient Contact (if needed):    Additional Information about above appointments (if needed):    1: cardiology - Dr. Isiah Blancas [phone]  2: PCP  3: NEIDA Villalobos     Other comments or requests:     PCP/Geriatric Medicine:  Patient informed us they already have secured a follow up appointment which was not scheduled by our team.    JANIA CONRAD NP 1/9/2025 1:00 PM      Cardiovascular Disease:  Patient informed us they already have secured a follow up appointment which was not scheduled by our team.  Isiah Hicks 01/28/2024 at 12:45  Pt. prefers to call to schedule sooner appointment.   APPTS ARE READY TO BE MADE: [X] YES    Best Family or Patient Contact (if needed):    Additional Information about above appointments (if needed):    1: cardiology - Dr. Isiah Blancas [phone]  2: PCP  3: NEIDA Villalobos     Other comments or requests:     PCP/Geriatric Medicine:  Patient informed us they already have secured a follow up appointment which was not scheduled by our team.    JANIA CONRAD NP 1/9/2025 1:00 PM    Cardiovascular Disease:  Patient informed us they already have secured a follow up appointment which was not scheduled by our team.  Isiah Hicks 01/28/2024 at 12:45  Pt. prefers to call to schedule sooner appointment.    Infectious Disease:  Provider's office was contacted to secure an appointment, however the office will follow up with the patient/caregiver directly. Task message sent to I/D team requesting appointment.     APPTS ARE READY TO BE MADE: [X] YES    Best Family or Patient Contact (if needed):    Additional Information about above appointments (if needed):    1: cardiology - Dr. Isiah Blancas [phone]  2: PCP  3: NEIDA Villalobos     Other comments or requests:     PCP/Geriatric Medicine:  Patient informed us they already have secured a follow up appointment which was not scheduled by our team.    JANIA CONRAD NP 1/9/2025 1:00 PM    Cardiovascular Disease:  Patient informed us they already have secured a follow up appointment which was not scheduled by our team.  Isiah Hicks 12/17/2024 02:15 PM    Infectious Disease:  Provider's office was contacted to secure an appointment, however the office will follow up with the patient/caregiver directly. Task message sent to I/D team requesting appointment.

## 2024-12-06 NOTE — PROGRESS NOTE ADULT - ASSESSMENT
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 No urinary sx.  Vitals notable for hypoxia on presentation. Labs with leukocytosis to 14.01. Blood cultures x 2 with e. fecalis by PCR. TTE without vegetations. Of note, patient with known colonic polyp; has not pursued colonoscopy as previously recommended for strep lutetiensis bacteremia given concern for risk of procedure. Clinical exam unremarkable at this time for foci of infection.     #e. 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    dont think a CAREN is essential at present as he is a  poor surgical candidate but this can be reconsidered  either way ,    Discussed with family in detail  to hold off on CAREN and she how does    needs repeat BC .  we can increase the ampicillin to 2 q 4 hr   Discussed with daughter who agree with hollis. pt defers  decision making to her   PICC if BC are negative

## 2024-12-06 NOTE — DISCHARGE NOTE PROVIDER - HOSPITAL COURSE
HPI:  88 yo M w/ PMHx of COPD on 3.5 L nasal cannula on albuterol inhalers and PPM presents for acute respiratory distress.  Patient was hypoxic (79% on room air) on arrival to the ED and was placed on nonrebreather.  Family reports the patient was found to have UTI on at-home test and had a fever (Tmax 101F).  Patient started becoming short of breath despite taking albuterol inhalers.   Per patient the past two days he has had increased SOB both at rest and with activity and was prompted to come to the ED when his at home O2 sat was bellow 88%. He also says an at home urine test showed he had a UTI but he denies any increase urinary frequency of dyuria. Denies any associated pain. Albuterol inhalers did not relieve his SOB (03 Dec 2024 07:57)    Hospital Course:  Patient was light diuresed for fluid overload and repeat TTE showed improvement from the previous, no obvious signs of endocarditis and patient responded well to a one time dose of diuresis. Patient was quickly found to have gram positive cultures in all four bottles and started on ampicillin and ceftriaxone. Patient was deemed to high risk to be sent for CAREN to rule out endocarditis due to COPD. ID recommended a total 4 week course of antibiotics from negative blood cultures. Patient had PICC line placed and sent home with presciptions for home infusions of ampicillin and ceftriaxone.       Important Medication Changes and Reason:  Ampicillin 2g Q4 12/5 - 1/2/25  Ceftriaxone 2g Q12 12/5/24 - 1/2/25    Active or Pending Issues Requiring Follow-up:  - follow once per week for CBC and CMP while o IV antibiotics     Advanced Directives:   [ ] Full code  [ x] DNR  [ ] Hospice    Discharge Diagnoses:         HPI:  88 yo M w/ PMHx of COPD on 3.5 L nasal cannula on albuterol inhalers and PPM presents for acute respiratory distress.  Patient was hypoxic (79% on room air) on arrival to the ED and was placed on nonrebreather.  Family reports the patient was found to have UTI on at-home test and had a fever (Tmax 101F).  Patient started becoming short of breath despite taking albuterol inhalers.   Per patient the past two days he has had increased SOB both at rest and with activity and was prompted to come to the ED when his at home O2 sat was bellow 88%. He also says an at home urine test showed he had a UTI but he denies any increase urinary frequency of dyuria. Denies any associated pain. Albuterol inhalers did not relieve his SOB (03 Dec 2024 07:57)    Hospital Course:  Patient was light diuresed for fluid overload and repeat TTE showed improvement from the previous, no obvious signs of endocarditis and patient responded well to a one time dose of diuresis. Patient was quickly found to have gram positive cultures in all four bottles and started on ampicillin and ceftriaxone. Patient was deemed to high risk to be sent for CAREN to rule out endocarditis due to COPD. ID recommended a total 4 week course of antibiotics from negative blood cultures. Patient had PICC line placed and sent home with prescriptions for home infusions of ampicillin and ceftriaxone.       Important Medication Changes and Reason:  Ampicillin 2g Q4 12/5 - 1/2/25  Ceftriaxone 2g Q12 12/5/24 - 1/2/25  Lasix 20 PO daily given congestion on CXR and fluid overload.     Active or Pending Issues Requiring Follow-up:  - follow once per week for CBC and CMP while o IV antibiotics   - follow up with cardiologist     Advanced Directives:   [ ] Full code  [ x] DNR  [ ] Hospice    Discharge Diagnoses:         HPI:  88 yo M w/ PMHx of COPD on 3.5 L nasal cannula on albuterol inhalers and PPM presents for acute respiratory distress.  Patient was hypoxic (79% on room air) on arrival to the ED and was placed on nonrebreather.  Family reports the patient was found to have UTI on at-home test and had a fever (Tmax 101F).  Patient started becoming short of breath despite taking albuterol inhalers.   Per patient the past two days he has had increased SOB both at rest and with activity and was prompted to come to the ED when his at home O2 sat was bellow 88%. He also says an at home urine test showed he had a UTI but he denies any increase urinary frequency of dyuria. Denies any associated pain. Albuterol inhalers did not relieve his SOB (03 Dec 2024 07:57)    Hospital Course:  Patient was light diuresed for fluid overload and repeat TTE showed improvement from the previous, no obvious signs of endocarditis and patient responded well to a one time dose of diuresis. Patient was quickly found to have gram positive cultures in all four bottles and started on ampicillin and ceftriaxone. Patient was deemed to high risk to be sent for CAREN to rule out endocarditis due to COPD. ID recommended a total 4 week course of antibiotics from negative blood cultures. Patient had PICC line placed and sent home with prescriptions for home infusions of ampicillin and ceftriaxone.       Important Medication Changes and Reason:  Ampicillin 2g Q4 12/5 - 1/2/25  Ceftriaxone 2g Q12 12/5/24 - 1/2/25  Lasix 20 PO daily given congestion on CXR and fluid overload.     Active or Pending Issues Requiring Follow-up:  - follow once per week for CBC and CMP while o IV antibiotics   - follow up with cardiologist     Advanced Directives:   [ ] Full code  [ x] DNR  [ ] Hospice    Discharge Diagnoses: ?

## 2024-12-06 NOTE — DISCHARGE NOTE PROVIDER - CARE PROVIDER_API CALL
Isiah Blancas  Cardiovascular Disease  [address] [place] [id number]  Phone: [phone]  Fax: [phone]  Follow Up Time:    Isiah Blancas  Cardiovascular Disease  [address], [place] [id number]  Phone: [phone]  Fax: [phone]  Follow Up Time:     Haroldo Garcia  Infectious Disease  [address] [place] [id number]  Phone: [phone]  Fax: [phone]  Established Patient  Follow Up Time: 2 weeks   Isiah Blancas  Cardiovascular Disease  [address] [place] [id number]  Phone: [phone]  Fax: [phone]  Established Patient  Scheduled Appointment: 12/17/2024 02:15 PM    Haroldo Garcia  Infectious Disease  [address] [place] [id number]  Phone: [phone]  Fax: [phone]  Established Patient  Follow Up Time: 2 weeks

## 2024-12-06 NOTE — DISCHARGE NOTE PROVIDER - NSDCFUSCHEDAPPT_GEN_ALL_CORE_FT
[facility]  ELECTROPH [address]  Scheduled Appointment: 12/16/2024    [facility]  GERIATRICS [address]   Scheduled Appointment: 01/09/2025

## 2024-12-06 NOTE — DISCHARGE NOTE PROVIDER - CARE PROVIDERS DIRECT ADDRESSES
,DirectAddress_Unknown ,DirectAddress_Unknown,[email or web address]

## 2024-12-07 LAB
ANION GAP SERPL CALC-SCNC: 14 MMOL/L — SIGNIFICANT CHANGE UP (ref 5–17)
BUN SERPL-MCNC: 18 MG/DL — SIGNIFICANT CHANGE UP (ref 7–23)
CALCIUM SERPL-MCNC: 8.5 MG/DL — SIGNIFICANT CHANGE UP (ref 8.4–10.5)
CHLORIDE SERPL-SCNC: 105 MMOL/L — SIGNIFICANT CHANGE UP (ref 96–108)
CO2 SERPL-SCNC: 19 MMOL/L — LOW (ref 22–31)
CREAT SERPL-MCNC: 1.02 MG/DL — SIGNIFICANT CHANGE UP (ref 0.5–1.3)
EGFR: 71 ML/MIN/1.73M2 — SIGNIFICANT CHANGE UP
GLUCOSE SERPL-MCNC: 101 MG/DL — HIGH (ref 70–99)
HCT VFR BLD CALC: 34.7 % — LOW (ref 39–50)
HGB BLD-MCNC: 11.3 G/DL — LOW (ref 13–17)
MAGNESIUM SERPL-MCNC: 2 MG/DL — SIGNIFICANT CHANGE UP (ref 1.6–2.6)
MCHC RBC-ENTMCNC: 32.6 G/DL — SIGNIFICANT CHANGE UP (ref 32–36)
MCHC RBC-ENTMCNC: 32.6 PG — SIGNIFICANT CHANGE UP (ref 27–34)
MCV RBC AUTO: 100 FL — SIGNIFICANT CHANGE UP (ref 80–100)
NRBC # BLD: 0 /100 WBCS — SIGNIFICANT CHANGE UP (ref 0–0)
PHOSPHATE SERPL-MCNC: 2.4 MG/DL — LOW (ref 2.5–4.5)
PLATELET # BLD AUTO: 113 K/UL — LOW (ref 150–400)
POTASSIUM SERPL-MCNC: 3.8 MMOL/L — SIGNIFICANT CHANGE UP (ref 3.5–5.3)
POTASSIUM SERPL-SCNC: 3.8 MMOL/L — SIGNIFICANT CHANGE UP (ref 3.5–5.3)
RBC # BLD: 3.47 M/UL — LOW (ref 4.2–5.8)
RBC # FLD: 12.9 % — SIGNIFICANT CHANGE UP (ref 10.3–14.5)
SODIUM SERPL-SCNC: 138 MMOL/L — SIGNIFICANT CHANGE UP (ref 135–145)
WBC # BLD: 5.63 K/UL — SIGNIFICANT CHANGE UP (ref 3.8–10.5)
WBC # FLD AUTO: 5.63 K/UL — SIGNIFICANT CHANGE UP (ref 3.8–10.5)

## 2024-12-07 PROCEDURE — 99232 SBSQ HOSP IP/OBS MODERATE 35: CPT | Mod: GC

## 2024-12-07 RX ORDER — SODIUM,POTASSIUM PHOSPHATES 278-250MG
1 POWDER IN PACKET (EA) ORAL ONCE
Refills: 0 | Status: COMPLETED | OUTPATIENT
Start: 2024-12-07 | End: 2024-12-07

## 2024-12-07 RX ADMIN — Medication 100 MILLIGRAM(S): at 05:41

## 2024-12-07 RX ADMIN — Medication 325 MILLIGRAM(S): at 10:09

## 2024-12-07 RX ADMIN — TAMSULOSIN HYDROCHLORIDE 0.8 MILLIGRAM(S): 0.4 CAPSULE ORAL at 21:22

## 2024-12-07 RX ADMIN — FLUTICASONE PROPIONATE AND SALMETEROL XINAFOATE 1 DOSE(S): 45; 21 AEROSOL, METERED RESPIRATORY (INHALATION) at 05:41

## 2024-12-07 RX ADMIN — Medication 200 GRAM(S): at 16:09

## 2024-12-07 RX ADMIN — Medication 2 PUFF(S): at 10:08

## 2024-12-07 RX ADMIN — RIVAROXABAN 20 MILLIGRAM(S): 10 TABLET, FILM COATED ORAL at 17:06

## 2024-12-07 RX ADMIN — Medication 1 PACKET(S): at 10:30

## 2024-12-07 RX ADMIN — Medication 200 GRAM(S): at 05:41

## 2024-12-07 RX ADMIN — METOPROLOL TARTRATE 50 MILLIGRAM(S): 100 TABLET, FILM COATED ORAL at 05:41

## 2024-12-07 RX ADMIN — Medication 100 MILLIGRAM(S): at 17:05

## 2024-12-07 RX ADMIN — Medication 200 GRAM(S): at 02:18

## 2024-12-07 RX ADMIN — FLUTICASONE PROPIONATE AND SALMETEROL XINAFOATE 1 DOSE(S): 45; 21 AEROSOL, METERED RESPIRATORY (INHALATION) at 17:05

## 2024-12-07 RX ADMIN — LOSARTAN POTASSIUM 50 MILLIGRAM(S): 100 TABLET, FILM COATED ORAL at 05:41

## 2024-12-07 RX ADMIN — Medication 200 GRAM(S): at 10:08

## 2024-12-07 RX ADMIN — Medication 200 GRAM(S): at 21:21

## 2024-12-07 NOTE — PROGRESS NOTE ADULT - ASSESSMENT
89M COPD on 3.5 L nasal cannula, AF/AVB s/p PPM, previous gram positive bacteremia s/p PPM extraction exchanged with micra leadless pacemaker s/p 4 weeks of IV abx in April 2024, presenting with AHRF, now resolved, course c/b enterococcus faecalis bacteremia with unclear source of infection, now pending PIC for 4 week course of home antibiotics

## 2024-12-07 NOTE — PROGRESS NOTE ADULT - SUBJECTIVE AND OBJECTIVE BOX
PROGRESS NOTE:   Authored by Dr. Sagar Thomas MD     Patient is a 89y old  Male who presents with a chief complaint of Shortness of Breath (06 Dec 2024 13:49)      SUBJECTIVE / OVERNIGHT EVENTS:  No acute events overnight.     MEDICATIONS  (STANDING):  ampicillin  IVPB 2 Gram(s) IV Intermittent every 4 hours  cefTRIAXone   IVPB      cefTRIAXone   IVPB 2000 milliGRAM(s) IV Intermittent every 12 hours  ferrous    sulfate 325 milliGRAM(s) Oral daily  fluticasone propionate/ salmeterol 100-50 MICROgram(s) Diskus 1 Dose(s) Inhalation two times a day  losartan 50 milliGRAM(s) Oral daily  metoprolol succinate ER 50 milliGRAM(s) Oral daily  rivaroxaban 20 milliGRAM(s) Oral with dinner  tamsulosin 0.8 milliGRAM(s) Oral at bedtime  tiotropium 2.5 MICROgram(s) Inhaler 2 Puff(s) Inhalation daily    MEDICATIONS  (PRN):  acetaminophen     Tablet .. 650 milliGRAM(s) Oral every 6 hours PRN Temp greater or equal to 38C (100.4F), Mild Pain (1 - 3), Moderate Pain (4 - 6)  albuterol/ipratropium for Nebulization 3 milliLiter(s) Nebulizer every 6 hours PRN Shortness of Breath and/or Wheezing      CAPILLARY BLOOD GLUCOSE        I&O's Summary    06 Dec 2024 07:01  -  07 Dec 2024 07:00  --------------------------------------------------------  IN: 100 mL / OUT: 900 mL / NET: -800 mL        PHYSICAL EXAM:  Vital Signs Last 24 Hrs  T(C): 37 (07 Dec 2024 04:20), Max: 37 (07 Dec 2024 04:20)  T(F): 98.6 (07 Dec 2024 04:20), Max: 98.6 (07 Dec 2024 04:20)  HR: 70 (07 Dec 2024 04:20) (70 - 73)  BP: 127/70 (07 Dec 2024 04:20) (127/70 - 150/76)  BP(mean): --  RR: 18 (07 Dec 2024 04:20) (18 - 18)  SpO2: 96% (07 Dec 2024 04:20) (94% - 98%)    Parameters below as of 07 Dec 2024 04:20  Patient On (Oxygen Delivery Method): nasal cannula        CONSTITUTIONAL: NAD  HEET: MMM, EOMI, PERRLA  NECK: supple  RESPIRATORY: Normal respiratory effort; lungs are clear to auscultation bilaterally  CARDIOVASCULAR: Regular rate and rhythm, normal S1 and S2, no murmur/rub/gallop; No lower extremity edema; Peripheral pulses are 2+ bilaterally  ABDOMEN: Nontender to palpation, normoactive bowel sounds, no rebound/guarding; No hepatosplenomegaly  MUSCULOSKELETAL: no clubbing or cyanosis of digits; no joint swelling or tenderness to palpation  PSYCH: A+O to person, place, and time; affect appropriate  SKIN: No rash    LABS:                        11.1   4.92  )-----------( 115      ( 06 Dec 2024 06:38 )             34.3     12-06    140  |  106  |  26[H]  ----------------------------<  89  3.7   |  21[L]  |  1.12    Ca    8.5      06 Dec 2024 06:40  Phos  2.5     12-06  Mg     2.2     12-06            Urinalysis Basic - ( 06 Dec 2024 06:40 )    Color: x / Appearance: x / SG: x / pH: x  Gluc: 89 mg/dL / Ketone: x  / Bili: x / Urobili: x   Blood: x / Protein: x / Nitrite: x   Leuk Esterase: x / RBC: x / WBC x   Sq Epi: x / Non Sq Epi: x / Bacteria: x        Culture - Blood (collected 05 Dec 2024 05:18)  Source: .Blood BLOOD  Preliminary Report (06 Dec 2024 10:01):    No growth at 24 hours    Culture - Blood (collected 05 Dec 2024 05:10)  Source: .Blood BLOOD  Preliminary Report (06 Dec 2024 10:01):    No growth at 24 hours        Tele Reviewed:    RADIOLOGY & ADDITIONAL TESTS:  Results Reviewed:   Imaging Personally Reviewed:  Electrocardiogram Personally Reviewed:     PROGRESS NOTE:   Authored by Dr. Sagar Thomas MD     Patient is a 89y old  Male who presents with a chief complaint of Shortness of Breath (06 Dec 2024 13:49)      SUBJECTIVE / OVERNIGHT EVENTS:  No acute events overnight.   In acute distress, no new pain     MEDICATIONS  (STANDING):  ampicillin  IVPB 2 Gram(s) IV Intermittent every 4 hours  cefTRIAXone   IVPB      cefTRIAXone   IVPB 2000 milliGRAM(s) IV Intermittent every 12 hours  ferrous    sulfate 325 milliGRAM(s) Oral daily  fluticasone propionate/ salmeterol 100-50 MICROgram(s) Diskus 1 Dose(s) Inhalation two times a day  losartan 50 milliGRAM(s) Oral daily  metoprolol succinate ER 50 milliGRAM(s) Oral daily  rivaroxaban 20 milliGRAM(s) Oral with dinner  tamsulosin 0.8 milliGRAM(s) Oral at bedtime  tiotropium 2.5 MICROgram(s) Inhaler 2 Puff(s) Inhalation daily    MEDICATIONS  (PRN):  acetaminophen     Tablet .. 650 milliGRAM(s) Oral every 6 hours PRN Temp greater or equal to 38C (100.4F), Mild Pain (1 - 3), Moderate Pain (4 - 6)  albuterol/ipratropium for Nebulization 3 milliLiter(s) Nebulizer every 6 hours PRN Shortness of Breath and/or Wheezing      CAPILLARY BLOOD GLUCOSE        I&O's Summary    06 Dec 2024 07:01  -  07 Dec 2024 07:00  --------------------------------------------------------  IN: 100 mL / OUT: 900 mL / NET: -800 mL        PHYSICAL EXAM:  Vital Signs Last 24 Hrs  T(C): 37 (07 Dec 2024 04:20), Max: 37 (07 Dec 2024 04:20)  T(F): 98.6 (07 Dec 2024 04:20), Max: 98.6 (07 Dec 2024 04:20)  HR: 70 (07 Dec 2024 04:20) (70 - 73)  BP: 127/70 (07 Dec 2024 04:20) (127/70 - 150/76)  BP(mean): --  RR: 18 (07 Dec 2024 04:20) (18 - 18)  SpO2: 96% (07 Dec 2024 04:20) (94% - 98%)    Parameters below as of 07 Dec 2024 04:20  Patient On (Oxygen Delivery Method): nasal cannula        CONSTITUTIONAL: NAD  HEET: MMM, EOMI, PERRLA  NECK: supple  RESPIRATORY: Normal respiratory effort; lungs are clear to auscultation bilaterally  CARDIOVASCULAR: Regular rate and rhythm, normal S1 and S2, no murmur/rub/gallop; No lower extremity edema; Peripheral pulses are 2+ bilaterally  ABDOMEN: Nontender to palpation, normoactive bowel sounds, no rebound/guarding; No hepatosplenomegaly  MUSCULOSKELETAL: no clubbing or cyanosis of digits; no joint swelling or tenderness to palpation  PSYCH: A+O to person, place, and time; affect appropriate  SKIN: No rash    LABS:                        11.1   4.92  )-----------( 115      ( 06 Dec 2024 06:38 )             34.3     12-06    140  |  106  |  26[H]  ----------------------------<  89  3.7   |  21[L]  |  1.12    Ca    8.5      06 Dec 2024 06:40  Phos  2.5     12-06  Mg     2.2     12-06            Urinalysis Basic - ( 06 Dec 2024 06:40 )    Color: x / Appearance: x / SG: x / pH: x  Gluc: 89 mg/dL / Ketone: x  / Bili: x / Urobili: x   Blood: x / Protein: x / Nitrite: x   Leuk Esterase: x / RBC: x / WBC x   Sq Epi: x / Non Sq Epi: x / Bacteria: x        Culture - Blood (collected 05 Dec 2024 05:18)  Source: .Blood BLOOD  Preliminary Report (06 Dec 2024 10:01):    No growth at 24 hours    Culture - Blood (collected 05 Dec 2024 05:10)  Source: .Blood BLOOD  Preliminary Report (06 Dec 2024 10:01):    No growth at 24 hours        Tele Reviewed:    RADIOLOGY & ADDITIONAL TESTS:  Results Reviewed:   Imaging Personally Reviewed:  Electrocardiogram Personally Reviewed:

## 2024-12-07 NOTE — PROGRESS NOTE ADULT - PROBLEM SELECTOR PLAN 1
12/3 Bcx revealing pan-sensitive E. faecalis   CT A/P without GI source  Micra with extremely low risk of infection    PLAN  - Ampicillin 2g q6h (12/3- 1/2/24 ) and CFX 2g q24h (12/4-1/2/25)  - will need 4 weeks IV abx after Bcx clearance. 48 hours   - Will place consult for PICC line once cultures clear, per ID 48 hours clear (12/7/24)  - Per ID, no need for CAREN to look for source 12/3 Bcx revealing pan-sensitive E. faecalis   CT A/P without GI source  Micra with extremely low risk of infection    PLAN  - Ampicillin 2g q6h (12/3- 1/2/24 ) and CFX 2g q24h (12/4-1/2/25)  - will need 4 weeks IV abx after Bcx clearance. 48 hours   - Pending PICC line vs Midline for at home infusions   - Per ID, no need for CAREN to look for source

## 2024-12-07 NOTE — PROGRESS NOTE ADULT - PROBLEM SELECTOR PLAN 2
Volume overload on exam concern for ADHF vs less likely COPD exacerbation   TTE in March 2024 EF 45%,  global left ventricular hypokinesis,  RV enlarged,  reduced systolic function., left atrium is moderately dilated,  right atrium is severely dilated.   Repeat TTE this admission grossly unchanged   Still on 3.5 L     PLAN   - asses volume status daily; diuresis prn   - Toprol 50 mg qd (at home on 50 mg in AM and 25 mg in PM)   - Losartan 50 QD ( At home BID) Volume overload on exam concern for ADHF vs less likely COPD exacerbation   TTE in March 2024 EF 45%,  global left ventricular hypokinesis,  RV enlarged,  reduced systolic function., left atrium is moderately dilated,  right atrium is severely dilated.   Repeat TTE this admission grossly unchanged   Still on 3.5 L   Overall resolved     PLAN   - asses volume status daily; diuresis prn   - Toprol 50 mg qd (at home on 50 mg in AM and 25 mg in PM)   - Losartan 50 QD ( At home BID)

## 2024-12-08 LAB
ANION GAP SERPL CALC-SCNC: 14 MMOL/L — SIGNIFICANT CHANGE UP (ref 5–17)
BUN SERPL-MCNC: 18 MG/DL — SIGNIFICANT CHANGE UP (ref 7–23)
CALCIUM SERPL-MCNC: 8.6 MG/DL — SIGNIFICANT CHANGE UP (ref 8.4–10.5)
CHLORIDE SERPL-SCNC: 104 MMOL/L — SIGNIFICANT CHANGE UP (ref 96–108)
CO2 SERPL-SCNC: 19 MMOL/L — LOW (ref 22–31)
CREAT SERPL-MCNC: 1.09 MG/DL — SIGNIFICANT CHANGE UP (ref 0.5–1.3)
EGFR: 65 ML/MIN/1.73M2 — SIGNIFICANT CHANGE UP
GLUCOSE SERPL-MCNC: 101 MG/DL — HIGH (ref 70–99)
HCT VFR BLD CALC: 32.6 % — LOW (ref 39–50)
HGB BLD-MCNC: 10.7 G/DL — LOW (ref 13–17)
MAGNESIUM SERPL-MCNC: 2.1 MG/DL — SIGNIFICANT CHANGE UP (ref 1.6–2.6)
MCHC RBC-ENTMCNC: 32.8 G/DL — SIGNIFICANT CHANGE UP (ref 32–36)
MCHC RBC-ENTMCNC: 33.4 PG — SIGNIFICANT CHANGE UP (ref 27–34)
MCV RBC AUTO: 101.9 FL — HIGH (ref 80–100)
NRBC # BLD: 0 /100 WBCS — SIGNIFICANT CHANGE UP (ref 0–0)
PHOSPHATE SERPL-MCNC: 2.3 MG/DL — LOW (ref 2.5–4.5)
PLATELET # BLD AUTO: 127 K/UL — LOW (ref 150–400)
POTASSIUM SERPL-MCNC: 3.7 MMOL/L — SIGNIFICANT CHANGE UP (ref 3.5–5.3)
POTASSIUM SERPL-SCNC: 3.7 MMOL/L — SIGNIFICANT CHANGE UP (ref 3.5–5.3)
RBC # BLD: 3.2 M/UL — LOW (ref 4.2–5.8)
RBC # FLD: 12.7 % — SIGNIFICANT CHANGE UP (ref 10.3–14.5)
SODIUM SERPL-SCNC: 137 MMOL/L — SIGNIFICANT CHANGE UP (ref 135–145)
WBC # BLD: 5.27 K/UL — SIGNIFICANT CHANGE UP (ref 3.8–10.5)
WBC # FLD AUTO: 5.27 K/UL — SIGNIFICANT CHANGE UP (ref 3.8–10.5)

## 2024-12-08 PROCEDURE — 99232 SBSQ HOSP IP/OBS MODERATE 35: CPT | Mod: GC

## 2024-12-08 RX ORDER — SODIUM,POTASSIUM PHOSPHATES 278-250MG
1 POWDER IN PACKET (EA) ORAL ONCE
Refills: 0 | Status: COMPLETED | OUTPATIENT
Start: 2024-12-08 | End: 2024-12-08

## 2024-12-08 RX ADMIN — FLUTICASONE PROPIONATE AND SALMETEROL XINAFOATE 1 DOSE(S): 45; 21 AEROSOL, METERED RESPIRATORY (INHALATION) at 06:14

## 2024-12-08 RX ADMIN — Medication 100 MILLIGRAM(S): at 18:07

## 2024-12-08 RX ADMIN — Medication 200 GRAM(S): at 16:50

## 2024-12-08 RX ADMIN — TAMSULOSIN HYDROCHLORIDE 0.8 MILLIGRAM(S): 0.4 CAPSULE ORAL at 20:19

## 2024-12-08 RX ADMIN — METOPROLOL TARTRATE 50 MILLIGRAM(S): 100 TABLET, FILM COATED ORAL at 06:11

## 2024-12-08 RX ADMIN — Medication 325 MILLIGRAM(S): at 12:26

## 2024-12-08 RX ADMIN — LOSARTAN POTASSIUM 50 MILLIGRAM(S): 100 TABLET, FILM COATED ORAL at 06:11

## 2024-12-08 RX ADMIN — Medication 200 GRAM(S): at 20:20

## 2024-12-08 RX ADMIN — ACETAMINOPHEN 500MG 650 MILLIGRAM(S): 500 TABLET, COATED ORAL at 02:30

## 2024-12-08 RX ADMIN — Medication 1 PACKET(S): at 12:26

## 2024-12-08 RX ADMIN — Medication 200 GRAM(S): at 06:09

## 2024-12-08 RX ADMIN — RIVAROXABAN 20 MILLIGRAM(S): 10 TABLET, FILM COATED ORAL at 18:07

## 2024-12-08 RX ADMIN — Medication 200 GRAM(S): at 09:26

## 2024-12-08 RX ADMIN — ACETAMINOPHEN 500MG 650 MILLIGRAM(S): 500 TABLET, COATED ORAL at 21:00

## 2024-12-08 RX ADMIN — ACETAMINOPHEN 500MG 650 MILLIGRAM(S): 500 TABLET, COATED ORAL at 20:19

## 2024-12-08 RX ADMIN — Medication 200 GRAM(S): at 12:28

## 2024-12-08 RX ADMIN — Medication 100 MILLIGRAM(S): at 06:12

## 2024-12-08 RX ADMIN — FLUTICASONE PROPIONATE AND SALMETEROL XINAFOATE 1 DOSE(S): 45; 21 AEROSOL, METERED RESPIRATORY (INHALATION) at 18:10

## 2024-12-08 RX ADMIN — Medication 2 PUFF(S): at 12:25

## 2024-12-08 RX ADMIN — ACETAMINOPHEN 500MG 650 MILLIGRAM(S): 500 TABLET, COATED ORAL at 01:05

## 2024-12-08 RX ADMIN — Medication 200 GRAM(S): at 00:55

## 2024-12-08 NOTE — PROGRESS NOTE ADULT - PROBLEM SELECTOR PLAN 5
Diet - regular   DVT - Xarelto  DIspo - outpatient PT  Code status - DNR/DNI Diet - regular   DVT - Xarelto  DIspo - outpatient PT pending confirmation of antibiotics outpatient  Code status - DNR/DNI

## 2024-12-08 NOTE — PROGRESS NOTE ADULT - PROBLEM SELECTOR PLAN 2
Volume overload on exam concern for ADHF vs less likely COPD exacerbation   TTE in March 2024 EF 45%,  global left ventricular hypokinesis,  RV enlarged,  reduced systolic function., left atrium is moderately dilated,  right atrium is severely dilated.   Repeat TTE this admission grossly unchanged   Still on 3.5 L   Overall resolved     PLAN   - asses volume status daily; diuresis prn   - Toprol 50 mg qd (at home on 50 mg in AM and 25 mg in PM)   - Losartan 50 QD ( At home BID)

## 2024-12-08 NOTE — PROGRESS NOTE ADULT - SUBJECTIVE AND OBJECTIVE BOX
****Diego Junior Internal Medicine PGY-1*****    CHIEF COMPLAINT:    Overnight Events: NAEON, VSS  Interval Events:    OBJECTIVE:  ICU Vital Signs Last 24 Hrs  T(C): 36.4 (08 Dec 2024 05:22), Max: 36.8 (07 Dec 2024 14:01)  T(F): 97.5 (08 Dec 2024 05:22), Max: 98.3 (07 Dec 2024 20:30)  HR: 79 (08 Dec 2024 05:22) (70 - 92)  BP: 130/70 (08 Dec 2024 05:22) (124/72 - 135/78)  BP(mean): --  ABP: --  ABP(mean): --  RR: 18 (08 Dec 2024 05:22) (18 - 18)  SpO2: 94% (08 Dec 2024 05:22) (94% - 98%)    O2 Parameters below as of 08 Dec 2024 05:22  Patient On (Oxygen Delivery Method): nasal cannula              12-07 @ 07:01  -  12-08 @ 07:00  --------------------------------------------------------  IN: 470 mL / OUT: 900 mL / NET: -430 mL      CAPILLARY BLOOD GLUCOSE          PHYSICAL EXAM  HEET: MMM, EOMI, PERRLA  NECK: supple  RESPIRATORY: Normal respiratory effort; lungs are clear to auscultation bilaterally  CARDIOVASCULAR: Regular rate and rhythm, normal S1 and S2, no murmur/rub/gallop; No lower extremity edema; Peripheral pulses are 2+ bilaterally  ABDOMEN: Nontender to palpation, normoactive bowel sounds, no rebound/guarding; No hepatosplenomegaly  MUSCULOSKELETAL: no clubbing or cyanosis of digits; no joint swelling or tenderness to palpation  PSYCH: A+O to person, place, and time; affect appropriate  SKIN: No rash      HOSPITAL MEDICATIONS:  MEDICATIONS  (STANDING):  ampicillin  IVPB 2 Gram(s) IV Intermittent every 4 hours  cefTRIAXone   IVPB      cefTRIAXone   IVPB 2000 milliGRAM(s) IV Intermittent every 12 hours  ferrous    sulfate 325 milliGRAM(s) Oral daily  fluticasone propionate/ salmeterol 100-50 MICROgram(s) Diskus 1 Dose(s) Inhalation two times a day  losartan 50 milliGRAM(s) Oral daily  metoprolol succinate ER 50 milliGRAM(s) Oral daily  rivaroxaban 20 milliGRAM(s) Oral with dinner  tamsulosin 0.8 milliGRAM(s) Oral at bedtime  tiotropium 2.5 MICROgram(s) Inhaler 2 Puff(s) Inhalation daily    MEDICATIONS  (PRN):  acetaminophen     Tablet .. 650 milliGRAM(s) Oral every 6 hours PRN Temp greater or equal to 38C (100.4F), Mild Pain (1 - 3), Moderate Pain (4 - 6)  albuterol/ipratropium for Nebulization 3 milliLiter(s) Nebulizer every 6 hours PRN Shortness of Breath and/or Wheezing      LABS:                        10.7   5.27  )-----------( 127      ( 08 Dec 2024 07:02 )             32.6     Hgb Trend: 10.7<--, 11.3<--, 11.1<--, 11.1<--, 12.5<--  12-07    138  |  105  |  18  ----------------------------<  101[H]  3.8   |  19[L]  |  1.02    Ca    8.5      07 Dec 2024 08:53  Phos  2.4     12-07  Mg     2.0     12-07      Creatinine Trend: 1.02<--, 1.12<--, 1.08<--, 1.22<--, 1.27<--    Urinalysis Basic - ( 07 Dec 2024 08:53 )    Color: x / Appearance: x / SG: x / pH: x  Gluc: 101 mg/dL / Ketone: x  / Bili: x / Urobili: x   Blood: x / Protein: x / Nitrite: x   Leuk Esterase: x / RBC: x / WBC x   Sq Epi: x / Non Sq Epi: x / Bacteria: x            MICROBIOLOGY:       RADIOLOGY:  [ ] Reviewed by me   ****Diego Junior Internal Medicine PGY-1*****    CHIEF COMPLAINT:    Overnight Events: NAEON, VSS  Interval Events: doing well this morning, no complaints, no nausea, SOB, pain    OBJECTIVE:  ICU Vital Signs Last 24 Hrs  T(C): 36.4 (08 Dec 2024 05:22), Max: 36.8 (07 Dec 2024 14:01)  T(F): 97.5 (08 Dec 2024 05:22), Max: 98.3 (07 Dec 2024 20:30)  HR: 79 (08 Dec 2024 05:22) (70 - 92)  BP: 130/70 (08 Dec 2024 05:22) (124/72 - 135/78)  BP(mean): --  ABP: --  ABP(mean): --  RR: 18 (08 Dec 2024 05:22) (18 - 18)  SpO2: 94% (08 Dec 2024 05:22) (94% - 98%)    O2 Parameters below as of 08 Dec 2024 05:22  Patient On (Oxygen Delivery Method): nasal cannula              12-07 @ 07:01  -  12-08 @ 07:00  --------------------------------------------------------  IN: 470 mL / OUT: 900 mL / NET: -430 mL      CAPILLARY BLOOD GLUCOSE          PHYSICAL EXAM  HEET: MMM, EOMI, PERRLA  NECK: supple  RESPIRATORY: Normal respiratory effort; lungs are clear to auscultation bilaterally  CARDIOVASCULAR: Regular rate and rhythm, normal S1 and S2, no murmur/rub/gallop; No lower extremity edema; Peripheral pulses are 2+ bilaterally  ABDOMEN: Nontender to palpation, normoactive bowel sounds, no rebound/guarding; No hepatosplenomegaly  MUSCULOSKELETAL: no clubbing or cyanosis of digits; no joint swelling or tenderness to palpation  PSYCH: A+O to person, place, and time; affect appropriate  SKIN: No rash      HOSPITAL MEDICATIONS:  MEDICATIONS  (STANDING):  ampicillin  IVPB 2 Gram(s) IV Intermittent every 4 hours  cefTRIAXone   IVPB      cefTRIAXone   IVPB 2000 milliGRAM(s) IV Intermittent every 12 hours  ferrous    sulfate 325 milliGRAM(s) Oral daily  fluticasone propionate/ salmeterol 100-50 MICROgram(s) Diskus 1 Dose(s) Inhalation two times a day  losartan 50 milliGRAM(s) Oral daily  metoprolol succinate ER 50 milliGRAM(s) Oral daily  rivaroxaban 20 milliGRAM(s) Oral with dinner  tamsulosin 0.8 milliGRAM(s) Oral at bedtime  tiotropium 2.5 MICROgram(s) Inhaler 2 Puff(s) Inhalation daily    MEDICATIONS  (PRN):  acetaminophen     Tablet .. 650 milliGRAM(s) Oral every 6 hours PRN Temp greater or equal to 38C (100.4F), Mild Pain (1 - 3), Moderate Pain (4 - 6)  albuterol/ipratropium for Nebulization 3 milliLiter(s) Nebulizer every 6 hours PRN Shortness of Breath and/or Wheezing      LABS:                        10.7   5.27  )-----------( 127      ( 08 Dec 2024 07:02 )             32.6     Hgb Trend: 10.7<--, 11.3<--, 11.1<--, 11.1<--, 12.5<--  12-07    138  |  105  |  18  ----------------------------<  101[H]  3.8   |  19[L]  |  1.02    Ca    8.5      07 Dec 2024 08:53  Phos  2.4     12-07  Mg     2.0     12-07      Creatinine Trend: 1.02<--, 1.12<--, 1.08<--, 1.22<--, 1.27<--    Urinalysis Basic - ( 07 Dec 2024 08:53 )    Color: x / Appearance: x / SG: x / pH: x  Gluc: 101 mg/dL / Ketone: x  / Bili: x / Urobili: x   Blood: x / Protein: x / Nitrite: x   Leuk Esterase: x / RBC: x / WBC x   Sq Epi: x / Non Sq Epi: x / Bacteria: x            MICROBIOLOGY:       RADIOLOGY:  [ ] Reviewed by me

## 2024-12-08 NOTE — PROGRESS NOTE ADULT - PROBLEM SELECTOR PLAN 1
12/3 Bcx revealing pan-sensitive E. faecalis   CT A/P without GI source  Micra with extremely low risk of infection    PLAN  - Ampicillin 2g q6h (12/3- 1/2/24 ) and CFX 2g q24h (12/4-1/2/25)  - will need 4 weeks IV abx after Bcx clearance. 48 hours   - Pending PICC line vs Midline for at home infusions   - Per ID, no need for CAREN to look for source 12/3 Bcx revealing pan-sensitive E. faecalis   CT A/P without GI source  Micra with extremely low risk of infection    PLAN  - Ampicillin 2g q6h (12/3- 1/2/24 ) and CFX 2g q24h (12/4-1/2/25)  - will need 4 weeks IV abx after Bcx clearance. 48 hours   - Pending PICC line placement on 12/9  - antibiotics planned for set up on 12/9  - Per ID, no need for CAREN to look for source

## 2024-12-09 LAB — TROPONIN T, HIGH SENSITIVITY RESULT: 49 NG/L — SIGNIFICANT CHANGE UP (ref 0–51)

## 2024-12-09 PROCEDURE — 99232 SBSQ HOSP IP/OBS MODERATE 35: CPT

## 2024-12-09 PROCEDURE — 71045 X-RAY EXAM CHEST 1 VIEW: CPT | Mod: 26,77

## 2024-12-09 PROCEDURE — 99232 SBSQ HOSP IP/OBS MODERATE 35: CPT | Mod: GC

## 2024-12-09 PROCEDURE — 71045 X-RAY EXAM CHEST 1 VIEW: CPT | Mod: 26

## 2024-12-09 PROCEDURE — 93010 ELECTROCARDIOGRAM REPORT: CPT

## 2024-12-09 RX ORDER — KETOROLAC TROMETHAMINE 30 MG/ML
15 INJECTION INTRAMUSCULAR; INTRAVENOUS ONCE
Refills: 0 | Status: DISCONTINUED | OUTPATIENT
Start: 2024-12-09 | End: 2024-12-09

## 2024-12-09 RX ADMIN — Medication 325 MILLIGRAM(S): at 11:52

## 2024-12-09 RX ADMIN — Medication 200 GRAM(S): at 08:52

## 2024-12-09 RX ADMIN — Medication 200 GRAM(S): at 04:21

## 2024-12-09 RX ADMIN — Medication 200 GRAM(S): at 12:48

## 2024-12-09 RX ADMIN — KETOROLAC TROMETHAMINE 15 MILLIGRAM(S): 30 INJECTION INTRAMUSCULAR; INTRAVENOUS at 06:25

## 2024-12-09 RX ADMIN — Medication 100 MILLIGRAM(S): at 05:25

## 2024-12-09 RX ADMIN — Medication 2 PUFF(S): at 11:53

## 2024-12-09 RX ADMIN — RIVAROXABAN 20 MILLIGRAM(S): 10 TABLET, FILM COATED ORAL at 17:22

## 2024-12-09 RX ADMIN — Medication 200 GRAM(S): at 21:04

## 2024-12-09 RX ADMIN — Medication 200 GRAM(S): at 00:03

## 2024-12-09 RX ADMIN — Medication 200 GRAM(S): at 17:22

## 2024-12-09 RX ADMIN — Medication 100 MILLIGRAM(S): at 17:58

## 2024-12-09 RX ADMIN — FLUTICASONE PROPIONATE AND SALMETEROL XINAFOATE 1 DOSE(S): 45; 21 AEROSOL, METERED RESPIRATORY (INHALATION) at 05:26

## 2024-12-09 RX ADMIN — KETOROLAC TROMETHAMINE 15 MILLIGRAM(S): 30 INJECTION INTRAMUSCULAR; INTRAVENOUS at 07:03

## 2024-12-09 RX ADMIN — LOSARTAN POTASSIUM 50 MILLIGRAM(S): 100 TABLET, FILM COATED ORAL at 05:25

## 2024-12-09 RX ADMIN — TAMSULOSIN HYDROCHLORIDE 0.8 MILLIGRAM(S): 0.4 CAPSULE ORAL at 21:03

## 2024-12-09 RX ADMIN — METOPROLOL TARTRATE 50 MILLIGRAM(S): 100 TABLET, FILM COATED ORAL at 05:25

## 2024-12-09 RX ADMIN — FLUTICASONE PROPIONATE AND SALMETEROL XINAFOATE 1 DOSE(S): 45; 21 AEROSOL, METERED RESPIRATORY (INHALATION) at 17:26

## 2024-12-09 NOTE — ADVANCED PRACTICE NURSE CONSULT - ASSESSMENT
PICC Line Insertion Note    Patient or patient representative educated about central line associated blood stream infection prevention practices.  Catheter type: 4 F,  SL Picc  : Bard  Power injectable: Yes  LOT# [id number]    Informed consent obtained by covering floor team.  Procedure assisted by: JEROME Ochoa RN  Time out was performed, confirming the patient's first and last name, date of birth, procedure, and correct site prior to start of procedure.    Patient was placed with HOB 30 degrees. A mask and cap provided for patient. Patient placement site was prepped with chlorhexidine solution, then draped using maximum sterile barrier protection. The area was injected with 2 ml of 1% lidocaine. Using the Bard Site Rite 8, the catheter was placed using the Modified Seldinger Technique. Strict adherence to outline aseptic technique including handwashing, glove and gown, utilizing mask and cap, plus draping the patient with a sterile drape was observed. Upon completion of line placement, the insertion site was covered with a sterile CHG dressing. Pt tolerated procedure well. V/S: WDL      All materials used for catheter insertion, including the intact guide wires, were accounted for at the end of the procedure.  Number of attempts: 1  Complications/Comments: None  Emergency Placement: No    Site: New  Anatomical Site of insertion: Right Basilic vein  Catheter size/length: 4F, 41cm  US guided Bard Single lumen power picc placed    Post procedure verification with chest Xray as per orders.

## 2024-12-09 NOTE — PROGRESS NOTE ADULT - PROBLEM SELECTOR PLAN 1
12/3 Bcx revealing pan-sensitive E. faecalis   CT A/P without GI source  Micra with extremely low risk of infection    PLAN  - Ampicillin 2g q6h (12/3- 1/2/24 ) and CFX 2g q24h (12/4-1/2/25)  - will need 4 weeks IV abx after Bcx clearance. 48 hours   - Pending PICC line placement on 12/9  - antibiotics planned for set up on 12/9  - Per ID, no need for CAREN to look for source

## 2024-12-09 NOTE — ADVANCED PRACTICE NURSE CONSULT - ASSESSMENT
PICC Line Insertion Note    Patient or patient representative educated about central line associated blood stream infection prevention practices.  Catheter type: 4 F,  SL Picc  : Bard  Power injectable: Yes  LOT# [id number]    Informed consent obtained by covering floor team.  Procedure assisted by: JEROME Ochoa RN  Time out was performed, confirming the patient's first and last name, date of birth, procedure, and correct site prior to start of procedure.    Patient was placed with HOB 30 degrees. A mask and cap provided for patient. Patient placement site was prepped with chlorhexidine solution, then draped using maximum sterile barrier protection. The area was injected with 2 ml of 1% lidocaine. Using the Bard Site Rite 8, the catheter was placed using the Modified Seldinger Technique. Strict adherence to outline aseptic technique including handwashing, glove and gown, utilizing mask and cap, plus draping the patient with a sterile drape was observed. Upon completion of line placement, the insertion site was covered with a sterile CHG dressing. Pt tolerated procedure well. V/S: WDL      All materials used for catheter insertion, including the intact guide wires, were accounted for at the end of the procedure.  Number of attempts: 1  Complications/Comments: None  Emergency Placement: No    Site: changed overwire from SL to DL PICC  Anatomical Site of insertion: Right Basilic vein  Catheter size/length: 4F, 41cm  US guided Bard Double lumen power picc placed    Post procedure verification with chest Xray as per orders.

## 2024-12-09 NOTE — PROGRESS NOTE ADULT - ASSESSMENT
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 No urinary sx.  Vitals notable for hypoxia on presentation. Labs with leukocytosis to 14.01. Blood cultures x 2 with e. fecalis by PCR. TTE without vegetations. Of note, patient with known colonic polyp; has not pursued colonoscopy as previously recommended for strep lutetiensis bacteremia given concern for risk of procedure. Clinical exam unremarkable at this time for foci of infection.     #e. 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    dont think a CAREN is essential at present as he is a  poor surgical candidate but this can be reconsidered  either way ,    Discussed with family in detail  to hold off on CAREN     repeat BC negative      Discussed with daughter who agree with hollis.    discharge when ready on current  therapy

## 2024-12-09 NOTE — PROGRESS NOTE ADULT - PROBLEM SELECTOR PLAN 4
We have not seen him in some time and he needs an appointment, hence no refills.   At home on Trelegy and albuterol as needed    PLAN  - Trelegy 200/62.5  therapeutic inpatient conversion   - Dounebs as needed  - . no known mental health issues.

## 2024-12-09 NOTE — PROGRESS NOTE ADULT - SUBJECTIVE AND OBJECTIVE BOX
PROGRESS NOTE:     Patient is a 89y old  Male who presents with a chief complaint of Shortness of Breath (08 Dec 2024 07:56)      ---------------------------------------------------  Barbara Perez  PGY-1, Internal Medicine  Available on [employer] Teams  ---------------------------------------------------    89M with COPD on 3.5L NC at home, previous bacteremia s/p PPM extraction in April replaced with leadless micra PPM, admitted initially for AHRF initially presumed to be volume overload vs. COPD, however further workup revealing high grade E. faecalis bacteremia, unclear source. Spoke with EP, micras have a very low chance on infection so that did not need to be extracted. Was supposed to be CO’ed home with PICC line over the weekend however insurance did not authorize the abx.      INTERVAL / OVERNIGHT EVENTS:  Pt complaining of stabbing, nonradiating chest pain 5/10. VSS, EKG WNL for pt, no other acute signs of stress. MD notified overnight and examined pT. Trop negative.     SUBJECTIVE:  Patient examined at bedside with no acute complaints.       MEDICATIONS  (STANDING):  ampicillin  IVPB 2 Gram(s) IV Intermittent every 4 hours  cefTRIAXone   IVPB      cefTRIAXone   IVPB 2000 milliGRAM(s) IV Intermittent every 12 hours  ferrous    sulfate 325 milliGRAM(s) Oral daily  fluticasone propionate/ salmeterol 100-50 MICROgram(s) Diskus 1 Dose(s) Inhalation two times a day  losartan 50 milliGRAM(s) Oral daily  metoprolol succinate ER 50 milliGRAM(s) Oral daily  rivaroxaban 20 milliGRAM(s) Oral with dinner  tamsulosin 0.8 milliGRAM(s) Oral at bedtime  tiotropium 2.5 MICROgram(s) Inhaler 2 Puff(s) Inhalation daily    MEDICATIONS  (PRN):  acetaminophen     Tablet .. 650 milliGRAM(s) Oral every 6 hours PRN Temp greater or equal to 38C (100.4F), Mild Pain (1 - 3), Moderate Pain (4 - 6)  albuterol/ipratropium for Nebulization 3 milliLiter(s) Nebulizer every 6 hours PRN Shortness of Breath and/or Wheezing      CAPILLARY BLOOD GLUCOSE        I&O's Summary    08 Dec 2024 07:01  -  09 Dec 2024 07:00  --------------------------------------------------------  IN: 0 mL / OUT: 1125 mL / NET: -1125 mL        VITALS:   T(C): 36.4 (12-09-24 @ 05:19), Max: 36.9 (12-08-24 @ 21:07)  HR: 88 (12-09-24 @ 05:19) (75 - 88)  BP: 154/81 (12-09-24 @ 05:19) (124/67 - 154/81)  RR: 18 (12-09-24 @ 05:19) (18 - 18)  SpO2: 96% (12-09-24 @ 05:19) (95% - 97%)    General: Lying in bed   RESPIRATORY: Normal respiratory effort; lungs are clear to auscultation bilaterally  CARDIOVASCULAR: Regular rate and rhythm, normal S1 and S2, no murmur/rub/gallop; No lower extremity edema; Peripheral pulses are 2+ bilaterally  ABDOMEN: Nontender to palpation, normoactive bowel sounds, no rebound/guarding; No hepatosplenomegaly  MUSCULOSKELETAL: no clubbing or cyanosis of digits; no joint swelling or tenderness to palpation  PSYCH: A+O to person, place, and time; affect appropriate  SKIN: No rash    LABS:                        10.7   5.27  )-----------( 127      ( 08 Dec 2024 07:02 )             32.6     12-08    137  |  104  |  18  ----------------------------<  101[H]  3.7   |  19[L]  |  1.09    Ca    8.6      08 Dec 2024 07:02  Phos  2.3     12-08  Mg     2.1     12-08            Urinalysis Basic - ( 08 Dec 2024 07:02 )    Color: x / Appearance: x / SG: x / pH: x  Gluc: 101 mg/dL / Ketone: x  / Bili: x / Urobili: x   Blood: x / Protein: x / Nitrite: x   Leuk Esterase: x / RBC: x / WBC x   Sq Epi: x / Non Sq Epi: x / Bacteria: x

## 2024-12-09 NOTE — PROGRESS NOTE ADULT - SUBJECTIVE AND OBJECTIVE BOX
infectious diseases progress note:    Patient is a 89y old  Male who presents with a chief complaint of Shortness of Breath (08 Dec 2024 07:56)        Chronic obstructive pulmonary disease with acute exacerbation             Allergies    No Known Allergies    Intolerances        ANTIBIOTICS/RELEVANT:  antimicrobials  ampicillin  IVPB 2 Gram(s) IV Intermittent every 4 hours  cefTRIAXone   IVPB      cefTRIAXone   IVPB 2000 milliGRAM(s) IV Intermittent every 12 hours    immunologic:    OTHER:  acetaminophen     Tablet .. 650 milliGRAM(s) Oral every 6 hours PRN  albuterol/ipratropium for Nebulization 3 milliLiter(s) Nebulizer every 6 hours PRN  ferrous    sulfate 325 milliGRAM(s) Oral daily  fluticasone propionate/ salmeterol 100-50 MICROgram(s) Diskus 1 Dose(s) Inhalation two times a day  losartan 50 milliGRAM(s) Oral daily  metoprolol succinate ER 50 milliGRAM(s) Oral daily  rivaroxaban 20 milliGRAM(s) Oral with dinner  tamsulosin 0.8 milliGRAM(s) Oral at bedtime  tiotropium 2.5 MICROgram(s) Inhaler 2 Puff(s) Inhalation daily      Objective:  Vital Signs Last 24 Hrs  T(C): 36.4 (09 Dec 2024 05:19), Max: 36.9 (08 Dec 2024 21:07)  T(F): 97.5 (09 Dec 2024 05:19), Max: 98.4 (08 Dec 2024 21:07)  HR: 88 (09 Dec 2024 05:19) (75 - 88)  BP: 154/81 (09 Dec 2024 05:19) (124/67 - 154/81)  BP(mean): --  RR: 18 (09 Dec 2024 05:19) (18 - 18)  SpO2: 96% (09 Dec 2024 05:19) (95% - 97%)    Parameters below as of 09 Dec 2024 05:19  Patient On (Oxygen Delivery Method): nasal cannula           Ear/Nose/Throat: no oral lesion, no sinus tenderness on percussion	  Neck:no JVD, no lymphadenopathy, supple  Respiratory: CTA amy  Cardiovascular: S1S2 RRR, no murmurs  Gastrointestinal:soft, (+) BS, no HSM  Extremities:no e/e/c        LABS:                        10.7   5.27  )-----------( 127      ( 08 Dec 2024 07:02 )             32.6     12-08    137  |  104  |  18  ----------------------------<  101[H]  3.7   |  19[L]  |  1.09    Ca    8.6      08 Dec 2024 07:02  Phos  2.3     12-08  Mg     2.1     12-08        Urinalysis Basic - ( 08 Dec 2024 07:02 )    Color: x / Appearance: x / SG: x / pH: x  Gluc: 101 mg/dL / Ketone: x  / Bili: x / Urobili: x   Blood: x / Protein: x / Nitrite: x   Leuk Esterase: x / RBC: x / WBC x   Sq Epi: x / Non Sq Epi: x / Bacteria: x          MICROBIOLOGY:    RECENT CULTURES:  12-05 @ 05:18 .Blood BLOOD                No growth at 72 Hours    12-05 @ 05:10 .Blood BLOOD                No growth at 72 Hours    12-03 @ 04:35 Clean Catch Clean Catch (Midstream)                No growth    12-03 @ 00:17 .Blood BLOOD       Growth in aerobic bottle: Gram positive cocci in pairs  Growth in anaerobic bottle: Gram positive cocci in pairs           Growth in aerobic and anaerobic bottles: Enterococcus faecalis  See previous culture [id number]    12-03 @ 00:16 .Blood BLOOD   PCR    Growth in aerobic bottle: Gram positive cocci in pairs  Growth in anaerobic bottle: Gram positive cocci in pairs    Blood Culture PCR  Enterococcus faecalis  Blood Culture PCR     Growth in aerobic and anaerobic bottles: Enterococcus faecalis  Direct identification is available within approximately 3-5  hours either by Blood Panel Multiplexed PCR or Direct  MALDI-TOF. Details: [email or web address]          RESPIRATORY CULTURES:              RADIOLOGY & ADDITIONAL STUDIES:        Pager [phone]  After 5 pm/weekends or if no response :[phone]

## 2024-12-09 NOTE — ADVANCED PRACTICE NURSE CONSULT - REASON FOR CONSULT
Vascular Access Team    Evaluation for: Bedside  SL PICC placement  Requested by name: Sagar Thomas  Date/Time: 12/7 @ 13:35    Indication: Ampicillin & Ceftriaxone x 4week  Allergy to CHG or Heparin or Lidocaine: no    Platelets(>20): 127  INR(<3): 2.68  eGFR(>40): 65  Blood cultures sent: 12/5  Blood culture negative in 48hrs: NG X 72 hours  Anticoagulants: Xarelto  Arms DVT: no  Mastectomy: no  Fistula: no  PPM/Defib: micra  IR or Nephrology or ID clearance needed: no    Consent obtained: yes      Plan: Bedside picc order evaluated. Will plan fpr placement 12/9 for [facility] provider aware.  
Bedside picc order placed.   Indication: DL picc placement for long term antibiotics. Home care requested Double lumen PICC for ampicillin and ceftriaxone.  
Bedside picc order placed.   Indication: SL picc placement for long term antibiotics.

## 2024-12-09 NOTE — PROGRESS NOTE ADULT - PROBLEM SELECTOR PLAN 5
Diet - regular   DVT - Xarelto  DIspo - outpatient PT pending confirmation of antibiotics outpatient  Code status - DNR/DNI

## 2024-12-10 ENCOUNTER — TRANSCRIPTION ENCOUNTER (OUTPATIENT)
Age: 89
End: 2024-12-10

## 2024-12-10 ENCOUNTER — NON-APPOINTMENT (OUTPATIENT)
Age: 89
End: 2024-12-10

## 2024-12-10 VITALS
RESPIRATION RATE: 18 BRPM | DIASTOLIC BLOOD PRESSURE: 67 MMHG | TEMPERATURE: 99 F | HEART RATE: 72 BPM | OXYGEN SATURATION: 100 % | SYSTOLIC BLOOD PRESSURE: 113 MMHG

## 2024-12-10 LAB
ALBUMIN SERPL ELPH-MCNC: 3.4 G/DL — SIGNIFICANT CHANGE UP (ref 3.3–5)
ALP SERPL-CCNC: 60 U/L — SIGNIFICANT CHANGE UP (ref 40–120)
ALT FLD-CCNC: 11 U/L — SIGNIFICANT CHANGE UP (ref 10–45)
ANION GAP SERPL CALC-SCNC: 16 MMOL/L — SIGNIFICANT CHANGE UP (ref 5–17)
AST SERPL-CCNC: 14 U/L — SIGNIFICANT CHANGE UP (ref 10–40)
BILIRUB SERPL-MCNC: 0.9 MG/DL — SIGNIFICANT CHANGE UP (ref 0.2–1.2)
BUN SERPL-MCNC: 19 MG/DL — SIGNIFICANT CHANGE UP (ref 7–23)
CALCIUM SERPL-MCNC: 8.5 MG/DL — SIGNIFICANT CHANGE UP (ref 8.4–10.5)
CHLORIDE SERPL-SCNC: 102 MMOL/L — SIGNIFICANT CHANGE UP (ref 96–108)
CO2 SERPL-SCNC: 20 MMOL/L — LOW (ref 22–31)
CREAT SERPL-MCNC: 1.12 MG/DL — SIGNIFICANT CHANGE UP (ref 0.5–1.3)
CULTURE RESULTS: SIGNIFICANT CHANGE UP
CULTURE RESULTS: SIGNIFICANT CHANGE UP
EGFR: 63 ML/MIN/1.73M2 — SIGNIFICANT CHANGE UP
GLUCOSE SERPL-MCNC: 110 MG/DL — HIGH (ref 70–99)
HCT VFR BLD CALC: 35.6 % — LOW (ref 39–50)
HGB BLD-MCNC: 11.5 G/DL — LOW (ref 13–17)
MCHC RBC-ENTMCNC: 32.3 G/DL — SIGNIFICANT CHANGE UP (ref 32–36)
MCHC RBC-ENTMCNC: 33.3 PG — SIGNIFICANT CHANGE UP (ref 27–34)
MCV RBC AUTO: 103.2 FL — HIGH (ref 80–100)
NRBC # BLD: 0 /100 WBCS — SIGNIFICANT CHANGE UP (ref 0–0)
PLATELET # BLD AUTO: 150 K/UL — SIGNIFICANT CHANGE UP (ref 150–400)
POTASSIUM SERPL-MCNC: 3.7 MMOL/L — SIGNIFICANT CHANGE UP (ref 3.5–5.3)
POTASSIUM SERPL-SCNC: 3.7 MMOL/L — SIGNIFICANT CHANGE UP (ref 3.5–5.3)
PROT SERPL-MCNC: 5.9 G/DL — LOW (ref 6–8.3)
RBC # BLD: 3.45 M/UL — LOW (ref 4.2–5.8)
RBC # FLD: 13 % — SIGNIFICANT CHANGE UP (ref 10.3–14.5)
SODIUM SERPL-SCNC: 138 MMOL/L — SIGNIFICANT CHANGE UP (ref 135–145)
SPECIMEN SOURCE: SIGNIFICANT CHANGE UP
SPECIMEN SOURCE: SIGNIFICANT CHANGE UP
WBC # BLD: 8.16 K/UL — SIGNIFICANT CHANGE UP (ref 3.8–10.5)
WBC # FLD AUTO: 8.16 K/UL — SIGNIFICANT CHANGE UP (ref 3.8–10.5)

## 2024-12-10 PROCEDURE — C1751: CPT

## 2024-12-10 PROCEDURE — 83605 ASSAY OF LACTIC ACID: CPT

## 2024-12-10 PROCEDURE — 87150 DNA/RNA AMPLIFIED PROBE: CPT

## 2024-12-10 PROCEDURE — 82435 ASSAY OF BLOOD CHLORIDE: CPT

## 2024-12-10 PROCEDURE — 87077 CULTURE AEROBIC IDENTIFY: CPT

## 2024-12-10 PROCEDURE — 87640 STAPH A DNA AMP PROBE: CPT

## 2024-12-10 PROCEDURE — 84132 ASSAY OF SERUM POTASSIUM: CPT

## 2024-12-10 PROCEDURE — 96374 THER/PROPH/DIAG INJ IV PUSH: CPT

## 2024-12-10 PROCEDURE — 80053 COMPREHEN METABOLIC PANEL: CPT

## 2024-12-10 PROCEDURE — 85014 HEMATOCRIT: CPT

## 2024-12-10 PROCEDURE — 99285 EMERGENCY DEPT VISIT HI MDM: CPT

## 2024-12-10 PROCEDURE — 81001 URINALYSIS AUTO W/SCOPE: CPT

## 2024-12-10 PROCEDURE — 80048 BASIC METABOLIC PNL TOTAL CA: CPT

## 2024-12-10 PROCEDURE — 0241U: CPT

## 2024-12-10 PROCEDURE — 87086 URINE CULTURE/COLONY COUNT: CPT

## 2024-12-10 PROCEDURE — C8929: CPT

## 2024-12-10 PROCEDURE — 82330 ASSAY OF CALCIUM: CPT

## 2024-12-10 PROCEDURE — 85018 HEMOGLOBIN: CPT

## 2024-12-10 PROCEDURE — 83690 ASSAY OF LIPASE: CPT

## 2024-12-10 PROCEDURE — 83880 ASSAY OF NATRIURETIC PEPTIDE: CPT

## 2024-12-10 PROCEDURE — 83735 ASSAY OF MAGNESIUM: CPT

## 2024-12-10 PROCEDURE — 97116 GAIT TRAINING THERAPY: CPT

## 2024-12-10 PROCEDURE — 82947 ASSAY GLUCOSE BLOOD QUANT: CPT

## 2024-12-10 PROCEDURE — 96375 TX/PRO/DX INJ NEW DRUG ADDON: CPT

## 2024-12-10 PROCEDURE — 93005 ELECTROCARDIOGRAM TRACING: CPT

## 2024-12-10 PROCEDURE — 71275 CT ANGIOGRAPHY CHEST: CPT | Mod: MC

## 2024-12-10 PROCEDURE — 84295 ASSAY OF SERUM SODIUM: CPT

## 2024-12-10 PROCEDURE — 85730 THROMBOPLASTIN TIME PARTIAL: CPT

## 2024-12-10 PROCEDURE — 97162 PT EVAL MOD COMPLEX 30 MIN: CPT

## 2024-12-10 PROCEDURE — 74177 CT ABD & PELVIS W/CONTRAST: CPT | Mod: MC

## 2024-12-10 PROCEDURE — 87040 BLOOD CULTURE FOR BACTERIA: CPT

## 2024-12-10 PROCEDURE — 85610 PROTHROMBIN TIME: CPT

## 2024-12-10 PROCEDURE — 85025 COMPLETE CBC W/AUTO DIFF WBC: CPT

## 2024-12-10 PROCEDURE — 82803 BLOOD GASES ANY COMBINATION: CPT

## 2024-12-10 PROCEDURE — 84443 ASSAY THYROID STIM HORMONE: CPT

## 2024-12-10 PROCEDURE — 94660 CPAP INITIATION&MGMT: CPT

## 2024-12-10 PROCEDURE — 87641 MR-STAPH DNA AMP PROBE: CPT

## 2024-12-10 PROCEDURE — 99239 HOSP IP/OBS DSCHRG MGMT >30: CPT

## 2024-12-10 PROCEDURE — 71045 X-RAY EXAM CHEST 1 VIEW: CPT

## 2024-12-10 PROCEDURE — 84100 ASSAY OF PHOSPHORUS: CPT

## 2024-12-10 PROCEDURE — 36569 INSJ PICC 5 YR+ W/O IMAGING: CPT

## 2024-12-10 PROCEDURE — 85027 COMPLETE CBC AUTOMATED: CPT

## 2024-12-10 PROCEDURE — 84484 ASSAY OF TROPONIN QUANT: CPT

## 2024-12-10 PROCEDURE — 94640 AIRWAY INHALATION TREATMENT: CPT

## 2024-12-10 PROCEDURE — 87637 SARSCOV2&INF A&B&RSV AMP PRB: CPT

## 2024-12-10 PROCEDURE — 87186 SC STD MICRODIL/AGAR DIL: CPT

## 2024-12-10 RX ORDER — KETOROLAC TROMETHAMINE 30 MG/ML
15 INJECTION INTRAMUSCULAR; INTRAVENOUS ONCE
Refills: 0 | Status: DISCONTINUED | OUTPATIENT
Start: 2024-12-10 | End: 2024-12-10

## 2024-12-10 RX ORDER — FUROSEMIDE 40 MG/1
1 TABLET ORAL
Qty: 30 | Refills: 0
Start: 2024-12-10 | End: 2025-01-08

## 2024-12-10 RX ADMIN — Medication 200 GRAM(S): at 04:19

## 2024-12-10 RX ADMIN — Medication 200 GRAM(S): at 00:33

## 2024-12-10 RX ADMIN — KETOROLAC TROMETHAMINE 15 MILLIGRAM(S): 30 INJECTION INTRAMUSCULAR; INTRAVENOUS at 06:22

## 2024-12-10 RX ADMIN — FLUTICASONE PROPIONATE AND SALMETEROL XINAFOATE 1 DOSE(S): 45; 21 AEROSOL, METERED RESPIRATORY (INHALATION) at 05:19

## 2024-12-10 RX ADMIN — Medication 100 MILLIGRAM(S): at 05:15

## 2024-12-10 RX ADMIN — Medication 200 GRAM(S): at 08:38

## 2024-12-10 RX ADMIN — KETOROLAC TROMETHAMINE 15 MILLIGRAM(S): 30 INJECTION INTRAMUSCULAR; INTRAVENOUS at 05:52

## 2024-12-10 RX ADMIN — LOSARTAN POTASSIUM 50 MILLIGRAM(S): 100 TABLET, FILM COATED ORAL at 05:15

## 2024-12-10 RX ADMIN — METOPROLOL TARTRATE 50 MILLIGRAM(S): 100 TABLET, FILM COATED ORAL at 05:15

## 2024-12-10 RX ADMIN — Medication 2 PUFF(S): at 12:21

## 2024-12-10 RX ADMIN — Medication 200 GRAM(S): at 12:20

## 2024-12-10 NOTE — DISCHARGE NOTE NURSING/CASE MANAGEMENT/SOCIAL WORK - NSDCVIVACCINE_GEN_ALL_CORE_FT
influenza, injectable, quadrivalent, preservative free; 03-Oct-2018 10:13; Bony Gamino (MARCO); imgfave; T57EA (Exp. Date: 30-Jun-2019); IntraMuscular; Deltoid Right.; 0.5 milliLiter(s); VIS (VIS Published: 07-Aug-2015, VIS Presented: 03-Oct-2018);

## 2024-12-10 NOTE — DISCHARGE NOTE NURSING/CASE MANAGEMENT/SOCIAL WORK - NSDCFUADDAPPT_GEN_ALL_CORE_FT
APPTS ARE READY TO BE MADE: [X] YES    Best Family or Patient Contact (if needed):    Additional Information about above appointments (if needed):    1: cardiology - Dr. Joyce Handler [phone]  2: PCP  3:     Other comments or requests:

## 2024-12-10 NOTE — PROGRESS NOTE ADULT - PROBLEM SELECTOR PLAN 1
12/3 Bcx revealing pan-sensitive E. faecalis   CT A/P without GI source  Micra with extremely low risk of infection    PLAN  - Ampicillin 2g q6h (12/3- 1/2/24 ) and CFX 2g q24h (12/4-1/2/25)  - will need 4 weeks IV abx after Bcx clearance. 48 hours   - s/p PICC line placement on 12/9  - Per ID, no need for CAREN to look for source

## 2024-12-10 NOTE — DISCHARGE NOTE NURSING/CASE MANAGEMENT/SOCIAL WORK - PATIENT PORTAL LINK FT
You can access the FollowMyHealth Patient Portal offered by [facility] by registering at the following website: [email or web address]. By joining [facility]’s FollowMyHealth portal, you will also be able to view your health information using other applications (apps) compatible with our system.

## 2024-12-10 NOTE — PROGRESS NOTE ADULT - ATTENDING COMMENTS
89M w/pmh COPD on 3.5L NC baseline, HTN, BPH, presents to [facility] for acute on chronic hypoxic respiratory failure secondary to a heart failure exacerbation. He says he had a recent LHC with his outpatient cardiologist (Dr. Isiah Blancas) which was normal. TTE with EF 50%. He is back on baseline O2 needs. Now found to have Enterococcus faecalis bacteremia without a GI source of infection. Continue ampicillin and ceftriaxone, he will need a prolonged course of antibiotics to treat for presumed endocarditis. Repeat blood cultures are no growth at 5 days. Per ID, ok to place picc and send home with IV antibiotics.     Incidental finding on CT AP - cirrhosis, not ETOH related. He is compensated, not in acute liver failure.    Stable for discharge home 12/10. Will add PO lasix 20mg QD for mild congestion seen on CXR. He says he has an appointment with his cardiologist next week already.
89M w/pmh COPD on 3.5L NC baseline, HTN, BPH, presents to [facility] for acute on chronic hypoxic respiratory failure likely secondary to a heart failure exacerbation. He says he had a recent LHC with his outpatient cardiologist (Dr. Isiah Blancas) which was normal. TTE with EF 50%. Stop lasix, he is back on baseline O2 needs. Now found to have Enterococcus faecalis bacteremia without a GI source of infection. Continue ampicillin and ceftriaxone, he will likely need a prolonged course of antibiotics to treat for presumed endocarditis. Repeat blood cultures are no growth at 72 hours. Per ID, ok to place picc and send home with IV antibiotics.     Incidental finding on CT AP - cirrhosis, not ETOH related. He is compensated, not in acute liver failure.    Stable for DC once IV abx are set up, hopefully 12/9. Picc line has been placed at bedside.
89M w/pmh COPD on 3.5L NC baseline, HTN, BPH, presents to [facility] for acute on chronic hypoxic respiratory failure likely secondary to a heart failure exacerbation. He says he had a recent LHC with his outpatient cardiologist (Dr. Isiah Blancas) which was normal. TTE with EF 50%. Stop lasix, he is back on baseline O2 needs. Now found to have Enterococcus faecalis bacteremia without a GI source of infection. Continue ampicillin and ceftriaxone, he will likely need a prolonged course of antibiotics to treat for presumed endocarditis. Repeat blood cultures are no growth at 24 hours. Will confirm with ID it's ok to place picc and confirm his home abx prescription.    Incidental finding on CT AP - cirrhosis, not ETOH related. He is compensated, not in acute liver failure.    Discharge either today or tomorrow.
89M w/pmh COPD on 3.5L NC baseline, HTN, BPH, presents to [facility] for acute on chronic hypoxic respiratory failure likely secondary to a heart failure exacerbation. He says he had a recent LHC with his outpatient cardiologist (Dr. Isiah Blancas) which was normal. TTE with EF 50%. Stop lasix, he is back on baseline O2 needs. Now found to have Enterococcus faecalis bacteremia without a GI source of infection. Continue ampicillin and ceftriaxone, he will likely need a prolonged course of antibiotics to treat for presumed endocarditis. Follow up repeat blood cultures drawn today.    Incidental finding on CT AP - cirrhosis, not ETOH related. He is compensated, not in acute liver failure.
89M w/pmh COPD on 3.5L NC baseline, HTN, BPH, presents to [facility] for acute on chronic hypoxic respiratory failure likely secondary to a heart failure exacerbation. He says he had a recent LHC with his outpatient cardiologist (Dr. Isiah Blancas) which was normal. TTE with EF 50%. Stop lasix, he is back on baseline O2 needs. Now found to have Enterococcus faecalis bacteremia without a GI source of infection. Continue ampicillin and ceftriaxone, he will likely need a prolonged course of antibiotics to treat for presumed endocarditis. Repeat blood cultures are no growth at 48 hours. Per ID, ok to place midline and send home with IV antibiotics.     Incidental finding on CT AP - cirrhosis, not ETOH related. He is compensated, not in acute liver failure.    Stable for DC once midline is placed and IV abx are set up.
89M w/pmh COPD on 3.5L NC baseline, HTN, BPH, presents to [facility] for acute on chronic hypoxic respiratory failure likely secondary to a heart failure exacerbation. He says he had a recent LHC with his outpatient cardiologist (Dr. Isiah Blancas) which was normal. TTE with EF 50%. Stop lasix, he is back on baseline O2 needs. Now found to have Enterococcus faecalis bacteremia without a GI source of infection. Continue ampicillin and ceftriaxone, he will likely need a prolonged course of antibiotics to treat for presumed endocarditis. Repeat blood cultures are no growth at 72 hours. Per ID, ok to place midline or picc and send home with IV antibiotics.     Incidental finding on CT AP - cirrhosis, not ETOH related. He is compensated, not in acute liver failure.    Stable for DC once picc is placed and IV abx are set up, hopefully 12/9.
89M w/pmh COPD on 3.5L NC baseline, HTN, BPH, presents to [facility] for acute on chronic hypoxic respiratory failure likely secondary to a heart failure exacerbation. He says he had a recent LHC with his outpatient cardiologist (Dr. Isiah Blancas) which was normal. TTE with EF 50%. Stop lasix, he is back on baseline O2 needs. Now found to have Enterococcus faecalis bacteremia - check CT A/P for GI sources. Continue ceftriaxone, he wIll likely need a prolonged course of antibiotics. Follow up repeat blood cultures tomorrow.

## 2024-12-10 NOTE — PROGRESS NOTE ADULT - SUBJECTIVE AND OBJECTIVE BOX
PROGRESS NOTE:     Patient is a 89y old  Male who presents with a chief complaint of Shortness of Breath (09 Dec 2024 08:18)      ---------------------------------------------------  Barbara Perez  PGY-1, Internal Medicine  Available on [employer] Teams  ---------------------------------------------------    INTERVAL / OVERNIGHT EVENTS:  Pt had chest pain overnight again. Toradol given with relief     SUBJECTIVE:  Patient examined at bedside with no acute complaints.     BRIEF DAILY PLAN:  - dispo     MEDICATIONS  (STANDING):  ampicillin  IVPB 2 Gram(s) IV Intermittent every 4 hours  cefTRIAXone   IVPB      cefTRIAXone   IVPB 2000 milliGRAM(s) IV Intermittent every 12 hours  ferrous    sulfate 325 milliGRAM(s) Oral daily  fluticasone propionate/ salmeterol 100-50 MICROgram(s) Diskus 1 Dose(s) Inhalation two times a day  losartan 50 milliGRAM(s) Oral daily  metoprolol succinate ER 50 milliGRAM(s) Oral daily  rivaroxaban 20 milliGRAM(s) Oral with dinner  tamsulosin 0.8 milliGRAM(s) Oral at bedtime  tiotropium 2.5 MICROgram(s) Inhaler 2 Puff(s) Inhalation daily    MEDICATIONS  (PRN):  acetaminophen     Tablet .. 650 milliGRAM(s) Oral every 6 hours PRN Temp greater or equal to 38C (100.4F), Mild Pain (1 - 3), Moderate Pain (4 - 6)  albuterol/ipratropium for Nebulization 3 milliLiter(s) Nebulizer every 6 hours PRN Shortness of Breath and/or Wheezing      CAPILLARY BLOOD GLUCOSE        I&O's Summary    09 Dec 2024 07:01  -  10 Dec 2024 07:00  --------------------------------------------------------  IN: 850 mL / OUT: 350 mL / NET: 500 mL        VITALS:   T(C): 36.8 (12-10-24 @ 06:15), Max: 37.3 (12-09-24 @ 20:44)  HR: 69 (12-10-24 @ 06:15) (69 - 84)  BP: 112/61 (12-10-24 @ 06:15) (105/54 - 145/67)  RR: 18 (12-10-24 @ 06:15) (18 - 18)  SpO2: 100% (12-10-24 @ 06:15) (95% - 100%)    GENERAL: NAD, sitting in bed comfortably on 4L NC   HEART: Regular rate and rhythm   LUNGS: Unlabored respirations.    ABDOMEN: Soft, nontender, nondistended, +BS  EXTREMITIES: 2+ peripheral pulses bilaterally. PICC In place   NERVOUS SYSTEM:  A&Ox3, no focal deficits        LABS:                        11.5   8.16  )-----------( 150      ( 10 Dec 2024 06:35 )             35.6     12-10    138  |  102  |  19  ----------------------------<  110[H]  3.7   |  20[L]  |  1.12    Ca    8.5      10 Dec 2024 06:35    TPro  5.9[L]  /  Alb  3.4  /  TBili  0.9  /  DBili  x   /  AST  14  /  ALT  11  /  AlkPhos  60  12-10          Urinalysis Basic - ( 10 Dec 2024 06:35 )    Color: x / Appearance: x / SG: x / pH: x  Gluc: 110 mg/dL / Ketone: x  / Bili: x / Urobili: x   Blood: x / Protein: x / Nitrite: x   Leuk Esterase: x / RBC: x / WBC x   Sq Epi: x / Non Sq Epi: x / Bacteria: x       PROGRESS NOTE:     Patient is a 89y old  Male who presents with a chief complaint of Shortness of Breath (09 Dec 2024 08:18)      ---------------------------------------------------  Barbara Perez  PGY-1, Internal Medicine  Available on [employer] Teams  ---------------------------------------------------    INTERVAL / OVERNIGHT EVENTS:  Pt had chest pain overnight again. Toradol given with relief    SUBJECTIVE:  Patient examined at bedside with no acute complaints. Pt denies any CP, SOB, N.V.When I arrived at bedside, pt on 5L NC but able to wean back to 4L.     BRIEF DAILY PLAN:  - dispo     MEDICATIONS  (STANDING):  ampicillin  IVPB 2 Gram(s) IV Intermittent every 4 hours  cefTRIAXone   IVPB      cefTRIAXone   IVPB 2000 milliGRAM(s) IV Intermittent every 12 hours  ferrous    sulfate 325 milliGRAM(s) Oral daily  fluticasone propionate/ salmeterol 100-50 MICROgram(s) Diskus 1 Dose(s) Inhalation two times a day  losartan 50 milliGRAM(s) Oral daily  metoprolol succinate ER 50 milliGRAM(s) Oral daily  rivaroxaban 20 milliGRAM(s) Oral with dinner  tamsulosin 0.8 milliGRAM(s) Oral at bedtime  tiotropium 2.5 MICROgram(s) Inhaler 2 Puff(s) Inhalation daily    MEDICATIONS  (PRN):  acetaminophen     Tablet .. 650 milliGRAM(s) Oral every 6 hours PRN Temp greater or equal to 38C (100.4F), Mild Pain (1 - 3), Moderate Pain (4 - 6)  albuterol/ipratropium for Nebulization 3 milliLiter(s) Nebulizer every 6 hours PRN Shortness of Breath and/or Wheezing      CAPILLARY BLOOD GLUCOSE        I&O's Summary    09 Dec 2024 07:01  -  10 Dec 2024 07:00  --------------------------------------------------------  IN: 850 mL / OUT: 350 mL / NET: 500 mL        VITALS:   T(C): 36.8 (12-10-24 @ 06:15), Max: 37.3 (12-09-24 @ 20:44)  HR: 69 (12-10-24 @ 06:15) (69 - 84)  BP: 112/61 (12-10-24 @ 06:15) (105/54 - 145/67)  RR: 18 (12-10-24 @ 06:15) (18 - 18)  SpO2: 100% (12-10-24 @ 06:15) (95% - 100%)    GENERAL: NAD, sitting in bed comfortably on 4L NC   HEART: Regular rate and rhythm   LUNGS: Unlabored respirations.    ABDOMEN: Soft, nontender, nondistended, +BS  EXTREMITIES: 2+ peripheral pulses bilaterally. PICC In place   NERVOUS SYSTEM:  A&Ox3, no focal deficits        LABS:                        11.5   8.16  )-----------( 150      ( 10 Dec 2024 06:35 )             35.6     12-10    138  |  102  |  19  ----------------------------<  110[H]  3.7   |  20[L]  |  1.12    Ca    8.5      10 Dec 2024 06:35    TPro  5.9[L]  /  Alb  3.4  /  TBili  0.9  /  DBili  x   /  AST  14  /  ALT  11  /  AlkPhos  60  12-10          Urinalysis Basic - ( 10 Dec 2024 06:35 )    Color: x / Appearance: x / SG: x / pH: x  Gluc: 110 mg/dL / Ketone: x  / Bili: x / Urobili: x   Blood: x / Protein: x / Nitrite: x   Leuk Esterase: x / RBC: x / WBC x   Sq Epi: x / Non Sq Epi: x / Bacteria: x

## 2024-12-10 NOTE — DISCHARGE NOTE NURSING/CASE MANAGEMENT/SOCIAL WORK - FINANCIAL ASSISTANCE
[facility] provides services at a reduced cost to those who are determined to be eligible through [facility]’s financial assistance program. Information regarding [facility]’s financial assistance program can be found by going to [email or web address] or by calling [phone].

## 2024-12-10 NOTE — PROGRESS NOTE ADULT - PROVIDER SPECIALTY LIST ADULT
Infectious Disease
Infectious Disease
Internal Medicine
Internal Medicine
Infectious Disease
Infectious Disease
Internal Medicine

## 2024-12-11 ENCOUNTER — TRANSCRIPTION ENCOUNTER (OUTPATIENT)
Age: 89
End: 2024-12-11

## 2024-12-13 ENCOUNTER — TRANSCRIPTION ENCOUNTER (OUTPATIENT)
Age: 89
End: 2024-12-13

## 2024-12-16 ENCOUNTER — NON-APPOINTMENT (OUTPATIENT)
Age: 89
End: 2024-12-16

## 2024-12-16 ENCOUNTER — APPOINTMENT (OUTPATIENT)
Dept: ELECTROPHYSIOLOGY | Facility: CLINIC | Age: 89
End: 2024-12-16

## 2024-12-16 ENCOUNTER — TRANSCRIPTION ENCOUNTER (OUTPATIENT)
Age: 89
End: 2024-12-16

## 2024-12-16 PROCEDURE — 93296 REM INTERROG EVL PM/IDS: CPT

## 2024-12-16 PROCEDURE — 93294 REM INTERROG EVL PM/LDLS PM: CPT

## 2024-12-18 ENCOUNTER — APPOINTMENT (OUTPATIENT)
Dept: CARE COORDINATION | Facility: HOME HEALTH | Age: 89
End: 2024-12-18
Payer: MEDICARE

## 2024-12-18 VITALS
SYSTOLIC BLOOD PRESSURE: 123 MMHG | DIASTOLIC BLOOD PRESSURE: 62 MMHG | TEMPERATURE: 97.8 F | HEART RATE: 71 BPM | RESPIRATION RATE: 16 BRPM | OXYGEN SATURATION: 90 %

## 2024-12-18 DIAGNOSIS — J44.1 CHRONIC OBSTRUCTIVE PULMONARY DISEASE WITH (ACUTE) EXACERBATION: ICD-10-CM

## 2024-12-18 DIAGNOSIS — I48.91 UNSPECIFIED ATRIAL FIBRILLATION: ICD-10-CM

## 2024-12-18 DIAGNOSIS — R78.81 BACTEREMIA: ICD-10-CM

## 2024-12-18 DIAGNOSIS — I50.21 ACUTE SYSTOLIC (CONGESTIVE) HEART FAILURE: ICD-10-CM

## 2024-12-18 PROCEDURE — 99349 HOME/RES VST EST MOD MDM 40: CPT

## 2024-12-22 NOTE — ED PROCEDURE NOTE - CPROC ED INFORMED CONSENT1
----- Message from Chiqui Kessler LPN sent at [id number]  2:46 PM EDT -----  Regarding: covid booster  03/24/22 2:46 PM    Hello, our patient Aj Bui has had covid booster (moderna) completed/performed  Please assist in updating the patient chart by making an External outreach to [facility] located in [place], [place] The date of service is march 2022      Thank you,  Chiqui Kessler LPN   BARI MOTA
Upon review of the In Basket request and the patient's chart, initial outreach has been made via fax, please see Contacts section for details       Thank you  Benito Hsu, [address] [place] [address] [facility] [phone] Fax [phone]
Upon review of the In Basket request we were able to locate, review, and update the patient chart as requested for Immunization(s) covid  Any additional questions or concerns should be emailed to the Practice Liaisons via [email or web address] email, please do not reply via In Basket      Thank you  Alma Mayen
Benefits, risks, and possible complications of procedure explained to patient/caregiver who verbalized understanding and gave verbal consent.

## 2024-12-22 NOTE — ED PROVIDER NOTE - ATTENDING CONTRIBUTION TO CARE
Attending MD Bianca Salinas:  I personally have seen and examined this patient.  Resident note reviewed and agree on plan of care and except where noted.  See HPI, PE, and MDM for details.

## 2024-12-22 NOTE — H&P ADULT - HISTORY OF PRESENT ILLNESS
89 yo M with COPD (on home 3L), HFrEF (12/24 EF 45%) Afib on xarelto s/p PPM, BPH, HTN, HLD p/w SOB x 2 weeks. Of note pt was recently admitted to [facility] 12/3-12/10  for AHRF 2/2 CHF exacerbation c/b 4/4 positive bcx for Enterococcus faecalis d/c on PICC  on amp and CTX for 4 weeks (12/3/24- 1/2/24) for presumed endocarditis as CAREN was deemed non-essential as he is a poor surgical candidate. Since his d/c, pt states he's b 88 yo M with COPD (on home 3L), HFrEF (12/24 EF 45%) Afib on xarelto s/p PPM, BPH, HTN, HLD p/w SOB x 2 weeks. Of note pt was recently admitted to [facility] 12/3-12/10  for AHRF 2/2 CHF exacerbation c/b 4/4 positive bcx for Enterococcus faecalis d/c on PICC  on amp and CTX for 4 weeks (12/3/24- 1/2/24) for presumed endocarditis as CAREN was deemed non-essential as he is a poor surgical candidate. Since his d/c, pt states he's been taking all of his meds and limiting fluid intake as per recommendations. One day after d/c after showering, the patient stated he developed SOB worse with exertion. The SOB progressed to then being present at while rest and worse while laying down. Additionally, the pt noticed his legs were swollen. Pt denies fever, chills, HA, CP, productive cough, N/V, constipation, diarrhea. No recent sick contacts.  89 yo M with COPD (on home 3L), RHF, mod pHTN, mild HFrEF (12/24 EF 45%), Afib on xarelto s/p PPM, BPH, HTN, HLD, recent hospitalization for COPD vs CHF c/b efaecalis bacteremia, p/w SOB x 2 weeks.     Of note pt was recently admitted to [facility] 12/3-12/10  for AHRF 2/2 CHF exacerbation c/b 4/4 positive bcx for Enterococcus faecalis d/c on PICC  on amp and CTX for 4 weeks (12/3/24- 1/2/24) for presumed endocarditis as CAREN was deemed non-essential as he is a poor surgical candidate. Since his d/c, pt states he's been taking all of his meds and limiting fluid intake as per recommendations. One day after d/c after showering, the patient stated he developed SOB worse with exertion. The SOB progressed to then being present at while rest and worse while laying down. Additionally, the pt noticed his legs were swollen. Pt denies fever, chills, HA, CP, productive cough, N/V, constipation, diarrhea. No recent sick contacts.

## 2024-12-22 NOTE — H&P ADULT - PROBLEM SELECTOR PLAN 2
- from last admission, 4/4 bcx Enterococcus   - empirically tx for endocarditis per last ID note as TTE was deemed non-essential as pt is a poor surgical candidate  - 4 weeks from last cleared culture (12/5/24-1/2/25)    Plan  - c/w amp and CTX

## 2024-12-22 NOTE — H&P ADULT - PROBLEM SELECTOR PLAN 4
- c/w xarelto - c/w home losartan and toprolol - mild tropinemia -> likely demand ischemia -> f/u OP for ischemic eval   - hold losartan iso incr diuresis  - c/w home toprolol

## 2024-12-22 NOTE — ED PROVIDER NOTE - PROGRESS NOTE DETAILS
Attending Bianca Salinas: Patient feeling better on BiPAP.  Patient does have ampicillin running through IV in his right arm.  Reviewed prior cultures patient has grown Enterococcus in the past and follows with Dr. Garcia.  Patient has no stated leukocytosis but does have a significantly elevated lactate which could be in the setting of breathing treatments however with concern for possible infection and increased work of breathing will cover with antibiotics pending cultures and further results.  Point-of-care ultrasound performed showing RV enlargement as well as a plethoric IVC, scattered B-lines and bilateral pleural effusions.  Patient does have 2+ lower extremity pitting edema.  Will give a dose of diuresis.  Patient likely has right-sided heart dysfunction secondary to advanced COPD.

## 2024-12-22 NOTE — H&P ADULT - PROBLEM SELECTOR PLAN 7
Diet: NPO while on bipap  DVT: xarelto  Dispo: pending PT   Code status:   Communication: Diet: DASH   DVT: xarelto  Dispo: pending PT   Code status:   Communication:

## 2024-12-22 NOTE — ED PROVIDER NOTE - CARE PLAN
Principal Discharge DX:	Acute hypoxic respiratory failure   1 Principal Discharge DX:	CHF exacerbation  Secondary Diagnosis:	Acute hypoxic respiratory failure

## 2024-12-22 NOTE — ED ADULT NURSE NOTE - NSFALLHARMRISKINTERV_ED_ALL_ED

## 2024-12-22 NOTE — ED ADULT NURSE NOTE - NSICDXPASTMEDICALHX_GEN_ALL_CORE_FT
PAST MEDICAL HISTORY:  Benign prostatic hyperplasia with nocturia     Chronic obstructive pulmonary disease, unspecified COPD type     Essential hypertension     Former smoker, stopped smoking many years ago 2PPD X 50yrs    Idiopathic pulmonary fibrosis     Osteoarthritis of multiple joints, unspecified osteoarthritis type     Pacemaker [employer] Model [id number]  Serial [id number]    Persistent atrial fibrillation

## 2024-12-22 NOTE — H&P ADULT - PROBLEM SELECTOR PLAN 1
- progressive SOB, +orthopnea, +LE edema  - no productive cough  - ddx: CHF vs PNA vs COPD     Plan  - s/p zoysn , amp, Duoneb, lasix 40 IV   - another dose of IV lasix ordered  - re-dose in the AM based on I/O  - f/u bcx  - afebrile, no WBC, non-toxic appearing -> monitor off abx  - standing wts   - strict I/O - progressive SOB, +orthopnea, +LE edema  - no productive cough  - - SOB improved after 1 dose of lasix per pt suggesting CHF as etiology  - ddx: CHF vs PNA vs COPD     Plan  - s/p zoysn , amp, Duoneb, lasix 40 IV   - another dose of IV lasix 40 ordered  - re-dose in the AM based on I/O  - f/u bcx  - afebrile, no WBC, non-toxic appearing -> monitor off abx  - standing wts   - strict I/O - progressive SOB, +orthopnea, +LE edema  - no productive cough  - - SOB improved after 1 dose of lasix per pt suggesting CHF as etiology  - ddx: CHF vs PNA vs COPD   - low     Plan  - s/p zoysn , amp, Duoneb, lasix 40 IV   - another dose of IV lasix 40 ordered  - start IV lasix 40 qd   - f/u bcx  - afebrile, no WBC, non-toxic appearing -> monitor off abx  - standing wts   - strict I/O - progressive SOB, +orthopnea, +LE edema  - no productive cough  - - SOB improved after 1 dose of lasix per pt suggesting CHF as etiology  - ddx: CHF vs PNA vs COPD   - low     Plan  - s/p zoysn , amp, Duoneb, lasix 40 IV   - another dose of IV lasix 40 ordered  - start IV lasix 40 qd   - wean oxygen support as tolerated   - f/u bcx  - afebrile, no WBC, non-toxic appearing -> monitor off abx  - standing wts   - strict I/O - progressive SOB, +orthopnea, +LE edema  - no productive cough  - - SOB improved after 1 dose of lasix per pt suggesting CHF as etiology  - ddx: CHF vs PNA vs COPD   - low     Plan  - s/p zoysn , amp, Duoneb, lasix 40 IV   - another dose of IV lasix 40 ordered  - start IV lasix 40 qd   - wean oxygen support as tolerated   - f/u bcx  - afebrile, no WBC, non-toxic appearing -> c/w abx for empiric endocarditis (see below)  - standing wts   - strict I/O - progressive SOB, +orthopnea, +LE edema  - no productive cough  - - SOB improved after 1 dose of lasix per pt suggesting CHF as etiology  - ddx: CHF vs PNA vs COPD   - low suspicion for PE as pt on a/c    Plan  - s/p zoysn , amp, Duoneb, lasix 40 IV   - another dose of IV lasix 40 ordered  - start IV lasix 40 qd   - wean oxygen support as tolerated   - f/u bcx  - afebrile, no WBC, non-toxic appearing -> c/w abx for empiric endocarditis (see below)  - standing wts   - strict I/O

## 2024-12-22 NOTE — ED PROVIDER NOTE - PHYSICAL EXAMINATION
General: alert, oriented to person, time, place  Psych: mood appropriate  Head: normocephalic; atraumatic  Eyes: conjunctivae clear bilaterally, sclerae anicteric  ENT: no nasal flaring, patent nares  Cardio: RRR, no m/r/g, pulses 2+ b/l  Resp: bilateral rales  GI: soft/nondistended/nontender  Neuro: normal sensation, moving all four extremities equally  Skin: No evidence of rash or bruising  MSK: normal movement of all extremities  Lymph/Vasc: bilateral pitting LE edema General: alert, oriented to person, time, place  Psych: mood appropriate  Head: normocephalic; atraumatic  Eyes: conjunctivae clear bilaterally, sclerae anicteric  ENT: no nasal flaring, patent nares  Cardio: RRR, no m/r/g, pulses 2+ b/l  Resp: bilateral rales  GI: soft/nondistended/nontender  Neuro: normal sensation, moving all four extremities equally  Skin: No evidence of rash or bruising  MSK: normal movement of all extremities  Lymph/Vasc: bilateral pitting LE edema  Attending Bianca Salinas: Gen: increased wob, heent: atrauamtic, eomi, , neck; nttp, no nuchal rigidity, chest: nttp, no crepitus, cv: rrr,, lungs: decreased at bases, abd: soft, nontender, nondistended, no peritoneal signs, , no guarding, ext: wwp, bl LE pitting edema, skin: no rash, neuro: awake and alert, following commands, speech clear, sensation and strength intact, no focal deficits

## 2024-12-22 NOTE — ED PROVIDER NOTE - CLINICAL SUMMARY MEDICAL DECISION MAKING FREE TEXT BOX
The patient has dyspnea and/or increased work of breathing. Initial vital signs demonstrate relative hypoxia with 10L NRB. A chest x-ray is ordered to evaluate for a pleural effusion, pneumothorax, or focal consolidation. There is concern for a COPD vs CHF exacerbation, will start patient in BiPAP, check gas for prognostication. We will continue to monitor their vitals on pulse oximetry as clinically indicated. The patient has dyspnea and/or increased work of breathing. Initial vital signs demonstrate relative hypoxia with 10L NRB. A chest x-ray is ordered to evaluate for a pleural effusion, pneumothorax, or focal consolidation. There is concern for a COPD vs CHF exacerbation, will start patient in BiPAP, check gas for prognostication. We will continue to monitor their vitals on pulse oximetry as clinically indicated.    PCO2 is 49. Initial lactate is 3.5, patient has fluid on XR and US, will give diuretic. Patient to be admitted, stable on BiPAP FiO2 30%. The patient has dyspnea and/or increased work of breathing. Initial vital signs demonstrate relative hypoxia with 10L NRB. A chest x-ray is ordered to evaluate for a pleural effusion, pneumothorax, or focal consolidation. There is concern for a COPD vs CHF exacerbation, will start patient in BiPAP, check gas for prognostication. We will continue to monitor their vitals on pulse oximetry as clinically indicated.    PCO2 is 49. Initial lactate is 3.5, patient has fluid on XR and US, will give diuretic. Patient to be admitted, stable on BiPAP FiO2 30%.  Attending Bianca Salinas: 89 yo male with multiple medical issues including h/o CHF, COPD, presenting with concern for sob and difficulty breathibng. upon arrival pt with increased wob. pocus performed showing RV enlargement, plethroic IVC< bl pleural effusions. concern for combination of CHF and COPD. pt with bl LE pitting edema. less likely PE. pt with prior GOC discussions is dnr/dni but okay for bipap. given lasix, afebrile and no producitve cough, less likely acute pna. will give diuretic, duonebs. pt is receiving abx, reviewed prior charts has had enterococcus. will also send cultures. pt will need admission.

## 2024-12-22 NOTE — H&P ADULT - NSHPREVIEWOFSYSTEMS_GEN_ALL_CORE
REVIEW OF SYSTEMS:    CONSTITUTIONAL: No weakness, fevers or chills  EYES: No vision changes  ENT: No vertigo or throat pain   NECK: No pain or stiffness  RESPIRATORY: +SOB. No cough, wheezing, hemoptysis  CARDIOVASCULAR: +LE edema. No chest pain or palpitations  GASTROINTESTINAL: No abdominal or epigastric pain. No nausea, vomiting, or hematemesis; No diarrhea or constipation. No melena or hematochezia.  GENITOURINARY: No dysuria, frequency or hematuria  NEUROLOGICAL: No numbness or weakness  PSYCH: No anxiety, depression, or behavior changes  All other review of systems is negative unless indicated above.

## 2024-12-22 NOTE — H&P ADULT - NSICDXPASTMEDICALHX_GEN_ALL_CORE_FT
PAST MEDICAL HISTORY:  Benign prostatic hyperplasia with nocturia     Chronic obstructive pulmonary disease, unspecified COPD type     Essential hypertension     Former smoker, stopped smoking many years ago 2PPD X 50yrs    Idiopathic pulmonary fibrosis     Osteoarthritis of multiple joints, unspecified osteoarthritis type     Pacemaker [employer] Model [id number]  Serial [id number]    Persistent atrial fibrillation

## 2024-12-22 NOTE — H&P ADULT - NSHPPHYSICALEXAM_GEN_ALL_CORE
VITALS:   T(C): 36.6 (12-22-24 @ 18:38), Max: 36.7 (12-22-24 @ 17:00)  HR: 74 (12-22-24 @ 18:38) (73 - 94)  BP: 126/74 (12-22-24 @ 18:38) (117/74 - 144/95)  RR: 20 (12-22-24 @ 18:38) (20 - 30)  SpO2: 100% (12-22-24 @ 18:38) (94% - 100%)    GENERAL: NAD, lying in bed comfortably  HEAD:  Atraumatic, normocephalic  EYES: EOMI, PERRLA, conjunctiva and sclera clear  ENT: Moist mucous membranes  NECK: Supple, no JVD  HEART: Regular rate and rhythm, no murmurs, rubs, or gallops  LUNGS: Unlabored respirations.    ABDOMEN: Soft, nontender, nondistended, +BS  EXTREMITIES: 2+ peripheral pulses bilaterally. 2+ pitting edema LE   NERVOUS SYSTEM:  A&Ox3, no focal deficits   SKIN: No rashes or lesions

## 2024-12-22 NOTE — PATIENT PROFILE ADULT - FALL HARM RISK - HARM RISK INTERVENTIONS

## 2024-12-22 NOTE — H&P ADULT - PROBLEM SELECTOR PLAN 6
Diet: NPO while on bipap  DVT: xarelto  Dispo: pending PT   Code status:   Communication: - c/w home losartan, toprolol, amlodipine - hold losartan and amlodipine iso incr diuresis  - c/w home toprolol

## 2024-12-22 NOTE — ED PROVIDER NOTE - OBJECTIVE STATEMENT
89-year-old male with past medical history of severe COPD on home oxygen, atrial fibrillation with pacemaker in place, hypertension, congestive heart failure, presents to the emergency department due to worsening shortness of breath since his discharge on December 10.  At that previous visit, he was admitted for a combination of COPD exacerbation and CHF.  He was also found to have gram-positive cultures in all 4 bottles, started on ampicillin and ceftriaxone, discharged with PICC line in place.

## 2024-12-22 NOTE — ED CLERICAL - NS ED CLERK NOTE PRE-ARRIVAL INFORMATION; ADDITIONAL PRE-ARRIVAL INFORMATION
This patient is enrolled in the COPD or PNA STARS readmission program and has active care navigation.  Please call the contact number above to speak with the Care navigation team to obtain additional clinical information regarding this patient and/or to arrange close clinical follow up within 24 hours. Between the hours of 8A and 5P, Monday through Friday, a Hospital Medicine attending will meet with the ED care team to provide relevant clinical information, and expedite appropriate follow-up if there is an opportunity for discharge from the ED. Off hours, you may contact the Hospitalist-In-Charge at pager [phone] for assistance. For expedited pulmonary follow-up appointments, you may email [email or web address]. Please include in this email: patient name, date of birth, MRN and contact information.

## 2024-12-22 NOTE — H&P ADULT - NSHPSOCIALHISTORY_GEN_ALL_CORE
-Former smoker but has not smoked in 35 years   -Social ETOH use   -Denies IVDU or other substances   -Lives home alone with home health aid who is there everyday for 6 hours a day   -Both adult children are very involved in ongoing care

## 2024-12-22 NOTE — ED ADULT NURSE NOTE - OBJECTIVE STATEMENT
Pt is 88 y/o male, presenting to the ED c/o SOB via EMS. Pt's daughter @ bedside. As per daughter, d/c'ed about a week ago for bacteremia, sent home w/ RUE PICC and continuous abx infusion of ampicillin. Pt has PMH of COPD, CHF, HTN, and Afib. As per daughter, pt on baseline of 4L NC. Pt baseline in low 90s for O2 saturation. Daughter reports, pt has progressively gotten worse, w/ increased work of breathing. Upon arrival to ED, pt tachypneic, pulling, use of accessory muscles, and unable to speak in full sentences. Pt placed on continuous pulse ox, RT called to place pt on BiPAP. Pt moves all extremities w/ = strength, skin warm, dry, and intact w/ + peripheral pulses. Pt placed on continuous cardiac monitor, EKG done, given to MD. PICC in RUE draws back, abx continuous running through. Pt to receive x1 MD kayleigh @ bedside. Safety and comfort measures maintained- bed in lowest position, locked, and blanket given. Daughter @ bedside.

## 2024-12-22 NOTE — H&P ADULT - REASON FOR ADMISSION
Paula Durbin  [address]   [place] [address]    Your procedure is on 3/15/23 Anticipated Arrival Time 11:45am  Location: [address], [place]  Procedure Name:  Radiofrequency Ablation Medial Branch/Dorsal Ramus, Left/Right, Lumbar, L3, L4, L5    We understand that you are looking forward to pain relief, yet your safety and comfort is important to us during your procedure.  Your care team will assess you the day of your procedure.     If any of the below occur on the day of the procedure, you may be asked to reschedule:   • low or high blood pressure   • low or high blood sugar  • irregular heartbeat   • recent or current infection   • not holding certain medications as instructed  • not fasting (eating or drinking) for procedures that you are getting sedation for  • any other medical reason which your care team feels it is best to delay your procedure    The time listed above is an estimated time. Our pre-procedural department will call you 1-2 days prior to your procedure to confirm your arrival time.  Please note- if your procedure is scheduled near the end of the day, you may be asked to move your procedure to an earlier time due to changes in the schedule and/or possibly reschedule to a different date.    If you are receiving sedation (something to help you relax) or having an epidural steroid injection, you must have a responsible adult  to take you home after your procedure. If you do not have a responsible adult , your procedure may be cancelled. If you choose to take the bus, cab, Uber, etc. and do not have a responsible adult to accompany you, your procedure may be cancelled.     Please notify the office immediately if:  • You develop an infection of any kind  • You have a fever within 7 days of your procedure  • You start any new medications since you were seen in the office by the pain management provider, including antibiotics,    • Received any dose of the COVID vaccine in the past two weeks or planning on getting a dosage within the next 2 weeks   • No longer have any pain and wish to cancel the procedure    According to [facility]’s cancellation policy, if you need to change a scheduled appointment, you must call the Pain Management Department  at least 48 hours before your appointment to cancel or reschedule.     Paula is scheduled with local anesthetic. Patient is educated that it is not necessary to withhold from eating or drinking the day of their procedure.    You do not have any medications that need to be stopped for this procedure.    Medication Name and Days to Hold:  Writer reviewed all patient medications. Patient was educated to continue medications due to no required medication hold for scheduled procedure.     Do you have any history of bleeding or clotting disorders? No  Do you have any of the following? n/a    Please contact your insurance to verify benefits/coverage. If you have any insurance changes prior to your scheduled procedure, please contact our office.     Please note that an authorization/pre-approval obtained by our team is not a guarantee of payment. Any additional financial or billing questions, you can call our Patient Contact Center at [phone].    If you have any questions regarding these instructions, please call [facility]-Pain Management at [phone].        SOB

## 2024-12-22 NOTE — H&P ADULT - NSICDXPASTSURGICALHX_GEN_ALL_CORE_FT
Pt called clinic back to discuss SOB. States that she believes it is still her bronchitis hanging on as she is still very stuffed up and fatigued. States that with exertion, her breath is hard to catch but it goes away with rest. Denies chest pain, dizziness, nausea, numbness/tingling.     Instructed pt to follow up with PCP as she saw them on 3/2 for her bronchitis and the OV notes instructed her to f/u again in 1 week if not better. Pt verbalized understanding and will make a f/u appt with her as soon as she can.    Lindsey Walters RN     PAST SURGICAL HISTORY:  H/O amputation age 19, R great toe, due to growth "tumor"    H/O arthroscopic knee surgery Both knees, 2013    H/O cardiac pacemaker 2005    H/O repair of rotator cuff 2012

## 2024-12-22 NOTE — H&P ADULT - ASSESSMENT
88 yo M with COPD (on home 3L), HFrEF (12/24 EF 45%) Afib on xarelto s/p PPM, BPH, HTN, HLD p/w SOB admitted for AHRF 2/2 likely CHF exacerbation. Of note pt was recently admitted for CHF exaxerbation c/b 4/4 bcx Enterococcus d/c with PICC for 4 weeks (12/5-1/2) for empiric endocarditis tx.  89 yo M with COPD (on home 3L), RHF, mod pHTN, mild HFrEF (12/24 EF 45%), Afib on xarelto s/p PPM, BPH, HTN, HLD, recent hospitalization for COPD vs CHF c/b efaecalis bacteremia, p/w SOB and AHRF 2/2 likely CHF exacerbation

## 2024-12-22 NOTE — H&P ADULT - PROBLEM SELECTOR PLAN 3
- c/w home losartan and toprolol - no wheezing on exam  - SOB improved after 1 dose of lasix suggesting CHF as etiology  - low suspicion for exacerbation   - home meds: trelegy and albuterol  - start symbicort and duonebs inpt - no wheezing on exam  - SOB improved after 1 dose of lasix suggesting CHF as etiology  - low suspicion for copd exacerbation   - home meds: trelegy and albuterol  - start symbicort and duonebs inpt

## 2024-12-22 NOTE — H&P ADULT - ATTENDING COMMENTS
Pt was seen and examined during key portion of E/M service. Case discussed with resident. H&P reviewed and edited where appropriate. Other than the following, I agree with the above history, exam, assessment, and plan.  88 yo M with COPD (on home 3L), RHF, mod pHTN, mild HFrEF (12/24 EF 45%), Afib on xarelto s/p PPM, BPH, HTN, HLD, recent hospitalization for COPD vs CHF c/b efaecalis bacteremia, p/w SOB and AHRF 2/2 likely CHF exacerbation.  cont lasix 40 IV qd. cont metoprolol. hold losartan, norvasc. cont amp/ceftriaxone. cont xarelto

## 2024-12-22 NOTE — H&P ADULT - NSHPLABSRESULTS_GEN_ALL_CORE
LABS:                           11.2   8.41  )-----------( 200      ( 22 Dec 2024 15:10 )             35.8     12-22    146[H]  |  107  |  32[H]  ----------------------------<  168[H]  3.9   |  21[L]  |  1.06    Ca    9.6      22 Dec 2024 15:10  Mg     2.2     12-22    TPro  6.5  /  Alb  4.0  /  TBili  0.6  /  DBili  x   /  AST  16  /  ALT  13  /  AlkPhos  64  12-22              Urinalysis Basic - ( 22 Dec 2024 15:10 )    Color: x / Appearance: x / SG: x / pH: x  Gluc: 168 mg/dL / Ketone: x  / Bili: x / Urobili: x   Blood: x / Protein: x / Nitrite: x   Leuk Esterase: x / RBC: x / WBC x   Sq Epi: x / Non Sq Epi: x / Bacteria: x        LIVER FUNCTIONS - ( 22 Dec 2024 15:10 )  Alb: 4.0 g/dL / Pro: 6.5 g/dL / ALK PHOS: 64 U/L / ALT: 13 U/L / AST: 16 U/L / GGT: x             Blood Gas Profile w/Lytes - Venous: Performed In Lab (12-22-24 @ 14:45)    Blood Gas Profile w/Lytes - Venous: Performed In Lab (12-22-24 @ 14:45)    I&O's Summary      CARDIAC MARKERS ( 22 Dec 2024 17:58 )  x     / x     / x     / x     / 3.1 ng/mL  CARDIAC MARKERS ( 22 Dec 2024 15:10 )  x     / x     / x     / x     / 3.2 ng/mL     / Troponin T, High Sensitivity Result: 69 ng/L (12-22-24 @ 17:58)  Troponin T, High Sensitivity Result: 57 ng/L (12-22-24 @ 15:10)      CAPILLARY BLOOD GLUCOSE                MICRO:

## 2024-12-22 NOTE — PATIENT PROFILE ADULT - NSPRONUTRITIONRISK_GEN_A_NUR
Well Exam: 12-18 YR OLD MALE    Chief Complaint   Patient presents with    Office Visit     13 year        SUBJECTIVE:  Ba Norwood is a 13 year old 2011 male who presents for a well child exam.  Patient presents with Mother.    7th grade. Average student. Soccer.  Good appetite.  No sleep issues.    Seasonal allergies.  Mild exertional difficulty (stamina).           MEASUREMENT: Visit Vitals  BP 99/62   Pulse 72   Temp 97.7 °F (36.5 °C) (Temporal)   Ht 4' 11.65\" (1.515 m)   Wt 44 kg (97 lb)   BMI 19.17 kg/m²        OBJECTIVE:  PAST HISTORIES:  ALLERGIES:  Seasonal   Current Medications    ALBUTEROL 108 (90 BASE) MCG/ACT INHALER    May take 1-2 puffs every 4 - 6 hours as needed.  Do not exceed 12 puffs in 24 hours    CETIRIZINE HCL (ZYRTE CHILDRENS ALLERGY PO)        SPACER/AERO-HOLDING CHAMBERS DEVICE    Okay to sub.  Use with albuterol      History       Last reviewed in this visit by Rubén Isbell MD on 8/29/2024 at  9:28 AM      Sections Reviewed    Tobacco, Family, Medical, Surgical           IMMUNIZATION STATUS: Not up to date.  Needed immunizations include: HPV--mother to research.      RECENT HEALTH EVENTS:  Illnesses: []  Yes   [x]  None  Hospitalizations: []  Yes  [x]  None  Injuries or Accidents: []  Yes   [x]  None    REVIEW OF SYSTEMS:    Review of Systems   Constitutional:  Negative for fatigue and unexpected weight change.   Respiratory:  Negative for shortness of breath.    Cardiovascular:  Negative for chest pain.   Gastrointestinal:  Negative for abdominal pain.   Genitourinary:  Negative for frequency and urgency.   Musculoskeletal:  Negative for gait problem.   Skin:  Negative for pallor.   Neurological:  Negative for headaches.         Physical Exam:   VITAL SIGNS: Visit Vitals  BP 99/62   Pulse 72   Temp 97.7 °F (36.5 °C) (Temporal)   Ht 4' 11.65\" (1.515 m)   Wt 44 kg (97 lb)   BMI 19.17 kg/m²    61 %ile (Z= 0.27) based on CDC (Boys, 2-20 Years) weight-for-age data using data from  8/29/2024.  GENERAL:  Well appearing male child.  Alert and active.  SKIN:  Warm, normal turgor. No cyanosis. No rash.  HEAD:  Normocephalic, atraumatic.    EYES:  Conjunctivae appear normal, noninjected, nonicteric, positive red reflex.  NOSE:  Appears normal without drainage.  EARS:  Normal external auditory canals. Tympanic membranes are transparent with normal landmarks.  THROAT:  Oropharynx with moist mucous membranes without lesions.  NECK:  Supple, no lymphadenopathy or masses.  HEART:  Regular rate and rhythm.  Normal S1, S2.  No murmurs, rubs, gallops.   LUNGS:  Clear to auscultation. No wheezes, rales, rhonchi.  Normal work effort with breathing.  ABDOMEN:  Bowel sounds present. Soft, nontender. No hepatomegaly, splenomegaly or masses.  GENITOURINARY:  Alexsander stage II  EXTREMITIES: Symmetrical muscle mass, strength all extremities.  No effusions.   BACK: Spine straight. No costovertebral angle tenderness.      NEUROLOGIC:  Oriented x4. No gross lateralizing signs or focal deficits.  Normal gait.    Screenings Reviewed:  Review Flowsheet  More data may exist         8/29/2024   PHQ 2/9 Score   Peds PHQ 2 Score 0   Peds PHQ 2 Interpretation No further screening needed   Feeling down, depressed, irritable or hopeless? Not at all   Little interest or pleasure in activity? Not at all   Peds PHQ 9 Score 1   Peds PHQ 9 Interpretation Minimal Depression   Trouble falling or staying asleep or sleeping too much? Several days   Poor appetite, weight loss, or overeating? Not at all   Feeling tired or having little energy? Not at all   Feeling bad about yourself or that you are a failure or have let yourself or family down? Not at all   Trouble concentrating on things such as school work, reading, or watching TV? Not at all   Moving or speaking slowly that other people have noticed or the opposite - being so fidgety or restless that you have been moving around a lot more than usual? Not at all   Thoughts that you would  be better off dead or of hurting yourself in some way? Not at all      Details                      Patient Education  [x] Referenced Bright Futures Parent Handout  [x] Recommend routine dental exam  [x] Reviewed and discussed parent questions / concerns         ASSESSMENT:  Ba Norwood 13 year old male seen for:  Encounter Diagnoses   Name Primary?    Well adolescent visit without abnormal findings Yes                     ED Diagnosis   1. Well adolescent visit without abnormal findings           PLAN:  All parental concerns and questions discussed.  No Immunizations given.   Anticipatory guidance provided, handout/s given.Puberty  Self testicular examination  Accident prevention  School achievement  Family/Peer relationships  Regular physical activity  Healthy food choices  Suggest inhaler use prior to exertion. Follow clinical course.    ORDERS:  No orders of the defined types were placed in this encounter.       Follow up: Return in about 1 year (around 8/29/2025) for Yearly check up.    Rubén Isbell MD  [facility] [address] [address]  Dept Phone: [phone]      No indicators present

## 2024-12-22 NOTE — ED PROVIDER NOTE - NSICDXPASTMEDICALHX_GEN_ALL_CORE_FT
PAST MEDICAL HISTORY:  Benign prostatic hyperplasia with nocturia     Chronic obstructive pulmonary disease, unspecified COPD type     Essential hypertension     Former smoker, stopped smoking many years ago 2PPD X 50yrs    Idiopathic pulmonary fibrosis     Osteoarthritis of multiple joints, unspecified osteoarthritis type     Pacemaker [employer] Model [id number]  Serial [id number]    Persistent atrial fibrillation

## 2024-12-23 ENCOUNTER — APPOINTMENT (OUTPATIENT)
Dept: INFECTIOUS DISEASE | Facility: CLINIC | Age: 89
End: 2024-12-23

## 2024-12-23 PROBLEM — I50.21 ACUTE SYSTOLIC CONGESTIVE HEART FAILURE: Status: ACTIVE | Noted: 2024-12-23

## 2024-12-23 PROBLEM — J44.1 CHRONIC OBSTRUCTIVE PULMONARY DISEASE WITH ACUTE EXACERBATION: Status: ACTIVE | Noted: 2024-12-23

## 2024-12-23 PROBLEM — R78.81 BACTEREMIA: Status: ACTIVE | Noted: 2024-01-01

## 2024-12-23 RX ORDER — METOPROLOL SUCCINATE 25 MG/1
25 TABLET, EXTENDED RELEASE ORAL AT BEDTIME
Refills: 0 | Status: ACTIVE | COMMUNITY
Start: 2024-12-23

## 2024-12-23 RX ORDER — CEFTRIAXONE 2 G/1
2 INJECTION, POWDER, FOR SOLUTION INTRAMUSCULAR; INTRAVENOUS
Refills: 0 | Status: ACTIVE | COMMUNITY
Start: 2024-12-23

## 2024-12-23 RX ORDER — METOPROLOL SUCCINATE 50 MG/1
50 TABLET, EXTENDED RELEASE ORAL DAILY
Refills: 0 | Status: ACTIVE | COMMUNITY
Start: 2024-12-23

## 2024-12-23 RX ORDER — FUROSEMIDE 20 MG/1
20 TABLET ORAL DAILY
Refills: 0 | Status: ACTIVE | COMMUNITY
Start: 2024-12-23

## 2024-12-23 RX ORDER — AMPICILLIN SODIUM 2 G/1
2 INJECTION, POWDER, FOR SOLUTION INTRAVENOUS
Refills: 0 | Status: ACTIVE | COMMUNITY
Start: 2024-12-23

## 2024-12-23 RX ORDER — CHLORHEXIDINE GLUCONATE 4 %
325 (65 FE) LIQUID (ML) TOPICAL DAILY
Refills: 0 | Status: ACTIVE | COMMUNITY
Start: 2024-12-23

## 2024-12-23 NOTE — CONSULT NOTE ADULT - CONVERSATION DETAILS
Discussed recurrent hospitalizations and overdecline in functional status and increase in sx burden - thought 2/2 HF, end stage copd, and infections. Report seeing outpatient lung and heart doctors recently who did not have any further recs beyond steroids, which did not seem to help much. loss of fx and independence have been very hard for patient - make him feel hopeless at times. he expressed "wanting" to have more time but also unacceptability of his current state 2/2 symptoms and loss of independence. priorities include meaningful time with family, sx control (breathing and pain), and being mobile. home has not been a good fit due to patient feeling anxious there due to decreased level of care but also does not wish to go to a nursing home. during conversation patient became frustrated and expressed wish to have hastened death due to unacceptable qol. he also reported feeling unheard in his care. his children voiced wanting to advocate for him to be comfortable and have symptoms controlled and a nice environment. patient stating concerns about both home and facility settings. also initially resistant to trying any more treatments including sx mgmt, but eventually ammenable to having PRN opioid available for his request if he decides to try to use it.

## 2024-12-23 NOTE — PROGRESS NOTE ADULT - ASSESSMENT
88 yo M with COPD (on home 3L), RHF, mod pHTN, mild HFrEF (12/24 EF 45%), Afib on xarelto s/p PPM, BPH, HTN, HLD, recent hospitalization for COPD vs CHF c/b efaecalis bacteremia, p/w SOB and AHRF 2/2 likely CHF exacerbation 89 yo M with COPD (on home 3L), RHF, mod pHTN, mild HFrEF (12/24 EF 45%), Afib on xarelto s/p PPM, BPH, HTN, HLD, recent hospitalization for COPD vs CHF c/b efaecalis bacteremia, p/w SOB and AHRF 2/2 likely CHF exacerbation

## 2024-12-23 NOTE — PROGRESS NOTE ADULT - ATTENDING COMMENTS
89 yo M with COPD (on home 3L), RHF, mod pHTN, mild HFrEF (12/24 EF 45%), Afib on xarelto s/p PPM, BPH, HTN, HLD, recent hospitalization for COPD vs CHF c/b efaecalis bacteremia, p/w SOB and AHRF 2/2 likely HFrEF exacerbation.    # acute on chronic HFrEF  # acute on chronic respiratory failure with hypoxia  # pulmonary HTN  # chronic AFib on xarelto  # COPD on 3L NC  # endocarditis/Enterococcus faecalis bacteremia on IV abx    - pt with recent admission for enterococcus faecalis bacteremia with presumed endocarditis, discharged on IV abx course presents with worsening dyspnea and LE swelling, admitted for acute on chronic HFrEF exacerbation  - s/p IV lasix in ED with improvement in LE edema as well as respiratory status  - continue IV lasix 40mg daily and monitor volume status  - palliative care input appreciated: both patient and daughter understand the guarded prognosis and interested in hospice at this time. Code status confirmed and DNR/DNI.   - continue to wean off O2 to his home regimen 3L NC as tolerated  - OOB to chair    updated daughter at bedside    Maria Luisa Story MD  Division of Hospital Medicine  Contact via [employer] Teams  Office: [phone]    I have personally and independently provided 51 minutes in patient encounter, reviewing tests and imaging, independently obtaining a history, performing a physical examination, discussing the plan with the patient, ordering medications/tests, documenting clinical information, and coordinating care. This excludes any time spent on teaching.

## 2024-12-23 NOTE — CONSULT NOTE ADULT - ASSESSMENT
89M COPD (3L home O2), pHTN, HFrEF, afib (AC) s/p PPM, BPH. Recent admission for bacteremia and COPD/CHF exacerbation, empirically being treated for endocarditis given not surgical candidate. Readmitted 12/22/2024 for HF exacerbation. Palliative care consulted for [facility].

## 2024-12-23 NOTE — CONSULT NOTE ADULT - NS ATTEST RISK PROBLEM GEN_ALL_CORE FT
1. Number and complexity of problems addressed for this patient:    1.1 Moderate (At least 1)  [ ] 1 or more chronic illnesses with exacerbation, progression, or side effects of treatment  [ ] 2 or more stable chronic illnesses  [ ] 1 undiagnosed new problem with uncertain prognosis  [ ] 1 acute illness with systemic symptoms  [ ] 1 acute complicated injury  1.2 High (At least 1)   [ x] 1 or more chronic illnesses with severe exacerbation, progression, or side effects of treatment  [ x] 1 acute or chronic illnesses or injuries that may pose a threat to life or bodily function    2. Amount and/or Complexity of Data that was Reviewed and Analyzed for this case:       Moderate (1 out of 3)       High (2 out of 3)  2.1. (Any combination of 3 of the following)   [ ] Prior External notes were reviewed  [ ] Each test result was reviewed (see "LABS" and "RADIOLOGY & ADDITIONAL STUDIES" above)  [ ] The following tests were ordered and/or reviewed (Only count 1 point for ordering or reviewing a unique test):  	[ ]CBC  	[ ] Chemistry   	[ ] Imaging   	[ ] Other:   [ ] Assessment requiring an independent historian   		Name of historian and relationship:   2.2  [ ] Personally review and interpretation of  image or testing   2.3  [ ] Discussion of management or test interpretation with external physician/other qualified health care professional\appropriate source (not separately reported)    3. Risk of Complications and/or Morbidity or Mortality of for this Patient’s Management:  3.1 Moderate risk of morbidity from additional diagnostic testing or treatment (At least 1):   [ ] Prescription drug management   [ ] Decision regarding minor surgery, treatment, or procedure with identified patient or procedure risk factors  [ ] Decision regarding elective major surgery, treatment, or procedure without identified patient or procedure risk factors   [ ] Diagnosis or treatment significantly limited by social determinants of health   [ ] Other:   3.2 High risk of morbidity from additional diagnostic testing or treatment (At least 1):   [ ] Drug therapy requiring intensive monitoring for toxicity   [ ] Decision regarding elective major surgery, treatment, or procedure with identified patient or procedure risk factors   [ ] Decision regarding emergency major surgery, treatment, or procedure   [ ] Decision regarding hospitalization or escalation of hospital-level of care  [ ] Decision not to resuscitate, not to intubate, or to de-escalate care because of poor prognosis   [ ] Decision to proceed or not with artificial nutrition   [x ] Parenteral controlled substance  [ ] Other:

## 2024-12-23 NOTE — CONSULT NOTE ADULT - PROBLEM SELECTOR RECOMMENDATION 2
consider touch base with outpatient pulm re: any additional recs to optimize symptoms  dyspnea at rest  recurrent hospitalizations  continue discussions re: acceptable dispo options to receive hospice care

## 2024-12-23 NOTE — PHARMACOTHERAPY INTERVENTION NOTE - COMMENTS
Collected medication history from patient and pharmacy. Home medication list updated in prescription writer/ outpatient medication review.    Home medications:  acetaminophen 325 mg oral tablet: 2 tab(s) orally every 6 hours As needed Temp greater or equal to 38C (100.4F), Mild Pain (1 - 3), Moderate Pain (4 - 6), Severe Pain (7 - 10)  albuterol 90 mcg/inh inhalation aerosol: 2 puff(s) inhaled 4 times a day, As Needed  Ampicillin 2g Every 4 hours : Infuse through PICC line every 4 hours every day. Total course of Treatment will be 4 weeks (12/5/24 - 1/2/25)  Ceftriaxone 2g Every 12 hours: Infuse through PICC line every 12 hours every day. Total course of Treatment will be 4 weeks (12/5/24 - 1/2/25)  ferrous sulfate 325 mg (65 mg elemental iron) oral tablet: 1 tab(s) orally once a day  Flomax 0.4 mg oral capsule: 2 cap(s) orally once a day (at bedtime)  Lasix 20 mg oral tablet: 1 tab(s) orally once a day Please take Lasix 1 tablet daily everyday until follow up with cardiologist.  losartan 50 mg oral tablet: 2 tab(s) orally once a day losartan 100 mg once a day in the morning  Norvasc 5 mg oral tablet: 1 tab(s) orally once a day  Toprol-XL 25 mg oral tablet, extended release: 1 tab(s) orally once a day (at bedtime)  Toprol-XL 50 mg oral tablet, extended release: 1 tab(s) orally once a day Please take 50 mg once a day in the morning. (25 mg at night).  Trelegy Ellipta 200 mcg-62.5 mcg-25 mcg/inh inhalation powder: 1 puff(s) inhaled once a day  Xarelto 20 mg oral tablet: 1 tab(s) orally once a day    Recommendations:  Patient was on Trelegy Ellipta(ICS/LABA/LAMA) inhaler at home. Patient is currently ordered for Advair (ICS/LABA) inhaler and Duoneb (DENYS/IRA) inhalation. Recommend adding on Spiriva 2.5mcg- 2 puffs inhaled once a day to continue LAMA component.     Lisa Milligan, PharmD, BCPS  Clinical Pharmacy Specialist  Available on [employer]  Cell: [phone]

## 2024-12-23 NOTE — PHYSICAL THERAPY INITIAL EVALUATION ADULT - RISK REDUCTION/PREVENTION, PT EVAL
Called patient to reschedule breast biopsy. Per the patient she will be leaving to go out of town and will call to reschedule when she returns. Provided patient with my contact information to call and reschedule breast biopsy.   Mailed patient her breast imaging results certified.   
risk factors

## 2024-12-23 NOTE — PROGRESS NOTE ADULT - PROBLEM SELECTOR PLAN 4
- mild tropinemia -> likely demand ischemia -> f/u OP for ischemic eval   - hold losartan iso incr diuresis  - c/w home toprolol

## 2024-12-23 NOTE — PHYSICAL THERAPY INITIAL EVALUATION ADULT - GENERAL OBSERVATIONS, REHAB EVAL
Rec'd semi-supine in bed in NAD, VSS, IVL, +tele monitoring, +pulse ox monitoring, +O2 via NC (4L) agreeable to PT

## 2024-12-23 NOTE — PHYSICAL THERAPY INITIAL EVALUATION ADULT - ADDITIONAL COMMENTS
Pt lives alone in a  with chair lift access, has HHA x6 hours a day 6 days a week to assist with ADLs. Was ambulating (I) with R/W

## 2024-12-23 NOTE — PROGRESS NOTE ADULT - PROBLEM SELECTOR PLAN 3
- no wheezing on exam  - SOB improved after 1 dose of lasix suggesting CHF as etiology  - low suspicion for copd exacerbation   - home meds: trelegy and albuterol  - start symbicort and duonebs inpt

## 2024-12-23 NOTE — CONSULT NOTE ADULT - SUBJECTIVE AND OBJECTIVE BOX
Date of Service:12-23-24 @ 17:46  HPI:  88 yo M with COPD (on home 3L), RHF, mod pHTN, mild HFrEF (12/24 EF 45%), Afib on xarelto s/p PPM, BPH, HTN, HLD, recent hospitalization for COPD vs CHF c/b efaecalis bacteremia, p/w SOB x 2 weeks.     Of note pt was recently admitted to [facility] 12/3-12/10  for AHRF 2/2 CHF exacerbation c/b 4/4 positive bcx for Enterococcus faecalis d/c on PICC  on amp and CTX for 4 weeks (12/3/24- 1/2/24) for presumed endocarditis as CAREN was deemed non-essential as he is a poor surgical candidate. Since his d/c, pt states he's been taking all of his meds and limiting fluid intake as per recommendations. One day after d/c after showering, the patient stated he developed SOB worse with exertion. The SOB progressed to then being present at while rest and worse while laying down. Additionally, the pt noticed his legs were swollen. Pt denies fever, chills, HA, CP, productive cough, N/V, constipation, diarrhea. No recent sick contacts.  (22 Dec 2024 19:23)    PERTINENT PM/SXH:   Osteoarthritis of multiple joints, unspecified osteoarthritis type    Former smoker, stopped smoking many years ago    Chronic obstructive pulmonary disease, unspecified COPD type    ETOH abuse    Persistent atrial fibrillation    Pacemaker    Idiopathic pulmonary fibrosis    Polyp of colon, unspecified part of colon, unspecified type    Benign prostatic hyperplasia with nocturia    Dyspnea on exertion    Oxygen desaturation during sleep    Essential hypertension      H/O amputation    H/O arthroscopic knee surgery    H/O cardiac pacemaker    H/O repair of rotator cuff      FAMILY HISTORY:  FH: colon cancer  mother    FH: ovarian cancer  father      Family Hx substance abuse [ ]yes [ ]no  ITEMS NOT CHECKED ARE NOT PRESENT    SOCIAL HISTORY:   Significant other/partner[ ]  Children[x ]  Buddhism/Spirituality:  Substance hx:  [ ]   Tobacco hx:  [ ]   Alcohol hx: [ ]   Home Opioid hx:  [ ] I-Stop Reference No:  Living Situation: [ ]Home  [ ]Long term care  [ ]Rehab [ ]Other    ADVANCE DIRECTIVES:    DNR/MOLST  [ ]  Living Will  [ ]   DECISION MAKER(s):  [ ] Health Care Proxy(s)  [ ] Surrogate(s)  [ ] Guardian           Name(s): Phone Number(s):    BASELINE (I)ADL(s) (prior to admission):  [place]: [ ]Total  [ ] Moderate [ ]Dependent    Allergies    No Known Allergies    Intolerances    MEDICATIONS  (STANDING):  albuterol/ipratropium for Nebulization 3 milliLiter(s) Nebulizer every 6 hours  amLODIPine   Tablet 5 milliGRAM(s) Oral daily  ampicillin  IVPB 12 Gram(s) IV Intermittent Once  cefTRIAXone   IVPB 2000 milliGRAM(s) IV Intermittent every 12 hours  chlorhexidine 2% Cloths 1 Application(s) Topical <User Schedule>  ferrous    sulfate 325 milliGRAM(s) Oral daily  fluticasone propionate/ salmeterol 100-50 MICROgram(s) Diskus 1 Dose(s) Inhalation two times a day  furosemide   Injectable 40 milliGRAM(s) IV Push daily  metoprolol succinate ER 50 milliGRAM(s) Oral daily  metoprolol succinate ER 25 milliGRAM(s) Oral at bedtime  rivaroxaban 20 milliGRAM(s) Oral with dinner  tamsulosin 0.8 milliGRAM(s) Oral at bedtime  tiotropium 2.5 MICROgram(s) Inhaler 2 Puff(s) Inhalation daily    MEDICATIONS  (PRN):    PRESENT SYMPTOMS: [ ]Unable to self-report  [ ] CPOT [ ] PAINADs [ ] RDOS  Source if other than patient:  [ ]Family   [ ]Team     Pain: [ ]yes [ ]no  QOL impact -   Location -                    Aggravating factors -  Quality -  Radiation -  Timing-  Severity (0-10 scale):  Minimal acceptable level (0-10 scale):     CPOT:    https://www.[facility].org/getattachment/[email or web address]/Critical-Care-Pain-Observation-Tool-(CPOT)    PAINAD Score: See PAINAD tool and score below     Dyspnea:                           [ ]Mild [ ]Moderate [ ]Severe    RDOS: See RDOS tool and score below   0 to 2  minimal or no respiratory distress   3  mild distress  4 to 6 moderate distress  >7 severe distress      Anxiety:                             [ ]Mild [ ]Moderate [ ]Severe  Fatigue:                             [ ]Mild [ ]Moderate [ ]Severe  Nausea:                             [ ]Mild [ ]Moderate [ ]Severe  Loss of appetite:              [ ]Mild [ ]Moderate [ ]Severe  Constipation:                    [ ]Mild [ ]Moderate [ ]Severe    PCSSQ[Palliative Care Spiritual Screening Question]   Severity (0-10):  Score of 4 or > indicate consideration of Chaplaincy referral.  Chaplaincy Referral: [ ] yes [ ] refused [ ] following [ ] Deferred     Caregiver [place]? : [ ] yes [ ] no [ ] Deferred [ ] Declined             Social work referral [ ] Patient & Family Centered Care Referral [ ]     Anticipatory Grief present?:  [ ] yes [ ] no  [ ] Deferred                  Social work referral [ ] Chaplaincy Referral [ ]    		  Other Symptoms:  [ ]All other review of systems negative     Palliative Performance Status Version 2:   See PPSv2 tool and score below          PHYSICAL EXAM:  Vital Signs Last 24 Hrs  T(C): 36.4 (23 Dec 2024 11:24), Max: 36.7 (22 Dec 2024 19:00)  T(F): 97.6 (23 Dec 2024 11:24), Max: 98 (22 Dec 2024 19:00)  HR: 77 (23 Dec 2024 11:24) (69 - 85)  BP: 146/76 (23 Dec 2024 11:24) (110/69 - 146/76)  BP(mean): 91 (22 Dec 2024 18:38) (91 - 91)  RR: 19 (23 Dec 2024 11:24) (19 - 20)  SpO2: 89% (23 Dec 2024 11:24) (88% - 100%)    Parameters below as of 23 Dec 2024 11:24  Patient On (Oxygen Delivery Method): nasal cannula  O2 Flow (L/min): 5   I&O's Summary    22 Dec 2024 07:01  -  23 Dec 2024 07:00  --------------------------------------------------------  IN: 0 mL / OUT: 2000 mL / NET: -2000 mL    23 Dec 2024 07:01  -  23 Dec 2024 17:46  --------------------------------------------------------  IN: 240 mL / OUT: 3251 mL / NET: -3011 mL      GENERAL: [ ]Cachexia    [ ]Alert  [ ]Oriented x   [ ]Lethargic  [ ]Unarousable  [ ]Verbal  [ ]Non-Verbal  Behavioral:   [ ] Anxiety  [ ] Delirium [ ] Agitation [ ] Other  HEENT:  [ ]Normal   [ ]Dry mouth   [ ]ET Tube/Trach  [ ]Oral lesions  PULMONARY:   [ ]Clear [ ]Tachypnea  [ ]Audible excessive secretions   [ ]Rhonchi        [ ]Right [ ]Left [ ]Bilateral  [ ]Crackles        [ ]Right [ ]Left [ ]Bilateral  [ ]Wheezing     [ ]Right [ ]Left [ ]Bilateral  [ ]Diminished breath sounds [ ]right [ ]left [ ]bilateral  CARDIOVASCULAR:    [ ]Regular [ ]Irregular [ ]Tachy  [ ]Boo [ ]Murmur [ ]Other  GASTROINTESTINAL:  [ ]Soft  [ ]Distended   [ ]+BS  [ ]Non tender [ ]Tender  [ ]Other [ ]PEG [ ]OGT/ NGT  Last BM:  GENITOURINARY:  [ ]Normal [ ] Incontinent   [ ]Oliguria/Anuria   [ ]Griffin  MUSCULOSKELETAL:   [ ]Normal   [ ]Weakness  [ ]Bed/Wheelchair bound [ ]Edema  NEUROLOGIC:   [ ]No focal deficits  [ ]Cognitive impairment  [ ]Dysphagia [ ]Dysarthria [ ]Paresis [ ]Other   SKIN:   [ ]Normal  [ ]Rash  [ ]Other  [ ]Pressure ulcer(s)       Present on admission [ ]y [ ]n    CRITICAL CARE:  [ ] Shock Present  [ ]Septic [ ]Cardiogenic [ ]Neurologic [ ]Hypovolemic  [ ]  Vasopressors [ ]  Inotropes   [ ]Respiratory failure present [ ]Mechanical ventilation [ ]Non-invasive ventilatory support [ ]High flow    [ ]Acute  [ ]Chronic [ ]Hypoxic  [ ]Hypercarbic [ ]Other  [ ]Other organ failure     LABS:                        10.7   7.76  )-----------( 174      ( 23 Dec 2024 06:43 )             34.0   12-23    145  |  103  |  29[H]  ----------------------------<  102[H]  3.4[L]   |  28  |  1.13    Ca    9.3      23 Dec 2024 06:43  Phos  2.0     12-23  Mg     2.0     12-23    TPro  6.5  /  Alb  4.0  /  TBili  0.6  /  DBili  x   /  AST  16  /  ALT  13  /  AlkPhos  64  12-22      Urinalysis Basic - ( 23 Dec 2024 06:43 )    Color: x / Appearance: x / SG: x / pH: x  Gluc: 102 mg/dL / Ketone: x  / Bili: x / Urobili: x   Blood: x / Protein: x / Nitrite: x   Leuk Esterase: x / RBC: x / WBC x   Sq Epi: x / Non Sq Epi: x / Bacteria: x      RADIOLOGY & ADDITIONAL STUDIES:    PROTEIN CALORIE MALNUTRITION PRESENT: [ ]mild [ ]moderate [ ]severe [ ]underweight [ ]morbid obesity  https://www.andeal.org/vault/2440/web/files/ONC/Table_Clinical%20Characteristics%20to%20Document%20Malnutrition-White%20JV%20et%20al%202012.pdf    Height (cm): 182.9 (12-22-24 @ 14:28), 182.9 (12-02-24 @ 23:55), 185.4 (03-23-24 @ 17:30)  Weight (kg): 90.7 (12-23-24 @ 00:57), 99.8 (12-02-24 @ 23:55), 87.1 (03-23-24 @ 17:30)  BMI (kg/m2): 27.1 (12-23-24 @ 00:57), 29.8 (12-22-24 @ 14:28), 29.8 (12-02-24 @ 23:55)    [ ]PPSV2 < or = to 30% [ ]significant weight loss  [ ]poor nutritional intake  [ ]anasarca[ ]Artificial Nutrition      Other REFERRALS:  [ ]Hospice  [ ]Child Life  [ ]Social Work  [ ]Case management [ ]Holistic Therapy     Goals of Care Document:  Date of Service:12-23-24 @ 17:46  HPI:  88 yo M with COPD (on home 3L), RHF, mod pHTN, mild HFrEF (12/24 EF 45%), Afib on xarelto s/p PPM, BPH, HTN, HLD, recent hospitalization for COPD vs CHF c/b efaecalis bacteremia, p/w SOB x 2 weeks.     Of note pt was recently admitted to [facility] 12/3-12/10  for AHRF 2/2 CHF exacerbation c/b 4/4 positive bcx for Enterococcus faecalis d/c on PICC  on amp and CTX for 4 weeks (12/3/24- 1/2/24) for presumed endocarditis as CAREN was deemed non-essential as he is a poor surgical candidate. Since his d/c, pt states he's been taking all of his meds and limiting fluid intake as per recommendations. One day after d/c after showering, the patient stated he developed SOB worse with exertion. The SOB progressed to then being present at while rest and worse while laying down. Additionally, the pt noticed his legs were swollen. Pt denies fever, chills, HA, CP, productive cough, N/V, constipation, diarrhea. No recent sick contacts.  (22 Dec 2024 19:23)    PERTINENT PM/SXH:   Osteoarthritis of multiple joints, unspecified osteoarthritis type    Former smoker, stopped smoking many years ago    Chronic obstructive pulmonary disease, unspecified COPD type    ETOH abuse    Persistent atrial fibrillation    Pacemaker    Idiopathic pulmonary fibrosis    Polyp of colon, unspecified part of colon, unspecified type    Benign prostatic hyperplasia with nocturia    Dyspnea on exertion    Oxygen desaturation during sleep    Essential hypertension      H/O amputation    H/O arthroscopic knee surgery    H/O cardiac pacemaker    H/O repair of rotator cuff      FAMILY HISTORY:  FH: colon cancer  mother    FH: ovarian cancer  father      Family Hx substance abuse [ ]yes [ ]no  ITEMS NOT CHECKED ARE NOT PRESENT    SOCIAL HISTORY:   Significant other/partner[ ]  Children[x ]  Church/Spirituality:  Substance hx:  [ ]   Tobacco hx:  [ ]   Alcohol hx: [ ]   Home Opioid hx:  [ x] I-Stop Reference No: see note  Living Situation: [x ]Home  [ ]Long term care  [ ]Rehab [ ]Other    ADVANCE DIRECTIVES:    DNR/MOLST  [ ]  Living Will  [ ]   DECISION MAKER(s):  [ ] Health Care Proxy(s)  [ ] Surrogate(s)  [ ] Guardian           Name(s): Phone Number(s):    BASELINE (I)ADL(s) (prior to admission):  [place]: [ ]Total  [ ] Moderate [x ]Dependent    Allergies    No Known Allergies    Intolerances    MEDICATIONS  (STANDING):  albuterol/ipratropium for Nebulization 3 milliLiter(s) Nebulizer every 6 hours  amLODIPine   Tablet 5 milliGRAM(s) Oral daily  ampicillin  IVPB 12 Gram(s) IV Intermittent Once  cefTRIAXone   IVPB 2000 milliGRAM(s) IV Intermittent every 12 hours  chlorhexidine 2% Cloths 1 Application(s) Topical <User Schedule>  ferrous    sulfate 325 milliGRAM(s) Oral daily  fluticasone propionate/ salmeterol 100-50 MICROgram(s) Diskus 1 Dose(s) Inhalation two times a day  furosemide   Injectable 40 milliGRAM(s) IV Push daily  metoprolol succinate ER 50 milliGRAM(s) Oral daily  metoprolol succinate ER 25 milliGRAM(s) Oral at bedtime  rivaroxaban 20 milliGRAM(s) Oral with dinner  tamsulosin 0.8 milliGRAM(s) Oral at bedtime  tiotropium 2.5 MICROgram(s) Inhaler 2 Puff(s) Inhalation daily    MEDICATIONS  (PRN):    PRESENT SYMPTOMS: [ ]Unable to self-report  [ ] CPOT [ ] PAINADs [ ] RDOS  Source if other than patient:  [ ]Family   [ ]Team     Pain: [ ]yes [ x]no  QOL impact -   Location -                    Aggravating factors -  Quality -  Radiation -  Timing-  Severity (0-10 scale):  Minimal acceptable level (0-10 scale):     CPOT:    https://www.[facility].org/getattachment/[email or web address]/Critical-Care-Pain-Observation-Tool-(CPOT)    PAINAD Score: See PAINAD tool and score below     Dyspnea:                           [ ]Mild [ ]Moderate [ x]Severe    RDOS: See RDOS tool and score below   0 to 2  minimal or no respiratory distress   3  mild distress  4 to 6 moderate distress  >7 severe distress      Anxiety:                             [ ]Mild [x ]Moderate [ ]Severe  Fatigue:                             [ ]Mild [ ]Moderate [ ]Severe  Nausea:                             [ ]Mild [ ]Moderate [ ]Severe  Loss of appetite:              [ ]Mild [ x]Moderate [ ]Severe  Constipation:                    [ ]Mild [ ]Moderate [ ]Severe    PCSSQ[Palliative Care Spiritual Screening Question]   Severity (0-10):  Score of 4 or > indicate consideration of Chaplaincy referral.  Chaplaincy Referral: [ ] yes [ ] refused [ ] following [x ] Deferred     Caregiver [place]? : [x ] yes [ ] no [ ] Deferred [ ] Declined             Social work referral [ ] Patient & Family Centered Care Referral [ ]     Anticipatory Grief present?:  [x ] yes [ ] no  [ ] Deferred                  Social work referral [ ] Chaplaincy Referral [ ]    		  Other Symptoms:  [x ]All other review of systems negative     Palliative Performance Status Version 2:   See PPSv2 tool and score below          PHYSICAL EXAM:  Vital Signs Last 24 Hrs  T(C): 36.4 (23 Dec 2024 11:24), Max: 36.7 (22 Dec 2024 19:00)  T(F): 97.6 (23 Dec 2024 11:24), Max: 98 (22 Dec 2024 19:00)  HR: 77 (23 Dec 2024 11:24) (69 - 85)  BP: 146/76 (23 Dec 2024 11:24) (110/69 - 146/76)  BP(mean): 91 (22 Dec 2024 18:38) (91 - 91)  RR: 19 (23 Dec 2024 11:24) (19 - 20)  SpO2: 89% (23 Dec 2024 11:24) (88% - 100%)    Parameters below as of 23 Dec 2024 11:24  Patient On (Oxygen Delivery Method): nasal cannula  O2 Flow (L/min): 5   I&O's Summary    22 Dec 2024 07:01  -  23 Dec 2024 07:00  --------------------------------------------------------  IN: 0 mL / OUT: 2000 mL / NET: -2000 mL    23 Dec 2024 07:01  -  23 Dec 2024 17:46  --------------------------------------------------------  IN: 240 mL / OUT: 3251 mL / NET: -3011 mL      GENERAL: [ ]Cachexia    [x ]Alert  [x ]Oriented x   [ ]Lethargic  [ ]Unarousable  [x ]Verbal  [ ]Non-Verbal  Behavioral:   [x ] Anxiety  [ ] Delirium [ ] Agitation [ ] Other  HEENT:  [ ]Normal   [x ]Dry mouth   [ ]ET Tube/Trach  [ ]Oral lesions  PULMONARY:   [x ]Clear [ x]Tachypnea  [ ]Audible excessive secretions   [ ]Rhonchi        [ ]Right [ ]Left [ ]Bilateral  [ ]Crackles        [ ]Right [ ]Left [ ]Bilateral  [ ]Wheezing     [ ]Right [ ]Left [ ]Bilateral  [ ]Diminished breath sounds [ ]right [ ]left [ ]bilateral  CARDIOVASCULAR:    [x ]Regular [ ]Irregular [ ]Tachy  [ ]Boo [ ]Murmur [ ]Other  GASTROINTESTINAL:  [x ]Soft  [ ]Distended   [ ]+BS  [ x]Non tender [ ]Tender  [ ]Other [ ]PEG [ ]OGT/ NGT  Last BM:  GENITOURINARY:  [ ]Normal [ ] Incontinent   [ ]Oliguria/Anuria   [ ]Griffin  MUSCULOSKELETAL:   [ ]Normal   [ ]Weakness  [x ]Bed/Wheelchair bound [ x]Edema  NEUROLOGIC:   [ ]No focal deficits  [ ]Cognitive impairment  [ ]Dysphagia [ ]Dysarthria [ ]Paresis [ ]Other   SKIN:   [ ]Normal  [ ]Rash  [ ]Other  [ ]Pressure ulcer(s)       Present on admission [ ]y [ ]n    CRITICAL CARE:  [ ] Shock Present  [ ]Septic [ ]Cardiogenic [ ]Neurologic [ ]Hypovolemic  [ ]  Vasopressors [ ]  Inotropes   [x ]Respiratory failure present [ ]Mechanical ventilation [ ]Non-invasive ventilatory support [ ]High flow    [ ]Acute  [ x]Chronic [x ]Hypoxic  [ ]Hypercarbic [ ]Other  [ ]Other organ failure     LABS:                        10.7   7.76  )-----------( 174      ( 23 Dec 2024 06:43 )             34.0   12-23    145  |  103  |  29[H]  ----------------------------<  102[H]  3.4[L]   |  28  |  1.13    Ca    9.3      23 Dec 2024 06:43  Phos  2.0     12-23  Mg     2.0     12-23    TPro  6.5  /  Alb  4.0  /  TBili  0.6  /  DBili  x   /  AST  16  /  ALT  13  /  AlkPhos  64  12-22      Urinalysis Basic - ( 23 Dec 2024 06:43 )    Color: x / Appearance: x / SG: x / pH: x  Gluc: 102 mg/dL / Ketone: x  / Bili: x / Urobili: x   Blood: x / Protein: x / Nitrite: x   Leuk Esterase: x / RBC: x / WBC x   Sq Epi: x / Non Sq Epi: x / Bacteria: x      RADIOLOGY & ADDITIONAL STUDIES:    PROTEIN CALORIE MALNUTRITION PRESENT: [ ]mild [ ]moderate [ ]severe [ ]underweight [ ]morbid obesity  https://www.andeal.org/vault/2440/web/files/ONC/Table_Clinical%20Characteristics%20to%20Document%20Malnutrition-White%20JV%20et%20al%202012.pdf    Height (cm): 182.9 (12-22-24 @ 14:28), 182.9 (12-02-24 @ 23:55), 185.4 (03-23-24 @ 17:30)  Weight (kg): 90.7 (12-23-24 @ 00:57), 99.8 (12-02-24 @ 23:55), 87.1 (03-23-24 @ 17:30)  BMI (kg/m2): 27.1 (12-23-24 @ 00:57), 29.8 (12-22-24 @ 14:28), 29.8 (12-02-24 @ 23:55)    [x ]PPSV2 < or = to 30% [ ]significant weight loss  [ x]poor nutritional intake  [ ]anasarca[ ]Artificial Nutrition      Other REFERRALS:  [ x]Hospice  [ ]Child Life  [ x]Social Work  [x ]Case management [ ]Holistic Therapy     Goals of Care Document:

## 2024-12-23 NOTE — PROGRESS NOTE ADULT - SUBJECTIVE AND OBJECTIVE BOX
ASHLEY KENDALL  89y  MRN: [id number]    Patient is a 89y old  Male who presents with a chief complaint of SOB (22 Dec 2024 19:23)      Subjective: admitted overnight for SOB, currently on home O2, feeling better. PT working with him at beginning of encounter, pt desats to low 80s with slight movement in bed on 3L     MEDICATIONS  (STANDING):  albuterol/ipratropium for Nebulization 3 milliLiter(s) Nebulizer every 6 hours  amLODIPine   Tablet 5 milliGRAM(s) Oral daily  ampicillin  IVPB 12 Gram(s) IV Intermittent Once  cefTRIAXone   IVPB 2000 milliGRAM(s) IV Intermittent every 12 hours  ferrous    sulfate 325 milliGRAM(s) Oral daily  fluticasone propionate/ salmeterol 100-50 MICROgram(s) Diskus 1 Dose(s) Inhalation two times a day  furosemide   Injectable 40 milliGRAM(s) IV Push daily  metoprolol succinate ER 50 milliGRAM(s) Oral daily  metoprolol succinate ER 25 milliGRAM(s) Oral at bedtime  potassium phosphate IVPB 30 milliMole(s) IV Intermittent once  rivaroxaban 20 milliGRAM(s) Oral with dinner  tamsulosin 0.8 milliGRAM(s) Oral at bedtime    MEDICATIONS  (PRN):      Objective:    Vitals: Vital Signs Last 24 Hrs  T(C): 36.6 (12-23-24 @ 04:42), Max: 36.7 (12-22-24 @ 17:00)  T(F): 97.9 (12-23-24 @ 04:42), Max: 98 (12-22-24 @ 17:00)  HR: 69 (12-23-24 @ 04:42) (69 - 94)  BP: 130/70 (12-23-24 @ 04:42) (110/69 - 144/95)  BP(mean): 91 (12-22-24 @ 18:38) (83 - 109)  RR: 20 (12-23-24 @ 04:42) (20 - 30)  SpO2: 94% (12-23-24 @ 04:42) (93% - 100%)            I&O's Summary    22 Dec 2024 07:01  -  23 Dec 2024 07:00  --------------------------------------------------------  IN: 0 mL / OUT: 2000 mL / NET: -2000 mL        PHYSICAL EXAM:  GENERAL: NAD  HEAD:  Atraumatic, Normocephalic  EYES: EOMI, conjunctiva and sclera clear  CHEST/LUNG: slight bibasilar crackles   HEART: Regular rate and rhythm; No murmurs, rubs, or gallops  ABDOMEN: Soft, Nontender, Nondistended;   NERVOUS SYSTEM:  Alert & Oriented X3, no focal deficits    LABS:  12-23    145  |  103  |  29[H]  ----------------------------<  102[H]  3.4[L]   |  28  |  1.13  12-22    146[H]  |  107  |  32[H]  ----------------------------<  168[H]  3.9   |  21[L]  |  1.06    Ca    9.3      23 Dec 2024 06:43  Ca    9.6      22 Dec 2024 15:10  Phos  2.0     12-23  Mg     2.0     12-23    TPro  6.5  /  Alb  4.0  /  TBili  0.6  /  DBili  x   /  AST  16  /  ALT  13  /  AlkPhos  64  12-22                    Urinalysis Basic - ( 23 Dec 2024 06:43 )    Color: x / Appearance: x / SG: x / pH: x  Gluc: 102 mg/dL / Ketone: x  / Bili: x / Urobili: x   Blood: x / Protein: x / Nitrite: x   Leuk Esterase: x / RBC: x / WBC x   Sq Epi: x / Non Sq Epi: x / Bacteria: x                              10.7   7.76  )-----------( 174      ( 23 Dec 2024 06:43 )             34.0                         11.2   8.41  )-----------( 200      ( 22 Dec 2024 15:10 )             35.8     CAPILLARY BLOOD GLUCOSE          RADIOLOGY & ADDITIONAL TESTS:    Imaging Personally Reviewed:  [x ] YES  [ ] NO    Consultants involved in case:   Consultant(s) Notes Reviewed:  [ x] YES  [ ] NO:   Care Discussed with Consultants/Other Providers [x ] YES  [ ] NO

## 2024-12-23 NOTE — PROGRESS NOTE ADULT - PARTICIPANTS
Spoke with patient about GOC given prior MOLST on last admission. Patient stated he would resign MOLST as he does not want chest compressions or to be put on a machine if he got acutely sick, saying he would let nature take its course. He also discussed how he does not want to be in and out of the hospital like he has been recently, and his daughter spoke to him about hospice before. MUNA signed with patient, DNR/DNI status currently and palliative consulted for possible hospice./Patient Spoke with patient about GOC given prior MOLST on last admission. Patient stated he would resign MOLST as he does not want chest compressions or to be put on a machine if he got acutely sick, saying he would let nature take its course. He also discussed how he does not want to be in and out of the hospital like he has been recently, and his daughter spoke to him about hospice before. MUNA signed with patient, DNR/DNI status currently and palliative consulted for possible hospice./Patient/Family

## 2024-12-23 NOTE — CONSULT NOTE ADULT - PROBLEM SELECTOR RECOMMENDATION 3
will continue to assess for referrals  expressing sense of hopelessness re: loss of anything to help him live longer, requesting hastened death (will continue explore and support underlying reasons for this, suspect symptom burden and grief), reporting loss of ability to do some things he enjoys - fishing, hunting, going out of home. still able to spend time with family (essential for qol) and to watch some tv.   will continue to offer support for grief and navigating transition in care

## 2024-12-23 NOTE — PROGRESS NOTE ADULT - PROBLEM SELECTOR PLAN 1
- progressive SOB, +orthopnea, +LE edema  - no productive cough  - - SOB improved after 1 dose of lasix per pt suggesting CHF as etiology  - ddx: CHF vs PNA vs COPD   - low suspicion for PE as pt on a/c    Plan  - s/p zoysn , amp, Duoneb  -s/p 80 IV lasix   - start IV lasix 40 qd   - wean oxygen support as tolerated   - f/u bcx  - afebrile, no WBC, non-toxic appearing -> c/w abx for empiric endocarditis (see below)  - standing wts   - strict I/O

## 2024-12-23 NOTE — CHART NOTE - NSCHARTNOTEFT_GEN_A_CORE
This report was requested by: Elizabeth Mason | Reference #: [id number]    You have not added a NEGRO number. Keeping your NEGRO number(s) up to date on the My NEGRO # tab will enable the separation of your prescriptions from others in the search results.    Practitioner Count: 0  Pharmacy Count: 0  Current Opioid Prescriptions: 0  Current Benzodiazepine Prescriptions: 0  Current Stimulant Prescriptions: 0      Patient Demographic Information (PDI)       PDI	First Name	Last Name	Birth Date	Gender	Street Address	[facility] Code  	Erik Hutchison	1935	Male	[phone] [facility]	[id number]    Prescription Information      PDI Filter:    PDI	Current Rx	Drug Type	Rx Written	Rx Dispensed	Drug	Quantity	Days Supply	Prescriber Name	Prescriber NEGRO #  A	N	O	09/09/2024	09/09/2024	acetaminophen-cod #2 tablet	30	10	Myron Steel	[id number]  Payment Method Insurance  Dispenser [facility]    * - Details of Drug Type : O = Opioid, B = Benzodiazepine, S = Stimulant    * - Drugs marked with an asterisk are compound drugs. If the compound drug is made up of more than one controlled substance, then each controlled substance will be a separate row in the table.

## 2024-12-23 NOTE — PROGRESS NOTE ADULT - PROBLEM SELECTOR PLAN 7
Diet: DASH   DVT: xarelto  Dispo: pending PT, pending palliative care   Code status: DNR/DNI   Communication:

## 2024-12-23 NOTE — CONSULT NOTE ADULT - PROBLEM SELECTOR RECOMMENDATION 9
mgmt of COPD and HF per primary, pulm, and cards teams    RECOMMEND:  - begin low dose opioid PRN for refractory dyspnea   - morphine 2mg IV q2h PRN dyspnea  - bowel regimen while on opioids  - may also trial fan to face if available  - supplemental O2 for hypoxia - goal 88-92% or as determined by team

## 2024-12-23 NOTE — PHYSICAL THERAPY INITIAL EVALUATION ADULT - PERTINENT HX OF CURRENT PROBLEM, REHAB EVAL
89 yo M with COPD (on home 3L), RHF, mod pHTN, mild HFrEF (12/24 EF 45%), Afib on xarelto s/p PPM, BPH, HTN, HLD, recent hospitalization for COPD vs CHF c/b efaecalis bacteremia, p/w SOB x 2 weeks.  Of note pt was recently admitted to [facility] 12/3-12/10  for AHRF 2/2 CHF exacerbation c/b 4/4 positive bcx for Enterococcus faecalis d/c on PICC  on amp and CTX for 4 weeks (12/3/24- 1/2/24) for presumed endocarditis as CAREN was deemed non-essential as he is a poor surgical candidate. Since his d/c, pt states he's been taking all of his meds and limiting fluid intake as per recommendations. One day after d/c after showering, the patient stated he developed SOB worse with exertion. The SOB progressed to then being present at while rest and worse while laying down. Additionally, the pt noticed his legs were swollen. Pt denies fever, chills, HA, CP, productive cough, N/V, constipation, diarrhea. No recent sick contacts. CXR 12/22: Bilateral lower lung airspace opacities, right greater than left, likely represent bilateral pleural effusions with associated atelectasis, however, infectious etiology cannot be excluded. POCUS TTE: No Pericardial Effusion. Right ventricular enlargement IVC plethoric

## 2024-12-23 NOTE — CONSULT NOTE ADULT - PROBLEM SELECTOR RECOMMENDATION 4
no hcp form on file - recommend primary team to obtain copy from family if not done already  CM and SW to help explore dispo options to facility given home is unacceptable to the patient  continue to follow for sx and support    Elizabeth Mason MD  Geriatrics and Palliative Care Attending  [facility]      Please contact me via Teams from Monday through Friday between 9am-5pm. If not answering, please call the palliative care pager [phone]    After 5pm and on weekends, please see the contact information below:    In the event of newly developing, evolving, or worsening symptoms, please contact the Palliative Medicine team via pager (if the patient is at [facility] #[phone] or if the patient is at [facility] #[phone]) The Geriatric and Palliative Medicine service has coverage 24 hours a day/ 7 days a week to provide medical recommendations regarding symptom management needs via telephone

## 2024-12-24 ENCOUNTER — TRANSCRIPTION ENCOUNTER (OUTPATIENT)
Age: 89
End: 2024-12-24

## 2024-12-24 NOTE — GOALS OF CARE CONVERSATION - ADVANCED CARE PLANNING - CONVERSATION DETAILS
Patient and I spoke at length about his experience with his illness and thoughts about the future. He started with sharing that if there is a way he could have more time he would want that, but also wants some level of quality. It's been a lot for him to adjust being less independent (previously driving, had his own boat, went fishing, hunting, part of a bowling league), and he doesn't like the idea of being in a nursing home because of 1. bad experiences other family had there and 2. he doesn't like the idea of being around "old people" - for reasons he couldn't specifiy. He also mentions not wanting to make things too hard on his kids. He remembers a facility nearby called the [facility] that was nice because it was nearby to family. He continues to report that things at home were very tough for him because of his care needs.    He remembers his wife's end of life experience with respiratory support and how he hasn't thought of that as being an acceptable quality of life for him.     He also shares how he doesn't like thinking about these things (end of life) a lot. It also feels in some ways like his kids are going through their own process regarding where he's at and what they think he wants in his care. He seemed reassured by the idea of having some time to keep thinking about things for himself. He endorsed not having a decision yet on what approach he wants to take to his care going forward. He was open to having our team try to reach out to his pulmonologist Dr. Myron Steel.

## 2024-12-24 NOTE — PROGRESS NOTE ADULT - PROBLEM SELECTOR PLAN 3
uses menthol gel at home that is helpful  may trial topical analgesic to R knee for likely arthritis pain (e.g. topical menthol vs topical voltaren)

## 2024-12-24 NOTE — DISCHARGE NOTE PROVIDER - HOSPITAL COURSE
HPI:  89 yo M with COPD (on home 3L), RHF, mod pHTN, mild HFrEF (12/24 EF 45%), Afib on xarelto s/p PPM, BPH, HTN, HLD, recent hospitalization for COPD vs CHF c/b efaecalis bacteremia, p/w SOB x 2 weeks.     Of note pt was recently admitted to [facility] 12/3-12/10  for AHRF 2/2 CHF exacerbation c/b 4/4 positive bcx for Enterococcus faecalis d/c on PICC  on amp and CTX for 4 weeks (12/3/24- 1/2/24) for presumed endocarditis as CAREN was deemed non-essential as he is a poor surgical candidate. Since his d/c, pt states he's been taking all of his meds and limiting fluid intake as per recommendations. One day after d/c after showering, the patient stated he developed SOB worse with exertion. The SOB progressed to then being present at while rest and worse while laying down. Additionally, the pt noticed his legs were swollen. Pt denies fever, chills, HA, CP, productive cough, N/V, constipation, diarrhea. No recent sick contacts.  (22 Dec 2024 19:23)    Hospital Course:  Patient admitted for likely ADHF, received 80 lasix and was weaned to 5L NC. MUNA signed, DNR/DNI. Palliative c/s for hospice care, looking into hospice facilities- patient was accepted for home hospice however does not want to go home.     Important Medication Changes and Reason:    Active or Pending Issues Requiring Follow-up:    Advanced Directives:   [ ] Full code  [ X] DNR  [ ] Hospice             HPI:  89 yo M with COPD (on home 3L), RHF, mod pHTN, mild HFrEF (12/24 EF 45%), Afib on xarelto s/p PPM, BPH, HTN, HLD, recent hospitalization for COPD vs CHF c/b efaecalis bacteremia, p/w SOB x 2 weeks.     Of note pt was recently admitted to [facility] 12/3-12/10  for AHRF 2/2 CHF exacerbation c/b 4/4 positive bcx for Enterococcus faecalis d/c on PICC  on amp and CTX for 4 weeks (12/3/24- 1/2/24) for presumed endocarditis as CAREN was deemed non-essential as he is a poor surgical candidate. Since his d/c, pt states he's been taking all of his meds and limiting fluid intake as per recommendations. One day after d/c after showering, the patient stated he developed SOB worse with exertion. The SOB progressed to then being present at while rest and worse while laying down. Additionally, the pt noticed his legs were swollen. Pt denies fever, chills, HA, CP, productive cough, N/V, constipation, diarrhea. No recent sick contacts.  (22 Dec 2024 19:23)    Hospital Course:  Patient admitted for likely ADHF, received 80 lasix and was weaned to 5L NC. MUNA signed, DNR/DNI. Palliative c/s for hospice care, looking into hospice facilities- patient was accepted for home hospice however does not want to go home. CCB R knee gout treated with 3 days of colchicine with resolution of flare, however developed diarrhea on colchicine which was not resolved after stopped medication so GI  PCR and C diff sent.     Patient approved for hospice facility.     Important Medication Changes and Reason:    Active or Pending Issues Requiring Follow-up:    Advanced Directives:   [ ] Full code  [ X] DNR  [ ] Hospice             HPI:  88 yo M with COPD (on home 3L), RHF, mod pHTN, mild HFrEF (12/24 EF 45%), Afib on xarelto s/p PPM, BPH, HTN, HLD, recent hospitalization for COPD vs CHF c/b efaecalis bacteremia, p/w SOB x 2 weeks.     Of note pt was recently admitted to [facility] 12/3-12/10  for AHRF 2/2 CHF exacerbation c/b 4/4 positive bcx for Enterococcus faecalis d/c on PICC  on amp and CTX for 4 weeks (12/3/24- 1/2/24) for presumed endocarditis as CAREN was deemed non-essential as he is a poor surgical candidate. Since his d/c, pt states he's been taking all of his meds and limiting fluid intake as per recommendations. One day after d/c after showering, the patient stated he developed SOB worse with exertion. The SOB progressed to then being present at while rest and worse while laying down. Additionally, the pt noticed his legs were swollen. Pt denies fever, chills, HA, CP, productive cough, N/V, constipation, diarrhea. No recent sick contacts.  (22 Dec 2024 19:23)    Hospital Course:  88 yo M with COPD (on home 3L), RHF, mod pHTN, mild HFrEF (12/24 EF 45%), Afib on xarelto s/p PPM, BPH, HTN, HLD, recent hospitalization for COPD vs CHF c/b E, faecalis bacteremia, p/w SOB and AHRF 2/2 likely CHF exacerbation with decision made for hospice at this time.       Problem/Plan - 1:  ·  Problem: Acute hypoxic respiratory failure.   ·  Plan: - progressive SOB, +orthopnea, +LE edema   - SOB initially improved after diuresis but with persistent ongoing dyspnea despite euvolemia  - has also underlying COPD on chronic home O2  - decision made to pursue inpatient hospice at this time  - c/w duonebs and inhaler regimen for SOB  - morphine 2mg IVP PRN for SOB/dyspnea.     Problem/Plan - 2:  ·  Problem: History of COPD.   ·  Plan: on 3L home O2  - home meds: trelegy and albuterol  - c/w advair, spiriva, and duonebs for ongoing SOB/dyspnea.     Problem/Plan - 3:  ·  Problem: HFrEF (heart failure with reduced ejection fraction).   ·  Plan: - mild tropinemia -> likely demand ischemia. non-MI troponin elevation  - GDMT and diuretics de-escalated as per family's GOC/wishes.     Problem/Plan - 4:  ·  Problem: Bacteremia due to Enterococcus.   ·  Plan: Present from last admission with 4/4 blood cultures growing E. faecalis  - was on empiric 4 week treatment course (to end 1/2/25) with IV ampicillin and ceftriaxone for endocarditis per last ID note as TTE was deemed non-essential as pt is a poor surgical candidate  - given change in GOC with now pursuing hospice measures, IV ampicillin and ceftriaxone discontinued as per family request on 12/30.     Problem/Plan - 5:  ·  Problem: Gout.   ·  Plan: had acute R knee pain with erythema with elevated uric acid concerning for acute gout flare. likely 2/2 to diuresis.  - s/p colchicine - discontinued in setting of diarrhea  - R knee pain resolved - non-tender to palpation, no erythema on exam.     Problem/Plan - 6:  ·  Problem: Chronic atrial fibrillation.   ·  Plan: - will stop xarelto and Toprol as per GOC discussion with daughter Inez 12/30.     Problem/Plan - 7:  ·  Problem: Diarrhea.   ·  Plan: had diarrhea likely 2/2 to colchicine which was discontinued. diarrhea now resolved  despite being on long-term IV abx, given resolution of diarrhea, no concern for infectious process.  c. diff and GI PCR testing discontinued.     Problem/Plan - 8:  ·  Problem: Prophylactic measure.   ·  Plan: Code status: DNR/DNI    Patient approved for hospice facility.     Advanced Directives:   [ ] Full code  [ X] DNR  [ ] Hospice             HPI:  89 yo M with COPD (on home 3L), RHF, mod pHTN, mild HFrEF (12/24 EF 45%), Afib on xarelto s/p PPM, BPH, HTN, HLD, recent hospitalization for COPD vs CHF c/b efaecalis bacteremia, p/w SOB x 2 weeks.     Of note pt was recently admitted to [facility] 12/3-12/10  for AHRF 2/2 CHF exacerbation c/b 4/4 positive bcx for Enterococcus faecalis d/c on PICC  on amp and CTX for 4 weeks (12/3/24- 1/2/24) for presumed endocarditis as CAREN was deemed non-essential as he is a poor surgical candidate. Since his d/c, pt states he's been taking all of his meds and limiting fluid intake as per recommendations. One day after d/c after showering, the patient stated he developed SOB worse with exertion. The SOB progressed to then being present at while rest and worse while laying down. Additionally, the pt noticed his legs were swollen. Pt denies fever, chills, HA, CP, productive cough, N/V, constipation, diarrhea. No recent sick contacts.  (22 Dec 2024 19:23)    Hospital Course:  89 yo M with COPD (on home 3L), RHF, mod pHTN, mild HFrEF (12/24 EF 45%), Afib on xarelto s/p PPM, BPH, HTN, HLD, recent hospitalization for COPD vs CHF c/b E, faecalis bacteremia, p/w SOB and AHRF 2/2 likely CHF exacerbation with decision made for hospice at this time.       Problem/Plan - 1:  ·  Problem: Acute hypoxic respiratory failure.   ·  Plan: - progressive SOB, +orthopnea, +LE edema   - SOB initially improved after diuresis but with persistent ongoing dyspnea despite euvolemia  - has also underlying COPD on chronic home O2  - decision made to pursue inpatient hospice at this time  - c/w duonebs and inhaler regimen for SOB  - morphine 2mg IVP PRN for SOB/dyspnea.     Problem/Plan - 2:  ·  Problem: History of COPD.   ·  Plan: on 3L home O2  - home meds: trelegy and albuterol  - c/w advair, spiriva, and duonebs for ongoing SOB/dyspnea.     Problem/Plan - 3:  ·  Problem: HFrEF (heart failure with reduced ejection fraction).   ·  Plan: - mild tropinemia -> likely demand ischemia. non-MI troponin elevation  - GDMT and diuretics de-escalated as per family's GOC/wishes.     Problem/Plan - 4:  ·  Problem: Bacteremia due to Enterococcus.   ·  Plan: Present from last admission with 4/4 blood cultures growing E. faecalis  - was on empiric 4 week treatment course (to end 1/2/25) with IV ampicillin and ceftriaxone for endocarditis per last ID note as TTE was deemed non-essential as pt is a poor surgical candidate  - given change in GOC with now pursuing hospice measures, IV ampicillin and ceftriaxone discontinued as per family request on 12/30.     Problem/Plan - 5:  ·  Problem: Gout.   ·  Plan: had acute R knee pain with erythema with elevated uric acid concerning for acute gout flare. likely 2/2 to diuresis.  - s/p colchicine - discontinued in setting of diarrhea  - R knee pain resolved - non-tender to palpation, no erythema on exam.     Problem/Plan - 6:  ·  Problem: Chronic atrial fibrillation.   ·  Plan: - will stop xarelto and Toprol as per GOC discussion with daughter Inez 12/30.     Problem/Plan - 7:  ·  Problem: Diarrhea.   ·  Plan: had diarrhea likely 2/2 to colchicine which was discontinued. diarrhea now resolved  despite being on long-term IV abx, given resolution of diarrhea, no concern for infectious process.  c. diff and GI PCR testing discontinued.     Problem/Plan - 8:  ·  Problem: Prophylactic measure.   ·  Plan: Code status: DNR/DNI    Patient approved for hospice facility.     Advanced Directives:   [ ] Full code  [] DNR  [x] Hospice             HPI:  89 yo M with COPD (on home 3L), RHF, mod pHTN, mild HFrEF (12/24 EF 45%), Afib on xarelto s/p PPM, BPH, HTN, HLD, recent hospitalization for COPD vs CHF c/b efaecalis bacteremia, p/w SOB x 2 weeks.     Of note pt was recently admitted to [facility] 12/3-12/10  for AHRF 2/2 CHF exacerbation c/b 4/4 positive bcx for Enterococcus faecalis d/c on PICC  on amp and CTX for 4 weeks (12/3/24- 1/2/24) for presumed endocarditis as CAREN was deemed non-essential as he is a poor surgical candidate. Since his d/c, pt states he's been taking all of his meds and limiting fluid intake as per recommendations. One day after d/c after showering, the patient stated he developed SOB worse with exertion. The SOB progressed to then being present at while rest and worse while laying down. Additionally, the pt noticed his legs were swollen. Pt denies fever, chills, HA, CP, productive cough, N/V, constipation, diarrhea. No recent sick contacts.  (22 Dec 2024 19:23)    Hospital Course:  89 yo M with COPD (on home 3L), RHF, mod pHTN, mild HFrEF (12/24 EF 45%), Afib on xarelto s/p PPM, BPH, HTN, HLD, recent hospitalization for COPD vs CHF c/b E, faecalis bacteremia, p/w SOB and AHRF 2/2 likely CHF exacerbation with decision made for hospice at this time.       Problem/Plan - 1:  ·  Problem: Acute hypoxic respiratory failure.   ·  Plan: - progressive SOB, +orthopnea, +LE edema   - SOB initially improved after diuresis but with persistent ongoing dyspnea despite euvolemia  - has also underlying COPD on chronic home O2  - decision made to pursue inpatient hospice at this time  - c/w duonebs and inhaler regimen for SOB  - morphine 2mg IVP PRN for SOB/dyspnea.     Problem/Plan - 2:  ·  Problem: History of COPD.   ·  Plan: on 3L home O2  - home meds: trelegy and albuterol  - c/w advair, spiriva, and duonebs for ongoing SOB/dyspnea.     Problem/Plan - 3:  ·  Problem: HFrEF (heart failure with reduced ejection fraction).   ·  Plan: - mild tropinemia -> likely demand ischemia. non-MI troponin elevation  - GDMT and diuretics de-escalated as per family's GOC/wishes.     Problem/Plan - 4:  ·  Problem: Bacteremia due to Enterococcus.   ·  Plan: Present from last admission with 4/4 blood cultures growing E. faecalis  - was on empiric 4 week treatment course (to end 1/2/25) with IV ampicillin and ceftriaxone for endocarditis per last ID note as TTE was deemed non-essential as pt is a poor surgical candidate  - given change in GOC with now pursuing hospice measures, IV ampicillin and ceftriaxone discontinued as per family request on 12/30.     Problem/Plan - 5:  ·  Problem: Gout.   ·  Plan: had acute R knee pain with erythema with elevated uric acid concerning for acute gout flare. likely 2/2 to diuresis.  - s/p colchicine - discontinued in setting of diarrhea  - R knee pain resolved - non-tender to palpation, no erythema on exam.     Problem/Plan - 6:  ·  Problem: Chronic atrial fibrillation.   ·  Plan: - will stop xarelto and Toprol as per GOC discussion with daughter Inez 12/30.     Problem/Plan - 7:  ·  Problem: Diarrhea.   ·  Plan: had diarrhea likely 2/2 to colchicine which was discontinued. diarrhea now resolved  despite being on long-term IV abx, given resolution of diarrhea, no concern for infectious process.  c. diff and GI PCR testing discontinued.     Problem/Plan - 8:  ·  Problem: Prophylactic measure.   ·  Plan: Code status: DNR/DNI    Patient approved for inpatient hospice facility.     Advanced Directives:   [ ] Full code  [] DNR  [x] Hospice

## 2024-12-24 NOTE — PROGRESS NOTE ADULT - SUBJECTIVE AND OBJECTIVE BOX
ASHLEY KENDALL  89y  MRN: [id number]    Patient is a 89y old  Male who presents with a chief complaint of SOB (23 Dec 2024 17:46)      Subjective: no events ON. Denies fever, CP, SOB, abn pain, N/V, dysuria.     MEDICATIONS  (STANDING):  albuterol/ipratropium for Nebulization 3 milliLiter(s) Nebulizer every 6 hours  amLODIPine   Tablet 5 milliGRAM(s) Oral daily  ampicillin  IVPB 12 Gram(s) IV Intermittent Once  cefTRIAXone   IVPB 2000 milliGRAM(s) IV Intermittent every 12 hours  chlorhexidine 2% Cloths 1 Application(s) Topical <User Schedule>  ferrous    sulfate 325 milliGRAM(s) Oral daily  fluticasone propionate/ salmeterol 100-50 MICROgram(s) Diskus 1 Dose(s) Inhalation two times a day  furosemide   Injectable 40 milliGRAM(s) IV Push daily  metoprolol succinate ER 50 milliGRAM(s) Oral daily  metoprolol succinate ER 25 milliGRAM(s) Oral at bedtime  rivaroxaban 20 milliGRAM(s) Oral with dinner  tamsulosin 0.8 milliGRAM(s) Oral at bedtime  tiotropium 2.5 MICROgram(s) Inhaler 2 Puff(s) Inhalation daily    MEDICATIONS  (PRN):  morphine  - Injectable 2 milliGRAM(s) IV Push every 2 hours PRN dyspnea      Objective:    Vitals: Vital Signs Last 24 Hrs  T(C): 36.7 (12-24-24 @ 04:16), Max: 36.8 (12-23-24 @ 19:00)  T(F): 98 (12-24-24 @ 04:16), Max: 98.2 (12-23-24 @ 19:00)  HR: 72 (12-24-24 @ 04:16) (72 - 79)  BP: 129/69 (12-24-24 @ 04:16) (129/69 - 146/76)  BP(mean): --  RR: 18 (12-24-24 @ 04:16) (18 - 20)  SpO2: 91% (12-24-24 @ 04:16) (88% - 91%)            I&O's Summary    23 Dec 2024 07:01  -  24 Dec 2024 07:00  --------------------------------------------------------  IN: 600 mL / OUT: 4401 mL / NET: -3801 mL        PHYSICAL EXAM:  GENERAL: NAD  HEAD:  Atraumatic, Normocephalic  EYES: EOMI, conjunctiva and sclera clear  CHEST/LUNG: Clear to auscultation bilaterally; No rales, rhonchi, wheezing, or rubs  HEART: Regular rate and rhythm; No murmurs, rubs, or gallops  ABDOMEN: Soft, Nontender, Nondistended;   NERVOUS SYSTEM:  Alert & Oriented X3, no focal deficits    LABS:  12-23    145  |  103  |  29[H]  ----------------------------<  102[H]  3.4[L]   |  28  |  1.13  12-22    146[H]  |  107  |  32[H]  ----------------------------<  168[H]  3.9   |  21[L]  |  1.06    Ca    9.3      23 Dec 2024 06:43  Ca    9.6      22 Dec 2024 15:10  Phos  2.0     12-23  Mg     2.0     12-23    TPro  6.5  /  Alb  4.0  /  TBili  0.6  /  DBili  x   /  AST  16  /  ALT  13  /  AlkPhos  64  12-22                    Urinalysis Basic - ( 23 Dec 2024 06:43 )    Color: x / Appearance: x / SG: x / pH: x  Gluc: 102 mg/dL / Ketone: x  / Bili: x / Urobili: x   Blood: x / Protein: x / Nitrite: x   Leuk Esterase: x / RBC: x / WBC x   Sq Epi: x / Non Sq Epi: x / Bacteria: x                              10.7   7.76  )-----------( 174      ( 23 Dec 2024 06:43 )             34.0                         11.2   8.41  )-----------( 200      ( 22 Dec 2024 15:10 )             35.8     CAPILLARY BLOOD GLUCOSE          RADIOLOGY & ADDITIONAL TESTS:    Imaging Personally Reviewed:  [x ] YES  [ ] NO    Consultants involved in case:   Consultant(s) Notes Reviewed:  [ x] YES  [ ] NO:   Care Discussed with Consultants/Other Providers [x ] YES  [ ] NO         ASHLEY KENDALL  89y  MRN: [id number]    Patient is a 89y old  Male who presents with a chief complaint of SOB (23 Dec 2024 17:46)      Subjective: no events ON. Denies fever, CP, SOB, abn pain, N/V, dysuria. States he slept ok. He knows he has pain medication available if he needs it, however states he will not, but will remember he has the choice. Again states he does not want to go home but also does not care about the process of choosing a facility.    MEDICATIONS  (STANDING):  albuterol/ipratropium for Nebulization 3 milliLiter(s) Nebulizer every 6 hours  amLODIPine   Tablet 5 milliGRAM(s) Oral daily  ampicillin  IVPB 12 Gram(s) IV Intermittent Once  cefTRIAXone   IVPB 2000 milliGRAM(s) IV Intermittent every 12 hours  chlorhexidine 2% Cloths 1 Application(s) Topical <User Schedule>  ferrous    sulfate 325 milliGRAM(s) Oral daily  fluticasone propionate/ salmeterol 100-50 MICROgram(s) Diskus 1 Dose(s) Inhalation two times a day  furosemide   Injectable 40 milliGRAM(s) IV Push daily  metoprolol succinate ER 50 milliGRAM(s) Oral daily  metoprolol succinate ER 25 milliGRAM(s) Oral at bedtime  rivaroxaban 20 milliGRAM(s) Oral with dinner  tamsulosin 0.8 milliGRAM(s) Oral at bedtime  tiotropium 2.5 MICROgram(s) Inhaler 2 Puff(s) Inhalation daily    MEDICATIONS  (PRN):  morphine  - Injectable 2 milliGRAM(s) IV Push every 2 hours PRN dyspnea      Objective:    Vitals: Vital Signs Last 24 Hrs  T(C): 36.7 (12-24-24 @ 04:16), Max: 36.8 (12-23-24 @ 19:00)  T(F): 98 (12-24-24 @ 04:16), Max: 98.2 (12-23-24 @ 19:00)  HR: 72 (12-24-24 @ 04:16) (72 - 79)  BP: 129/69 (12-24-24 @ 04:16) (129/69 - 146/76)  BP(mean): --  RR: 18 (12-24-24 @ 04:16) (18 - 20)  SpO2: 91% (12-24-24 @ 04:16) (88% - 91%)            I&O's Summary    23 Dec 2024 07:01  -  24 Dec 2024 07:00  --------------------------------------------------------  IN: 600 mL / OUT: 4401 mL / NET: -3801 mL        PHYSICAL EXAM:  GENERAL: NAD  HEAD:  Atraumatic, Normocephalic  EYES: EOMI, conjunctiva and sclera clear  CHEST/LUNG:  slight bibasilar crackles   HEART: Regular rate and rhythm; No murmurs, rubs, or gallops  ABDOMEN: Soft, Nontender, Nondistended;   MSK: no LE edema   NERVOUS SYSTEM:  Alert & Oriented X3, no focal deficits    LABS:  12-23    145  |  103  |  29[H]  ----------------------------<  102[H]  3.4[L]   |  28  |  1.13  12-22    146[H]  |  107  |  32[H]  ----------------------------<  168[H]  3.9   |  21[L]  |  1.06    Ca    9.3      23 Dec 2024 06:43  Ca    9.6      22 Dec 2024 15:10  Phos  2.0     12-23  Mg     2.0     12-23    TPro  6.5  /  Alb  4.0  /  TBili  0.6  /  DBili  x   /  AST  16  /  ALT  13  /  AlkPhos  64  12-22                    Urinalysis Basic - ( 23 Dec 2024 06:43 )    Color: x / Appearance: x / SG: x / pH: x  Gluc: 102 mg/dL / Ketone: x  / Bili: x / Urobili: x   Blood: x / Protein: x / Nitrite: x   Leuk Esterase: x / RBC: x / WBC x   Sq Epi: x / Non Sq Epi: x / Bacteria: x                              10.7   7.76  )-----------( 174      ( 23 Dec 2024 06:43 )             34.0                         11.2   8.41  )-----------( 200      ( 22 Dec 2024 15:10 )             35.8     CAPILLARY BLOOD GLUCOSE          RADIOLOGY & ADDITIONAL TESTS:    Imaging Personally Reviewed:  [x ] YES  [ ] NO    Consultants involved in case:   Consultant(s) Notes Reviewed:  [ x] YES  [ ] NO:   Care Discussed with Consultants/Other Providers [x ] YES  [ ] NO

## 2024-12-24 NOTE — DISCHARGE NOTE PROVIDER - NSDCCPTREATMENT_GEN_ALL_CORE_FT
PRINCIPAL PROCEDURE  Procedure: CXR PA & lat  Findings and Treatment:   < end of copied text >  FINDINGS:  The heart size cannot be accurately assessed on this projection. Micra   pacemaker.  Right PICC terminating in the distal SVC.  Bilateral lower lung airspace opacities with small pleural effusions,   right greater than left. No pneumothorax.  Visualized osseous structures are unremarkable for the patient's age.  IMPRESSION:  Bilateral lower lung airspace opacities, right greater than left, likely   represent bilateral pleural effusions with associated atelectasis.   However, infectious etiology cannot be excluded.  < from: Xray Chest 1 View- PORTABLE-Urgent (12.22.24 @ 16:54) >

## 2024-12-24 NOTE — PROGRESS NOTE ADULT - PROBLEM SELECTOR PLAN 2
consider pulm consult re: any additional recs to optimize symptoms and function which will help qol  copd mgmt per primary team, pulm  follows with Dr. Myron Steel for pulm  dyspnea at rest  recurrent hospitalizations  continue discussions re: acceptable dispo options to receive hospice care

## 2024-12-24 NOTE — PROGRESS NOTE ADULT - ASSESSMENT
89M COPD (3L home O2), pHTN, HFrEF, afib (AC) s/p PPM, BPH. Recent admission for bacteremia and COPD/CHF exacerbation, empirically being treated for endocarditis given not surgical candidate. Readmitted 12/22/2024 for HF exacerbation. Palliative care consulted for [facility].

## 2024-12-24 NOTE — PROGRESS NOTE ADULT - ASSESSMENT
88 yo M with COPD (on home 3L), RHF, mod pHTN, mild HFrEF (12/24 EF 45%), Afib on xarelto s/p PPM, BPH, HTN, HLD, recent hospitalization for COPD vs CHF c/b efaecalis bacteremia, p/w SOB and AHRF 2/2 likely CHF exacerbation, currently being diuresed, family talks about hospice facilities underway.

## 2024-12-24 NOTE — DISCHARGE NOTE PROVIDER - NSDCCPCAREPLAN_GEN_ALL_CORE_FT
PRINCIPAL DISCHARGE DIAGNOSIS  Diagnosis: CHF exacerbation  Assessment and Plan of Treatment: You were admitted for shortness of breath and found to be volume overloaded due to heart failure exacerbation. We treated you with IV diuretic medications to get rid of the extra volume. You were seen by palliative care to help figure out sending you to a hospice facility. You were also continued on IV antibiotics for Enteroccocus infection that you were told to continue until 1/2      SECONDARY DISCHARGE DIAGNOSES  Diagnosis: Acute hypoxic respiratory failure  Assessment and Plan of Treatment:      PRINCIPAL DISCHARGE DIAGNOSIS  Diagnosis: CHF exacerbation  Assessment and Plan of Treatment: You were admitted for shortness of breath and found to be volume overloaded due to heart failure exacerbation. We treated you with IV diuretic medications to get rid of the extra volume and you are now on a higher dose of the oral diuretic pill. You were seen by palliative care to help figure out sending you to a hospice facility. You were also continued on IV antibiotics for Enteroccocus infection that you were told to continue until 1/2. You were accepted to a hospice facility for further management of your symptoms.     PRINCIPAL DISCHARGE DIAGNOSIS  Diagnosis: CHF exacerbation  Assessment and Plan of Treatment: You were admitted for shortness of breath and found to be volume overloaded due to heart failure exacerbation. We treated you with IV diuretic medications to get rid of the extra volume and you are now on a higher dose of the oral diuretic pill. You were seen by palliative care to help figure out sending you to a hospice facility. You were also continued on IV antibiotics for Enteroccocus infection.  You were accepted to a hospice facility for further management of your symptoms.     PRINCIPAL DISCHARGE DIAGNOSIS  Diagnosis: Acute on chronic systolic congestive heart failure  Assessment and Plan of Treatment: You were admitted for shortness of breath and found to be volume overloaded due to heart failure exacerbation. We treated you with IV diuretic medications to get rid of the extra volume and started on a higher dose of the oral diuretic pill. You were seen by palliative care with decision made to pursue more comfort-directed measures and inpatient hospice.You were accepted to a hospice facility for further management of your symptoms.      SECONDARY DISCHARGE DIAGNOSES  Diagnosis: Acute hypoxic respiratory failure  Assessment and Plan of Treatment: due to CHF and COPD   persisting dysphea and increased O2 needs likely due to your CHF  continue 6L NC. plan as above    Diagnosis: End stage COPD  Assessment and Plan of Treatment: You required 3L oxygen prior to admission due to known advanced COPD but have had ongoing increased oxygen requirements now requiring 6L NC. We will continue nebulizers and inhalers on discharge for your ongoing dyspnea/SOB and defer to inpatient hospice if and when to de-escalate    Diagnosis: Bacteremia due to Enterococcus  Assessment and Plan of Treatment: This was known from prior admission and you were continued on your IV antibiotics while inpatient, but abx were discontinued/de-escalated after goals of care conversations to focus on only symptom management and not curative treatments.

## 2024-12-24 NOTE — PROGRESS NOTE ADULT - PROBLEM SELECTOR PLAN 5
no hcp form on file - recommend primary team to obtain copy from family if not done already  DNR/DNI established by primary team  CM and SW to help explore dispo options to facility given home is unacceptable to the patient    pall care will continue to follow for sx and support    Elizabeth Mason MD  Geriatrics and Palliative Care Attending  [facility]      Please contact me via Teams from Monday through Friday between 9am-5pm. If not answering, please call the palliative care pager [phone]    After 5pm and on weekends, please see the contact information below:    In the event of newly developing, evolving, or worsening symptoms, please contact the Palliative Medicine team via pager (if the patient is at [facility] #[phone] or if the patient is at [facility] #[phone]) The Geriatric and Palliative Medicine service has coverage 24 hours a day/ 7 days a week to provide medical recommendations regarding symptom management needs via telephone.

## 2024-12-24 NOTE — DISCHARGE NOTE PROVIDER - NSDCFUSCHEDAPPT_GEN_ALL_CORE_FT
[facility]  GERIATRICS [address]   Scheduled Appointment: 01/09/2025    Piter Lewis  [facility]  PULMMED [address]  Scheduled Appointment: 01/16/2025    [facility]  ELECTROPH [address]  Scheduled Appointment: 03/17/2025

## 2024-12-24 NOTE — PROGRESS NOTE ADULT - ATTENDING COMMENTS
88 yo M with COPD (on home 3L), RHF, mod pHTN, mild HFrEF (12/24 EF 45%), Afib on xarelto s/p PPM, BPH, HTN, HLD, recent hospitalization for COPD vs CHF c/b efaecalis bacteremia, p/w SOB and AHRF 2/2 likely HFrEF exacerbation.    # acute on chronic HFrEF  # acute on chronic respiratory failure with hypoxia  # pulmonary HTN  # chronic AFib on xarelto  # COPD on 3L NC  # endocarditis/Enterococcus faecalis bacteremia on IV abx    - pt with recent admission for enterococcus faecalis bacteremia with presumed endocarditis, discharged on IV abx course presents with worsening dyspnea and LE swelling, admitted for acute on chronic HFrEF exacerbation  - s/p IV lasix in ED with improvement in LE edema as well as respiratory status  - continue IV lasix 40mg daily and monitor volume status. Much improved since admission   - palliative care input appreciated: both patient and daughter understand the guarded prognosis and interested in hospice at this time. Code status confirmed and DNR/DNI. Pt does NOT want to return home but also not interested in facility at this time.  - started on IV morphine for symptom control  - continue to wean off O2 to his home regimen 3L NC as tolerated  - OOB to chair    updated daughter at bedside    Maria Luisa Story MD  Division of Hospital Medicine  Contact via [employer] Teams  Office: [phone]    I have personally and independently provided 51 minutes in patient encounter, reviewing tests and imaging, independently obtaining a history, performing a physical examination, discussing the plan with the patient, ordering medications/tests, documenting clinical information, and coordinating care. This excludes any time spent on teaching.

## 2024-12-24 NOTE — PROGRESS NOTE ADULT - SUBJECTIVE AND OBJECTIVE BOX
Date of Service: 12-24-24 @ 09:21    SUBJECTIVE AND OBJECTIVE:  Patient seen at bedside.     Indication for Geriatrics and Palliative Care Services/INTERVAL HPI:  sx, goc, support      OVERNIGHT EVENTS:  2 morphine PRNs used overnight  ----------------------------------------    DNR on chart:DNI  DNI      Allergies    No Known Allergies    Intolerances    MEDICATIONS  (STANDING):  albuterol/ipratropium for Nebulization 3 milliLiter(s) Nebulizer every 6 hours  amLODIPine   Tablet 5 milliGRAM(s) Oral daily  ampicillin  IVPB 12 Gram(s) IV Intermittent Once  cefTRIAXone   IVPB 2000 milliGRAM(s) IV Intermittent every 12 hours  chlorhexidine 2% Cloths 1 Application(s) Topical <User Schedule>  ferrous    sulfate 325 milliGRAM(s) Oral daily  fluticasone propionate/ salmeterol 100-50 MICROgram(s) Diskus 1 Dose(s) Inhalation two times a day  furosemide   Injectable 40 milliGRAM(s) IV Push daily  metoprolol succinate ER 50 milliGRAM(s) Oral daily  metoprolol succinate ER 25 milliGRAM(s) Oral at bedtime  rivaroxaban 20 milliGRAM(s) Oral with dinner  tamsulosin 0.8 milliGRAM(s) Oral at bedtime  tiotropium 2.5 MICROgram(s) Inhaler 2 Puff(s) Inhalation daily    MEDICATIONS  (PRN):  morphine  - Injectable 2 milliGRAM(s) IV Push every 2 hours PRN dyspnea      -----------------------------------------------------------------    ITEMS UNCHECKED ARE NOT PRESENT    PRESENT SYMPTOMS: [ ]Unable to self-report - see [ ] CPOT [ ] PAINADS [ ] RDOS  Source if other than patient:  [ ]Family   [ ]Team     Pain:  [ ]yes [ x]no  QOL impact -   Location -                    Aggravating factors -  Quality -  Radiation -  Timing-  Severity (0-10 scale):  Minimal acceptable level (0-10 scale):     CPOT:    https://www.sccm.org/getattachment/[email or web address]/Critical-Care-Pain-Observation-Tool-(CPOT)    PAINAD Score: See PAINAD tool and score below       Dyspnea:                           [ ]Mild [ ]Moderate [x ]Severe    RDOS: See RDOS tool and score below   0 to 2  minimal or no respiratory distress   3  mild distress  4 to 6 moderate distress  >7 severe distress    Anxiety:                             [ ]Mild [ ]Moderate [ ]Severe  Fatigue:                             [ ]Mild [ ]Moderate [ ]Severe  Nausea:                             [ ]Mild [ ]Moderate [ ]Severe  Loss of appetite:              [ ]Mild [ ]Moderate [ ]Severe  Constipation:                    [ ]Mild [ ]Moderate [ ]Severe    PCSSQ[Palliative Care Spiritual Screening Question]   Severity (0-10):  Score of 4 or > indicate consideration of Chaplaincy referral.  Chaplaincy Referral: [ ] yes [ ] refused [ ] following [x ] Deferred     Caregiver [place]? : [x ] yes [ ] no [ ] Deferred [ ] Declined             Social work referral [ ] Patient & Family Centered Care Referral [ ]     Anticipatory Grief present?:  [x ] yes [ ] no  [ ] Deferred                  Social work referral [x ] Chaplaincy Referral [ ]    		  Other Symptoms:  [x ]All other review of systems negative     Palliative Performance Status Version 2:   See PPSv2 tool and score below         --------------------------------------------    PHYSICAL EXAM:  Vital Signs Last 24 Hrs  T(C): 36.7 (24 Dec 2024 04:16), Max: 36.8 (23 Dec 2024 19:00)  T(F): 98 (24 Dec 2024 04:16), Max: 98.2 (23 Dec 2024 19:00)  HR: 72 (24 Dec 2024 04:16) (72 - 79)  BP: 129/69 (24 Dec 2024 04:16) (129/69 - 146/76)  BP(mean): --  RR: 18 (24 Dec 2024 04:16) (18 - 20)  SpO2: 91% (24 Dec 2024 04:16) (89% - 91%)    Parameters below as of 24 Dec 2024 04:16  Patient On (Oxygen Delivery Method): nasal cannula     I&O's Summary    23 Dec 2024 07:01  -  24 Dec 2024 07:00  --------------------------------------------------------  IN: 600 mL / OUT: 4401 mL / NET: -3801 mL       Height (cm): 182.9 (12-22-24 @ 14:28), 182.9 (12-02-24 @ 23:55), 185.4 (03-23-24 @ 17:30)  Weight (kg): 90.7 (12-23-24 @ 00:57), 99.8 (12-02-24 @ 23:55), 87.1 (03-23-24 @ 17:30)  BMI (kg/m2): 27.1 (12-23-24 @ 00:57), 29.8 (12-22-24 @ 14:28), 29.8 (12-02-24 @ 23:55)        GENERAL: [ ]Cachexia    [ ]Alert  [ ]Oriented x   [ ]Lethargic  [ ]Unarousable  [ ]Verbal  [ ]Non-Verbal  Behavioral:   [ ]Anxiety  [ ]Delirium [ ]Agitation [ ]Other  HEENT:  [ ]Normal   [ ]Dry mouth   [ ]ET Tube/Trach  [ ]Oral lesions  PULMONARY:   [ ]Clear [ ]Tachypnea  [ ]Audible excessive secretions   [ ]Rhonchi        [ ]Right [ ]Left [ ]Bilateral  [ ]Crackles        [ ]Right [ ]Left [ ]Bilateral  [ ]Wheezing     [ ]Right [ ]Left [ ]Bilateral  [ ]Diminished BS [ ] Right [ ]Left [ ]Bilateral  CARDIOVASCULAR:    [ ]Regular [ ]Irregular [ ]Tachy  [ ]Boo [ ]Murmur [ ]Other  GASTROINTESTINAL:  [ ]Soft  [ ]Distended   [ ]+BS  [ ]Non tender [ ]Tender  [ ]Other [ ]PEG [ ]OGT/ NGT   Last BM:   GENITOURINARY:  [ ]Normal [ ]Incontinent   [ ]Oliguria/Anuria   [ ]Griffin  MUSCULOSKELETAL:   [ ]Normal   [ ]Weakness  [ ]Bed/Wheelchair bound [ ]Edema  NEUROLOGIC:   [ ]No focal deficits  [ ] Cognitive impairment  [ ] Dysphagia [ ]Dysarthria [ ] Paresis [ ]Other   SKIN:   [ ]Normal  [ ]Rash  [ ]Other  [ ]Pressure ulcer(s) [ ]y [ ]n present on admission      LABS:                        10.7   7.76  )-----------( 174      ( 23 Dec 2024 06:43 )             34.0   12-23    145  |  103  |  29[H]  ----------------------------<  102[H]  3.4[L]   |  28  |  1.13    Ca    9.3      23 Dec 2024 06:43  Phos  2.0     12-23  Mg     2.0     12-23    TPro  6.5  /  Alb  4.0  /  TBili  0.6  /  DBili  x   /  AST  16  /  ALT  13  /  AlkPhos  64  12-22      Urinalysis Basic - ( 23 Dec 2024 06:43 )    Color: x / Appearance: x / SG: x / pH: x  Gluc: 102 mg/dL / Ketone: x  / Bili: x / Urobili: x   Blood: x / Protein: x / Nitrite: x   Leuk Esterase: x / RBC: x / WBC x   Sq Epi: x / Non Sq Epi: x / Bacteria: x      RADIOLOGY & ADDITIONAL STUDIES:    < from: Xray Chest 1 View- PORTABLE-Urgent (12.22.24 @ 16:54) >  FINDINGS:    The heart size cannot be accurately assessed on this projection. Micra   pacemaker.  Right PICC terminating in the distal SVC.  Bilateral lower lung airspace opacities with small pleural effusions,   right greater than left. No pneumothorax.  Visualized osseous structures are unremarkable for the patient's age.    IMPRESSION:  Bilateral lower lung airspace opacities, right greater than left, likely   represent bilateral pleural effusions with associated atelectasis.   However, infectious etiology cannot be excluded.    < end of copied text >      CRITICAL CARE:  [ ]Shock Present  [ ]Septic [ ]Cardiogenic [ ]Neurologic [ ]Hypovolemic  [ ]Vasopressors [ ]Inotropes  [x ]Respiratory failure present [ ]Mechanical Ventilation [ ]Non-invasive ventilatory support [ ]High-Flow   [ ]Acute  [ x]Chronic [ ]Hypoxic  [ ]Hypercarbic [ ]Other  [ ]Other organ failure     Protein Calorie Malnutrition Present: [ ]mild [ ]moderate [ ]severe [ ]underweight [ ]morbid obesity  https://www.andeal.org/vault/2440/web/files/ONC/Table_Clinical%20Characteristics%20to%20Document%20Malnutrition-White%20JV%20et%20al%202012.pdf      [ x]PPSV2 < or = 30%  [ ]significant weight loss [ ]poor nutritional intake [ ]anasarca[ ]Artificial Nutrition    Other REFERRALS:  [ ]Hospice  [ ]Child Life  [ ]Social Work  [ ]Case management [ ]Holistic Therapy     Goals of Care Document: Date of Service: 12-24-24 @ 09:21    SUBJECTIVE AND OBJECTIVE:  Patient seen at bedside.     Indication for Geriatrics and Palliative Care Services/INTERVAL HPI:  sx, goc, support      OVERNIGHT EVENTS:  2 morphine PRNs used overnight  ----------------------------------------    DNR on chart:DNI  DNI      Allergies    No Known Allergies    Intolerances    MEDICATIONS  (STANDING):  albuterol/ipratropium for Nebulization 3 milliLiter(s) Nebulizer every 6 hours  amLODIPine   Tablet 5 milliGRAM(s) Oral daily  ampicillin  IVPB 12 Gram(s) IV Intermittent Once  cefTRIAXone   IVPB 2000 milliGRAM(s) IV Intermittent every 12 hours  chlorhexidine 2% Cloths 1 Application(s) Topical <User Schedule>  ferrous    sulfate 325 milliGRAM(s) Oral daily  fluticasone propionate/ salmeterol 100-50 MICROgram(s) Diskus 1 Dose(s) Inhalation two times a day  furosemide   Injectable 40 milliGRAM(s) IV Push daily  metoprolol succinate ER 50 milliGRAM(s) Oral daily  metoprolol succinate ER 25 milliGRAM(s) Oral at bedtime  rivaroxaban 20 milliGRAM(s) Oral with dinner  tamsulosin 0.8 milliGRAM(s) Oral at bedtime  tiotropium 2.5 MICROgram(s) Inhaler 2 Puff(s) Inhalation daily    MEDICATIONS  (PRN):  morphine  - Injectable 2 milliGRAM(s) IV Push every 2 hours PRN dyspnea      -----------------------------------------------------------------    ITEMS UNCHECKED ARE NOT PRESENT    PRESENT SYMPTOMS: [ ]Unable to self-report - see [ ] CPOT [ ] PAINADS [ ] RDOS  Source if other than patient:  [ ]Family   [ ]Team     Pain:  [ ]yes [ x]no  QOL impact -   Location -                    Aggravating factors -  Quality -  Radiation -  Timing-  Severity (0-10 scale):  Minimal acceptable level (0-10 scale):     CPOT:    https://www.sccm.org/getattachment/etc80q60-0j6h-4w6c-2w6g-7998v0813t1f/Critical-Care-Pain-Observation-Tool-(CPOT)    PAINAD Score: See PAINAD tool and score below       Dyspnea:                           [ ]Mild [ ]Moderate [x ]Severe    RDOS: See RDOS tool and score below   0 to 2  minimal or no respiratory distress   3  mild distress  4 to 6 moderate distress  >7 severe distress    Anxiety:                             [ ]Mild [ ]Moderate [ ]Severe  Fatigue:                             [ ]Mild [ ]Moderate [ ]Severe  Nausea:                             [ ]Mild [ ]Moderate [ ]Severe  Loss of appetite:              [ ]Mild [ ]Moderate [ ]Severe  Constipation:                    [ ]Mild [ ]Moderate [ ]Severe    PCSSQ[Palliative Care Spiritual Screening Question]   Severity (0-10):  Score of 4 or > indicate consideration of Chaplaincy referral.  Chaplaincy Referral: [ ] yes [ ] refused [ ] following [x ] Deferred     Caregiver [place]? : [x ] yes [ ] no [ ] Deferred [ ] Declined             Social work referral [ ] Patient & Family Centered Care Referral [ ]     Anticipatory Grief present?:  [x ] yes [ ] no  [ ] Deferred                  Social work referral [x ] Chaplaincy Referral [ ]    		  Other Symptoms:  [x ]All other review of systems negative     Palliative Performance Status Version 2:   See PPSv2 tool and score below         --------------------------------------------    PHYSICAL EXAM:  Vital Signs Last 24 Hrs  T(C): 36.7 (24 Dec 2024 04:16), Max: 36.8 (23 Dec 2024 19:00)  T(F): 98 (24 Dec 2024 04:16), Max: 98.2 (23 Dec 2024 19:00)  HR: 72 (24 Dec 2024 04:16) (72 - 79)  BP: 129/69 (24 Dec 2024 04:16) (129/69 - 146/76)  BP(mean): --  RR: 18 (24 Dec 2024 04:16) (18 - 20)  SpO2: 91% (24 Dec 2024 04:16) (89% - 91%)    Parameters below as of 24 Dec 2024 04:16  Patient On (Oxygen Delivery Method): nasal cannula     I&O's Summary    23 Dec 2024 07:01  -  24 Dec 2024 07:00  --------------------------------------------------------  IN: 600 mL / OUT: 4401 mL / NET: -3801 mL       Height (cm): 182.9 (12-22-24 @ 14:28), 182.9 (12-02-24 @ 23:55), 185.4 (03-23-24 @ 17:30)  Weight (kg): 90.7 (12-23-24 @ 00:57), 99.8 (12-02-24 @ 23:55), 87.1 (03-23-24 @ 17:30)  BMI (kg/m2): 27.1 (12-23-24 @ 00:57), 29.8 (12-22-24 @ 14:28), 29.8 (12-02-24 @ 23:55)        GENERAL: [ ]Cachexia    [ ]Alert  [ ]Oriented x   [ ]Lethargic  [ ]Unarousable  [ ]Verbal  [ ]Non-Verbal  Behavioral:   [ ]Anxiety  [ ]Delirium [ ]Agitation [ ]Other  HEENT:  [ ]Normal   [ ]Dry mouth   [ ]ET Tube/Trach  [ ]Oral lesions  PULMONARY:   [ ]Clear [ ]Tachypnea  [ ]Audible excessive secretions   [ ]Rhonchi        [ ]Right [ ]Left [ ]Bilateral  [ ]Crackles        [ ]Right [ ]Left [ ]Bilateral  [ ]Wheezing     [ ]Right [ ]Left [ ]Bilateral  [ ]Diminished BS [ ] Right [ ]Left [ ]Bilateral  CARDIOVASCULAR:    [ ]Regular [ ]Irregular [ ]Tachy  [ ]Boo [ ]Murmur [ ]Other  GASTROINTESTINAL:  [ ]Soft  [ ]Distended   [ ]+BS  [ ]Non tender [ ]Tender  [ ]Other [ ]PEG [ ]OGT/ NGT   Last BM:   GENITOURINARY:  [ ]Normal [ ]Incontinent   [ ]Oliguria/Anuria   [ ]Griffin  MUSCULOSKELETAL:   [ ]Normal   [ ]Weakness  [ ]Bed/Wheelchair bound [ ]Edema  NEUROLOGIC:   [ ]No focal deficits  [ ] Cognitive impairment  [ ] Dysphagia [ ]Dysarthria [ ] Paresis [ ]Other   SKIN:   [ ]Normal  [ ]Rash  [ ]Other  [ ]Pressure ulcer(s) [ ]y [ ]n present on admission    ------------------------------------------------------    LABS:                        11.1   6.33  )-----------( 149      ( 24 Dec 2024 09:36 )             35.5   12-24    138  |  96  |  24[H]  ----------------------------<  168[H]  3.7   |  29  |  0.94    Ca    9.0      24 Dec 2024 09:36  Phos  2.5     12-24  Mg     1.8     12-24      Urinalysis Basic - ( 24 Dec 2024 09:36 )    Color: x / Appearance: x / SG: x / pH: x  Gluc: 168 mg/dL / Ketone: x  / Bili: x / Urobili: x   Blood: x / Protein: x / Nitrite: x   Leuk Esterase: x / RBC: x / WBC x   Sq Epi: x / Non Sq Epi: x / Bacteria: x      RADIOLOGY & ADDITIONAL STUDIES:    < from: Xray Chest 1 View- PORTABLE-Urgent (12.22.24 @ 16:54) >  FINDINGS:    The heart size cannot be accurately assessed on this projection. Micra   pacemaker.  Right PICC terminating in the distal SVC.  Bilateral lower lung airspace opacities with small pleural effusions,   right greater than left. No pneumothorax.  Visualized osseous structures are unremarkable for the patient's age.    IMPRESSION:  Bilateral lower lung airspace opacities, right greater than left, likely   represent bilateral pleural effusions with associated atelectasis.   However, infectious etiology cannot be excluded.    < end of copied text >      CRITICAL CARE:  [ ]Shock Present  [ ]Septic [ ]Cardiogenic [ ]Neurologic [ ]Hypovolemic  [ ]Vasopressors [ ]Inotropes  [x ]Respiratory failure present [ ]Mechanical Ventilation [ ]Non-invasive ventilatory support [ ]High-Flow   [ ]Acute  [ x]Chronic [ ]Hypoxic  [ ]Hypercarbic [ ]Other  [ ]Other organ failure     Protein Calorie Malnutrition Present: [ ]mild [ ]moderate [ ]severe [ ]underweight [ ]morbid obesity  https://www.andeal.org/vault/2440/web/files/ONC/Table_Clinical%20Characteristics%20to%20Document%20Malnutrition-White%20JV%20et%20al%202012.pdf      [ x]PPSV2 < or = 30%  [ ]significant weight loss [ ]poor nutritional intake [ ]anasarca[ ]Artificial Nutrition    Other REFERRALS:  [ x]Hospice  [ ]Child Life  [x ]Social Work  [x ]Case management [ ]Holistic Therapy     Goals of Care Document: Date of Service: 12-24-24 @ 09:21    SUBJECTIVE AND OBJECTIVE:  Patient seen at bedside. Per RN staff, seems like the morphine seems to help some.  Per patient, he was able to sleep after morphine and is not sure how much he feels like it's helping his breathing. But is open to idea that it may be helping when I mentioned he seemed to be breathing more comfortably today compared to yesterday.    see separate goc for remainder of conversation.    We spoke at length about his experience with his illness and thoughts about the future. He started with sharing that if there is a way he could have more time he would want that, but also wants some level of quality. It's been a lot for him to adjust being less independent (previously driving, had his own boat, went fishing, hunting, part of a bowling league), and he doesn't like the idea of being in a nursing home because of 1. bad experiences other family had there and 2. he doesn't like the idea of being around "old people" - for reasons he's not sure why. He also mentions not wanting to make things too hard on his kids. He remembers a facility nearby called [facility] that was nice because it was nearby to family. He continues to report that things at home were very tough for him because of his care needs.     He remembers his wife's end of life experience with respiratory support and how he hasn't thought of that as being an acceptable quality of life for him.     He also shares how he doesn't like thinking about these things a lot. It also feels in some ways like his kids are going through their own process regarding where he's at and what they think he wants in his care. He seemed reassured by the idea of having some time to keep thinking about things for himself.    Indication for Geriatrics and Palliative Care Services/INTERVAL HPI:  sx, goc, support      OVERNIGHT EVENTS:  2 morphine PRNs used overnight  ----------------------------------------    DNR on chart:DNI  DNI      Allergies    No Known Allergies    Intolerances    MEDICATIONS  (STANDING):  albuterol/ipratropium for Nebulization 3 milliLiter(s) Nebulizer every 6 hours  amLODIPine   Tablet 5 milliGRAM(s) Oral daily  ampicillin  IVPB 12 Gram(s) IV Intermittent Once  cefTRIAXone   IVPB 2000 milliGRAM(s) IV Intermittent every 12 hours  chlorhexidine 2% Cloths 1 Application(s) Topical <User Schedule>  ferrous    sulfate 325 milliGRAM(s) Oral daily  fluticasone propionate/ salmeterol 100-50 MICROgram(s) Diskus 1 Dose(s) Inhalation two times a day  furosemide   Injectable 40 milliGRAM(s) IV Push daily  metoprolol succinate ER 50 milliGRAM(s) Oral daily  metoprolol succinate ER 25 milliGRAM(s) Oral at bedtime  rivaroxaban 20 milliGRAM(s) Oral with dinner  tamsulosin 0.8 milliGRAM(s) Oral at bedtime  tiotropium 2.5 MICROgram(s) Inhaler 2 Puff(s) Inhalation daily    MEDICATIONS  (PRN):  morphine  - Injectable 2 milliGRAM(s) IV Push every 2 hours PRN dyspnea      -----------------------------------------------------------------    ITEMS UNCHECKED ARE NOT PRESENT    PRESENT SYMPTOMS: [ ]Unable to self-report - see [ ] CPOT [ ] PAINADS [ ] RDOS  Source if other than patient:  [ ]Family   [ ]Team     Pain:  [ ]yes [ x]no  QOL impact -   Location -                    Aggravating factors -  Quality -  Radiation -  Timing-  Severity (0-10 scale):  Minimal acceptable level (0-10 scale):     CPOT:    https://www.sccm.org/getattachment/vns83x51-4m0h-6j5c-3o0m-2693r1501t4j/Critical-Care-Pain-Observation-Tool-(CPOT)    PAINAD Score: See PAINAD tool and score below       Dyspnea:                           [ ]Mild [ ]Moderate [x ]Severe    RDOS: See RDOS tool and score below   0 to 2  minimal or no respiratory distress   3  mild distress  4 to 6 moderate distress  >7 severe distress    Anxiety:                             [ ]Mild [ ]Moderate [ ]Severe  Fatigue:                             [ ]Mild [ ]Moderate [ ]Severe  Nausea:                             [ ]Mild [ ]Moderate [ ]Severe  Loss of appetite:              [ ]Mild [ ]Moderate [ ]Severe  Constipation:                    [ ]Mild [ ]Moderate [ ]Severe    PCSSQ[Palliative Care Spiritual Screening Question]   Severity (0-10):  Score of 4 or > indicate consideration of Chaplaincy referral.  Chaplaincy Referral: [ ] yes [ ] refused [ ] following [x ] Deferred     Caregiver [place]? : [x ] yes [ ] no [ ] Deferred [ ] Declined             Social work referral [ ] Patient & Family Centered Care Referral [ ]     Anticipatory Grief present?:  [x ] yes [ ] no  [ ] Deferred                  Social work referral [x ] Chaplaincy Referral [ ]    		  Other Symptoms:  [x ]All other review of systems negative     Palliative Performance Status Version 2:   See PPSv2 tool and score below         --------------------------------------------    PHYSICAL EXAM:  Vital Signs Last 24 Hrs  T(C): 36.7 (24 Dec 2024 04:16), Max: 36.8 (23 Dec 2024 19:00)  T(F): 98 (24 Dec 2024 04:16), Max: 98.2 (23 Dec 2024 19:00)  HR: 72 (24 Dec 2024 04:16) (72 - 79)  BP: 129/69 (24 Dec 2024 04:16) (129/69 - 146/76)  BP(mean): --  RR: 18 (24 Dec 2024 04:16) (18 - 20)  SpO2: 91% (24 Dec 2024 04:16) (89% - 91%)    Parameters below as of 24 Dec 2024 04:16  Patient On (Oxygen Delivery Method): nasal cannula     I&O's Summary    23 Dec 2024 07:01  -  24 Dec 2024 07:00  --------------------------------------------------------  IN: 600 mL / OUT: 4401 mL / NET: -3801 mL       Height (cm): 182.9 (12-22-24 @ 14:28), 182.9 (12-02-24 @ 23:55), 185.4 (03-23-24 @ 17:30)  Weight (kg): 90.7 (12-23-24 @ 00:57), 99.8 (12-02-24 @ 23:55), 87.1 (03-23-24 @ 17:30)  BMI (kg/m2): 27.1 (12-23-24 @ 00:57), 29.8 (12-22-24 @ 14:28), 29.8 (12-02-24 @ 23:55)        GENERAL: [ ]Cachexia    [ ]Alert  [ ]Oriented x   [ ]Lethargic  [ ]Unarousable  [ ]Verbal  [ ]Non-Verbal  Behavioral:   [ ]Anxiety  [ ]Delirium [ ]Agitation [ ]Other  HEENT:  [ ]Normal   [ ]Dry mouth   [ ]ET Tube/Trach  [ ]Oral lesions  PULMONARY:   [ ]Clear [ ]Tachypnea  [ ]Audible excessive secretions   [ ]Rhonchi        [ ]Right [ ]Left [ ]Bilateral  [ ]Crackles        [ ]Right [ ]Left [ ]Bilateral  [ ]Wheezing     [ ]Right [ ]Left [ ]Bilateral  [ ]Diminished BS [ ] Right [ ]Left [ ]Bilateral  CARDIOVASCULAR:    [ ]Regular [ ]Irregular [ ]Tachy  [ ]Boo [ ]Murmur [ ]Other  GASTROINTESTINAL:  [ ]Soft  [ ]Distended   [ ]+BS  [ ]Non tender [ ]Tender  [ ]Other [ ]PEG [ ]OGT/ NGT   Last BM:   GENITOURINARY:  [ ]Normal [ ]Incontinent   [ ]Oliguria/Anuria   [ ]Griffin  MUSCULOSKELETAL:   [ ]Normal   [ ]Weakness  [ ]Bed/Wheelchair bound [ ]Edema  NEUROLOGIC:   [ ]No focal deficits  [ ] Cognitive impairment  [ ] Dysphagia [ ]Dysarthria [ ] Paresis [ ]Other   SKIN:   [ ]Normal  [ ]Rash  [ ]Other  [ ]Pressure ulcer(s) [ ]y [ ]n present on admission    ------------------------------------------------------    LABS:                        11.1   6.33  )-----------( 149      ( 24 Dec 2024 09:36 )             35.5   12-24    138  |  96  |  24[H]  ----------------------------<  168[H]  3.7   |  29  |  0.94    Ca    9.0      24 Dec 2024 09:36  Phos  2.5     12-24  Mg     1.8     12-24      Urinalysis Basic - ( 24 Dec 2024 09:36 )    Color: x / Appearance: x / SG: x / pH: x  Gluc: 168 mg/dL / Ketone: x  / Bili: x / Urobili: x   Blood: x / Protein: x / Nitrite: x   Leuk Esterase: x / RBC: x / WBC x   Sq Epi: x / Non Sq Epi: x / Bacteria: x      RADIOLOGY & ADDITIONAL STUDIES:    < from: Xray Chest 1 View- PORTABLE-Urgent (12.22.24 @ 16:54) >  FINDINGS:    The heart size cannot be accurately assessed on this projection. Micra   pacemaker.  Right PICC terminating in the distal SVC.  Bilateral lower lung airspace opacities with small pleural effusions,   right greater than left. No pneumothorax.  Visualized osseous structures are unremarkable for the patient's age.    IMPRESSION:  Bilateral lower lung airspace opacities, right greater than left, likely   represent bilateral pleural effusions with associated atelectasis.   However, infectious etiology cannot be excluded.    < end of copied text >      CRITICAL CARE:  [ ]Shock Present  [ ]Septic [ ]Cardiogenic [ ]Neurologic [ ]Hypovolemic  [ ]Vasopressors [ ]Inotropes  [x ]Respiratory failure present [ ]Mechanical Ventilation [ ]Non-invasive ventilatory support [ ]High-Flow   [ ]Acute  [ x]Chronic [ ]Hypoxic  [ ]Hypercarbic [ ]Other  [ ]Other organ failure     Protein Calorie Malnutrition Present: [ ]mild [ ]moderate [ ]severe [ ]underweight [ ]morbid obesity  https://www.andeal.org/vault/5340/web/files/ONC/Table_Clinical%20Characteristics%20to%20Document%20Malnutrition-White%20JV%20et%20al%202012.pdf      [ x]PPSV2 < or = 30%  [ ]significant weight loss [ ]poor nutritional intake [ ]anasarca[ ]Artificial Nutrition    Other REFERRALS:  [ x]Hospice  [ ]Child Life  [x ]Social Work  [x ]Case management [ ]Holistic Therapy     Goals of Care Document: Date of Service: 12-24-24 @ 09:21    SUBJECTIVE AND OBJECTIVE:  Patient seen at bedside. Per RN staff, seems like the morphine seems to help some.  Per patient, he was able to sleep after morphine and is not sure how much he feels like it's helping his breathing. But is open to idea that it may be helping when I mentioned he seemed to be breathing more comfortably today compared to yesterday.    see separate goc for remainder of conversation.  also discussed dispo options for palliative care and hospice (not home and not nursing home) with CM and SW who will look into these options    Indication for Geriatrics and Palliative Care Services/INTERVAL HPI:  sx, goc, support      OVERNIGHT EVENTS:  2 morphine PRNs used overnight  ----------------------------------------    DNR on chart:DNI  DNI      Allergies    No Known Allergies    Intolerances    MEDICATIONS  (STANDING):  albuterol/ipratropium for Nebulization 3 milliLiter(s) Nebulizer every 6 hours  amLODIPine   Tablet 5 milliGRAM(s) Oral daily  ampicillin  IVPB 12 Gram(s) IV Intermittent Once  cefTRIAXone   IVPB 2000 milliGRAM(s) IV Intermittent every 12 hours  chlorhexidine 2% Cloths 1 Application(s) Topical <User Schedule>  ferrous    sulfate 325 milliGRAM(s) Oral daily  fluticasone propionate/ salmeterol 100-50 MICROgram(s) Diskus 1 Dose(s) Inhalation two times a day  furosemide   Injectable 40 milliGRAM(s) IV Push daily  metoprolol succinate ER 50 milliGRAM(s) Oral daily  metoprolol succinate ER 25 milliGRAM(s) Oral at bedtime  rivaroxaban 20 milliGRAM(s) Oral with dinner  tamsulosin 0.8 milliGRAM(s) Oral at bedtime  tiotropium 2.5 MICROgram(s) Inhaler 2 Puff(s) Inhalation daily    MEDICATIONS  (PRN):  morphine  - Injectable 2 milliGRAM(s) IV Push every 2 hours PRN dyspnea      -----------------------------------------------------------------    ITEMS UNCHECKED ARE NOT PRESENT    PRESENT SYMPTOMS: [ ]Unable to self-report - see [ ] CPOT [ ] PAINADS [ ] RDOS  Source if other than patient:  [ ]Family   [ ]Team     Pain:  [ x]yes []no  QOL impact - dec qol, mobility limitation   Location -                  R knee  Aggravating factors - movement  Quality -   Radiation - hip and knee  Timing- intermittent  Severity (0-10 scale):  Minimal acceptable level (0-10 scale):     CPOT:    https://www.[facility].org/getattachment/[email or web address]-[email or web address]-1749d1396r7r/Critical-Care-Pain-Observation-Tool-(CPOT)    PAINAD Score: See PAINAD tool and score below       Dyspnea:                           [ ]Mild [ ]Moderate [x ]Severe    RDOS: See RDOS tool and score below   0 to 2  minimal or no respiratory distress   3  mild distress  4 to 6 moderate distress  >7 severe distress    Anxiety:                             [ ]Mild [ ]Moderate [ ]Severe  Fatigue:                             [ ]Mild [ ]Moderate [ ]Severe  Nausea:                             [ ]Mild [ ]Moderate [ ]Severe  Loss of appetite:              [ ]Mild [ ]Moderate [ ]Severe  Constipation:                    [ ]Mild [ ]Moderate [ ]Severe    PCSSQ[Palliative Care Spiritual Screening Question]   Severity (0-10):  Score of 4 or > indicate consideration of Chaplaincy referral.  Chaplaincy Referral: [ ] yes [ ] refused [ ] following [x ] Deferred     Caregiver Clare? : [x ] yes [ ] no [ ] Deferred [ ] Declined             Social work referral [ ] Patient & Family Centered Care Referral [ ]     Anticipatory Grief present?:  [x ] yes [ ] no  [ ] Deferred                  Social work referral [x ] Chaplaincy Referral [ ]    		  Other Symptoms:  [x ]All other review of systems negative     Palliative Performance Status Version 2:   See PPSv2 tool and score below         --------------------------------------------    PHYSICAL EXAM:  Vital Signs Last 24 Hrs  T(C): 36.7 (24 Dec 2024 04:16), Max: 36.8 (23 Dec 2024 19:00)  T(F): 98 (24 Dec 2024 04:16), Max: 98.2 (23 Dec 2024 19:00)  HR: 72 (24 Dec 2024 04:16) (72 - 79)  BP: 129/69 (24 Dec 2024 04:16) (129/69 - 146/76)  BP(mean): --  RR: 18 (24 Dec 2024 04:16) (18 - 20)  SpO2: 91% (24 Dec 2024 04:16) (89% - 91%)    Parameters below as of 24 Dec 2024 04:16  Patient On (Oxygen Delivery Method): nasal cannula     I&O's Summary    23 Dec 2024 07:01  -  24 Dec 2024 07:00  --------------------------------------------------------  IN: 600 mL / OUT: 4401 mL / NET: -3801 mL       Height (cm): 182.9 (12-22-24 @ 14:28), 182.9 (12-02-24 @ 23:55), 185.4 (03-23-24 @ 17:30)  Weight (kg): 90.7 (12-23-24 @ 00:57), 99.8 (12-02-24 @ 23:55), 87.1 (03-23-24 @ 17:30)  BMI (kg/m2): 27.1 (12-23-24 @ 00:57), 29.8 (12-22-24 @ 14:28), 29.8 (12-02-24 @ 23:55)        GENERAL: [ ]Cachexia    [x ]Alert  [x ]Oriented x   [ ]Lethargic  [ ]Unarousable  [x ]Verbal  [ ]Non-Verbal  Behavioral:   [ ]Anxiety  [ ]Delirium [ ]Agitation [ ]Other  HEENT:  [ ]Normal   [ ]Dry mouth   [ ]ET Tube/Trach  [ ]Oral lesions  PULMONARY:   [x ]Clear [x ]Tachypnea  [ ]Audible excessive secretions   [ ]Rhonchi        [ ]Right [ ]Left [ ]Bilateral  [ ]Crackles        [ ]Right [ ]Left [ ]Bilateral  [ ]Wheezing     [ ]Right [ ]Left [ ]Bilateral  [ ]Diminished BS [ ] Right [ ]Left [ ]Bilateral  CARDIOVASCULAR:    [x ]Regular [ ]Irregular [ ]Tachy  [ ]Boo [ ]Murmur [ ]Other  GASTROINTESTINAL:  [x ]Soft  [ ]Distended   [ x]+BS  [ ]Non tender [ ]Tender  [ ]Other [ ]PEG [ ]OGT/ NGT   Last BM:   GENITOURINARY:  [ ]Normal [ ]Incontinent   [ ]Oliguria/Anuria   [ ]Griffin  MUSCULOSKELETAL:   [ ]Normal   [ ]Weakness  [x ]Bed/Wheelchair bound [x ]Edema  [x] R knee no erythema or effusion noted  NEUROLOGIC:   [ ]No focal deficits  [ ] Cognitive impairment  [ ] Dysphagia [ ]Dysarthria [ ] Paresis [ ]Other   SKIN:   [ ]Normal  [ ]Rash  [ ]Other  [ ]Pressure ulcer(s) [ ]y [ ]n present on admission    ------------------------------------------------------    LABS:                        11.1   6.33  )-----------( 149      ( 24 Dec 2024 09:36 )             35.5   12-24    138  |  96  |  24[H]  ----------------------------<  168[H]  3.7   |  29  |  0.94    Ca    9.0      24 Dec 2024 09:36  Phos  2.5     12-24  Mg     1.8     12-24      Urinalysis Basic - ( 24 Dec 2024 09:36 )    Color: x / Appearance: x / SG: x / pH: x  Gluc: 168 mg/dL / Ketone: x  / Bili: x / Urobili: x   Blood: x / Protein: x / Nitrite: x   Leuk Esterase: x / RBC: x / WBC x   Sq Epi: x / Non Sq Epi: x / Bacteria: x      RADIOLOGY & ADDITIONAL STUDIES:    < from: Xray Chest 1 View- PORTABLE-Urgent (12.22.24 @ 16:54) >  FINDINGS:    The heart size cannot be accurately assessed on this projection. Micra   pacemaker.  Right PICC terminating in the distal SVC.  Bilateral lower lung airspace opacities with small pleural effusions,   right greater than left. No pneumothorax.  Visualized osseous structures are unremarkable for the patient's age.    IMPRESSION:  Bilateral lower lung airspace opacities, right greater than left, likely   represent bilateral pleural effusions with associated atelectasis.   However, infectious etiology cannot be excluded.    < end of copied text >      CRITICAL CARE:  [ ]Shock Present  [ ]Septic [ ]Cardiogenic [ ]Neurologic [ ]Hypovolemic  [ ]Vasopressors [ ]Inotropes  [x ]Respiratory failure present [ ]Mechanical Ventilation [ ]Non-invasive ventilatory support [ ]High-Flow   [ ]Acute  [ x]Chronic [ ]Hypoxic  [ ]Hypercarbic [ ]Other  [ ]Other organ failure     Protein Calorie Malnutrition Present: [ ]mild [ ]moderate [ ]severe [ ]underweight [ ]morbid obesity  https://www.andeal.org/vault/2440/web/files/ONC/Table_Clinical%20Characteristics%20to%20Document%20Malnutrition-White%20JV%20et%20al%127383.pdf      [ x]PPSV2 < or = 30%  [ ]significant weight loss [ ]poor nutritional intake [ ]anasarca[ ]Artificial Nutrition    Other REFERRALS:  [ x]Hospice  [ ]Child Life  [x ]Social Work  [x ]Case management [ ]Holistic Therapy     Goals of Care Document:

## 2024-12-24 NOTE — PROGRESS NOTE ADULT - NS ATTEST RISK PROBLEM GEN_ALL_CORE FT
1. Number and complexity of problems addressed for this patient:    1.1 Moderate (At least 1)  [ ] 1 or more chronic illnesses with exacerbation, progression, or side effects of treatment  [ ] 2 or more stable chronic illnesses  [ ] 1 undiagnosed new problem with uncertain prognosis  [ ] 1 acute illness with systemic symptoms  [ ] 1 acute complicated injury  1.2 High (At least 1)   [ x] 1 or more chronic illnesses with severe exacerbation, progression, or side effects of treatment  [ x] 1 acute or chronic illnesses or injuries that may pose a threat to life or bodily function    2. Amount and/or Complexity of Data that was Reviewed and Analyzed for this case:       Moderate (1 out of 3)       High (2 out of 3)  2.1. (Any combination of 3 of the following)   [ ] Prior External notes were reviewed  [ ] Each test result was reviewed (see "LABS" and "RADIOLOGY & ADDITIONAL STUDIES" above)  [ ] The following tests were ordered and/or reviewed (Only count 1 point for ordering or reviewing a unique test):  	[ ]CBC  	[ ] Chemistry   	[ ] Imaging   	[ ] Other:   [ ] Assessment requiring an independent historian   		Name of historian and relationship:   2.2  [ ] Personally review and interpretation of  image or testing   2.3  [ ] Discussion of management or test interpretation with external physician/other qualified health care professional\appropriate source (not separately reported)    3. Risk of Complications and/or Morbidity or Mortality of for this Patient’s Management:  3.1 Moderate risk of morbidity from additional diagnostic testing or treatment (At least 1):   [ ] Prescription drug management   [ ] Decision regarding minor surgery, treatment, or procedure with identified patient or procedure risk factors  [ ] Decision regarding elective major surgery, treatment, or procedure without identified patient or procedure risk factors   [ ] Diagnosis or treatment significantly limited by social determinants of health   [ ] Other:   3.2 High risk of morbidity from additional diagnostic testing or treatment (At least 1):   [ ] Drug therapy requiring intensive monitoring for toxicity   [ ] Decision regarding elective major surgery, treatment, or procedure with identified patient or procedure risk factors   [ ] Decision regarding emergency major surgery, treatment, or procedure   [ ] Decision regarding hospitalization or escalation of hospital-level of care  [ ] Decision not to resuscitate, not to intubate, or to de-escalate care because of poor prognosis   [ ] Decision to proceed or not with artificial nutrition   [x ] Parenteral controlled substance  [ ] Other: 1. Number and complexity of problems addressed for this patient:    1.1 Moderate (At least 1)  [ ] 1 or more chronic illnesses with exacerbation, progression, or side effects of treatment  [ ] 2 or more stable chronic illnesses  [ ] 1 undiagnosed new problem with uncertain prognosis  [ ] 1 acute illness with systemic symptoms  [ ] 1 acute complicated injury  1.2 High (At least 1)   [ x] 1 or more chronic illnesses with severe exacerbation, progression, or side effects of treatment  [ x] 1 acute or chronic illnesses or injuries that may pose a threat to life or bodily function    2. Amount and/or Complexity of Data that was Reviewed and Analyzed for this case:       Moderate (1 out of 3)       High (2 out of 3)  2.1. (Any combination of 3 of the following)   [ ] Prior External notes were reviewed  [ ] Each test result was reviewed (see "LABS" and "RADIOLOGY & ADDITIONAL STUDIES" above)  [ ] The following tests were ordered and/or reviewed (Only count 1 point for ordering or reviewing a unique test):  	[ ]CBC  	[ ] Chemistry   	[ ] Imaging   	[ ] Other:   [ ] Assessment requiring an independent historian   		Name of historian and relationship:   2.2  [ ] Personally review and interpretation of  image or testing   2.3  [ ] Discussion of management or test interpretation with external physician/other qualified health care professional\appropriate source (not separately reported)    3. Risk of Complications and/or Morbidity or Mortality of for this Patient’s Management:  3.1 Moderate risk of morbidity from additional diagnostic testing or treatment (At least 1):   [ ] Prescription drug management   [ ] Decision regarding minor surgery, treatment, or procedure with identified patient or procedure risk factors  [ ] Decision regarding elective major surgery, treatment, or procedure without identified patient or procedure risk factors   [ ] Diagnosis or treatment significantly limited by social determinants of health   [ ] Other:   3.2 High risk of morbidity from additional diagnostic testing or treatment (At least 1):   [x ] Drug therapy requiring intensive monitoring for toxicity   [ ] Decision regarding elective major surgery, treatment, or procedure with identified patient or procedure risk factors   [ ] Decision regarding emergency major surgery, treatment, or procedure   [ ] Decision regarding hospitalization or escalation of hospital-level of care  [ ] Decision not to resuscitate, not to intubate, or to de-escalate care because of poor prognosis   [ ] Decision to proceed or not with artificial nutrition   [x ] Parenteral controlled substance  [ ] Other:

## 2024-12-24 NOTE — DISCHARGE NOTE PROVIDER - NSDCMRMEDTOKEN_GEN_ALL_CORE_FT
acetaminophen 325 mg oral tablet: 2 tab(s) orally every 6 hours As needed Temp greater or equal to 38C (100.4F), Mild Pain (1 - 3), Moderate Pain (4 - 6), Severe Pain (7 - 10)  albuterol 90 mcg/inh inhalation aerosol: 2 puff(s) inhaled 4 times a day, As Needed  Ampicillin 2g Every 4 hours : Infuse through PICC line every 4 hours every day . LAST day of treatment will be January 2nd. STOP treatment on January 3rd. Total course of Treatment will be 4 weeks (12/5/24 - 1/2/25)  Ceftriaxone 2g Every 12 hours: Infuse through PICC line every 12 hours every day . LAST day of treatment will be January 2nd. STOP treatment on January 3rd. Total course of Treatment will be 4 weeks (12/5/24 - 1/2/25)  ferrous sulfate 325 mg (65 mg elemental iron) oral tablet: 1 tab(s) orally once a day  Flomax 0.4 mg oral capsule: 2 cap(s) orally once a day (at bedtime)  Lasix 20 mg oral tablet: 1 tab(s) orally once a day Please take Lasix 1 tablet daily everyday until follow up with cardiologist.  losartan 50 mg oral tablet: 2 tab(s) orally once a day losartan 100 mg once a day in the morning  Norvasc 5 mg oral tablet: 1 tab(s) orally once a day  Toprol-XL 25 mg oral tablet, extended release: 1 tab(s) orally once a day (at bedtime)  Toprol-XL 50 mg oral tablet, extended release: 1 tab(s) orally once a day Please take 50 mg once a day in the morning. (25 mg at night).  Trelegy Ellipta 200 mcg-62.5 mcg-25 mcg/inh inhalation powder: 1 puff(s) inhaled once a day  Xarelto 20 mg oral tablet: 1 tab(s) orally once a day   acetaminophen 325 mg oral tablet: 2 tab(s) orally every 6 hours As needed Temp greater or equal to 38C (100.4F), Mild Pain (1 - 3), Moderate Pain (4 - 6), Severe Pain (7 - 10)  albuterol 90 mcg/inh inhalation aerosol: 2 puff(s) inhaled 4 times a day, As Needed  Ampicillin 2g Every 4 hours : Infuse through PICC line every 4 hours every day . LAST day of treatment will be January 2nd. STOP treatment on January 3rd. Total course of Treatment will be 4 weeks (12/5/24 - 1/2/25)  Ceftriaxone 2g Every 12 hours: Infuse through PICC line every 12 hours every day . LAST day of treatment will be January 2nd. STOP treatment on January 3rd. Total course of Treatment will be 4 weeks (12/5/24 - 1/2/25)  ferrous sulfate 325 mg (65 mg elemental iron) oral tablet: 1 tab(s) orally once a day  Flomax 0.4 mg oral capsule: 2 cap(s) orally once a day (at bedtime)  Lasix 20 mg oral tablet: 1 tab(s) orally once a day Please take Lasix 1 tablet daily everyday until follow up with cardiologist.  losartan 50 mg oral tablet: 2 tab(s) orally once a day losartan 100 mg once a day in the morning  metoprolol succinate 50 mg oral tablet, extended release: 1 tab(s) orally once a day  Norvasc 5 mg oral tablet: 1 tab(s) orally once a day  Toprol-XL 25 mg oral tablet, extended release: 1 tab(s) orally once a day (at bedtime)  Toprol-XL 50 mg oral tablet, extended release: 1 tab(s) orally once a day Please take 50 mg once a day in the morning. (25 mg at night).  Trelegy Ellipta 200 mcg-62.5 mcg-25 mcg/inh inhalation powder: 1 puff(s) inhaled once a day  Xarelto 20 mg oral tablet: 1 tab(s) orally once a day   acetaminophen 325 mg oral tablet: 2 tab(s) orally every 6 hours As needed Temp greater or equal to 38C (100.4F), Mild Pain (1 - 3), Moderate Pain (4 - 6), Severe Pain (7 - 10)  albuterol 90 mcg/inh inhalation aerosol: 2 puff(s) inhaled 4 times a day, As Needed  Ampicillin 2g Every 4 hours : Infuse through PICC line every 4 hours every day . LAST day of treatment will be January 2nd. STOP treatment on January 3rd. Total course of Treatment will be 4 weeks (12/5/24 - 1/2/25)  Ceftriaxone 2g Every 12 hours: Infuse through PICC line every 12 hours every day . LAST day of treatment will be January 2nd. STOP treatment on January 3rd. Total course of Treatment will be 4 weeks (12/5/24 - 1/2/25)  ferrous sulfate 325 mg (65 mg elemental iron) oral tablet: 1 tab(s) orally once a day  Flomax 0.4 mg oral capsule: 2 cap(s) orally once a day (at bedtime)  Lasix 20 mg oral tablet: 1 tab(s) orally once a day Please take Lasix 1 tablet daily everyday until follow up with cardiologist.  metoprolol succinate 50 mg oral tablet, extended release: 1 tab(s) orally once a day  morphine: 2 milligram(s) intravenous every 2 hours as needed for dyspnea  Norvasc 5 mg oral tablet: 1 tab(s) orally once a day  Toprol-XL 25 mg oral tablet, extended release: 1 tab(s) orally once a day (at bedtime)  Toprol-XL 50 mg oral tablet, extended release: 1 tab(s) orally once a day Please take 50 mg once a day in the morning. (25 mg at night).  Trelegy Ellipta 200 mcg-62.5 mcg-25 mcg/inh inhalation powder: 1 puff(s) inhaled once a day  Xarelto 20 mg oral tablet: 1 tab(s) orally once a day   fluticasone-salmeterol: 1 dose(s) inhaled 2 times a day  ipratropium-albuterol 0.5 mg-2.5 mg/3 mL inhalation solution: 3 milliliter(s) inhaled every 6 hours  morphine: 2 milligram(s) intravenous every 2 hours as needed for dyspnea  tiotropium 2.5 mcg/inh inhalation aerosol: 2 puff(s) inhaled once a day

## 2024-12-24 NOTE — PROGRESS NOTE ADULT - PROBLEM SELECTOR PLAN 1
mgmt of COPD and HF per primary, pulm, and cards teams    RECOMMEND:  - begin low dose opioid PRN for refractory dyspnea   - morphine 2mg IV q2h PRN dyspnea  - bowel regimen while on opioids  - may also trial fan to face if available  - supplemental O2 for hypoxia - goal 88-92% or as determined by team.

## 2024-12-24 NOTE — DISCHARGE NOTE PROVIDER - CARE PROVIDER_API CALL
Isiah Blancas  Cardiovascular Disease  [address] [place] [id number]  Phone: [phone]  Fax: [phone]  Follow Up Time: 1 week

## 2024-12-24 NOTE — PROGRESS NOTE ADULT - PROBLEM SELECTOR PLAN 4
will continue to assess for referrals  patient expressing different things at different times and voices that he is still deciding on what he wants to do for his future  12/23/24 requesting hastened death (will continue explore and support underlying reasons for this, suspect symptom burden and grief), reporting loss of ability to do some things he enjoys - fishing, hunting, going out of home  12/24/24 he did not voice request for hastened death, and was able to tolerate reflecting on his current state and different options for the future  will continue to offer support for grief and navigating transition in care

## 2024-12-25 NOTE — PROGRESS NOTE ADULT - PROBLEM SELECTOR PLAN 1
- progressive SOB, +orthopnea, +LE edema  - no productive cough  - - SOB improved after 1 dose of lasix per pt suggesting CHF as etiology  - ddx: CHF vs PNA vs COPD   - low suspicion for PE as pt on a/c    Plan  - s/p zoysn , amp, Duoneb  -s/p 80 IV lasix   -c/w IV lasix 40 qd --> with good diuresis   - wean oxygen support as tolerated   - f/u bcx  - afebrile, no WBC, non-toxic appearing -> c/w abx for empiric endocarditis (see below)  - standing wts   - strict I/O

## 2024-12-25 NOTE — PROGRESS NOTE ADULT - PROBLEM SELECTOR PLAN 4
- mild tropinemia -> likely demand ischemia -> f/u OP for ischemic eval   - hold losartan iso incr diuresis  - c/w home toprolol - mild tropinemia -> likely demand ischemia -> f/u OP for ischemic eval  - c/w home toprolol  -will consider adding losartan 25 if BPs improve, currently soft

## 2024-12-25 NOTE — PROGRESS NOTE ADULT - SUBJECTIVE AND OBJECTIVE BOX
ASHLEY KENDALL  89y  MRN: [id number]    Patient is a 89y old  Male who presents with a chief complaint of SOB (24 Dec 2024 10:29)      Subjective: no events ON. Denies fever, CP, SOB, abn pain, N/V, dysuria. Tolerating diet. Has been using PRN morphine, states it helps when he feels SOB or has pain. Had some L knee pain this AM, slightly better after receiving morphine     MEDICATIONS  (STANDING):  albuterol/ipratropium for Nebulization 3 milliLiter(s) Nebulizer every 6 hours  amLODIPine   Tablet 5 milliGRAM(s) Oral daily  ampicillin  IVPB 12 Gram(s) IV Intermittent Once  cefTRIAXone   IVPB 2000 milliGRAM(s) IV Intermittent every 12 hours  chlorhexidine 2% Cloths 1 Application(s) Topical <User Schedule>  ferrous    sulfate 325 milliGRAM(s) Oral daily  fluticasone propionate/ salmeterol 250-50 MICROgram(s) Diskus 1 Dose(s) Inhalation two times a day  furosemide   Injectable 40 milliGRAM(s) IV Push daily  metoprolol succinate ER 50 milliGRAM(s) Oral daily  metoprolol succinate ER 25 milliGRAM(s) Oral at bedtime  rivaroxaban 20 milliGRAM(s) Oral with dinner  senna 2 Tablet(s) Oral at bedtime  tamsulosin 0.8 milliGRAM(s) Oral at bedtime  tiotropium 2.5 MICROgram(s) Inhaler 2 Puff(s) Inhalation daily    MEDICATIONS  (PRN):  morphine  - Injectable 2 milliGRAM(s) IV Push every 2 hours PRN dyspnea      Objective:    Vitals: Vital Signs Last 24 Hrs  T(C): 36.8 (12-25-24 @ 04:12), Max: 36.8 (12-25-24 @ 04:12)  T(F): 98.3 (12-25-24 @ 04:12), Max: 98.3 (12-25-24 @ 04:12)  HR: 88 (12-25-24 @ 04:12) (71 - 88)  BP: 103/64 (12-25-24 @ 04:12) (103/64 - 125/68)  BP(mean): --  RR: 20 (12-25-24 @ 04:12) (18 - 20)  SpO2: 91% (12-25-24 @ 04:12) (91% - 96%)            I&O's Summary    24 Dec 2024 07:01  -  25 Dec 2024 07:00  --------------------------------------------------------  IN: 340 mL / OUT: 1800 mL / NET: -1460 mL        PHYSICAL EXAM:  GENERAL: NAD  HEAD:  Atraumatic, Normocephalic  EYES: EOMI, conjunctiva and sclera clear  CHEST/LUNG: slight bibasilar crackles   HEART: Regular rate and rhythm; No murmurs, rubs, or gallops  ABDOMEN: Soft, Nontender, Nondistended;   SKIN: No rashes or lesions  NERVOUS SYSTEM:  Alert & Oriented X3, no focal deficits    LABS:  12-25    140  |  100  |  24[H]  ----------------------------<  103[H]  3.7   |  28  |  0.99  12-24    138  |  96  |  24[H]  ----------------------------<  168[H]  3.7   |  29  |  0.94  12-23    145  |  103  |  29[H]  ----------------------------<  102[H]  3.4[L]   |  28  |  1.13    Ca    8.3[L]      25 Dec 2024 05:23  Ca    9.0      24 Dec 2024 09:36  Ca    9.3      23 Dec 2024 06:43  Phos  2.4     12-25  Mg     2.0     12-25    TPro  6.5  /  Alb  4.0  /  TBili  0.6  /  DBili  x   /  AST  16  /  ALT  13  /  AlkPhos  64  12-22                    Urinalysis Basic - ( 25 Dec 2024 05:23 )    Color: x / Appearance: x / SG: x / pH: x  Gluc: 103 mg/dL / Ketone: x  / Bili: x / Urobili: x   Blood: x / Protein: x / Nitrite: x   Leuk Esterase: x / RBC: x / WBC x   Sq Epi: x / Non Sq Epi: x / Bacteria: x                              10.1   4.07  )-----------( 128      ( 25 Dec 2024 05:23 )             31.9                         11.1   6.33  )-----------( 149      ( 24 Dec 2024 09:36 )             35.5                         10.7   7.76  )-----------( 174      ( 23 Dec 2024 06:43 )             34.0     CAPILLARY BLOOD GLUCOSE          RADIOLOGY & ADDITIONAL TESTS:    Imaging Personally Reviewed:  [x ] YES  [ ] NO    Consultants involved in case:   Consultant(s) Notes Reviewed:  [ x] YES  [ ] NO:   Care Discussed with Consultants/Other Providers [x ] YES  [ ] NO

## 2024-12-25 NOTE — PROGRESS NOTE ADULT - ASSESSMENT
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

## 2024-12-25 NOTE — PROGRESS NOTE ADULT - ATTENDING COMMENTS
Initial attending contact date 12/25/24. See resident note written above for details. I reviewed the resident documentation. I have personally seen and examined this patient. I reviewed vitals, labs, medications, and additional imaging. I agree with the above resident's findings and plans as written above with the following additions/statements.    88 yo M with advanced COPD and HFrEF presenting with decompensated heart failure/CODP exacerbation, now improved after diuresis and abx therapy. Course complicated by bacteremia (2/2 Enterococcus) now currently on IV abx (ampicillin/CTX). GOC evolving pending home hospice vs hospice facility. Vpaced on tele with no additional events overnight.   Plan:  - c/w 40mg IV lasix qd, can likely transition to oral diuretics tomorrow   - Consider resuming RAASi with losartan 25mg tomorrow if he remains euvolemic   - c/w Ampicillin/CTx unil 1/2/2025  - Palliative recs appreciated, GOC evolving complicating dispo.

## 2024-12-26 NOTE — PROGRESS NOTE ADULT - PROBLEM SELECTOR PLAN 2
- from last admission, 4/4 bcx Enterococcus   - empirically tx for endocarditis per last ID note as TTE was deemed non-essential as pt is a poor surgical candidate  - 4 weeks from last cleared culture (12/5/24-1/2/25)    Plan  - c/w amp and CTX unclear hx of gout, now with acute R knee pain with light touch on exam, erythematous  UA 9.9  s/p 1.2mg colchicine, c/w 0.6 qd

## 2024-12-26 NOTE — PROGRESS NOTE ADULT - ASSESSMENT
88 yo M with COPD (on home 3L), RHF, mod pHTN, mild HFrEF (12/24 EF 45%), Afib on xarelto s/p PPM, BPH, HTN, HLD, recent hospitalization for COPD vs CHF c/b efaecalis bacteremia, p/w SOB and AHRF 2/2 likely CHF exacerbation, currently being diuresed, family talks about hospice facilities underway.  88 yo M with COPD (on home 3L), RHF, mod pHTN, mild HFrEF (12/24 EF 45%), Afib on xarelto s/p PPM, BPH, HTN, HLD, recent hospitalization for COPD vs CHF c/b efaecalis bacteremia, p/w SOB and AHRF 2/2 likely CHF exacerbation, currently being diuresed, family talks about hospice facilities underway. CCB by gout now on colchicine

## 2024-12-26 NOTE — CHART NOTE - NSCHARTNOTEFT_GEN_A_CORE
[facility] continues to follow this case. LCSW and LMSW Echo reviewed chart and met with patient and patient's daughter Inez at bedside today.     Team first introduced selves and roles in patient care. Patient and Inez verbalized understanding and were receptive to visit today. Team next inquired into patient status, and patient notes managing well today and is without concerns or complaints at present. Patient tired at time of visit and team continued discussion with Inez outside of patient room, who notes that d/c planning remains ongoing for patient. Inez states that she was informed that patient will be stable for d/c soon and notes awaiting further input from CM regarding next steps in care. Inez states that family cannot manage patient care needs in the home setting and is not interested in d/c planning to [facility], but notes possible interest in d/c to [facility] with private pay if feasible for family to manage. Inez inquired into PCU transfer today and team provided education regarding PCU as a short-term acute care unit and not a long-term placement for patient. Patient comfortable at time of visit and has not utilized any IV symptom medication thus far today, negating need for PCU transfer. Team informed Inez that, should patient status change and decompensate prior to d/c planning, team can reevaluate for PCU candidacy, and Inez verbalized understanding. Team assured ongoing availability and support throughout next steps in care as well, and Inez verbalized appreciation.    Goals remain clearly established in care. CM following for d/c planning, likely facility placement vs inpatient hospice if eligible. Hospice referrals placed and pending at this time. [facility] signing off, but remains available should status change prior to d/c.

## 2024-12-26 NOTE — PROGRESS NOTE ADULT - PROBLEM SELECTOR PLAN 5
- c/w xarelto  - c/w toprolol - mild tropinemia -> likely demand ischemia -> f/u OP for ischemic eval  - c/w home toprolol  -will consider adding losartan 25 if BPs improve, currently soft

## 2024-12-26 NOTE — PROGRESS NOTE ADULT - PROBLEM SELECTOR PLAN 7
Diet: DASH   DVT: xarelto  Dispo: likely hospice facility   Code status: DNR/DNI - hold losartan and amlodipine iso incr diuresis  - c/w home toprolol

## 2024-12-26 NOTE — PROGRESS NOTE ADULT - PROBLEM SELECTOR PLAN 1
- progressive SOB, +orthopnea, +LE edema  - no productive cough  - - SOB improved after 1 dose of lasix per pt suggesting CHF as etiology  - ddx: CHF vs PNA vs COPD   - low suspicion for PE as pt on a/c    Plan  - s/p zoysn , amp, Duoneb  -s/p 80 IV lasix   -c/w IV lasix 40 qd --> with good diuresis   - wean oxygen support as tolerated   - f/u bcx  - afebrile, no WBC, non-toxic appearing -> c/w abx for empiric endocarditis (see below)  - standing wts   - strict I/O - progressive SOB, +orthopnea, +LE edema  - no productive cough  - - SOB improved after 1 dose of lasix per pt suggesting CHF as etiology  - ddx: CHF vs PNA vs COPD   - low suspicion for PE as pt on a/c    Plan  - s/p zoysn , amp, Duoneb  -s/p 80 IV lasix   -c/w IV lasix 40 qd --> with good diuresis - will transition to lasix 40 PO tmw   - wean oxygen support as tolerated   - f/u bcx  - afebrile, no WBC, non-toxic appearing -> c/w abx for empiric endocarditis (see below)  - standing wts   - strict I/O

## 2024-12-26 NOTE — PROGRESS NOTE ADULT - PROBLEM SELECTOR PLAN 4
- mild tropinemia -> likely demand ischemia -> f/u OP for ischemic eval  - c/w home toprolol  -will consider adding losartan 25 if BPs improve, currently soft - no wheezing on exam  - SOB improved after 1 dose of lasix suggesting CHF as etiology  - low suspicion for copd exacerbation   - home meds: trelegy and albuterol  - start symbicort and duonebs inpt

## 2024-12-26 NOTE — PROGRESS NOTE ADULT - PROBLEM SELECTOR PLAN 3
- no wheezing on exam  - SOB improved after 1 dose of lasix suggesting CHF as etiology  - low suspicion for copd exacerbation   - home meds: trelegy and albuterol  - start symbicort and duonebs inpt - from last admission, 4/4 bcx Enterococcus   - empirically tx for endocarditis per last ID note as TTE was deemed non-essential as pt is a poor surgical candidate  - 4 weeks from last cleared culture (12/5/24-1/2/25)    Plan  - c/w amp and CTX

## 2024-12-26 NOTE — PROGRESS NOTE ADULT - SUBJECTIVE AND OBJECTIVE BOX
ASHLEY KENDALL  89y  MRN: [id number]    Patient is a 89y old  Male who presents with a chief complaint of SOB (25 Dec 2024 08:12)      Subjective: no events ON. Denies fever, CP, SOB, abn pain, N/V, dysuria. Tolerating diet.      MEDICATIONS  (STANDING):  albuterol/ipratropium for Nebulization 3 milliLiter(s) Nebulizer every 6 hours  amLODIPine   Tablet 5 milliGRAM(s) Oral daily  ampicillin  IVPB 12 Gram(s) IV Intermittent Once  cefTRIAXone   IVPB 2000 milliGRAM(s) IV Intermittent every 12 hours  chlorhexidine 2% Cloths 1 Application(s) Topical <User Schedule>  ferrous    sulfate 325 milliGRAM(s) Oral daily  fluticasone propionate/ salmeterol 250-50 MICROgram(s) Diskus 1 Dose(s) Inhalation two times a day  furosemide   Injectable 40 milliGRAM(s) IV Push daily  metoprolol succinate ER 50 milliGRAM(s) Oral daily  metoprolol succinate ER 25 milliGRAM(s) Oral at bedtime  rivaroxaban 20 milliGRAM(s) Oral with dinner  senna 2 Tablet(s) Oral at bedtime  sodium phosphate 30 milliMole(s)/500 mL IVPB 30 milliMole(s) IV Intermittent once  tamsulosin 0.8 milliGRAM(s) Oral at bedtime  tiotropium 2.5 MICROgram(s) Inhaler 2 Puff(s) Inhalation daily    MEDICATIONS  (PRN):  morphine  - Injectable 2 milliGRAM(s) IV Push every 2 hours PRN dyspnea      Objective:    Vitals: Vital Signs Last 24 Hrs  T(C): 36.4 (12-26-24 @ 04:27), Max: 36.7 (12-25-24 @ 19:20)  T(F): 97.6 (12-26-24 @ 04:27), Max: 98 (12-25-24 @ 19:20)  HR: 70 (12-26-24 @ 04:27) (70 - 76)  BP: 113/68 (12-26-24 @ 04:27) (99/61 - 113/68)  BP(mean): --  RR: 18 (12-26-24 @ 04:27) (18 - 20)  SpO2: 95% (12-26-24 @ 04:27) (93% - 97%)            I&O's Summary    25 Dec 2024 07:01  -  26 Dec 2024 07:00  --------------------------------------------------------  IN: 460 mL / OUT: 1450 mL / NET: -990 mL        PHYSICAL EXAM:  GENERAL: NAD  HEAD:  Atraumatic, Normocephalic  EYES: EOMI, conjunctiva and sclera clear  CHEST/LUNG: Clear to auscultation bilaterally; No rales, rhonchi, wheezing, or rubs  HEART: Regular rate and rhythm; No murmurs, rubs, or gallops  ABDOMEN: Soft, Nontender, Nondistended;   SKIN: No rashes or lesions  NERVOUS SYSTEM:  Alert & Oriented X3, no focal deficits    LABS:  12-26    139  |  100  |  27[H]  ----------------------------<  89  3.7   |  26  |  1.09  12-25    140  |  100  |  24[H]  ----------------------------<  103[H]  3.7   |  28  |  0.99  12-24    138  |  96  |  24[H]  ----------------------------<  168[H]  3.7   |  29  |  0.94    Ca    8.3[L]      26 Dec 2024 07:09  Ca    8.3[L]      25 Dec 2024 05:23  Ca    9.0      24 Dec 2024 09:36  Phos  2.2     12-26  Mg     2.0     12-26                      Urinalysis Basic - ( 26 Dec 2024 07:09 )    Color: x / Appearance: x / SG: x / pH: x  Gluc: 89 mg/dL / Ketone: x  / Bili: x / Urobili: x   Blood: x / Protein: x / Nitrite: x   Leuk Esterase: x / RBC: x / WBC x   Sq Epi: x / Non Sq Epi: x / Bacteria: x                              10.1   1.77  )-----------( 128      ( 26 Dec 2024 07:09 )             32.4                         10.1   4.07  )-----------( 128      ( 25 Dec 2024 05:23 )             31.9                         11.1   6.33  )-----------( 149      ( 24 Dec 2024 09:36 )             35.5     CAPILLARY BLOOD GLUCOSE          RADIOLOGY & ADDITIONAL TESTS:    Imaging Personally Reviewed:  [x ] YES  [ ] NO    Consultants involved in case:   Consultant(s) Notes Reviewed:  [ x] YES  [ ] NO:   Care Discussed with Consultants/Other Providers [x ] YES  [ ] NO         ASHLEY KENDALL  89y  MRN: [id number]    Patient is a 89y old  Male who presents with a chief complaint of SOB (25 Dec 2024 08:12)      Subjective: R knee pain worse this AM after prior pain control, now states pain happens with light movement. Not sure of gout hx. Denies fever, CP, SOB, abn pain, N/V, dysuria. Tolerating diet.      MEDICATIONS  (STANDING):  albuterol/ipratropium for Nebulization 3 milliLiter(s) Nebulizer every 6 hours  amLODIPine   Tablet 5 milliGRAM(s) Oral daily  ampicillin  IVPB 12 Gram(s) IV Intermittent Once  cefTRIAXone   IVPB 2000 milliGRAM(s) IV Intermittent every 12 hours  chlorhexidine 2% Cloths 1 Application(s) Topical <User Schedule>  ferrous    sulfate 325 milliGRAM(s) Oral daily  fluticasone propionate/ salmeterol 250-50 MICROgram(s) Diskus 1 Dose(s) Inhalation two times a day  furosemide   Injectable 40 milliGRAM(s) IV Push daily  metoprolol succinate ER 50 milliGRAM(s) Oral daily  metoprolol succinate ER 25 milliGRAM(s) Oral at bedtime  rivaroxaban 20 milliGRAM(s) Oral with dinner  senna 2 Tablet(s) Oral at bedtime  sodium phosphate 30 milliMole(s)/500 mL IVPB 30 milliMole(s) IV Intermittent once  tamsulosin 0.8 milliGRAM(s) Oral at bedtime  tiotropium 2.5 MICROgram(s) Inhaler 2 Puff(s) Inhalation daily    MEDICATIONS  (PRN):  morphine  - Injectable 2 milliGRAM(s) IV Push every 2 hours PRN dyspnea      Objective:    Vitals: Vital Signs Last 24 Hrs  T(C): 36.4 (12-26-24 @ 04:27), Max: 36.7 (12-25-24 @ 19:20)  T(F): 97.6 (12-26-24 @ 04:27), Max: 98 (12-25-24 @ 19:20)  HR: 70 (12-26-24 @ 04:27) (70 - 76)  BP: 113/68 (12-26-24 @ 04:27) (99/61 - 113/68)  BP(mean): --  RR: 18 (12-26-24 @ 04:27) (18 - 20)  SpO2: 95% (12-26-24 @ 04:27) (93% - 97%)            I&O's Summary    25 Dec 2024 07:01  -  26 Dec 2024 07:00  --------------------------------------------------------  IN: 460 mL / OUT: 1450 mL / NET: -990 mL        PHYSICAL EXAM:  GENERAL: NAD  HEAD:  Atraumatic, Normocephalic  EYES: EOMI, conjunctiva and sclera clear  CHEST/LUNG: Clear to auscultation bilaterally; No rales, rhonchi, wheezing, or rubs  HEART: Regular rate and rhythm; No murmurs, rubs, or gallops  ABDOMEN: Soft, Nontender, Nondistended;   MSK: R knee erythematous, slightly warm to touch tender w light touch, L knee without TTP or erythema   NERVOUS SYSTEM:  Alert & Oriented X3, no focal deficits    LABS:  12-26    139  |  100  |  27[H]  ----------------------------<  89  3.7   |  26  |  1.09  12-25    140  |  100  |  24[H]  ----------------------------<  103[H]  3.7   |  28  |  0.99  12-24    138  |  96  |  24[H]  ----------------------------<  168[H]  3.7   |  29  |  0.94    Ca    8.3[L]      26 Dec 2024 07:09  Ca    8.3[L]      25 Dec 2024 05:23  Ca    9.0      24 Dec 2024 09:36  Phos  2.2     12-26  Mg     2.0     12-26                      Urinalysis Basic - ( 26 Dec 2024 07:09 )    Color: x / Appearance: x / SG: x / pH: x  Gluc: 89 mg/dL / Ketone: x  / Bili: x / Urobili: x   Blood: x / Protein: x / Nitrite: x   Leuk Esterase: x / RBC: x / WBC x   Sq Epi: x / Non Sq Epi: x / Bacteria: x                              10.1   1.77  )-----------( 128      ( 26 Dec 2024 07:09 )             32.4                         10.1   4.07  )-----------( 128      ( 25 Dec 2024 05:23 )             31.9                         11.1   6.33  )-----------( 149      ( 24 Dec 2024 09:36 )             35.5     CAPILLARY BLOOD GLUCOSE          RADIOLOGY & ADDITIONAL TESTS:    Imaging Personally Reviewed:  [x ] YES  [ ] NO    Consultants involved in case:   Consultant(s) Notes Reviewed:  [ x] YES  [ ] NO:   Care Discussed with Consultants/Other Providers [x ] YES  [ ] NO

## 2024-12-26 NOTE — PROGRESS NOTE ADULT - ATTENDING COMMENTS
89 yo M with COPD (on home 3L), RHF, mod pHTN, mild HFrEF (12/24 EF 45%), Afib on xarelto s/p PPM, BPH, HTN, HLD, recent hospitalization for COPD vs CHF c/b efaecalis bacteremia, p/w SOB and AHRF 2/2 likely HFrEF exacerbation.    # acute on chronic HFrEF  # acute on chronic respiratory failure with hypoxia  # pulmonary HTN  # chronic AFib on xarelto  # COPD on 3L NC  # endocarditis/Enterococcus faecalis bacteremia on IV abx    - pt with recent admission for enterococcus faecalis bacteremia with presumed endocarditis, discharged on IV abx course presents with worsening dyspnea and LE swelling, admitted for acute on chronic HFrEF exacerbation  - s/p IV lasix in ED with improvement in LE edema as well as respiratory status  - transition to PO lasix 40mg daily tomorrow  - marked drop in wbc this morning. Monitor and see if need to change abx  - palliative care input appreciated: both patient and daughter understand the guarded prognosis and interested in hospice at this time. Code status confirmed and DNR/DNI. Pt does NOT want to return home but also not interested in facility at this time.  - started on IV morphine for symptom control  - continue to wean off O2 to his home regimen 3L NC as tolerated  - OOB to chair  - CM on board for inpatient hospice placement    Maria Luisa Story MD  Division of Hospital Medicine  Contact via [employer] Teams  Office: [phone]    I have personally and independently provided 51 minutes in patient encounter, reviewing tests and imaging, independently obtaining a history, performing a physical examination, discussing the plan with the patient, ordering medications/tests, documenting clinical information, and coordinating care. This excludes any time spent on teaching.

## 2024-12-27 NOTE — PROGRESS NOTE ADULT - PROBLEM SELECTOR PLAN 1
- progressive SOB, +orthopnea, +LE edema  - no productive cough  - - SOB improved after 1 dose of lasix per pt suggesting CHF as etiology  - ddx: CHF vs PNA vs COPD   - low suspicion for PE as pt on a/c    Plan  - s/p zoysn , amp, Duoneb  -s/p 80 IV lasix   -c/w lasix 40 PO   - wean oxygen support as tolerated   - BCx NGTD  - afebrile, no WBC, non-toxic appearing -> c/w abx for empiric endocarditis (see below)  - standing wts   - strict I/O

## 2024-12-27 NOTE — PROGRESS NOTE ADULT - PROBLEM SELECTOR PLAN 5
- mild tropinemia -> likely demand ischemia -> f/u OP for ischemic eval  - c/w home toprolol  -will consider adding losartan 25 if BPs improve, currently soft

## 2024-12-27 NOTE — PROGRESS NOTE ADULT - ASSESSMENT
88 yo M with COPD (on home 3L), RHF, mod pHTN, mild HFrEF (12/24 EF 45%), Afib on xarelto s/p PPM, BPH, HTN, HLD, recent hospitalization for COPD vs CHF c/b efaecalis bacteremia, p/w SOB and AHRF 2/2 likely CHF exacerbation, currently being diuresed, family talks about hospice facilities underway. CCB by gout now on colchicine

## 2024-12-27 NOTE — PROGRESS NOTE ADULT - PROBLEM SELECTOR PLAN 2
unclear hx of gout, now with acute R knee pain with light touch on exam, erythematous  UA 9.9  s/p 1.2mg colchicine, c/w 0.6 qd  with diarrhea today, if persists tmrw will switch to prednisone

## 2024-12-27 NOTE — PROGRESS NOTE ADULT - SUBJECTIVE AND OBJECTIVE BOX
AHSLEY KENDALL  89y  MRN: [id number]    Patient is a 89y old  Male who presents with a chief complaint of SOB (26 Dec 2024 08:03)      Subjective: Denies fever, CP, SOB, abn pain, N/V, dysuria. Slightly better knee pain however now complaining of diarrhea likely d/t colchicine     MEDICATIONS  (STANDING):  albuterol/ipratropium for Nebulization 3 milliLiter(s) Nebulizer every 6 hours  amLODIPine   Tablet 5 milliGRAM(s) Oral daily  ampicillin  IVPB 12 Gram(s) IV Intermittent Once  cefTRIAXone   IVPB 2000 milliGRAM(s) IV Intermittent every 12 hours  chlorhexidine 2% Cloths 1 Application(s) Topical <User Schedule>  colchicine 0.6 milliGRAM(s) Oral daily  ferrous    sulfate 325 milliGRAM(s) Oral daily  fluticasone propionate/ salmeterol 250-50 MICROgram(s) Diskus 1 Dose(s) Inhalation two times a day  furosemide    Tablet 40 milliGRAM(s) Oral daily  metoprolol succinate ER 50 milliGRAM(s) Oral daily  metoprolol succinate ER 25 milliGRAM(s) Oral at bedtime  rivaroxaban 20 milliGRAM(s) Oral with dinner  senna 2 Tablet(s) Oral at bedtime  tamsulosin 0.8 milliGRAM(s) Oral at bedtime  tiotropium 2.5 MICROgram(s) Inhaler 2 Puff(s) Inhalation daily    MEDICATIONS  (PRN):  morphine  - Injectable 2 milliGRAM(s) IV Push every 2 hours PRN dyspnea      Objective:    Vitals: Vital Signs Last 24 Hrs  T(C): 36.7 (12-27-24 @ 04:07), Max: 36.9 (12-26-24 @ 11:07)  T(F): 98 (12-27-24 @ 04:07), Max: 98.4 (12-26-24 @ 11:07)  HR: 73 (12-27-24 @ 04:07) (71 - 76)  BP: 121/67 (12-27-24 @ 04:07) (101/65 - 122/68)  BP(mean): --  RR: 18 (12-27-24 @ 04:07) (18 - 18)  SpO2: 92% (12-27-24 @ 04:07) (92% - 100%)            I&O's Summary    26 Dec 2024 07:01  -  27 Dec 2024 07:00  --------------------------------------------------------  IN: 660 mL / OUT: 1100 mL / NET: -440 mL    27 Dec 2024 07:01  -  27 Dec 2024 11:02  --------------------------------------------------------  IN: 240 mL / OUT: 0 mL / NET: 240 mL        PHYSICAL EXAM:  GENERAL: NAD  HEAD:  Atraumatic, Normocephalic  EYES: EOMI, conjunctiva and sclera clear  CHEST/LUNG: Clear to auscultation bilaterally; No rales, rhonchi, wheezing, or rubs  HEART: Regular rate and rhythm; No murmurs, rubs, or gallops  ABDOMEN: Soft, Nontender, Nondistended;   MSK: R knee still slightly red, tender over patella but less tender over rest of joint   NERVOUS SYSTEM:  Alert & Oriented X3, no focal deficits    LABS:  12-27    133[L]  |  98  |  22  ----------------------------<  105[H]  3.5   |  26  |  0.92  12-26    139  |  100  |  27[H]  ----------------------------<  89  3.7   |  26  |  1.09  12-25    140  |  100  |  24[H]  ----------------------------<  103[H]  3.7   |  28  |  0.99    Ca    8.2[L]      27 Dec 2024 05:44  Ca    8.3[L]      26 Dec 2024 07:09  Ca    8.3[L]      25 Dec 2024 05:23  Phos  2.5     12-27  Mg     2.1     12-27                      Urinalysis Basic - ( 27 Dec 2024 05:44 )    Color: x / Appearance: x / SG: x / pH: x  Gluc: 105 mg/dL / Ketone: x  / Bili: x / Urobili: x   Blood: x / Protein: x / Nitrite: x   Leuk Esterase: x / RBC: x / WBC x   Sq Epi: x / Non Sq Epi: x / Bacteria: x                              10.6   2.21  )-----------( 148      ( 27 Dec 2024 05:44 )             33.2                         10.1   1.77  )-----------( 128      ( 26 Dec 2024 07:09 )             32.4                         10.1   4.07  )-----------( 128      ( 25 Dec 2024 05:23 )             31.9     CAPILLARY BLOOD GLUCOSE          RADIOLOGY & ADDITIONAL TESTS:    Imaging Personally Reviewed:  [x ] YES  [ ] NO    Consultants involved in case:   Consultant(s) Notes Reviewed:  [ x] YES  [ ] NO:   Care Discussed with Consultants/Other Providers [x ] YES  [ ] NO

## 2024-12-27 NOTE — PROGRESS NOTE ADULT - ATTENDING COMMENTS
88 yo M with COPD (on home 3L), RHF, mod pHTN, mild HFrEF (12/24 EF 45%), Afib on xarelto s/p PPM, BPH, HTN, HLD, recent hospitalization for COPD vs CHF c/b efaecalis bacteremia, p/w SOB and AHRF 2/2 likely HFrEF exacerbation.    # acute on chronic HFrEF  # acute on chronic respiratory failure with hypoxia  # pulmonary HTN  # chronic AFib on xarelto  # COPD on 3L NC  # endocarditis/Enterococcus faecalis bacteremia on IV abx    - pt with recent admission for enterococcus faecalis bacteremia with presumed endocarditis, discharged on IV abx course presents with worsening dyspnea and LE swelling, admitted for acute on chronic HFrEF exacerbation  - s/p IV lasix in ED with improvement in LE edema as well as respiratory status  - transitioned to PO lasix 40mg daily on 12/27  - pt with gout flare in knee, started on colchicine but with GI symptoms. Continue to monitor and if no improvement, start course of prednisone  - wbc stable  - palliative care input appreciated: both patient and daughter understand the guarded prognosis and interested in hospice at this time. Code status confirmed and DNR/DNI. Pt does NOT want to return home but also not interested in facility at this time.  - started on IV morphine for symptom control  - continue to wean off O2 to his home regimen 3L NC as tolerated  - OOB to chair  - CM on board for inpatient hospice placement    Maria Luisa Story MD  Division of Hospital Medicine  Contact via [employer] Teams  Office: [phone]    I have personally and independently provided 51 minutes in patient encounter, reviewing tests and imaging, independently obtaining a history, performing a physical examination, discussing the plan with the patient, ordering medications/tests, documenting clinical information, and coordinating care. This excludes any time spent on teaching.

## 2024-12-28 NOTE — PROGRESS NOTE ADULT - ATTENDING COMMENTS
88 yo M with COPD (on home 3L), RHF, mod pHTN, mild HFrEF (12/24 EF 45%), Afib on xarelto s/p PPM, BPH, HTN, HLD, recent hospitalization for COPD vs CHF c/b efaecalis bacteremia, p/w SOB and AHRF 2/2 likely HFrEF exacerbation.    # acute on chronic HFrEF  # acute on chronic respiratory failure with hypoxia  # pulmonary HTN  # chronic AFib on xarelto  # COPD on 3L NC  # endocarditis/Enterococcus faecalis bacteremia on IV abx    Pt endorses knee pain improved, c/o diarrhea (3x this AM) and mild CP unchanged from ystdy. diarrhea causes distress.     - pt with recent admission for enterococcus faecalis bacteremia with presumed endocarditis, discharged on IV abx course presents with worsening dyspnea and LE swelling, admitted for acute on chronic HFrEF exacerbation  - s/p IV lasix in ED with improvement in LE edema as well as respiratory status  - transitioned to PO lasix 40mg daily on 12/27  - pt with gout flare in knee, started on colchicine but with GI symptoms. Knee pain improving, will DC colchicine d/t diarrhea per pt preference.  - wbc stable  - palliative care input appreciated: both patient and daughter understand the guarded prognosis and interested in hospice at this time. Code status confirmed and DNR/DNI. Pt does NOT want to return home but also not interested in facility at this time.  - started on IV morphine for symptom control  - continue to wean off O2 to his home regimen 3L NC as tolerated  - OOB to chair  - CM on board for inpatient hospice placement

## 2024-12-28 NOTE — PROGRESS NOTE ADULT - SUBJECTIVE AND OBJECTIVE BOX
ASHLEY KENDALL  89y  MRN: [id number]    Patient is a 89y old  Male who presents with a chief complaint of SOB (27 Dec 2024 11:02)      Subjective: no events ON. Denies fever, CP, SOB, abn pain, N/V, dysuria. Tolerating diet. Endorsed diarrhea this AM, but states that knee pain is mostly improved     MEDICATIONS  (STANDING):  albuterol/ipratropium for Nebulization 3 milliLiter(s) Nebulizer every 6 hours  amLODIPine   Tablet 5 milliGRAM(s) Oral daily  ampicillin  IVPB 12 Gram(s) IV Intermittent Once  cefTRIAXone   IVPB 2000 milliGRAM(s) IV Intermittent every 12 hours  chlorhexidine 2% Cloths 1 Application(s) Topical <User Schedule>  colchicine 0.6 milliGRAM(s) Oral daily  ferrous    sulfate 325 milliGRAM(s) Oral daily  fluticasone propionate/ salmeterol 250-50 MICROgram(s) Diskus 1 Dose(s) Inhalation two times a day  furosemide    Tablet 40 milliGRAM(s) Oral daily  metoprolol succinate ER 50 milliGRAM(s) Oral daily  metoprolol succinate ER 25 milliGRAM(s) Oral at bedtime  rivaroxaban 20 milliGRAM(s) Oral with dinner  tamsulosin 0.8 milliGRAM(s) Oral at bedtime  tiotropium 2.5 MICROgram(s) Inhaler 2 Puff(s) Inhalation daily    MEDICATIONS  (PRN):  morphine  - Injectable 2 milliGRAM(s) IV Push every 2 hours PRN dyspnea      Objective:    Vitals: Vital Signs Last 24 Hrs  T(C): 36.6 (12-28-24 @ 04:12), Max: 36.6 (12-28-24 @ 04:12)  T(F): 97.8 (12-28-24 @ 04:12), Max: 97.8 (12-28-24 @ 04:12)  HR: 72 (12-28-24 @ 04:12) (72 - 81)  BP: 106/62 (12-28-24 @ 04:12) (102/65 - 111/65)  BP(mean): --  RR: 18 (12-28-24 @ 04:12) (18 - 18)  SpO2: 94% (12-28-24 @ 04:12) (93% - 97%)            I&O's Summary    27 Dec 2024 07:01  -  28 Dec 2024 07:00  --------------------------------------------------------  IN: 600 mL / OUT: 500 mL / NET: 100 mL        PHYSICAL EXAM:  GENERAL: NAD  HEAD:  Atraumatic, Normocephalic  EYES: EOMI, conjunctiva and sclera clear  CHEST/LUNG: Clear to auscultation bilaterally; No rales, rhonchi, wheezing, or rubs  HEART: Regular rate and rhythm; No murmurs, rubs, or gallops  ABDOMEN: Soft, Nontender, Nondistended;   MSK: R knee nonerythematous, mildly tender to palpation, not red or warm   NERVOUS SYSTEM:  Alert & Oriented X3, no focal deficits    LABS:  12-28    137  |  98  |  21  ----------------------------<  87  3.6   |  24  |  0.93  12-27    133[L]  |  98  |  22  ----------------------------<  105[H]  3.5   |  26  |  0.92  12-26    139  |  100  |  27[H]  ----------------------------<  89  3.7   |  26  |  1.09    Ca    8.4      28 Dec 2024 06:47  Ca    8.2[L]      27 Dec 2024 05:44  Ca    8.3[L]      26 Dec 2024 07:09  Phos  2.4     12-28  Mg     2.1     12-28                      Urinalysis Basic - ( 28 Dec 2024 06:47 )    Color: x / Appearance: x / SG: x / pH: x  Gluc: 87 mg/dL / Ketone: x  / Bili: x / Urobili: x   Blood: x / Protein: x / Nitrite: x   Leuk Esterase: x / RBC: x / WBC x   Sq Epi: x / Non Sq Epi: x / Bacteria: x                              11.5   3.00  )-----------( 175      ( 28 Dec 2024 06:47 )             36.4                         10.6   2.21  )-----------( 148      ( 27 Dec 2024 05:44 )             33.2                         10.1   1.77  )-----------( 128      ( 26 Dec 2024 07:09 )             32.4     CAPILLARY BLOOD GLUCOSE          RADIOLOGY & ADDITIONAL TESTS:    Imaging Personally Reviewed:  [x ] YES  [ ] NO    Consultants involved in case:   Consultant(s) Notes Reviewed:  [ x] YES  [ ] NO:   Care Discussed with Consultants/Other Providers [x ] YES  [ ] NO

## 2024-12-29 NOTE — PROGRESS NOTE ADULT - PROBLEM SELECTOR PLAN 2
unclear hx of gout, now with acute R knee pain with light touch on exam, erythematous  UA 9.9  s/p 1.2mg colchicine, c/w 0.6 qd  with diarrhea today, if persists tmrw will switch to prednisone unclear hx of gout, now with acute R knee pain with light touch on exam, erythematous  UA 9.9  s/p 1.2mg colchicine, c/w 0.6 qd  with diarrhea today, if persists tmrw will switch to prednisone  - colchicine dc'd due to persistent diarrhea unclear hx of gout, now with acute R knee pain with light touch on exam, erythematous  UA 9.9  s/p 1.2mg colchicine, c/w 0.6 qd  with diarrhea today, if persists tmrw will switch to prednisone  - colchicine dc'd due to persistent diarrhea  - persistent diarrhea even aftr colchicine discontinued  - GI pcr ordered, Cdiff ordered   Plan  - isolation precaution  - GI PCR   - C diff studies, if positive vancomycin 125mg q6

## 2024-12-29 NOTE — CHART NOTE - NSCHARTNOTEFT_GEN_A_CORE
Transfer from: medicine   Transfer to: (  ) Medicine    (  ) Telemetry     (   ) RCU        (    ) Palliative         (   ) Stroke Unit          (  x ) _ADS_________________      Signout given to:       HPI:  88 yo M with COPD (on home 3L), RHF, mod pHTN, mild HFrEF (12/24 EF 45%), Afib on xarelto s/p PPM, BPH, HTN, HLD, recent hospitalization for COPD vs CHF c/b efaecalis bacteremia, p/w SOB x 2 weeks.     Of note pt was recently admitted to [facility] 12/3-12/10  for AHRF 2/2 CHF exacerbation c/b 4/4 positive bcx for Enterococcus faecalis d/c on PICC  on amp and CTX for 4 weeks (12/3/24- 1/2/24) for presumed endocarditis as CAREN was deemed non-essential as he is a poor surgical candidate. Since his d/c, pt states he's been taking all of his meds and limiting fluid intake as per recommendations. One day after d/c after showering, the patient stated he developed SOB worse with exertion. The SOB progressed to then being present at while rest and worse while laying down. Additionally, the pt noticed his legs were swollen. Pt denies fever, chills, HA, CP, productive cough, N/V, constipation, diarrhea. No recent sick contacts.  (22 Dec 2024 19:23)      Hospital Course:  Patient admitted for likely ADHF, received 80 lasix and was weaned to 5L NC. MOLST signed, DNR/DNI. Palliative c/s for hospice care, looking into hospice facilities- patient was accepted for home hospice however does not want to go home.       Assessment/Plan:    88 yo M with COPD (on home 3L), RHF, mod pHTN, mild HFrEF (12/24 EF 45%), Afib on xarelto s/p PPM, BPH, HTN, HLD, recent hospitalization for COPD vs CHF c/b efaecalis bacteremia, p/w SOB and AHRF 2/2 likely CHF exacerbation, currently being diuresed, family talks about hospice facilities underway. CCB by gout now on colchicine        Problem/Plan - 1:  ·  Problem: Acute hypoxic respiratory failure.   ·  Plan: - progressive SOB, +orthopnea, +LE edema  - no productive cough  - - SOB improved after 1 dose of lasix per pt suggesting CHF as etiology  - ddx: CHF vs PNA vs COPD   - low suspicion for PE as pt on a/c    Plan  - s/p zoysn , amp, Duoneb  -s/p 80 IV lasix   -c/w lasix 40 PO   - wean oxygen support as tolerated   - BCx NGTD  - afebrile, no WBC, non-toxic appearing -> c/w abx for empiric endocarditis (see below)  - standing wts   - strict I/O.     Problem/Plan - 2:  ·  Problem: Gout.   ·  Plan: unclear hx of gout, now with acute R knee pain with light touch on exam, erythematous  UA 9.9  s/p 1.2mg colchicine, c/w 0.6 qd  with diarrhea today, if persists tmrw will switch to prednisone  - colchicine dc'd due to persistent diarrhea  - persistent diarrhea even aftr colchicine discontinued  - GI pcr ordered, Cdiff ordered   Plan  - isolation precaution  - GI PCR   - C diff studies, if positive vancomycin 125mg q6.     Problem/Plan - 3:  ·  Problem: Positive blood culture.   ·  Plan: - from last admission, 4/4 bcx Enterococcus   - empirically tx for endocarditis per last ID note as TTE was deemed non-essential as pt is a poor surgical candidate  - 4 weeks from last cleared culture (12/5/24-1/2/25)    Plan  - c/w CTX.     Problem/Plan - 4:  ·  Problem: History of COPD.   ·  Plan: - no wheezing on exam  - SOB improved after 1 dose of lasix suggesting CHF as etiology  - low suspicion for copd exacerbation   - home meds: trelegy and albuterol  - start symbicort and duonebs inpt.     Problem/Plan - 5:  ·  Problem: HFrEF (heart failure with reduced ejection fraction).   ·  Plan: - mild tropinemia -> likely demand ischemia -> f/u OP for ischemic eval  - c/w home toprolol  -will consider adding losartan 25 if BPs improve, currently soft.     Problem/Plan - 6:  ·  Problem: Chronic atrial fibrillation.   ·  Plan: - c/w xarelto  - c/w toprolol.     Problem/Plan - 7:  ·  Problem: Hypertension.   ·  Plan: - hold losartan and amlodipine iso incr diuresis  - c/w home toprolol.     Problem/Plan - 8:  ·  Problem: Prophylactic measure.   ·  Plan: Diet: DASH   DVT: xarelto  Dispo: accepted to hospice facility pending bed  Code status: DNR/DNI.    To Do:  [ ] GI PCR, C diff studies, if positive start vancomycin 125mg q6   [ ] encourage po hydration  [ ] continue with ceftriaxone until 1/2 for bacteremia   [ ] accepted to hospice, pending hospice bed Transfer from: medicine   Transfer to: (  ) Medicine    (  ) Telemetry     (   ) RCU        (    ) Palliative         (   ) Stroke Unit          (  x ) _ADS_________________      Signout given to: Sumeet Herrmannjean carlos       HPI:  88 yo M with COPD (on home 3L), RHF, mod pHTN, mild HFrEF (12/24 EF 45%), Afib on xarelto s/p PPM, BPH, HTN, HLD, recent hospitalization for COPD vs CHF c/b efaecalis bacteremia, p/w SOB x 2 weeks.     Of note pt was recently admitted to [facility] 12/3-12/10  for AHRF 2/2 CHF exacerbation c/b 4/4 positive bcx for Enterococcus faecalis d/c on PICC  on amp and CTX for 4 weeks (12/3/24- 1/2/24) for presumed endocarditis as CAREN was deemed non-essential as he is a poor surgical candidate. Since his d/c, pt states he's been taking all of his meds and limiting fluid intake as per recommendations. One day after d/c after showering, the patient stated he developed SOB worse with exertion. The SOB progressed to then being present at while rest and worse while laying down. Additionally, the pt noticed his legs were swollen. Pt denies fever, chills, HA, CP, productive cough, N/V, constipation, diarrhea. No recent sick contacts.  (22 Dec 2024 19:23)      Hospital Course:  Patient admitted for likely ADHF, received 80 lasix and was weaned to 5L NC. MOLST signed, DNR/DNI. Palliative c/s for hospice care, looking into hospice facilities- patient was accepted for home hospice however does not want to go home.       Assessment/Plan:    88 yo M with COPD (on home 3L), RHF, mod pHTN, mild HFrEF (12/24 EF 45%), Afib on xarelto s/p PPM, BPH, HTN, HLD, recent hospitalization for COPD vs CHF c/b efaecalis bacteremia, p/w SOB and AHRF 2/2 likely CHF exacerbation, currently being diuresed, family talks about hospice facilities underway. CCB by gout now on colchicine        Problem/Plan - 1:  ·  Problem: Acute hypoxic respiratory failure.   ·  Plan: - progressive SOB, +orthopnea, +LE edema  - no productive cough  - - SOB improved after 1 dose of lasix per pt suggesting CHF as etiology  - ddx: CHF vs PNA vs COPD   - low suspicion for PE as pt on a/c    Plan  - s/p zoysn , amp, Duoneb  -s/p 80 IV lasix   -c/w lasix 40 PO   - wean oxygen support as tolerated   - BCx NGTD  - afebrile, no WBC, non-toxic appearing -> c/w abx for empiric endocarditis (see below)  - standing wts   - strict I/O.     Problem/Plan - 2:  ·  Problem: Gout.   ·  Plan: unclear hx of gout, now with acute R knee pain with light touch on exam, erythematous  UA 9.9  s/p 1.2mg colchicine, c/w 0.6 qd  with diarrhea today, if persists tmrw will switch to prednisone  - colchicine dc'd due to persistent diarrhea  - persistent diarrhea even aftr colchicine discontinued  - GI pcr ordered, Cdiff ordered   Plan  - isolation precaution  - GI PCR   - C diff studies, if positive vancomycin 125mg q6.     Problem/Plan - 3:  ·  Problem: Positive blood culture.   ·  Plan: - from last admission, 4/4 bcx Enterococcus   - empirically tx for endocarditis per last ID note as TTE was deemed non-essential as pt is a poor surgical candidate  - 4 weeks from last cleared culture (12/5/24-1/2/25)    Plan  - c/w CTX until 1/2  - ampicillin until 1/10      Problem/Plan - 4:  ·  Problem: History of COPD.   ·  Plan: - no wheezing on exam  - SOB improved after 1 dose of lasix suggesting CHF as etiology  - low suspicion for copd exacerbation   - home meds: trelegy and albuterol  - start symbicort and duonebs inpt.     Problem/Plan - 5:  ·  Problem: HFrEF (heart failure with reduced ejection fraction).   ·  Plan: - mild tropinemia -> likely demand ischemia -> f/u OP for ischemic eval  - c/w home toprolol  -will consider adding losartan 25 if BPs improve, currently soft.     Problem/Plan - 6:  ·  Problem: Chronic atrial fibrillation.   ·  Plan: - c/w xarelto  - c/w toprolol.     Problem/Plan - 7:  ·  Problem: Hypertension.   ·  Plan: - hold losartan and amlodipine iso incr diuresis  - c/w home toprolol.     Problem/Plan - 8:  ·  Problem: Prophylactic measure.   ·  Plan: Diet: DASH   DVT: xarelto  Dispo: accepted to hospice facility pending bed  Code status: DNR/DNI.    To Do:  [ ] GI PCR, C diff studies, if positive start vancomycin 125mg q6   [ ] encourage po hydration  [ ] continue with ceftriaxone until 1/2 and ampicillin until 1/10 for bacteremia   [ ] accepted to hospice, pending hospice bed

## 2024-12-29 NOTE — PROGRESS NOTE ADULT - ASSESSMENT
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 CCB by gout now on colchicine

## 2024-12-29 NOTE — PROGRESS NOTE ADULT - ATTENDING COMMENTS
89 yo M with COPD (on home 3L), RHF, mod pHTN, mild HFrEF (12/24 EF 45%), Afib on xarelto s/p PPM, BPH, HTN, HLD, recent hospitalization for COPD vs CHF c/b efaecalis bacteremia, p/w SOB and AHRF 2/2 likely HFrEF exacerbation.    # acute on chronic HFrEF  # acute on chronic respiratory failure with hypoxia  # pulmonary HTN  # chronic AFib on xarelto  # COPD on 3L NC  # endocarditis/Enterococcus faecalis bacteremia on IV abx    Pt endorses knee pain improved. Continues to have diarrhea.    - pt with recent admission for enterococcus faecalis bacteremia with presumed endocarditis, discharged on IV abx course (ampillin presents with worsening dyspnea and LE swelling, admitted for acute on chronic HFrEF exacerbation.  - s/p IV lasix in ED with improvement in LE edema as well as respiratory status  - transitioned to PO lasix 40mg daily on 12/27  - pt with gout flare in knee, started on colchicine but discontinued with diarrhea. Given continued diarrhea will check GI PCR and C Diff (contact precautions per infection control)  - wbc stable  - palliative care input appreciated: both patient and daughter understand the guarded prognosis and interested in hospice at this time. Code status confirmed and DNR/DNI. Pt does NOT want to return home but also not interested in facility at this time.  - started on IV morphine for symptom control  - continue to wean off O2 to his home regimen 3L NC as tolerated  - OOB to chair  - CM on board for inpatient hospice placement. 88 yo M with COPD (on home 3L), RHF, mod pHTN, mild HFrEF (12/24 EF 45%), Afib on xarelto s/p PPM, BPH, HTN, HLD, recent hospitalization for COPD vs CHF c/b efaecalis bacteremia, p/w SOB and AHRF 2/2 likely HFrEF exacerbation.    # acute on chronic HFrEF  # acute on chronic respiratory failure with hypoxia  # pulmonary HTN  # chronic AFib on xarelto  # COPD on 3L NC  # endocarditis/Enterococcus faecalis bacteremia on IV abx    Pt endorses knee pain improved. Continues to have diarrhea.    - pt with recent admission for enterococcus faecalis bacteremia with presumed endocarditis, discharged on IV abx course (ampillin presents with worsening dyspnea and LE swelling, admitted for acute on chronic HFrEF exacerbation.  - s/p IV lasix in ED with improvement in LE edema as well as respiratory status  - transitioned to PO lasix 40mg daily on 12/27  - pt with gout flare in knee, started on colchicine but discontinued with diarrhea. Given continued diarrhea will check GI PCR and C Diff (contact precautions per infection control)  - wbc stable  - palliative care input appreciated: both patient and daughter understand the guarded prognosis and interested in hospice at this time. Code status confirmed and DNR/DNI. Pt does NOT want to return home but also not interested in facility at this time.  - started on IV morphine for symptom control  - continue to wean off O2 to his home regimen 3L NC as tolerated  - OOB to chair  - CM on board for inpatient hospice placement.    Discussed plan with patient's daughter at bedside

## 2024-12-29 NOTE — PROGRESS NOTE ADULT - SUBJECTIVE AND OBJECTIVE BOX
PROGRESS NOTE:     Patient is a 89y old  Male who presents with a chief complaint of SOB (28 Dec 2024 08:55)      SUBJECTIVE / OVERNIGHT EVENTS:    OVERNIGHT: No acute overnight events.      Patient was examined at bedside and feels well this morning. Denies fever, chills, chest pain, SOB, nausea, vomiting. ROS otherwise negative and pt is amenable to current treatment plan.      REVIEW OF SYSTEMS:    CONSTITUTIONAL:  No weakness, fevers, or chills  EYES/ENT:  No visual changes, vertigo, or throat pain   NECK:  No pain or stiffness  RESPIRATORY:  No SOB, cough, wheezing, or hemoptysis  CARDIOVASCULAR:  No chest pain or palpitations  GASTROINTESTINAL:  No abdominal pain, nausea, vomiting, or hematemesis; No diarrhea or constipation; No melena or hematochezia.  GENITOURINARY:  No dysuria, change in frequency, or hematuria  NEUROLOGICAL:  No numbness or weakness  SKIN:  No itching or rashes      MEDICATIONS  (STANDING):  albuterol/ipratropium for Nebulization 3 milliLiter(s) Nebulizer every 6 hours  amLODIPine   Tablet 5 milliGRAM(s) Oral daily  ampicillin  IVPB 12 Gram(s) IV Intermittent Once  cefTRIAXone   IVPB 2000 milliGRAM(s) IV Intermittent every 12 hours  chlorhexidine 2% Cloths 1 Application(s) Topical <User Schedule>  ferrous    sulfate 325 milliGRAM(s) Oral daily  fluticasone propionate/ salmeterol 250-50 MICROgram(s) Diskus 1 Dose(s) Inhalation two times a day  furosemide    Tablet 40 milliGRAM(s) Oral daily  metoprolol succinate ER 50 milliGRAM(s) Oral daily  metoprolol succinate ER 25 milliGRAM(s) Oral at bedtime  rivaroxaban 20 milliGRAM(s) Oral with dinner  tamsulosin 0.8 milliGRAM(s) Oral at bedtime  tiotropium 2.5 MICROgram(s) Inhaler 2 Puff(s) Inhalation daily    MEDICATIONS  (PRN):  morphine  - Injectable 2 milliGRAM(s) IV Push every 2 hours PRN dyspnea      CAPILLARY BLOOD GLUCOSE        I&O's Summary    27 Dec 2024 07:01  -  28 Dec 2024 07:00  --------------------------------------------------------  IN: 600 mL / OUT: 500 mL / NET: 100 mL    28 Dec 2024 07:01  -  29 Dec 2024 06:56  --------------------------------------------------------  IN: 360 mL / OUT: 100 mL / NET: 260 mL        PHYSICAL EXAM:  Vital Signs Last 24 Hrs  T(C): 37.1 (29 Dec 2024 04:15), Max: 37.1 (29 Dec 2024 04:15)  T(F): 98.7 (29 Dec 2024 04:15), Max: 98.7 (29 Dec 2024 04:15)  HR: 71 (29 Dec 2024 04:15) (71 - 87)  BP: 114/62 (29 Dec 2024 04:15) (113/69 - 114/62)  BP(mean): --  RR: 18 (29 Dec 2024 04:15) (18 - 18)  SpO2: 94% (29 Dec 2024 04:15) (94% - 95%)    Parameters below as of 29 Dec 2024 04:15  Patient On (Oxygen Delivery Method): nasal cannula        CONSTITUTIONAL: NAD; well-developed  HEENT: PERRL, clear conjunctiva  RESPIRATORY: Normal respiratory effort; lungs are clear to auscultation bilaterally; No crackles/rhonchi/wheezing  CARDIOVASCULAR: Regular rate and rhythm, normal S1 and S2, no murmur/rub/gallop; No lower extremity edema; Peripheral pulses are 2+ bilaterally  ABDOMEN: Nontender to palpation, normoactive bowel sounds, no rebound/guarding; No hepatosplenomegaly  MUSCULOSKELETAL: No clubbing or cyanosis of digits; no joint swelling or tenderness to palpation  EXTREMITY: Lower extremities non-tender to palpation; non-erythematous B/L  NEURO: A&Ox3; no focal deficits   PSYCH: Normal mood; affect appropriate    LABS:                        10.9   3.26  )-----------( 167      ( 29 Dec 2024 06:32 )             34.4     12-28    137  |  98  |  21  ----------------------------<  87  3.6   |  24  |  0.93    Ca    8.4      28 Dec 2024 06:47  Phos  2.4     12-28  Mg     2.1     12-28            Urinalysis Basic - ( 28 Dec 2024 06:47 )    Color: x / Appearance: x / SG: x / pH: x  Gluc: 87 mg/dL / Ketone: x  / Bili: x / Urobili: x   Blood: x / Protein: x / Nitrite: x   Leuk Esterase: x / RBC: x / WBC x   Sq Epi: x / Non Sq Epi: x / Bacteria: x          RADIOLOGY & ADDITIONAL TESTS:    N/A   PROGRESS NOTE:     Patient is a 89y old  Male who presents with a chief complaint of SOB (28 Dec 2024 08:55)      SUBJECTIVE / OVERNIGHT EVENTS:    OVERNIGHT: No acute overnight events. watery diarrhea for the past 3 days. persistent even after dc'd colchicine       Patient was examined at bedside. Denies fever, chills, chest pain, SOB, nausea, vomiting. ROS otherwise negative and pt is amenable to current treatment plan.      REVIEW OF SYSTEMS:    CONSTITUTIONAL:  No weakness, fevers, or chills  EYES/ENT:  No visual changes, vertigo, or throat pain   NECK:  No pain or stiffness  RESPIRATORY:  No SOB, cough, wheezing, or hemoptysis  CARDIOVASCULAR:  No chest pain or palpitations  GASTROINTESTINAL:  No abdominal pain, nausea, vomiting, or hematemesis; + diarrhea, no constipation; No melena or hematochezia.  GENITOURINARY:  No dysuria, change in frequency, or hematuria  NEUROLOGICAL:  No numbness or weakness  SKIN:  No itching or rashes      MEDICATIONS  (STANDING):  albuterol/ipratropium for Nebulization 3 milliLiter(s) Nebulizer every 6 hours  amLODIPine   Tablet 5 milliGRAM(s) Oral daily  ampicillin  IVPB 12 Gram(s) IV Intermittent Once  cefTRIAXone   IVPB 2000 milliGRAM(s) IV Intermittent every 12 hours  chlorhexidine 2% Cloths 1 Application(s) Topical <User Schedule>  ferrous    sulfate 325 milliGRAM(s) Oral daily  fluticasone propionate/ salmeterol 250-50 MICROgram(s) Diskus 1 Dose(s) Inhalation two times a day  furosemide    Tablet 40 milliGRAM(s) Oral daily  metoprolol succinate ER 50 milliGRAM(s) Oral daily  metoprolol succinate ER 25 milliGRAM(s) Oral at bedtime  rivaroxaban 20 milliGRAM(s) Oral with dinner  tamsulosin 0.8 milliGRAM(s) Oral at bedtime  tiotropium 2.5 MICROgram(s) Inhaler 2 Puff(s) Inhalation daily    MEDICATIONS  (PRN):  morphine  - Injectable 2 milliGRAM(s) IV Push every 2 hours PRN dyspnea      CAPILLARY BLOOD GLUCOSE        I&O's Summary    27 Dec 2024 07:01  -  28 Dec 2024 07:00  --------------------------------------------------------  IN: 600 mL / OUT: 500 mL / NET: 100 mL    28 Dec 2024 07:01  -  29 Dec 2024 06:56  --------------------------------------------------------  IN: 360 mL / OUT: 100 mL / NET: 260 mL        PHYSICAL EXAM:  Vital Signs Last 24 Hrs  T(C): 37.1 (29 Dec 2024 04:15), Max: 37.1 (29 Dec 2024 04:15)  T(F): 98.7 (29 Dec 2024 04:15), Max: 98.7 (29 Dec 2024 04:15)  HR: 71 (29 Dec 2024 04:15) (71 - 87)  BP: 114/62 (29 Dec 2024 04:15) (113/69 - 114/62)  BP(mean): --  RR: 18 (29 Dec 2024 04:15) (18 - 18)  SpO2: 94% (29 Dec 2024 04:15) (94% - 95%)    Parameters below as of 29 Dec 2024 04:15  Patient On (Oxygen Delivery Method): nasal cannula        CONSTITUTIONAL: NAD; well-developed  HEENT: PERRL, clear conjunctiva, dry oral mucosa   RESPIRATORY: Normal respiratory effort; lungs are clear to auscultation bilaterally; No crackles/rhonchi/wheezing  CARDIOVASCULAR: Regular rate and rhythm, normal S1 and S2, no murmur/rub/gallop; No lower extremity edema; Peripheral pulses are 2+ bilaterally  ABDOMEN: Nontender to palpation, normoactive bowel sounds, no rebound/guarding; No hepatosplenomegaly  MUSCULOSKELETAL: No clubbing or cyanosis of digits; no joint swelling or tenderness to palpation  EXTREMITY: Lower extremities non-tender to palpation; non-erythematous B/L  NEURO: A&Ox3; no focal deficits   PSYCH: Normal mood; affect appropriate    LABS:                        10.9   3.26  )-----------( 167      ( 29 Dec 2024 06:32 )             34.4     12-28    137  |  98  |  21  ----------------------------<  87  3.6   |  24  |  0.93    Ca    8.4      28 Dec 2024 06:47  Phos  2.4     12-28  Mg     2.1     12-28            Urinalysis Basic - ( 28 Dec 2024 06:47 )    Color: x / Appearance: x / SG: x / pH: x  Gluc: 87 mg/dL / Ketone: x  / Bili: x / Urobili: x   Blood: x / Protein: x / Nitrite: x   Leuk Esterase: x / RBC: x / WBC x   Sq Epi: x / Non Sq Epi: x / Bacteria: x          RADIOLOGY & ADDITIONAL TESTS:    N/A

## 2024-12-30 NOTE — PROGRESS NOTE ADULT - NSPROGADDITIONALINFOA_GEN_ALL_CORE
.  Lindsey Cross MD  Division of Hospital Medicine  [facility]   Available on Microsoft Teams - messages preferred prior to calls.    Plan discussed with patient, daughter Inez bedside on 2 encounters, [facility] Care attending Dr. Carrasco,  Ginny, and medicine EMILE Stanford.

## 2024-12-30 NOTE — PROGRESS NOTE ADULT - PROBLEM SELECTOR PLAN 6
- will stop xarelto and Toprol as per [facility] discussion with daughter Inez 12/30

## 2024-12-30 NOTE — PROGRESS NOTE ADULT - PROBLEM SELECTOR PLAN 3
- mild tropinemia -> likely demand ischemia. non-MI troponin elevation  - GDMT and diuretics de-escalated as per family's GOC/wishes

## 2024-12-30 NOTE — PROGRESS NOTE ADULT - PROBLEM SELECTOR PROBLEM 1
Acute hypoxic respiratory failure
Dyspnea
Acute hypoxic respiratory failure

## 2024-12-30 NOTE — PROGRESS NOTE ADULT - PROBLEM SELECTOR PLAN 1
- progressive SOB, +orthopnea, +LE edema   - SOB initially improved after diuresis but with persistent ongoing dyspnea despite euvolemia  - has also underlying COPD on chronic home O2  - decision made to pursue inpatient hospice at this time  - c/w duonebs and inhaler regimen for SOB  - morphine 2mg IVP PRN for SOB/dyspnea

## 2024-12-30 NOTE — PROGRESS NOTE ADULT - ASSESSMENT
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

## 2024-12-30 NOTE — PROGRESS NOTE ADULT - PROBLEM SELECTOR PLAN 4
Present from last admission with 4/4 blood cultures growing E. faecalis  - was on empiric 4 week treatment course (to end 1/2/25) with IV ampicillin and ceftriaxone for endocarditis per last ID note as TTE was deemed non-essential as pt is a poor surgical candidate  - given change in GOC with now pursuing hospice measures, IV ampicillin and ceftriaxone discontinued as per family request on 12/30

## 2024-12-30 NOTE — PROGRESS NOTE ADULT - PROBLEM SELECTOR PLAN 2
on 3L home O2  - home meds: trelegy and albuterol  - c/w advair, spiriva, and duonebs for ongoing SOB/dyspnea

## 2024-12-30 NOTE — PROGRESS NOTE ADULT - PROBLEM SELECTOR PLAN 7
had diarrhea likely 2/2 to colchicine which was discontinued. diarrhea now resolved  despite being on long-term IV abx, given resolution of diarrhea, no concern for infectious process.  c. diff and GI PCR testing discontinued

## 2024-12-30 NOTE — PROGRESS NOTE ADULT - PROVIDER SPECIALTY LIST ADULT
Internal Medicine
Hospitalist
Internal Medicine
Palliative Care
Internal Medicine
Internal Medicine

## 2024-12-30 NOTE — PROGRESS NOTE ADULT - PROBLEM SELECTOR PLAN 5
had acute R knee pain with erythema with elevated uric acid concerning for acute gout flare. likely 2/2 to diuresis.  - s/p colchicine - discontinued in setting of diarrhea  - R knee pain resolved - non-tender to palpation, no erythema on exam

## 2024-12-30 NOTE — PROGRESS NOTE ADULT - TIME BILLING
- Ordering, reviewing, and interpreting labs, testing, and imaging.  - Independently obtaining a review of systems and performing a physical exam  - Reviewing consultant documentation/recommendations.  - Counselling and educating patient regarding interpretation of aforementioned items and plan of care.
Time-based billing (NON-critical care).     30 minutes spent on total encounter; more than 50% of the visit was spent counseling and / or coordinating care by the attending physician.  The necessity of the time spent during the encounter on this date of service was due to:     review of laboratory data, radiology results, consultants' recommendations, documentation in [facility], discussion with patient/caregivers/housestaff and interdisciplinary staff (such as , social workers, etc). Interventions were performed as documented above.
chart review, interview, physical exam, coordination with consultants, medical decision making and documentation

## 2024-12-30 NOTE — PROGRESS NOTE ADULT - PROBLEM SELECTOR PLAN 8
Code status: DNR/DNI    Dispo: awaiting inpatient hospice bed availabilty
Diet: DASH   DVT: xarelto  Dispo: likely hospice facility   Code status: DNR/DNI
Diet: DASH   DVT: xarelto  Dispo: accepted to hospice facility pending bed  Code status: DNR/DNI
Diet: DASH   DVT: xarelto  Dispo: accepted to hospice facility pending bed  Code status: DNR/DNI
Diet: DASH   DVT: xarelto  Dispo: likely hospice facility   Code status: DNR/DNI

## 2024-12-30 NOTE — PROGRESS NOTE ADULT - CONVERSATION DETAILS
Introduced myself to both patient and daughter Inez bedside. Confirmed both patient and family have decided to pursue hospice care at this time. We reviewed current medications and treatments modalities ordered in terms of what aligns with most recent GOC; daughter, Inez, requested de-escalation of all non-symptom focused medications including IV abx with goal to focus on symptom management and comfort only.     Advanced care planning time spent: 30 minutes
Discussed the guarded prognosis of the patient given multiple comorbidities including COPD and CHF. Patient and daughter both understand and agree with palliative/hospice referral. DNR/DNI

## 2024-12-30 NOTE — PROGRESS NOTE ADULT - SUBJECTIVE AND OBJECTIVE BOX
Lindsey Cross MD  Division of Hospital Medicine  [facility]   Available on Microsoft Teams (Mon-Fri 8am-5pm)    * messages preferred prior to calls  Other Times:  [phone]      Patient is a 89y old  Male who presents with a chief complaint of SOB (29 Dec 2024 06:56)      SUBJECTIVE / OVERNIGHT EVENTS: no acute events overnight. diarrhea resolved with formed BMs. endorsing SOB at time of my exam requesting IV morphine for symptom management. During encounter, became lethargic which patient's daughter states has been ongoing struggle. he is lethargic for most of the day and awakens when his SOB worsens.  ADDITIONAL REVIEW OF SYSTEMS:    MEDICATIONS  (STANDING):  albuterol/ipratropium for Nebulization 3 milliLiter(s) Nebulizer every 6 hours  chlorhexidine 2% Cloths 1 Application(s) Topical <User Schedule>  fluticasone propionate/ salmeterol 250-50 MICROgram(s) Diskus 1 Dose(s) Inhalation two times a day  tiotropium 2.5 MICROgram(s) Inhaler 2 Puff(s) Inhalation daily    MEDICATIONS  (PRN):  morphine  - Injectable 2 milliGRAM(s) IV Push every 2 hours PRN dyspnea      CAPILLARY BLOOD GLUCOSE        I&O's Summary    29 Dec 2024 07:01  -  30 Dec 2024 07:00  --------------------------------------------------------  IN: 462 mL / OUT: 675 mL / NET: -213 mL    30 Dec 2024 07:01  -  30 Dec 2024 13:23  --------------------------------------------------------  IN: 120 mL / OUT: 0 mL / NET: 120 mL        PHYSICAL EXAM:  Vital Signs Last 24 Hrs  T(C): 36.7 (30 Dec 2024 11:21), Max: 36.9 (30 Dec 2024 04:12)  T(F): 98.1 (30 Dec 2024 11:21), Max: 98.5 (30 Dec 2024 04:12)  HR: 70 (30 Dec 2024 11:21) (70 - 76)  BP: 100/58 (30 Dec 2024 11:21) (100/58 - 111/55)  BP(mean): --  RR: 18 (30 Dec 2024 11:21) (18 - 18)  SpO2: 94% (30 Dec 2024 11:21) (90% - 94%)    Parameters below as of 30 Dec 2024 11:21  Patient On (Oxygen Delivery Method): nasal cannula      CONSTITUTIONAL: frail, chronically ill appearing male in mild respiratory discomfort  EYES: PERRLA; conjunctiva and sclera clear  ENMT: Moist oral mucosa, no pharyngeal injection or exudates; normal dentition  NECK: Supple, no palpable masses; no thyromegaly  RESPIRATORY: Normal respiratory effort; coarse breath sounds b/l with scattered wheeze  CARDIOVASCULAR: Regular rate and rhythm, normal S1 and S2, no murmur/rub/gallop; No lower extremity edema  ABDOMEN: Soft, Nondistended, Nontender to palpation, normoactive bowel sounds  MUSCULOSKELETAL: No clubbing or cyanosis of digits; no joint swelling or tenderness to palpation  PSYCH: A+O to person, place, and time; affect appropriate  NEUROLOGY: CN 2-12 are intact and symmetric; no gross sensory deficits   SKIN: No rashes; no palpable lesions    LABS:                        10.5   3.10  )-----------( 157      ( 30 Dec 2024 07:26 )             32.6     12-30    139  |  99  |  17  ----------------------------<  101[H]  3.4[L]   |  25  |  0.86    Ca    8.2[L]      30 Dec 2024 07:26  Phos  2.2     12-30  Mg     1.8     12-30      Urinalysis Basic - ( 30 Dec 2024 07:26 )    Color: x / Appearance: x / SG: x / pH: x  Gluc: 101 mg/dL / Ketone: x  / Bili: x / Urobili: x   Blood: x / Protein: x / Nitrite: x   Leuk Esterase: x / RBC: x / WBC x   Sq Epi: x / Non Sq Epi: x / Bacteria: x        RADIOLOGY & ADDITIONAL TESTS:  Results Reviewed:   Imaging Personally Reviewed:  Electrocardiogram Personally Reviewed:    COORDINATION OF CARE:  Care Discussed with Consultants/Other Providers [Y]: [facility] care Attending Dr. Carrasco, medicine EMILE Stanford  Prior or Outpatient Records Reviewed [Y/N]:

## 2024-12-31 NOTE — DISCHARGE NOTE NURSING/CASE MANAGEMENT/SOCIAL WORK - PATIENT PORTAL LINK FT
You can access the FollowMyHealth Patient Portal offered by [facility] by registering at the following website: [email or web address]. By joining [facility]’s FollowMyHealth portal, you will also be able to view your health information using other applications (apps) compatible with our system.

## 2024-12-31 NOTE — CHART NOTE - NSCHARTNOTEFT_GEN_A_CORE
Hospitalist Discharge Day Note    Patient seen and examined earlier today on 12/31/24.     Interval History: alert this morning endorsing ongoing dyspnea/shortness of breath. understanding more role of IV morphine to help with his dyspnea and has been trying to use more when awake. having ongoing desaturation with minimal movement still requiring 6L NC.    Physical Exam:  CONSTITUTIONAL: frail, chronically ill appearing male  EYES: PERRLA; conjunctiva and sclera clear  ENMT: Moist oral mucosa, no pharyngeal injection or exudates; normal dentition  NECK: Supple, no palpable masses; no thyromegaly  RESPIRATORY: Normal respiratory effort; coarse breath sounds, no wheeze today but faint bibasilar crackles  CARDIOVASCULAR: Regular rate and rhythm, normal S1 and S2, no murmur/rub/gallop; No lower extremity edema  ABDOMEN: Soft, Nondistended, Nontender to palpation, normoactive bowel sounds  MUSCULOSKELETAL: No clubbing or cyanosis of digits; no joint swelling or tenderness to palpation  PSYCH: A+O to person, place (hospital), and time; affect appropriate  NEUROLOGY: CN 2-12 are intact and symmetric; no gross sensory deficits   SKIN: No rashes; no palpable lesions    Assessment:  88 yo M with COPD (on home 3L), RHF, mod pHTN, mild HFrEF (12/24 EF 45%), Afib on xarelto s/p PPM, BPH, HTN, HLD, recent hospitalization for COPD vs CHF c/b E, faecalis bacteremia, p/w SOB and AHRF 2/2 likely CHF exacerbation with decision made for hospice at this time.    Plan:  - utilizing IVP morphine more for SOB/dyspnea but still with ongoing SOB and desaturations with minimal movement requiring 6L NC  - non-essential medications de-escalating yesterday as per GOC with patient/daughter Inez  - d/w [facility] Palliative Care Attending Dr. Radha Carrasco today - will accept today and bed available  - will continue nebs/inhalers as ordered on d/c and defer to Palliative team further de-escalation  - c/w IV morphine PRN for SOB/pain    Medically clear for discharge to inpatient hospice today 12/31/24.  Discharge planning time spent: 36 minutes.    Plan discussed with patient, daughter Inez via phone, [facility] Pall Care Attending Dr. Carrasco, RAHUL Gross, and medicine EMILE Stanford.

## 2024-12-31 NOTE — DISCHARGE NOTE NURSING/CASE MANAGEMENT/SOCIAL WORK - NSDCVIVACCINE_GEN_ALL_CORE_FT
influenza, injectable, quadrivalent, preservative free; 03-Oct-2018 10:13; Bony Gamino (MARCO); [employer]; [id number] (Exp. Date: 30-Jun-2019); IntraMuscular; Deltoid Right.; 0.5 milliLiter(s); VIS (VIS Published: 07-Aug-2015, VIS Presented: 03-Oct-2018);

## 2025-01-01 ENCOUNTER — TRANSCRIPTION ENCOUNTER (OUTPATIENT)
Age: 89
End: 2025-01-01

## 2025-01-09 ENCOUNTER — APPOINTMENT (OUTPATIENT)
Dept: GERIATRICS | Facility: CLINIC | Age: 89
End: 2025-01-09

## 2025-01-16 ENCOUNTER — APPOINTMENT (OUTPATIENT)
Dept: PULMONOLOGY | Facility: CLINIC | Age: 89
End: 2025-01-16

## 2025-03-17 ENCOUNTER — APPOINTMENT (OUTPATIENT)
Dept: ELECTROPHYSIOLOGY | Facility: CLINIC | Age: 89
End: 2025-03-17

## 2025-06-06 NOTE — PATIENT PROFILE ADULT - NSPRESCRUSEDDRG_GEN_A_NUR
Case Management   Daily Progress Note       Patient Name: Lyn Lombardi                   YOB: 1939  Diagnosis: Acute hyponatremia [E87.1]  NSTEMI (non-ST elevated myocardial infarction) (HCC) [I21.4]                       GMLOS: 5 days  Length of Stay: 8  days    Anticipated Discharge Date: To be determined    Readmission Risk (Low < 19, Mod (19-27), High > 27): Readmission Risk Score: 16.4        Current Transitional Plan    [] Home Independently    [] Home with HC    [x] Skilled Nursing Facility    [x] Acute Rehabilitation    [] Long Term Acute Care (LTAC)    [] Other:     Plan for the Stay (Medical Management) :          Workflow Continuation (Additional Notes) :    Discussed plan with pt and pt's daughters at bedside, goal is to return home after acute rehab.  Pt's daughter's would like to have pt go to ARU vs snf.  Pulled list from [facility], closest ARU is [facility] in [place].  Also gave family snf list.  Discussed [facility] and provided print out from the website, would like a referral placed there.        VM from [place] at [facility]. Requesting clinicals be uploaded, clinical added to [facility].       JOSELIN EARLY  June 6, 2025         No

## 2025-06-09 NOTE — CONSULT NOTE ADULT - CONSULT REASON
Submitted PA for hydrOXYzine HCl 25MG tablets   Via [facility]: [id number]  STATUS: PENDING.    Follow up done daily; if no decision with in three days we will refax.  If another three days goes by with no decision will call the insurance for status.    goc

## 2025-06-30 NOTE — PROGRESS NOTE ADULT - PROBLEM SELECTOR PLAN 7
[Pulmonary Function Test] : Pulmonary Function Test [Severe] : (severe) [FreeTextEntry1] :  FEV1 IS 42% OF PRED that reverses by 54% after albuterol to 65% of predicted  - hold losartan and amlodipine iso incr diuresis  - c/w home toprolol
